# Patient Record
Sex: MALE | Race: WHITE | NOT HISPANIC OR LATINO | Employment: OTHER | URBAN - METROPOLITAN AREA
[De-identification: names, ages, dates, MRNs, and addresses within clinical notes are randomized per-mention and may not be internally consistent; named-entity substitution may affect disease eponyms.]

---

## 2017-01-24 ENCOUNTER — TELEPHONE (OUTPATIENT)
Dept: FAMILY MEDICINE | Facility: CLINIC | Age: 62
End: 2017-01-24

## 2017-01-24 DIAGNOSIS — M62.830 LUMBAR PARASPINAL MUSCLE SPASM: Primary | ICD-10-CM

## 2017-01-24 NOTE — TELEPHONE ENCOUNTER
Patient called in asking can you give him something for pain patient is having bad back spasms. Please advise.

## 2017-01-24 NOTE — TELEPHONE ENCOUNTER
----- Message from Luz Jones sent at 1/23/2017  8:33 AM CST -----  Contact: self  Pt requesting a call back regarding bad back spasms.  Please call pt at .    Thanks

## 2017-01-25 RX ORDER — ORPHENADRINE CITRATE 100 MG/1
100 TABLET, EXTENDED RELEASE ORAL 2 TIMES DAILY
Qty: 30 TABLET | Refills: 0 | Status: SHIPPED | OUTPATIENT
Start: 2017-01-25 | End: 2017-02-04

## 2017-01-25 RX ORDER — HYDROCODONE BITARTRATE AND ACETAMINOPHEN 7.5; 325 MG/1; MG/1
1 TABLET ORAL EVERY 6 HOURS PRN
Qty: 40 TABLET | Refills: 0 | Status: SHIPPED | OUTPATIENT
Start: 2017-01-25 | End: 2017-06-13 | Stop reason: SDUPTHER

## 2017-01-25 NOTE — TELEPHONE ENCOUNTER
Patient is having severe back spasms.  I called him, we will prescribe some orphenadrine 100 mg twice a day tonight, and prescribed Norco 7.5 mg for the patient's wife to  tomorrow.  Dr. Aquino

## 2017-01-26 ENCOUNTER — TELEPHONE (OUTPATIENT)
Dept: FAMILY MEDICINE | Facility: CLINIC | Age: 62
End: 2017-01-26

## 2017-01-31 RX ORDER — ALLOPURINOL 100 MG/1
TABLET ORAL
Qty: 180 TABLET | Refills: 0 | Status: SHIPPED | OUTPATIENT
Start: 2017-01-31 | End: 2017-04-27 | Stop reason: SDUPTHER

## 2017-02-20 RX ORDER — PRAVASTATIN SODIUM 20 MG/1
TABLET ORAL
Qty: 90 TABLET | Refills: 0 | Status: SHIPPED | OUTPATIENT
Start: 2017-02-20 | End: 2017-05-30 | Stop reason: SDUPTHER

## 2017-03-07 ENCOUNTER — OFFICE VISIT (OUTPATIENT)
Dept: OPTOMETRY | Facility: CLINIC | Age: 62
End: 2017-03-07
Payer: COMMERCIAL

## 2017-03-07 DIAGNOSIS — B30.9 VIRAL CONJUNCTIVITIS: Primary | ICD-10-CM

## 2017-03-07 PROCEDURE — 92012 INTRM OPH EXAM EST PATIENT: CPT | Mod: S$GLB,,, | Performed by: OPTOMETRIST

## 2017-03-07 PROCEDURE — 99999 PR PBB SHADOW E&M-EST. PATIENT-LVL II: CPT | Mod: PBBFAC,,, | Performed by: OPTOMETRIST

## 2017-03-07 NOTE — PATIENT INSTRUCTIONS
CONJUNCTIVITIS    Conjunctivitis Caused by Infection  Infections are caused by viruses or germs (bacteria). Treatment includes keeping your eyes and hands clean. Your health care provider may prescribe eye drops, and tell you to stay home from work or school if youre contagious. Untreated infections can be serious. It's important to see your provider for a diagnosis.    Viral infections  A cold, flu, or other virus can spread to your eyes. This causes a watery discharge. Your eyes may burn or itch and get red. Your eyelids may also be puffy and sore.    Treatment  Most viral infections go away on their own. Artificial tears and warm compresses can relieve symptoms. Your provider may also prescribe eye drops. A viral infection can be very contagious and spreads quickly. To prevent this, wash your hands often. Use a separate tissue to wipe each eye. Dont touch your eyes or share bedding or towels.       Bacterial infections  Bacterial infections often occur in one eye. There may be a watery or a thick discharge from the eye. These infections can cause serious damage to your eye if not treated promptly.    Treatment  Your provider may prescribe eye drops or ointment to kill the bacteria. Warm compresses can help keep the eyelids clean. To keep the bacteria from spreading, wash your hands often. Use a separate tissue to wipe each eye. Dont touch your eyes or share bedding or towels.      © 6448-5340 [employer]. [address] [place] [address], [place] [address]. All rights reserved. This information is not intended as a substitute for professional medical care. Always follow your healthcare professional's instructions.

## 2017-03-07 NOTE — PROGRESS NOTES
HPI     Dls: 11/29/16 Dr. Garrett x 1week ago dr. [place].     Pt states woke up this am red os itching os no burning no pain no tearing   no mucous no photophobia no changes in vision. Pt used systane balance.            Last edited by Veda Zurita on 3/7/2017  1:30 PM.       Assessment /Plan     For exam results, see Encounter Report.    Viral conjunctivitis    Cool compresses as needed. Artificial tears PRN for discomfort. Practice good hygiene. Return if symptoms worsen or fail to improve.

## 2017-03-20 ENCOUNTER — OFFICE VISIT (OUTPATIENT)
Dept: FAMILY MEDICINE | Facility: CLINIC | Age: 62
End: 2017-03-20
Payer: COMMERCIAL

## 2017-03-20 VITALS
BODY MASS INDEX: 25.33 KG/M2 | DIASTOLIC BLOOD PRESSURE: 88 MMHG | HEART RATE: 53 BPM | WEIGHT: 167.13 LBS | OXYGEN SATURATION: 99 % | TEMPERATURE: 100 F | SYSTOLIC BLOOD PRESSURE: 130 MMHG | HEIGHT: 68 IN

## 2017-03-20 PROCEDURE — 99999 PR PBB SHADOW E&M-EST. PATIENT-LVL III: CPT | Mod: PBBFAC,,,

## 2017-03-20 PROCEDURE — 3079F DIAST BP 80-89 MM HG: CPT | Mod: S$GLB,,,

## 2017-03-20 PROCEDURE — 99213 OFFICE O/P EST LOW 20 MIN: CPT | Mod: S$GLB,,,

## 2017-03-20 PROCEDURE — 3075F SYST BP GE 130 - 139MM HG: CPT | Mod: S$GLB,,,

## 2017-03-20 PROCEDURE — 1160F RVW MEDS BY RX/DR IN RCRD: CPT | Mod: S$GLB,,,

## 2017-03-20 RX ORDER — OSELTAMIVIR PHOSPHATE 75 MG/1
75 CAPSULE ORAL 2 TIMES DAILY
Qty: 10 CAPSULE | Refills: 0 | Status: SHIPPED | OUTPATIENT
Start: 2017-03-20 | End: 2017-03-25

## 2017-03-20 NOTE — PROGRESS NOTES
Chief Complaint   Patient presents with    Nasal Congestion    Fever    Cough       HPI  Denis Bunn is a 61 y.o. male who presents to the office with febrile illness.  The patient had no should the sudden onset of malaise, chills and fever 2 days ago.  This is associated with mild body ache, mild cough, but the patient is extremely fatigued.  Nonsmoker.  Pt is known to me.    HPI    PAST MEDICAL HISTORY:  Past Medical History:   Diagnosis Date    Back pain     Cataract     Constipation - functional     History of gout     HTN (hypertension), benign 4/24/2013    Prostatitis     Vision changes        PAST SURGICAL HISTORY:  Past Surgical History:   Procedure Laterality Date    CATARACT EXTRACTION      left eye    ELBOW SURGERY      scope/right    EYE SURGERY      Dr. Hope.retina x2 left    LAMINECTOMY      WRIST SURGERY      right       SOCIAL HISTORY:  Social History     Social History    Marital status:      Spouse name: N/A    Number of children: N/A    Years of education: N/A     Occupational History    Not on file.     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    Alcohol use 1.8 oz/week     3 Glasses of wine per week      Comment: 8-10 glasses wine per week    Drug use: No    Sexual activity: Yes     Partners: Female     Other Topics Concern    Not on file     Social History Narrative    No narrative on file       FAMILY HISTORY:  Family History   Problem Relation Age of Onset    Blindness Mother      from [facility]    Cataracts Mother     Cataracts Father     Cancer Father     No Known Problems Sister     No Known Problems Brother     No Known Problems Maternal Aunt     No Known Problems Maternal Uncle     No Known Problems Paternal Aunt     No Known Problems Paternal Uncle     No Known Problems Maternal Grandmother     No Known Problems Maternal Grandfather     No  Known Problems Paternal Grandmother     No Known Problems Paternal Grandfather     Amblyopia Neg Hx     Diabetes Neg Hx     Glaucoma Neg Hx     Hypertension Neg Hx     Macular degeneration Neg Hx     Retinal detachment Neg Hx     Strabismus Neg Hx     Stroke Neg Hx     Thyroid disease Neg Hx        ALLERGIES AND MEDICATIONS: updated and reviewed.  Review of patient's allergies indicates:   Allergen Reactions    Augmentin [amoxicillin-pot clavulanate]      Other reaction(s): Stomach upset    Celebrex [celecoxib] Other (See Comments)     Current Outpatient Prescriptions   Medication Sig Dispense Refill    allopurinol (ZYLOPRIM) 100 MG tablet TAKE 1 TABLET BY MOUTH TWICE A  tablet 0    allopurinol (ZYLOPRIM) 100 MG tablet TAKE 1 TABLET BY MOUTH TWICE A  tablet 0    ascorbic acid (VITAMIN C) 500 MG tablet Take 500 mg by mouth once daily.      aspirin 81 MG chewable tablet Take 1 tablet by mouth.      ciclopirox 1 % shampoo       DILTIAZEM HCL (DILTIAZEM 2% CREAM) Apply topically 3 (three) times daily. Apply topically to anal area. 50 g 5    hydrocodone-acetaminophen 7.5-325mg (NORCO) 7.5-325 mg per tablet Take 1 tablet by mouth every 6 (six) hours as needed for Pain. 40 tablet 0    meloxicam (MOBIC) 15 MG tablet Take 15 mg by mouth as needed.   5    OM-3/E/LINOL/ALA/OLEIC/GLA/LIP (OMEGA 3-6-9 ORAL) Take by mouth.      pravastatin (PRAVACHOL) 20 MG tablet TAKE 1 TABLET(20 MG) BY MOUTH EVERY DAY 90 tablet 0    SAW PALMETTO ORAL Take by mouth.      oseltamivir (TAMIFLU) 75 MG capsule Take 1 capsule (75 mg total) by mouth 2 (two) times daily. 10 capsule 0     No current facility-administered medications for this visit.        ROS  Review of Systems   Constitutional: Positive for chills and fever. Negative for appetite change.   HENT: Negative for postnasal drip and sore throat.    Respiratory: Positive for cough (Mild.).    Musculoskeletal: Positive for myalgias.       Physical  "Exam  Vitals:    03/20/17 1048   BP: 130/88   Pulse: (!) 53   Temp: 99.6 °F (37.6 °C)    Body mass index is 25.41 kg/(m^2).  Weight: 75.8 kg (167 lb 1.7 oz)   Height: 5' 8" (172.7 cm)     Physical Exam   Constitutional: He appears well-developed and well-nourished. No distress.   HENT:   TMs are normal.  Oropharynx is clear, well hydrated in no exudate.   Eyes: Conjunctivae are normal. Pupils are equal, round, and reactive to light.   Cardiovascular: Normal rate and regular rhythm.    Pulmonary/Chest: Effort normal and breath sounds normal.   Clear lungs.   Vitals reviewed.      Health Maintenance       Date Due Completion Date    TETANUS VACCINE 8/22/1972 ---    Zoster Vaccine 8/22/2014 ---    Lipid Panel 6/7/2021 6/7/2016    Colonoscopy 7/29/2024 7/29/2014          Assessment & Plan    1.  flu type A  Clinically, this is flu.  We'll go ahead and treat as such.  Follow-up if no improvement.  - oseltamivir (TAMIFLU) 75 MG capsule; Take 1 capsule (75 mg total) by mouth 2 (two) times daily.  Dispense: 10 capsule; Refill: 0      Return if symptoms worsen or fail to improve.        "

## 2017-03-20 NOTE — MR AVS SNAPSHOT
[facility]  [address]  [place] [address]  Phone: [phone]  Fax: [phone]                  Denis Bunn   3/20/2017 11:00 AM   Office Visit    Description:  Male : 1955   Provider:  Renan Aquino Jr., MD   Department:  [facility]           Reason for Visit     Nasal Congestion     Fever     Cough           Diagnoses this Visit        Comments    Asian flu type A    -  Primary            To Do List           Goals (5 Years of Data)     None      Follow-Up and Disposition     Return if symptoms worsen or fail to improve.       These Medications        Disp Refills Start End    oseltamivir (TAMIFLU) 75 MG capsule 10 capsule 0 3/20/2017 3/25/2017    Take 1 capsule (75 mg total) by mouth 2 (two) times daily. - Oral    Pharmacy: [facility]/pharmacy #[id number] - [place] - [address].  #: [phone]         [facility]     [facility] On Call Nurse Care Line -  Assistance  Registered nurses in the [facility] Call Center provide clinical advisement, health education, appointment booking, and other advisory services.  Call for this free service at [phone].             Medications           Message regarding Medications     Verify the changes and/or additions to your medication regime listed below are the same as discussed with your clinician today.  If any of these changes or additions are incorrect, please notify your healthcare provider.        START taking these NEW medications        Refills    oseltamivir (TAMIFLU) 75 MG capsule 0    Sig: Take 1 capsule (75 mg total) by mouth 2 (two) times daily.    Class: Normal    Route: Oral      STOP taking these medications     azithromycin (ZITHROMAX Z-MARTHA) 250 MG tablet Two tabs today then one daily until completed.           Verify that the below list of medications is an accurate representation of the medications you are currently taking.  If none reported, the list may be blank. If  "incorrect, please contact your healthcare provider. Carry this list with you in case of emergency.           Current Medications     allopurinol (ZYLOPRIM) 100 MG tablet TAKE 1 TABLET BY MOUTH TWICE A DAY    allopurinol (ZYLOPRIM) 100 MG tablet TAKE 1 TABLET BY MOUTH TWICE A DAY    ascorbic acid (VITAMIN C) 500 MG tablet Take 500 mg by mouth once daily.    aspirin 81 MG chewable tablet Take 1 tablet by mouth.    ciclopirox 1 % shampoo     DILTIAZEM HCL (DILTIAZEM 2% CREAM) Apply topically 3 (three) times daily. Apply topically to anal area.    hydrocodone-acetaminophen 7.5-325mg (NORCO) 7.5-325 mg per tablet Take 1 tablet by mouth every 6 (six) hours as needed for Pain.    meloxicam (MOBIC) 15 MG tablet Take 15 mg by mouth as needed.     OM-3/E/LINOL/ALA/OLEIC/GLA/LIP (OMEGA 3-6-9 ORAL) Take by mouth.    pravastatin (PRAVACHOL) 20 MG tablet TAKE 1 TABLET(20 MG) BY MOUTH EVERY DAY    SAW PALMETTO ORAL Take by mouth.    oseltamivir (TAMIFLU) 75 MG capsule Take 1 capsule (75 mg total) by mouth 2 (two) times daily.           Clinical Reference Information           Your Vitals Were     BP Pulse Temp Height Weight SpO2    130/88 (BP Location: Left arm, Patient Position: Sitting, BP Method: Manual) 53 99.6 °F (37.6 °C) 5' 8" (1.727 m) 75.8 kg (167 lb 1.7 oz) 99%    BMI                25.41 kg/m2          Blood Pressure          Most Recent Value    BP  130/88      Allergies as of 3/20/2017     Augmentin [Amoxicillin-pot Clavulanate]    Celebrex [Celecoxib]      Immunizations Administered on Date of Encounter - 3/20/2017     None      Language Assistance Services     ATTENTION: Language assistance services are available, free of charge. Please call [phone].      ATENCIÓN: Si jigneshla leny, tiene a lopez disposición servicios gratuitos de asistencia lingüística. Llame al [phone].     CHÚ Ý: N?u b?n nói Ti?ng Vi?t, có các d?ch v? h? tr? ngôn ng? mi?n phí dành cho b?n. G?i s? [phone].         [facility] complies with applicable Federal civil rights laws and does not discriminate on the basis of race, color, national origin, age, disability, or sex.

## 2017-03-21 ENCOUNTER — TELEPHONE (OUTPATIENT)
Dept: FAMILY MEDICINE | Facility: CLINIC | Age: 62
End: 2017-03-21

## 2017-03-21 ENCOUNTER — PATIENT MESSAGE (OUTPATIENT)
Dept: FAMILY MEDICINE | Facility: CLINIC | Age: 62
End: 2017-03-21

## 2017-03-21 DIAGNOSIS — J01.91 ACUTE RECURRENT SINUSITIS, UNSPECIFIED LOCATION: Primary | ICD-10-CM

## 2017-03-21 RX ORDER — METHYLPREDNISOLONE 4 MG/1
TABLET ORAL
Qty: 1 PACKAGE | Refills: 0 | Status: SHIPPED | OUTPATIENT
Start: 2017-03-21 | End: 2017-04-11

## 2017-04-21 ENCOUNTER — PATIENT MESSAGE (OUTPATIENT)
Dept: CARDIOLOGY | Facility: CLINIC | Age: 62
End: 2017-04-21

## 2017-04-27 RX ORDER — ALLOPURINOL 100 MG/1
TABLET ORAL
Qty: 180 TABLET | Refills: 0 | Status: SHIPPED | OUTPATIENT
Start: 2017-04-27 | End: 2017-06-13 | Stop reason: SDUPTHER

## 2017-05-23 ENCOUNTER — TELEPHONE (OUTPATIENT)
Dept: CARDIOLOGY | Facility: CLINIC | Age: 62
End: 2017-05-23

## 2017-05-24 NOTE — TELEPHONE ENCOUNTER
----- Message from Eli Keller sent at 5/23/2017  8:25 AM CDT -----  Contact: pt  Pls call pt at [phone].  He is returning someone's call from this morning to offer him an appt with Dr. Pereyra at the [facility] in July.  There are no appts available for me to offer him.    Thank you

## 2017-05-24 NOTE — TELEPHONE ENCOUNTER
Spoke with patient. He wants appt for him and wife on the same day with . Will schedule and call him back Thurs or Fri this week.

## 2017-05-31 ENCOUNTER — PATIENT MESSAGE (OUTPATIENT)
Dept: CARDIOLOGY | Facility: CLINIC | Age: 62
End: 2017-05-31

## 2017-05-31 RX ORDER — PRAVASTATIN SODIUM 20 MG/1
TABLET ORAL
Qty: 90 TABLET | Refills: 0 | Status: SHIPPED | OUTPATIENT
Start: 2017-05-31 | End: 2017-08-28 | Stop reason: SDUPTHER

## 2017-06-05 ENCOUNTER — LAB VISIT (OUTPATIENT)
Dept: LAB | Facility: HOSPITAL | Age: 62
End: 2017-06-05
Attending: INTERNAL MEDICINE
Payer: COMMERCIAL

## 2017-06-05 DIAGNOSIS — R73.01 IMPAIRED FASTING GLUCOSE: ICD-10-CM

## 2017-06-05 DIAGNOSIS — I10 HTN (HYPERTENSION), BENIGN: ICD-10-CM

## 2017-06-05 DIAGNOSIS — E78.5 DYSLIPIDEMIA: ICD-10-CM

## 2017-06-05 LAB
ALBUMIN SERPL BCP-MCNC: 3.9 G/DL
ALP SERPL-CCNC: 61 U/L
ALT SERPL W/O P-5'-P-CCNC: 21 U/L
ANION GAP SERPL CALC-SCNC: 7 MMOL/L
AST SERPL-CCNC: 25 U/L
BILIRUB SERPL-MCNC: 1 MG/DL
BUN SERPL-MCNC: 16 MG/DL
CALCIUM SERPL-MCNC: 9.6 MG/DL
CHLORIDE SERPL-SCNC: 106 MMOL/L
CHOLEST/HDLC SERPL: 2 {RATIO}
CO2 SERPL-SCNC: 28 MMOL/L
COMPLEXED PSA SERPL-MCNC: 2.5 NG/ML
CREAT SERPL-MCNC: 1.1 MG/DL
CRP SERPL-MCNC: 0.8 MG/L
EST. GFR  (AFRICAN AMERICAN): >60 ML/MIN/1.73 M^2
EST. GFR  (NON AFRICAN AMERICAN): >60 ML/MIN/1.73 M^2
GLUCOSE SERPL-MCNC: 103 MG/DL
HDL/CHOLESTEROL RATIO: 49.3 %
HDLC SERPL-MCNC: 152 MG/DL
HDLC SERPL-MCNC: 75 MG/DL
LDLC SERPL CALC-MCNC: 65.4 MG/DL
NONHDLC SERPL-MCNC: 77 MG/DL
POTASSIUM SERPL-SCNC: 5.2 MMOL/L
PROT SERPL-MCNC: 7.2 G/DL
SODIUM SERPL-SCNC: 141 MMOL/L
TRIGL SERPL-MCNC: 58 MG/DL
TSH SERPL DL<=0.005 MIU/L-ACNC: 1.28 UIU/ML

## 2017-06-05 PROCEDURE — 80061 LIPID PANEL: CPT

## 2017-06-05 PROCEDURE — 36415 COLL VENOUS BLD VENIPUNCTURE: CPT | Mod: PO

## 2017-06-05 PROCEDURE — 84443 ASSAY THYROID STIM HORMONE: CPT

## 2017-06-05 PROCEDURE — 86141 C-REACTIVE PROTEIN HS: CPT

## 2017-06-05 PROCEDURE — 84153 ASSAY OF PSA TOTAL: CPT

## 2017-06-05 PROCEDURE — 80053 COMPREHEN METABOLIC PANEL: CPT

## 2017-06-05 PROCEDURE — 83036 HEMOGLOBIN GLYCOSYLATED A1C: CPT

## 2017-06-06 LAB
ESTIMATED AVG GLUCOSE: 100 MG/DL
HBA1C MFR BLD HPLC: 5.1 %

## 2017-06-13 ENCOUNTER — OFFICE VISIT (OUTPATIENT)
Dept: FAMILY MEDICINE | Facility: CLINIC | Age: 62
End: 2017-06-13
Payer: COMMERCIAL

## 2017-06-13 VITALS
HEART RATE: 68 BPM | BODY MASS INDEX: 25.09 KG/M2 | DIASTOLIC BLOOD PRESSURE: 80 MMHG | WEIGHT: 165.56 LBS | OXYGEN SATURATION: 99 % | SYSTOLIC BLOOD PRESSURE: 136 MMHG | TEMPERATURE: 99 F | HEIGHT: 68 IN

## 2017-06-13 DIAGNOSIS — M62.830 LUMBAR PARASPINAL MUSCLE SPASM: ICD-10-CM

## 2017-06-13 DIAGNOSIS — M54.9 CHRONIC BACK PAIN, UNSPECIFIED BACK LOCATION, UNSPECIFIED BACK PAIN LATERALITY: ICD-10-CM

## 2017-06-13 DIAGNOSIS — G89.29 CHRONIC BACK PAIN, UNSPECIFIED BACK LOCATION, UNSPECIFIED BACK PAIN LATERALITY: ICD-10-CM

## 2017-06-13 DIAGNOSIS — Z00.00 ROUTINE MEDICAL EXAM: Primary | ICD-10-CM

## 2017-06-13 PROCEDURE — 99396 PREV VISIT EST AGE 40-64: CPT | Mod: S$GLB,,,

## 2017-06-13 PROCEDURE — 99999 PR PBB SHADOW E&M-EST. PATIENT-LVL III: CPT | Mod: PBBFAC,,,

## 2017-06-13 RX ORDER — ORPHENADRINE CITRATE 100 MG/1
100 TABLET, EXTENDED RELEASE ORAL 2 TIMES DAILY
Qty: 60 TABLET | Refills: 5 | Status: SHIPPED | OUTPATIENT
Start: 2017-06-13 | End: 2017-06-23

## 2017-06-13 RX ORDER — MELOXICAM 15 MG/1
15 TABLET ORAL
Qty: 60 TABLET | Refills: 5 | Status: SHIPPED | OUTPATIENT
Start: 2017-06-13 | End: 2018-05-14 | Stop reason: ALTCHOICE

## 2017-06-13 RX ORDER — HYDROCODONE BITARTRATE AND ACETAMINOPHEN 7.5; 325 MG/1; MG/1
1 TABLET ORAL EVERY 6 HOURS PRN
Qty: 60 TABLET | Refills: 0 | Status: SHIPPED | OUTPATIENT
Start: 2017-07-13 | End: 2017-07-07 | Stop reason: SDUPTHER

## 2017-06-13 RX ORDER — HYDROCODONE BITARTRATE AND ACETAMINOPHEN 7.5; 325 MG/1; MG/1
1 TABLET ORAL EVERY 6 HOURS PRN
Qty: 60 TABLET | Refills: 0 | Status: SHIPPED | OUTPATIENT
Start: 2017-08-12 | End: 2017-07-07 | Stop reason: SDUPTHER

## 2017-06-13 RX ORDER — HYDROCODONE BITARTRATE AND ACETAMINOPHEN 7.5; 325 MG/1; MG/1
1 TABLET ORAL EVERY 6 HOURS PRN
Qty: 60 TABLET | Refills: 0 | Status: SHIPPED | OUTPATIENT
Start: 2017-06-13 | End: 2017-07-13

## 2017-06-13 NOTE — PROGRESS NOTES
No chief complaint on file.      HPI  Denis Bunn is a 61 y.o. male who presents to the office today for annual physical examination and health care review.  The patient is in generally good health, with 2 major problems: Chronic back pain, for which she is now seeing an acupuncturist.  The patient has had several back operations, most of them were done by Dr. Ivelisse Crump.  The patient is getting to the point where he is able to play golf at the present time, this was not a given several months ago.  Pt is known to me.    Second big problem is that he has significant ophthalmological disease, he sees eye doctors on a regular basis, essentially, the patient has retinal disease of a chronic nature which is deteriorative in nature.      HPI    PAST MEDICAL HISTORY:  Past Medical History:   Diagnosis Date    Back pain     Cataract     Constipation - functional     History of gout     HTN (hypertension), benign 4/24/2013    Prostatitis     Vision changes        PAST SURGICAL HISTORY:  Past Surgical History:   Procedure Laterality Date    CATARACT EXTRACTION      left eye    ELBOW SURGERY      scope/right    EYE SURGERY      Dr. Hope.retina x2 left    LAMINECTOMY      WRIST SURGERY      right       SOCIAL HISTORY:  Social History     Social History    Marital status:      Spouse name: N/A    Number of children: N/A    Years of education: N/A     Occupational History    Not on file.     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    Alcohol use 1.8 oz/week     3 Glasses of wine per week      Comment: 8-10 glasses wine per week    Drug use: No    Sexual activity: Yes     Partners: Female     Other Topics Concern    Not on file     Social History Narrative    No narrative on file       FAMILY HISTORY:  Family History   Problem Relation Age of Onset    Blindness Mother      from [facility]     Cataracts Mother     Cataracts Father     Cancer Father     No Known Problems Sister     No Known Problems Brother     No Known Problems Maternal Aunt     No Known Problems Maternal Uncle     No Known Problems Paternal Aunt     No Known Problems Paternal Uncle     No Known Problems Maternal Grandmother     No Known Problems Maternal Grandfather     No Known Problems Paternal Grandmother     No Known Problems Paternal Grandfather     Amblyopia Neg Hx     Diabetes Neg Hx     Glaucoma Neg Hx     Hypertension Neg Hx     Macular degeneration Neg Hx     Retinal detachment Neg Hx     Strabismus Neg Hx     Stroke Neg Hx     Thyroid disease Neg Hx        ALLERGIES AND MEDICATIONS: updated and reviewed.  Review of patient's allergies indicates:   Allergen Reactions    Augmentin [amoxicillin-pot clavulanate]      Other reaction(s): Stomach upset    Celebrex [celecoxib] Other (See Comments)     Current Outpatient Prescriptions   Medication Sig Dispense Refill    allopurinol (ZYLOPRIM) 100 MG tablet TAKE 1 TABLET BY MOUTH TWICE A  tablet 0    ascorbic acid (VITAMIN C) 500 MG tablet Take 500 mg by mouth once daily.      aspirin 81 MG chewable tablet Take 1 tablet by mouth.      ciclopirox 1 % shampoo       hydrocodone-acetaminophen 7.5-325mg (NORCO) 7.5-325 mg per tablet Take 1 tablet by mouth every 6 (six) hours as needed for Pain. 60 tablet 0    [START ON 7/13/2017] hydrocodone-acetaminophen 7.5-325mg (NORCO) 7.5-325 mg per tablet Take 1 tablet by mouth every 6 (six) hours as needed for Pain. 60 tablet 0    [START ON 8/12/2017] hydrocodone-acetaminophen 7.5-325mg (NORCO) 7.5-325 mg per tablet Take 1 tablet by mouth every 6 (six) hours as needed for Pain. 60 tablet 0    meloxicam (MOBIC) 15 MG tablet Take 15 mg by mouth as needed.   5    OM-3/E/LINOL/ALA/OLEIC/GLA/LIP (OMEGA 3-6-9 ORAL) Take by mouth.      pravastatin (PRAVACHOL) 20 MG tablet TAKE 1 TABLET(20 MG) BY MOUTH EVERY DAY 90  "tablet 0    SAW PALMETTO ORAL Take by mouth.      DILTIAZEM HCL (DILTIAZEM 2% CREAM) Apply topically 3 (three) times daily. Apply topically to anal area. 50 g 5     No current facility-administered medications for this visit.        ROS  Review of Systems   Constitutional: Negative for unexpected weight change. Activity change: he exercises a lot.  Swims.   HENT: Negative for hearing loss, rhinorrhea and trouble swallowing.    Eyes: Negative for discharge and visual disturbance.        See history of present illness.   Respiratory: Negative for chest tightness and wheezing.    Cardiovascular: Negative for chest pain and palpitations.        No exertional symptoms whatsoever.   Gastrointestinal: Negative for blood in stool, constipation, diarrhea and vomiting.        Up-to-date with colonoscopy, apparently in the remote past he had some benign polyps, on a 5 year follow-up.   Endocrine: Negative for polydipsia and polyuria.   Genitourinary: Negative.  Negative for difficulty urinating, hematuria and urgency.   Musculoskeletal: Positive for arthralgias and back pain (with a lot of spasm.). Negative for joint swelling and neck pain.   Neurological: Negative.  Negative for weakness and headaches.   Psychiatric/Behavioral: Negative.  Negative for confusion and dysphoric mood.       Physical Exam  Vitals:    06/13/17 0750   BP: 136/80   Pulse: 68   Temp: 98.8 °F (37.1 °C)    Body mass index is 25.17 kg/m².  Weight: 75.1 kg (165 lb 9.1 oz)   Height: 5' 8" (172.7 cm)     Physical Exam   Constitutional: He is oriented to person, place, and time. He appears well-developed and well-nourished. No distress.   HENT:   TMs normal.  Oropharynx is clear, with good dental care.   Eyes: Conjunctivae are normal. Pupils are equal, round, and reactive to light.   Neck: Normal range of motion. Neck supple. No thyromegaly present.   Cardiovascular: Normal rate, regular rhythm and intact distal pulses.    No murmur " heard.  Pulmonary/Chest: Effort normal and breath sounds normal.   Abdominal: Soft. Bowel sounds are normal.   Genitourinary:   Genitourinary Comments: Testicular/hernia examination-normal.   Musculoskeletal: He exhibits no edema.   Neurological: He is alert and oriented to person, place, and time.   Skin: Skin is dry. No rash noted.   Psychiatric: He has a normal mood and affect. His behavior is normal.   Vitals reviewed.      Health Maintenance       Date Due Completion Date    TETANUS VACCINE 08/22/1972 ---    Zoster Vaccine 08/22/2014 ---    Influenza Vaccine 08/01/2017 10/3/2016    Lipid Panel 06/05/2022 6/5/2017    Colonoscopy 07/29/2024 7/29/2014          Assessment & Plan    1. Routine medical exam  Nondiagnostic examination.    2. Chronic back pain, unspecified back location, unspecified back pain laterality  This is a chronic problem for the patient, the patient does everything that he can to improve his back situation.    3. Lumbar paraspinal muscle spasm  As above.  Continue medications, patient is able to perform very well with these.  I would recommend continuation as is.  - hydrocodone-acetaminophen 7.5-325mg (NORCO) 7.5-325 mg per tablet; Take 1 tablet by mouth every 6 (six) hours as needed for Pain.  Dispense: 60 tablet; Refill: 0  - hydrocodone-acetaminophen 7.5-325mg (NORCO) 7.5-325 mg per tablet; Take 1 tablet by mouth every 6 (six) hours as needed for Pain.  Dispense: 60 tablet; Refill: 0  - hydrocodone-acetaminophen 7.5-325mg (NORCO) 7.5-325 mg per tablet; Take 1 tablet by mouth every 6 (six) hours as needed for Pain.  Dispense: 60 tablet; Refill: 0  - orphenadrine (NORFLEX) 100 mg tablet; Take 1 tablet (100 mg total) by mouth 2 (two) times daily.  Dispense: 60 tablet; Refill: 5  - meloxicam (MOBIC) 15 MG tablet; Take 1 tablet (15 mg total) by mouth as needed.  Dispense: 60 tablet; Refill: 5      Return in about 1 year (around 6/13/2018).

## 2017-06-27 ENCOUNTER — TELEPHONE (OUTPATIENT)
Dept: FAMILY MEDICINE | Facility: CLINIC | Age: 62
End: 2017-06-27

## 2017-06-27 RX ORDER — ALLOPURINOL 100 MG/1
100 TABLET ORAL 2 TIMES DAILY
Qty: 10 TABLET | Refills: 1 | Status: SHIPPED | OUTPATIENT
Start: 2017-06-27 | End: 2017-07-07 | Stop reason: SDUPTHER

## 2017-06-27 NOTE — TELEPHONE ENCOUNTER
----- Message from Lissette Barrios sent at 6/27/2017  8:36 AM CDT -----  Contact: pt  Pt is out of town and left his Zyloprim 100 mg at home and would like 10 pills called in to [facility] [phone].   LOV 7/14/16    Thanks

## 2017-06-27 NOTE — TELEPHONE ENCOUNTER
----- Message from Sissy Alexis sent at 6/27/2017  8:25 AM CDT -----  Contact: self  Pt calling to get a 5 day script (10 pills) of allopurinol (ZYLOPRIM) 100 MG tablet. Pt is on vacation and left script at home. Please send a script to [facility] in [place] information below. Please call pt at [phone]       [facility]  [address] [place], [place]  [phone]

## 2017-07-06 NOTE — PROGRESS NOTES
Subjective:   Patient ID:  Denis Bunn is a 61 y.o. male who presents for follow-up of CAD risk    HPI: The patient is here for CAD risk factors.   The patient has no chest pain, SOB, TIA, palpitations, syncope or pre-syncope.Patient currently exercises many times per week.        Review of Systems   Constitution: Negative for chills, decreased appetite, diaphoresis, fever, weakness, malaise/fatigue, night sweats, weight gain and weight loss.   HENT: Negative for congestion, headaches, hoarse voice, nosebleeds, sore throat and tinnitus.    Eyes: Negative for blurred vision, double vision, vision loss in left eye, vision loss in right eye, visual disturbance and visual halos.   Cardiovascular: Negative for chest pain, claudication, cyanosis, dyspnea on exertion, irregular heartbeat, leg swelling, near-syncope, orthopnea, palpitations, paroxysmal nocturnal dyspnea and syncope.   Respiratory: Negative for cough, hemoptysis, shortness of breath, sleep disturbances due to breathing, snoring, sputum production and wheezing.    Endocrine: Negative for cold intolerance, heat intolerance, polydipsia, polyphagia and polyuria.   Hematologic/Lymphatic: Negative for adenopathy and bleeding problem. Does not bruise/bleed easily.   Skin: Negative for color change, dry skin, flushing, itching, nail changes, poor wound healing, rash, skin cancer, suspicious lesions and unusual hair distribution.   Musculoskeletal: Negative for arthritis, back pain, falls, gout, joint pain, joint swelling, muscle cramps, muscle weakness, myalgias and stiffness.   Gastrointestinal: Negative for abdominal pain, anorexia, change in bowel habit, constipation, diarrhea, dysphagia, heartburn, hematemesis, hematochezia, melena and vomiting.   Genitourinary: Negative for decreased libido, dysuria, hematuria, hesitancy and urgency.   Neurological: Negative for excessive daytime sleepiness, dizziness, focal weakness, light-headedness, loss of balance,  "numbness, paresthesias, seizures, sensory change, tremors and vertigo.   Psychiatric/Behavioral: Negative for altered mental status, depression, hallucinations, memory loss, substance abuse and suicidal ideas. The patient does not have insomnia and is not nervous/anxious.    Allergic/Immunologic: Negative for environmental allergies and hives.       Objective: /86   Pulse 64   Ht 5' 8" (1.727 m)   Wt 73.5 kg (162 lb 2.4 oz)   BMI 24.65 kg/m²      Physical Exam   Constitutional: He is oriented to person, place, and time. He appears well-developed and well-nourished. No distress.   HENT:   Head: Normocephalic.   Eyes: EOM are normal. Pupils are equal, round, and reactive to light.   Neck: Normal range of motion. No thyromegaly present.   Cardiovascular: Normal rate, regular rhythm, normal heart sounds and intact distal pulses.  Exam reveals no gallop and no friction rub.    No murmur heard.  Pulses:       Carotid pulses are 3+ on the right side, and 3+ on the left side.       Radial pulses are 3+ on the right side, and 3+ on the left side.        Femoral pulses are 3+ on the right side, and 3+ on the left side.       Popliteal pulses are 3+ on the right side, and 3+ on the left side.        Dorsalis pedis pulses are 3+ on the right side, and 3+ on the left side.        Posterior tibial pulses are 3+ on the right side, and 3+ on the left side.   Pulmonary/Chest: Effort normal and breath sounds normal. No respiratory distress. He has no wheezes. He has no rales. He exhibits no tenderness.   Abdominal: Soft. He exhibits no distension and no mass. There is no tenderness.   Musculoskeletal: Normal range of motion.   Lymphadenopathy:     He has no cervical adenopathy.   Neurological: He is alert and oriented to person, place, and time.   Skin: Skin is warm. He is not diaphoretic. No cyanosis. Nails show no clubbing.   Psychiatric: He has a normal mood and affect. His speech is normal and behavior is normal. " Judgment and thought content normal. Cognition and memory are normal.       Assessment:     1. FH: CAD (coronary artery disease)    2. HTN (hypertension), benign    3. Impaired fasting glucose    4. Dyslipidemia        Plan:   Discussed diet , achieving and maintaining ideal body weight, and exercise.   We reviewed meds in detail.  Reassured  Discussed goals  Denis was seen today for hypertension.    Diagnoses and all orders for this visit:    FH: CAD (coronary artery disease)  -     allopurinol (ZYLOPRIM) 100 MG tablet; Take 1 tablet (100 mg total) by mouth 2 (two) times daily.  -     Lipid panel; Future; Expected date: 07/07/2018  -     Comprehensive metabolic panel; Future; Expected date: 07/07/2018  -     TSH; Future; Expected date: 07/07/2018  -     PSA, Screening; Future; Expected date: 07/07/2018  -     EKG 12-lead; Future; Expected date: 07/07/2018    HTN (hypertension), benign  -     allopurinol (ZYLOPRIM) 100 MG tablet; Take 1 tablet (100 mg total) by mouth 2 (two) times daily.  -     Lipid panel; Future; Expected date: 07/07/2018  -     Comprehensive metabolic panel; Future; Expected date: 07/07/2018  -     TSH; Future; Expected date: 07/07/2018  -     PSA, Screening; Future; Expected date: 07/07/2018  -     EKG 12-lead; Future; Expected date: 07/07/2018    Impaired fasting glucose  -     PSA, Screening; Future; Expected date: 07/07/2018  -     Hemoglobin A1c; Future; Expected date: 07/07/2018    Dyslipidemia  -     Lipid panel; Future; Expected date: 07/07/2018  -     Comprehensive metabolic panel; Future; Expected date: 07/07/2018  -     TSH; Future; Expected date: 07/07/2018  -     PSA, Screening; Future; Expected date: 07/07/2018  -     EKG 12-lead; Future; Expected date: 07/07/2018    Other orders  -     doxycycline (VIBRAMYCIN) 100 MG Cap; Take 1 capsule by mouth once daily.            Return in about 1 year (around 7/7/2018) for with ECG and labs.

## 2017-07-07 ENCOUNTER — OFFICE VISIT (OUTPATIENT)
Dept: CARDIOLOGY | Facility: CLINIC | Age: 62
End: 2017-07-07
Payer: COMMERCIAL

## 2017-07-07 VITALS
HEART RATE: 64 BPM | HEIGHT: 68 IN | SYSTOLIC BLOOD PRESSURE: 138 MMHG | BODY MASS INDEX: 24.57 KG/M2 | DIASTOLIC BLOOD PRESSURE: 86 MMHG | WEIGHT: 162.13 LBS

## 2017-07-07 DIAGNOSIS — I10 HTN (HYPERTENSION), BENIGN: ICD-10-CM

## 2017-07-07 DIAGNOSIS — Z82.49 FH: CAD (CORONARY ARTERY DISEASE): Primary | ICD-10-CM

## 2017-07-07 DIAGNOSIS — E78.5 DYSLIPIDEMIA: ICD-10-CM

## 2017-07-07 DIAGNOSIS — R73.01 IMPAIRED FASTING GLUCOSE: ICD-10-CM

## 2017-07-07 PROCEDURE — 99999 PR PBB SHADOW E&M-EST. PATIENT-LVL III: CPT | Mod: PBBFAC,,, | Performed by: INTERNAL MEDICINE

## 2017-07-07 PROCEDURE — 99214 OFFICE O/P EST MOD 30 MIN: CPT | Mod: S$GLB,,, | Performed by: INTERNAL MEDICINE

## 2017-07-07 RX ORDER — DOXYCYCLINE 100 MG/1
1 CAPSULE ORAL DAILY
COMMUNITY
Start: 2017-07-06 | End: 2017-07-16

## 2017-07-07 RX ORDER — ALLOPURINOL 100 MG/1
100 TABLET ORAL 2 TIMES DAILY
Qty: 30 TABLET | Refills: 11 | Status: SHIPPED | OUTPATIENT
Start: 2017-07-07 | End: 2017-11-16 | Stop reason: SDUPTHER

## 2017-08-28 RX ORDER — PRAVASTATIN SODIUM 20 MG/1
TABLET ORAL
Qty: 90 TABLET | Refills: 0 | Status: SHIPPED | OUTPATIENT
Start: 2017-08-28 | End: 2017-11-25 | Stop reason: SDUPTHER

## 2017-09-26 ENCOUNTER — CLINICAL SUPPORT (OUTPATIENT)
Dept: FAMILY MEDICINE | Facility: CLINIC | Age: 62
End: 2017-09-26
Payer: COMMERCIAL

## 2017-09-26 DIAGNOSIS — Z23 NEED FOR INFLUENZA VACCINATION: Primary | ICD-10-CM

## 2017-09-26 PROCEDURE — 99499 UNLISTED E&M SERVICE: CPT | Mod: S$GLB,,, | Performed by: INTERNAL MEDICINE

## 2017-09-26 PROCEDURE — 90686 IIV4 VACC NO PRSV 0.5 ML IM: CPT | Mod: S$GLB,,, | Performed by: INTERNAL MEDICINE

## 2017-09-26 PROCEDURE — 90471 IMMUNIZATION ADMIN: CPT | Mod: S$GLB,,, | Performed by: INTERNAL MEDICINE

## 2017-11-01 ENCOUNTER — OFFICE VISIT (OUTPATIENT)
Dept: FAMILY MEDICINE | Facility: CLINIC | Age: 62
End: 2017-11-01
Payer: COMMERCIAL

## 2017-11-01 VITALS
OXYGEN SATURATION: 98 % | WEIGHT: 169.75 LBS | BODY MASS INDEX: 25.73 KG/M2 | HEART RATE: 54 BPM | SYSTOLIC BLOOD PRESSURE: 134 MMHG | HEIGHT: 68 IN | RESPIRATION RATE: 17 BRPM | DIASTOLIC BLOOD PRESSURE: 88 MMHG | TEMPERATURE: 98 F

## 2017-11-01 DIAGNOSIS — M54.16 LUMBAR RADICULOPATHY: Primary | ICD-10-CM

## 2017-11-01 PROCEDURE — 99999 PR PBB SHADOW E&M-EST. PATIENT-LVL III: CPT | Mod: PBBFAC,,, | Performed by: INTERNAL MEDICINE

## 2017-11-01 PROCEDURE — 99213 OFFICE O/P EST LOW 20 MIN: CPT | Mod: S$GLB,,, | Performed by: INTERNAL MEDICINE

## 2017-11-01 RX ORDER — PREGABALIN 75 MG/1
CAPSULE ORAL
COMMUNITY
End: 2018-06-26

## 2017-11-01 RX ORDER — TRAMADOL HYDROCHLORIDE 50 MG/1
TABLET ORAL
COMMUNITY
Start: 2017-08-01 | End: 2018-06-26

## 2017-11-01 RX ORDER — CYCLOBENZAPRINE HCL 10 MG
TABLET ORAL
COMMUNITY
Start: 2017-08-01 | End: 2018-07-13

## 2017-11-01 RX ORDER — HYDROCODONE BITARTRATE AND ACETAMINOPHEN 7.5; 325 MG/1; MG/1
TABLET ORAL
COMMUNITY
Start: 2017-08-24 | End: 2018-07-13

## 2017-11-01 NOTE — PROGRESS NOTES
"Subjective:       Patient ID: Denis Bunn is a 62 y.o. male.    Chief Complaint: Establish Care    Est new pcp (formerly with Dr. Dooley)    HPI: 61 y/o with long history of lumbar disc disease is status post two fusions (L5-S1 in 1988 and L3-L4 in 2015) presents to establish PCP. He is actually scheduled next week for fusion at L2-L3 for worsening radicular symptoms with Dr. Ivelisse Crump. He has chronic lower back pain with radiation to bilateral posterior thighs and on left to lateral lower leg and foot. He has no loss of bowel or bladder or motor dysfunction. He is currently using lyrica nightly and tramadol once per day for pain control. Until two weeks ago he was swimming three time per week for exercise, but has discontinued this due to worsening pain. No orthopnea PND or chest pain. No LE swelling      Review of Systems   Constitutional: Positive for activity change. Negative for unexpected weight change.   HENT: Negative for hearing loss, rhinorrhea and trouble swallowing.    Eyes: Negative for discharge and visual disturbance.   Respiratory: Negative for chest tightness and wheezing.    Cardiovascular: Negative for chest pain and palpitations.   Gastrointestinal: Negative for blood in stool, constipation, diarrhea and vomiting.   Endocrine: Negative for polydipsia and polyuria.   Genitourinary: Negative for difficulty urinating, hematuria and urgency.   Musculoskeletal: Positive for arthralgias. Negative for joint swelling and neck pain.   Neurological: Negative for weakness and headaches.   Psychiatric/Behavioral: Negative for confusion and dysphoric mood.       Objective:     Vitals:    11/01/17 0747 11/01/17 0817   BP: (!) 146/94 134/88   BP Location: Left arm    Patient Position: Sitting    BP Method: Medium (Manual)    Pulse: 62 (!) 54   Resp: 17    Temp: 98.2 °F (36.8 °C)    TempSrc: Oral    SpO2: 98%    Weight: 77 kg (169 lb 12.1 oz)    Height: 5' 8" (1.727 m)           Physical Exam "   Constitutional: He is oriented to person, place, and time. He appears well-developed and well-nourished.   HENT:   Head: Normocephalic and atraumatic.   Eyes: Conjunctivae are normal. Pupils are equal, round, and reactive to light.   Neck: Normal range of motion.   Cardiovascular: Normal rate and regular rhythm.  Exam reveals no gallop and no friction rub.    No murmur heard.  Rate low 60's   Pulmonary/Chest: Effort normal and breath sounds normal. He has no wheezes. He has no rales.   Abdominal: Soft. Bowel sounds are normal. There is no tenderness. There is no rebound and no guarding.   Musculoskeletal: Normal range of motion. He exhibits no edema or tenderness.   Neurological: He is alert and oriented to person, place, and time. No cranial nerve deficit.   Normal gait observed without foot drop   Skin: Skin is warm and dry.   Psychiatric: He has a normal mood and affect.       Assessment:       1. Lumbar radiculopathy        Plan:    1. To follow up with his surgeon as scheduled defer pain management ot surgeon. He is aware of importance of early mobilization after surgery to prevent DVT.

## 2017-11-16 DIAGNOSIS — I10 HTN (HYPERTENSION), BENIGN: ICD-10-CM

## 2017-11-16 DIAGNOSIS — Z82.49 FH: CAD (CORONARY ARTERY DISEASE): ICD-10-CM

## 2017-11-16 RX ORDER — ALLOPURINOL 100 MG/1
TABLET ORAL
Qty: 30 TABLET | Refills: 1 | Status: SHIPPED | OUTPATIENT
Start: 2017-11-16 | End: 2017-12-06 | Stop reason: SDUPTHER

## 2017-11-27 RX ORDER — PRAVASTATIN SODIUM 20 MG/1
TABLET ORAL
Qty: 90 TABLET | Refills: 0 | Status: SHIPPED | OUTPATIENT
Start: 2017-11-27 | End: 2018-02-23 | Stop reason: SDUPTHER

## 2017-12-01 ENCOUNTER — PATIENT MESSAGE (OUTPATIENT)
Dept: CARDIOLOGY | Facility: CLINIC | Age: 62
End: 2017-12-01

## 2017-12-06 ENCOUNTER — PATIENT MESSAGE (OUTPATIENT)
Dept: CARDIOLOGY | Facility: CLINIC | Age: 62
End: 2017-12-06

## 2017-12-06 DIAGNOSIS — Z82.49 FH: CAD (CORONARY ARTERY DISEASE): ICD-10-CM

## 2017-12-06 DIAGNOSIS — I10 HTN (HYPERTENSION), BENIGN: ICD-10-CM

## 2017-12-06 RX ORDER — ALLOPURINOL 100 MG/1
100 TABLET ORAL 2 TIMES DAILY
Qty: 180 TABLET | Refills: 3 | Status: SHIPPED | OUTPATIENT
Start: 2017-12-06 | End: 2018-12-16 | Stop reason: SDUPTHER

## 2018-02-23 RX ORDER — PRAVASTATIN SODIUM 20 MG/1
TABLET ORAL
Qty: 90 TABLET | Refills: 0 | Status: SHIPPED | OUTPATIENT
Start: 2018-02-23 | End: 2018-05-22 | Stop reason: SDUPTHER

## 2018-05-14 ENCOUNTER — OFFICE VISIT (OUTPATIENT)
Dept: FAMILY MEDICINE | Facility: CLINIC | Age: 63
End: 2018-05-14
Payer: COMMERCIAL

## 2018-05-14 VITALS
WEIGHT: 167.31 LBS | RESPIRATION RATE: 17 BRPM | HEIGHT: 68 IN | DIASTOLIC BLOOD PRESSURE: 84 MMHG | HEART RATE: 46 BPM | SYSTOLIC BLOOD PRESSURE: 130 MMHG | BODY MASS INDEX: 25.36 KG/M2 | OXYGEN SATURATION: 97 % | TEMPERATURE: 99 F

## 2018-05-14 DIAGNOSIS — M54.9 CHRONIC BACK PAIN, UNSPECIFIED BACK LOCATION, UNSPECIFIED BACK PAIN LATERALITY: ICD-10-CM

## 2018-05-14 DIAGNOSIS — K29.50 CHRONIC GASTRITIS WITHOUT BLEEDING, UNSPECIFIED GASTRITIS TYPE: Primary | ICD-10-CM

## 2018-05-14 DIAGNOSIS — G89.29 CHRONIC BACK PAIN, UNSPECIFIED BACK LOCATION, UNSPECIFIED BACK PAIN LATERALITY: ICD-10-CM

## 2018-05-14 PROCEDURE — 99999 PR PBB SHADOW E&M-EST. PATIENT-LVL III: CPT | Mod: PBBFAC,,, | Performed by: INTERNAL MEDICINE

## 2018-05-14 PROCEDURE — 3008F BODY MASS INDEX DOCD: CPT | Mod: CPTII,S$GLB,, | Performed by: INTERNAL MEDICINE

## 2018-05-14 PROCEDURE — 3075F SYST BP GE 130 - 139MM HG: CPT | Mod: CPTII,S$GLB,, | Performed by: INTERNAL MEDICINE

## 2018-05-14 PROCEDURE — 99214 OFFICE O/P EST MOD 30 MIN: CPT | Mod: S$GLB,,, | Performed by: INTERNAL MEDICINE

## 2018-05-14 PROCEDURE — 3079F DIAST BP 80-89 MM HG: CPT | Mod: CPTII,S$GLB,, | Performed by: INTERNAL MEDICINE

## 2018-05-14 RX ORDER — NAPROXEN 500 MG/1
500 TABLET ORAL 2 TIMES DAILY WITH MEALS
COMMUNITY
Start: 2018-05-03 | End: 2019-03-11

## 2018-05-14 RX ORDER — OMEPRAZOLE 40 MG/1
40 CAPSULE, DELAYED RELEASE ORAL DAILY
Qty: 14 CAPSULE | Refills: 0 | Status: SHIPPED | OUTPATIENT
Start: 2018-05-14 | End: 2018-07-13

## 2018-05-14 NOTE — PROGRESS NOTES
"Subjective:       Patient ID: Denis Bunn is a 62 y.o. male.    Chief Complaint: Abdominal Cramping; Nausea; and Gas    Abdominal Cramping   This is a new problem. The current episode started 1 to 4 weeks ago (x10 days). The onset quality is gradual. The problem occurs intermittently. The most recent episode lasted 1 hour. The problem has been waxing and waning. The pain is located in the periumbilical region. The patient is experiencing no pain. Quality: cramping, and "queasy" filling occasionaly worse after eating. The abdominal pain does not radiate. Associated symptoms include nausea. Pertinent negatives include no constipation, diarrhea, dysuria, fever, headaches, hematuria or vomiting. The pain is aggravated by eating. The pain is relieved by belching. He has tried nothing for the symptoms.   one month ago switched from meloxicam to naproxen by his orthopedic surgeon. Noted approximately ten days ago feeling of queasiness. Stopped naproxen with initially some improvement but still with occasional periumbical "cramping" associated with PO intake. Moving bowels daily no change in color or character no melena or BRBPR no dysphagia/odonyphagia  Review of Systems   Constitutional: Negative for activity change, appetite change, fatigue, fever and unexpected weight change.   HENT: Negative for ear pain, rhinorrhea and sore throat.    Eyes: Negative for discharge and visual disturbance.   Respiratory: Negative for chest tightness, shortness of breath and wheezing.    Cardiovascular: Negative for chest pain, palpitations and leg swelling.   Gastrointestinal: Positive for abdominal distention and nausea. Negative for abdominal pain, constipation, diarrhea and vomiting.   Endocrine: Negative for cold intolerance and heat intolerance.   Genitourinary: Negative for dysuria and hematuria.   Musculoskeletal: Negative for joint swelling and neck stiffness.   Skin: Negative for rash.   Neurological: Negative for " "dizziness, syncope, weakness and headaches.   Psychiatric/Behavioral: Negative for suicidal ideas.       Objective:     Vitals:    05/14/18 1058   BP: 130/84   BP Location: Right arm   Patient Position: Sitting   BP Method: Medium (Manual)   Pulse: (!) 46   Resp: 17   Temp: 98.6 °F (37 °C)   TempSrc: Oral   SpO2: 97%   Weight: 75.9 kg (167 lb 5.3 oz)   Height: 5' 8" (1.727 m)          Physical Exam   Constitutional: He is oriented to person, place, and time. He appears well-developed and well-nourished.   HENT:   Head: Normocephalic and atraumatic.   Eyes: Conjunctivae are normal. Pupils are equal, round, and reactive to light. No scleral icterus.   Neck: Normal range of motion.   Cardiovascular: Normal rate and regular rhythm.  Exam reveals no gallop and no friction rub.    No murmur heard.  Pulmonary/Chest: Effort normal and breath sounds normal. He has no wheezes. He has no rales.   Abdominal: Soft. Bowel sounds are normal. There is no tenderness. There is no rebound and no guarding.   Bowel sounds in all four quadrents no tenderness to deep palpation   Musculoskeletal: Normal range of motion. He exhibits no edema or tenderness.   Neurological: He is alert and oriented to person, place, and time. No cranial nerve deficit.   Skin: Skin is warm and dry.   Psychiatric: He has a normal mood and affect.       Assessment and Plan   1. Chronic gastritis without bleeding, unspecified gastritis type  In light of recent NSAID change suspect gastritis. Trial daily ppi if no improvement after foru days (and holding NSAIDs) would consider GI evaluation with EGD  - omeprazole (PRILOSEC) 40 MG capsule; Take 1 capsule (40 mg total) by mouth once daily.  Dispense: 14 capsule; Refill: 0    2. Chronic back pain, unspecified back location, unspecified back pain laterality  Trial muscle relaxer to replace NSAID  "

## 2018-05-15 DIAGNOSIS — R00.2 PALPITATIONS: Primary | ICD-10-CM

## 2018-05-16 ENCOUNTER — PATIENT MESSAGE (OUTPATIENT)
Dept: FAMILY MEDICINE | Facility: CLINIC | Age: 63
End: 2018-05-16

## 2018-05-22 RX ORDER — PRAVASTATIN SODIUM 20 MG/1
TABLET ORAL
Qty: 90 TABLET | Refills: 0 | Status: SHIPPED | OUTPATIENT
Start: 2018-05-22 | End: 2018-08-23 | Stop reason: SDUPTHER

## 2018-06-06 ENCOUNTER — PATIENT MESSAGE (OUTPATIENT)
Dept: FAMILY MEDICINE | Facility: CLINIC | Age: 63
End: 2018-06-06

## 2018-06-11 ENCOUNTER — PATIENT MESSAGE (OUTPATIENT)
Dept: FAMILY MEDICINE | Facility: CLINIC | Age: 63
End: 2018-06-11

## 2018-06-11 ENCOUNTER — PATIENT MESSAGE (OUTPATIENT)
Dept: CARDIOLOGY | Facility: CLINIC | Age: 63
End: 2018-06-11

## 2018-06-11 ENCOUNTER — LAB VISIT (OUTPATIENT)
Dept: LAB | Facility: HOSPITAL | Age: 63
End: 2018-06-11
Payer: COMMERCIAL

## 2018-06-11 DIAGNOSIS — Z82.49 FH: CAD (CORONARY ARTERY DISEASE): ICD-10-CM

## 2018-06-11 DIAGNOSIS — R73.01 IMPAIRED FASTING GLUCOSE: ICD-10-CM

## 2018-06-11 DIAGNOSIS — E78.5 DYSLIPIDEMIA: ICD-10-CM

## 2018-06-11 DIAGNOSIS — I10 HTN (HYPERTENSION), BENIGN: ICD-10-CM

## 2018-06-11 LAB
ALBUMIN SERPL BCP-MCNC: 4 G/DL
ALP SERPL-CCNC: 53 U/L
ALT SERPL W/O P-5'-P-CCNC: 19 U/L
ANION GAP SERPL CALC-SCNC: 8 MMOL/L
AST SERPL-CCNC: 24 U/L
BILIRUB SERPL-MCNC: 0.9 MG/DL
BUN SERPL-MCNC: 19 MG/DL
CALCIUM SERPL-MCNC: 10 MG/DL
CHLORIDE SERPL-SCNC: 106 MMOL/L
CHOLEST SERPL-MCNC: 158 MG/DL
CHOLEST/HDLC SERPL: 2.3 {RATIO}
CO2 SERPL-SCNC: 29 MMOL/L
COMPLEXED PSA SERPL-MCNC: 3.6 NG/ML
CREAT SERPL-MCNC: 1.1 MG/DL
EST. GFR  (AFRICAN AMERICAN): >60 ML/MIN/1.73 M^2
EST. GFR  (NON AFRICAN AMERICAN): >60 ML/MIN/1.73 M^2
ESTIMATED AVG GLUCOSE: 100 MG/DL
GLUCOSE SERPL-MCNC: 99 MG/DL
HBA1C MFR BLD HPLC: 5.1 %
HDLC SERPL-MCNC: 69 MG/DL
HDLC SERPL: 43.7 %
LDLC SERPL CALC-MCNC: 74.8 MG/DL
NONHDLC SERPL-MCNC: 89 MG/DL
POTASSIUM SERPL-SCNC: 5.1 MMOL/L
PROT SERPL-MCNC: 7.2 G/DL
SODIUM SERPL-SCNC: 143 MMOL/L
TRIGL SERPL-MCNC: 71 MG/DL
TSH SERPL DL<=0.005 MIU/L-ACNC: 1.59 UIU/ML

## 2018-06-11 PROCEDURE — 80061 LIPID PANEL: CPT

## 2018-06-11 PROCEDURE — 84153 ASSAY OF PSA TOTAL: CPT

## 2018-06-11 PROCEDURE — 80053 COMPREHEN METABOLIC PANEL: CPT

## 2018-06-11 PROCEDURE — 84443 ASSAY THYROID STIM HORMONE: CPT

## 2018-06-11 PROCEDURE — 36415 COLL VENOUS BLD VENIPUNCTURE: CPT | Mod: PN

## 2018-06-11 PROCEDURE — 83036 HEMOGLOBIN GLYCOSYLATED A1C: CPT

## 2018-06-12 ENCOUNTER — PATIENT MESSAGE (OUTPATIENT)
Dept: CARDIOLOGY | Facility: CLINIC | Age: 63
End: 2018-06-12

## 2018-06-15 ENCOUNTER — TELEPHONE (OUTPATIENT)
Dept: FAMILY MEDICINE | Facility: CLINIC | Age: 63
End: 2018-06-15

## 2018-06-15 NOTE — TELEPHONE ENCOUNTER
----- Message from Xochitl elvisdana sent at 6/15/2018 10:32 AM CDT -----  Contact: self  Pt called to r/s appt on 06/18. He states it is ridiculous that he will have to wait until 07/13 for first available appt. Please call back to discuss possible earlier appt.  [phone].

## 2018-06-25 ENCOUNTER — PATIENT MESSAGE (OUTPATIENT)
Dept: CARDIOLOGY | Facility: CLINIC | Age: 63
End: 2018-06-25

## 2018-06-26 ENCOUNTER — OFFICE VISIT (OUTPATIENT)
Dept: FAMILY MEDICINE | Facility: CLINIC | Age: 63
End: 2018-06-26
Payer: COMMERCIAL

## 2018-06-26 VITALS
DIASTOLIC BLOOD PRESSURE: 92 MMHG | OXYGEN SATURATION: 98 % | HEIGHT: 68 IN | WEIGHT: 167.56 LBS | TEMPERATURE: 98 F | BODY MASS INDEX: 25.39 KG/M2 | HEART RATE: 60 BPM | RESPIRATION RATE: 12 BRPM | SYSTOLIC BLOOD PRESSURE: 180 MMHG

## 2018-06-26 DIAGNOSIS — R00.2 PALPITATIONS: ICD-10-CM

## 2018-06-26 DIAGNOSIS — Z00.00 VISIT FOR WELL MAN HEALTH CHECK: Primary | ICD-10-CM

## 2018-06-26 DIAGNOSIS — I10 HTN (HYPERTENSION), BENIGN: ICD-10-CM

## 2018-06-26 DIAGNOSIS — R00.2 HEART PALPITATIONS: Primary | ICD-10-CM

## 2018-06-26 DIAGNOSIS — E78.5 DYSLIPIDEMIA: ICD-10-CM

## 2018-06-26 DIAGNOSIS — F41.9 ANXIETY: ICD-10-CM

## 2018-06-26 PROCEDURE — 3080F DIAST BP >= 90 MM HG: CPT | Mod: CPTII,S$GLB,, | Performed by: FAMILY MEDICINE

## 2018-06-26 PROCEDURE — 93005 ELECTROCARDIOGRAM TRACING: CPT | Mod: S$GLB,,, | Performed by: FAMILY MEDICINE

## 2018-06-26 PROCEDURE — 3077F SYST BP >= 140 MM HG: CPT | Mod: CPTII,S$GLB,, | Performed by: FAMILY MEDICINE

## 2018-06-26 PROCEDURE — 99999 PR PBB SHADOW E&M-EST. PATIENT-LVL III: CPT | Mod: PBBFAC,,, | Performed by: FAMILY MEDICINE

## 2018-06-26 PROCEDURE — 93010 ELECTROCARDIOGRAM REPORT: CPT | Mod: S$GLB,,, | Performed by: INTERNAL MEDICINE

## 2018-06-26 PROCEDURE — 99396 PREV VISIT EST AGE 40-64: CPT | Mod: S$GLB,,, | Performed by: FAMILY MEDICINE

## 2018-06-26 RX ORDER — TRIAZOLAM 0.12 MG/1
0.12 TABLET ORAL NIGHTLY PRN
Qty: 30 TABLET | Refills: 0 | Status: SHIPPED | OUTPATIENT
Start: 2018-06-26 | End: 2018-07-26

## 2018-06-26 NOTE — PROGRESS NOTES
"Well Man VISIT      CHIEF COMPLAINT  Chief Complaint   Patient presents with    Annual Exam       HPI  Denis Bunn is a 62 y.o. male who presents for physical.     Social Factors  Tobacco use: No  Ready to Quit: No  Alcohol: Yes on rare occasion  Intimate partner violence screening  "Do you feel safe in your current relationship?" Yes   "Have you ever been in a relationship in which your partner frightened you or hurt you?" No  Living Will/POA: No  Regular Exercise: Yes swims 3 days a week    Depression  Over the past two weeks, have you felt down, depressed, or hopeless? Yes feels down due to chronic back apin  Over the past two weeks, have you felt little interest or pleasure in doing things? No    Reproductive Health  STD screening in last year: deferred  HIV screening: deferred    Screen for Chronic Disease  CHD Risk Factors: hypertension and male gender  Estimated body mass index is 25.48 kg/m² as calculated from the following:    Height as of this encounter: 5' 8" (1.727 m).    Weight as of this encounter: 76 kg (167 lb 8.8 oz).  Dyslipidemia screening needed: utd  T2DM screening needed: utd  Colonoscopy needed: utd  PSA needed: utd  AAA screening needed:n/a  Screen men 35 years and older, and men 20 to 34 years of age who have cardiovascular risk factors for dyslipidemia  Begin screening colonoscopies at 50 years of age in men of average risk, and continue until 75 years of age; offer fecal occult blood testing every year, flexible sigmoidoscopy every five years combined with fecal occult blood testing every three years, or colonoscopy every 10 years   The American U[employer] Association recommends offering PSA testing and digital rectal examination to well-informed men beginning at 40 years of age and continuing until life expectancy is less than 10 years  Screen once with ultrasonography in men 65 to 75 years of age if they have a family history or have smoked at [id number] cigarettes in their " "lifetime  Screen men with a sustained blood pressure greater than 135/80 mm Hg for T2DM      Immunizations  delayed    ALLERGIES and MEDS were verified.   PMHx, PSHx, FHx, SOCIALHx were updated as pertinent.    REVIEW OF SYSTEMS  Review of Systems   HENT: Negative for hearing loss.    Eyes: Negative for discharge.   Respiratory: Negative for wheezing.    Cardiovascular: Negative for chest pain and palpitations.   Gastrointestinal: Negative for blood in stool, constipation, diarrhea and vomiting.   Genitourinary: Negative for hematuria and urgency.   Musculoskeletal: Negative for neck pain.   Neurological: Negative for weakness and headaches.   Endo/Heme/Allergies: Negative for polydipsia.         PHYSICAL EXAM  VITAL SIGNS: BP (!) 180/92 (BP Location: Left arm, Patient Position: Sitting, BP Method: Medium (Manual))   Pulse 60   Temp 98 °F (36.7 °C) (Oral)   Resp 12   Ht 5' 8" (1.727 m)   Wt 76 kg (167 lb 8.8 oz)   SpO2 98%   BMI 25.48 kg/m²   GEN: Well developed, Well nourished, No acute distress.  HENT: Normocephalic, Atraumatic, Bilateral external ears normal, Nose normal, Oropharynx moist, No oral exudates.   Eyes: PERRL, EOMI, Conjunctiva normal, No discharge.   Neck: Supple, No tenderness.  Lymphatic: No cervical or supraclavicular lymphadenopathy noted.   Cardiovascular: Normal heart rate, Normal rhythm, No murmurs, No rubs, No gallops.   Thorax & Lungs: Normal breath sounds, No respiratory distress, No wheezing.  Abdomen: Soft, No tenderness, Bowel sounds normal.  Genital: deferred  Skin: Warm, Dry, No erythema, No rash.   Extremities: No edema, No tenderness.       ASSESSMENT/PLAN    Denis was seen today for annual exam.    Diagnoses and all orders for this visit:    Visit for [employer] [place] health check    Palpitations  -     IN OFFICE EKG 12-LEAD (to Muse)  -     EKG RHYTHM STRIP (to Muse); Future    HTN (hypertension), benign    Dyslipidemia    Other orders  -     Cancel: (In Office Administered) Tdap " Vaccine       1.  Labs reviewed and look good  2.  PSA is elevated compared to last year, so recommend repeat in 6 months or sooner should symptoms develop  3.  traizolam for sleep and anxiety    FOLLOW UP: cardiology follow up this week    Eleazar Spain MD

## 2018-06-27 ENCOUNTER — CLINICAL SUPPORT (OUTPATIENT)
Dept: ELECTROPHYSIOLOGY | Facility: CLINIC | Age: 63
End: 2018-06-27
Payer: COMMERCIAL

## 2018-06-27 DIAGNOSIS — R00.2 HEART PALPITATIONS: ICD-10-CM

## 2018-06-27 PROCEDURE — 93268 ECG RECORD/REVIEW: CPT | Mod: S$GLB,,, | Performed by: INTERNAL MEDICINE

## 2018-06-29 ENCOUNTER — PATIENT MESSAGE (OUTPATIENT)
Dept: FAMILY MEDICINE | Facility: CLINIC | Age: 63
End: 2018-06-29

## 2018-06-29 ENCOUNTER — PATIENT MESSAGE (OUTPATIENT)
Dept: CARDIOLOGY | Facility: CLINIC | Age: 63
End: 2018-06-29

## 2018-07-01 ENCOUNTER — HOSPITAL ENCOUNTER (OUTPATIENT)
Facility: HOSPITAL | Age: 63
Discharge: HOME | End: 2018-07-02
Attending: EMERGENCY MEDICINE | Admitting: EMERGENCY MEDICINE
Payer: COMMERCIAL

## 2018-07-01 DIAGNOSIS — R00.2 PALPITATIONS: Primary | ICD-10-CM

## 2018-07-01 LAB
ANION GAP SERPL CALC-SCNC: 8 MMOL/L
BASOPHILS # BLD AUTO: 0.04 K/UL
BASOPHILS NFR BLD: 0.6 %
BNP SERPL-MCNC: 254 PG/ML
BUN SERPL-MCNC: 19 MG/DL
CALCIUM SERPL-MCNC: 9.7 MG/DL
CHLORIDE SERPL-SCNC: 106 MMOL/L
CO2 SERPL-SCNC: 27 MMOL/L
CREAT SERPL-MCNC: 1.1 MG/DL
DIFFERENTIAL METHOD: ABNORMAL
EOSINOPHIL # BLD AUTO: 0.1 K/UL
EOSINOPHIL NFR BLD: 1.1 %
ERYTHROCYTE [DISTWIDTH] IN BLOOD BY AUTOMATED COUNT: 12.5 %
EST. GFR  (AFRICAN AMERICAN): >60 ML/MIN/1.73 M^2
EST. GFR  (NON AFRICAN AMERICAN): >60 ML/MIN/1.73 M^2
GLUCOSE SERPL-MCNC: 112 MG/DL
HCT VFR BLD AUTO: 42.7 %
HGB BLD-MCNC: 14.4 G/DL
IMM GRANULOCYTES # BLD AUTO: 0.02 K/UL
IMM GRANULOCYTES NFR BLD AUTO: 0.3 %
LYMPHOCYTES # BLD AUTO: 1.6 K/UL
LYMPHOCYTES NFR BLD: 22.1 %
MAGNESIUM SERPL-MCNC: 2.3 MG/DL
MCH RBC QN AUTO: 31.4 PG
MCHC RBC AUTO-ENTMCNC: 33.7 G/DL
MCV RBC AUTO: 93 FL
MONOCYTES # BLD AUTO: 0.7 K/UL
MONOCYTES NFR BLD: 9.6 %
NEUTROPHILS # BLD AUTO: 4.7 K/UL
NEUTROPHILS NFR BLD: 66.3 %
NRBC BLD-RTO: 0 /100 WBC
PHOSPHATE SERPL-MCNC: 2.5 MG/DL
PLATELET # BLD AUTO: 264 K/UL
PMV BLD AUTO: 9.9 FL
POTASSIUM SERPL-SCNC: 4.2 MMOL/L
RBC # BLD AUTO: 4.58 M/UL
SODIUM SERPL-SCNC: 141 MMOL/L
T4 FREE SERPL-MCNC: 0.94 NG/DL
TROPONIN I SERPL DL<=0.01 NG/ML-MCNC: <0.006 NG/ML
TSH SERPL DL<=0.005 MIU/L-ACNC: 1.23 UIU/ML
WBC # BLD AUTO: 7.07 K/UL

## 2018-07-01 PROCEDURE — 83880 ASSAY OF NATRIURETIC PEPTIDE: CPT

## 2018-07-01 PROCEDURE — 93010 ELECTROCARDIOGRAM REPORT: CPT | Mod: ,,, | Performed by: INTERNAL MEDICINE

## 2018-07-01 PROCEDURE — 25000003 PHARM REV CODE 250: Performed by: PHYSICIAN ASSISTANT

## 2018-07-01 PROCEDURE — 99219 PR INITIAL OBSERVATION CARE,LEVL II: CPT | Mod: ,,, | Performed by: PHYSICIAN ASSISTANT

## 2018-07-01 PROCEDURE — 99285 EMERGENCY DEPT VISIT HI MDM: CPT | Mod: 25

## 2018-07-01 PROCEDURE — 80048 BASIC METABOLIC PNL TOTAL CA: CPT

## 2018-07-01 PROCEDURE — 85025 COMPLETE CBC W/AUTO DIFF WBC: CPT

## 2018-07-01 PROCEDURE — 93306 TTE W/DOPPLER COMPLETE: CPT | Mod: 26,,, | Performed by: INTERNAL MEDICINE

## 2018-07-01 PROCEDURE — 84443 ASSAY THYROID STIM HORMONE: CPT

## 2018-07-01 PROCEDURE — A4216 STERILE WATER/SALINE, 10 ML: HCPCS | Performed by: PHYSICIAN ASSISTANT

## 2018-07-01 PROCEDURE — 84100 ASSAY OF PHOSPHORUS: CPT

## 2018-07-01 PROCEDURE — G0378 HOSPITAL OBSERVATION PER HR: HCPCS

## 2018-07-01 PROCEDURE — 93306 TTE W/DOPPLER COMPLETE: CPT

## 2018-07-01 PROCEDURE — 84484 ASSAY OF TROPONIN QUANT: CPT

## 2018-07-01 PROCEDURE — 83735 ASSAY OF MAGNESIUM: CPT

## 2018-07-01 PROCEDURE — 25000003 PHARM REV CODE 250: Performed by: EMERGENCY MEDICINE

## 2018-07-01 PROCEDURE — 99285 EMERGENCY DEPT VISIT HI MDM: CPT | Mod: ,,, | Performed by: EMERGENCY MEDICINE

## 2018-07-01 PROCEDURE — 84439 ASSAY OF FREE THYROXINE: CPT

## 2018-07-01 PROCEDURE — 93005 ELECTROCARDIOGRAM TRACING: CPT

## 2018-07-01 RX ORDER — NAPROXEN SODIUM 220 MG/1
81 TABLET, FILM COATED ORAL DAILY
Status: DISCONTINUED | OUTPATIENT
Start: 2018-07-02 | End: 2018-07-02 | Stop reason: HOSPADM

## 2018-07-01 RX ORDER — ONDANSETRON 8 MG/1
8 TABLET, ORALLY DISINTEGRATING ORAL EVERY 8 HOURS PRN
Status: DISCONTINUED | OUTPATIENT
Start: 2018-07-01 | End: 2018-07-02 | Stop reason: HOSPADM

## 2018-07-01 RX ORDER — ACETAMINOPHEN 325 MG/1
650 TABLET ORAL EVERY 6 HOURS PRN
Status: DISCONTINUED | OUTPATIENT
Start: 2018-07-01 | End: 2018-07-02 | Stop reason: HOSPADM

## 2018-07-01 RX ORDER — METOPROLOL TARTRATE 25 MG/1
12.5 TABLET ORAL 2 TIMES DAILY
Status: DISCONTINUED | OUTPATIENT
Start: 2018-07-01 | End: 2018-07-01

## 2018-07-01 RX ORDER — METOPROLOL TARTRATE 25 MG/1
12.5 TABLET ORAL EVERY 6 HOURS
Status: DISCONTINUED | OUTPATIENT
Start: 2018-07-01 | End: 2018-07-02

## 2018-07-01 RX ORDER — PRAVASTATIN SODIUM 20 MG/1
20 TABLET ORAL NIGHTLY
Status: DISCONTINUED | OUTPATIENT
Start: 2018-07-01 | End: 2018-07-02 | Stop reason: HOSPADM

## 2018-07-01 RX ORDER — LISINOPRIL 5 MG/1
5 TABLET ORAL DAILY
Status: DISCONTINUED | OUTPATIENT
Start: 2018-07-01 | End: 2018-07-02 | Stop reason: HOSPADM

## 2018-07-01 RX ORDER — RAMELTEON 8 MG/1
8 TABLET ORAL NIGHTLY PRN
Status: DISCONTINUED | OUTPATIENT
Start: 2018-07-01 | End: 2018-07-02 | Stop reason: HOSPADM

## 2018-07-01 RX ORDER — ALLOPURINOL 100 MG/1
100 TABLET ORAL 2 TIMES DAILY
Status: DISCONTINUED | OUTPATIENT
Start: 2018-07-01 | End: 2018-07-02 | Stop reason: HOSPADM

## 2018-07-01 RX ORDER — SODIUM CHLORIDE 0.9 % (FLUSH) 0.9 %
3 SYRINGE (ML) INJECTION EVERY 8 HOURS
Status: DISCONTINUED | OUTPATIENT
Start: 2018-07-01 | End: 2018-07-02 | Stop reason: HOSPADM

## 2018-07-01 RX ORDER — NAPROXEN 500 MG/1
500 TABLET ORAL 2 TIMES DAILY PRN
Status: DISCONTINUED | OUTPATIENT
Start: 2018-07-01 | End: 2018-07-02 | Stop reason: HOSPADM

## 2018-07-01 RX ADMIN — ALLOPURINOL 100 MG: 100 TABLET ORAL at 08:07

## 2018-07-01 RX ADMIN — PRAVASTATIN SODIUM 20 MG: 20 TABLET ORAL at 08:07

## 2018-07-01 RX ADMIN — METOPROLOL TARTRATE 12.5 MG: 25 TABLET, FILM COATED ORAL at 01:07

## 2018-07-01 RX ADMIN — SODIUM CHLORIDE, PRESERVATIVE FREE 3 ML: 5 INJECTION INTRAVENOUS at 08:07

## 2018-07-01 RX ADMIN — LISINOPRIL 5 MG: 5 TABLET ORAL at 03:07

## 2018-07-01 NOTE — HPI
"62 year old male with a PMHx of HTN (never on medications) and HLD presenting to the ER with wife at bedside complaining of palpitations x3 weeks. Pt reports episodes have been intermittent over the past 3 weeks and describes them as "my heart jumping around". He has gone periods of 3-4 days without any symptoms. He also noticed improvement of symptoms with Xanax. Pt reports increased frequency last night and reports constant "fluttering" since ~3AM. Pt denies associated CP, SOB, diaphoresis, N/V, lightheadedness, and association with exertion. Pt denies all other complaints- abdominal pain, N/V/D, fever/chills, urinary sx, recent illness/antibitoic use, headache, vision/speech changes. Pt endorses drinking 1-2 glasses of wine nightly. Pt denies smoking and illicit drug use.       In the ED, patient is hypertensive. Afebrile without leukocytosis. Labs entirely unremarkable, including troponin. EKG shows NSR with frequent PVCs. CXR without acute process.   "

## 2018-07-01 NOTE — ASSESSMENT & PLAN NOTE
-Admit to OBS  -Obtain 2D echo with [facility]  -Start metoprolol 12.5 mg po BID and increase dose as tolerated  -Cardiology will follow along and possibly consult EP if symptoms persist despite medical therapy.

## 2018-07-01 NOTE — ASSESSMENT & PLAN NOTE
- Hypertensive on arrival  - Afebrile without leukocytosis  - Labs entirely unremarkable, including troponin  - EKG shows NSR with frequent PVCs  - CXR without acute process  - Outpatient cardiologist, Dr. Pereyra ordered 30 day event monitor which is showing intermittent PVCs with sinus/bradycardia  - Cardiology consulted in the ER. Discussed with cardiology staff, Dr. Grijalva; plan to start lopressor 12.5 mg q6, lisinopril 5 mg daily, and obtain 2d echo  - Telemetry

## 2018-07-01 NOTE — HPI
62 M with PMH HTN placed in obs for palpitations and frequent PVCs. The patient has had these symptoms for the past 3 weeks which are intermittent, described as a rapid heart rate in which at times he can feel his heart beating in his throat. The symptoms are more noticeable when he is at rest, they are usually mild and he ignores them when he is active. He denies any syncope, near-syncope, chest pain/tightness/pressure, history of stroke/TIA or focal weakness. There are no alleviating factors, however he is more symptomatic when he is under stress. He drinks 1 cup of coffee daily, uses no other stimulants or illicit drugs, reports moderate alcohol consumption. He is currently wearing a 30 day event monitor. He exercises (swimming) multiple times per week). He is a never smoker, there is a family history of CAD in his father, and the patient has had negative cardiac stress testing in the past.

## 2018-07-01 NOTE — ED NOTES
WAR room called for pt portable tele monitor. Awaiting monitor prior to transportation. Pt report given to FLORENTINO Sutton on OBS 1-A.

## 2018-07-01 NOTE — H&P
"[facility] Medicine  History & Physical    Patient Name: Denis Bunn  MRN: [id number]  Admission Date: 7/1/2018  Attending Physician: Lupe Son MD   Primary Care Provider: Taz Lilly MD    [facility] Medicine Team: [facility] MED  Rosalie Cross PA-C     Patient information was obtained from patient, past medical records and ER records.     Subjective:     Principal Problem:Palpitations    Chief Complaint:   Chief Complaint   Patient presents with    Palpitations     for a few weeks, worse since 3am today, denies CP or SOB, currently wearing external heart monitor        HPI: 62 year old male with a PMHx of HTN (never on medications) and HLD presenting to the ER with wife at bedside complaining of palpitations x3 weeks. Pt reports episodes have been intermittent over the past 3 weeks and describes them as "my heart jumping around". He has gone periods of 3-4 days without any symptoms. He also noticed improvement of symptoms with Xanax. Pt reports increased frequency last night and reports constant "fluttering" since ~3AM. Pt denies associated CP, SOB, diaphoresis, N/V, lightheadedness, and association with exertion. Pt denies all other complaints- abdominal pain, N/V/D, fever/chills, urinary sx, recent illness/antibitoic use, headache, vision/speech changes. Pt endorses drinking 1-2 glasses of wine nightly. Pt denies smoking and illicit drug use.       In the ED, patient is hypertensive. Afebrile without leukocytosis. Labs entirely unremarkable, including troponin. EKG shows NSR with frequent PVCs. CXR without acute process.     Past Medical History:   Diagnosis Date    Back pain     Cataract     Constipation - functional     History of gout     HTN (hypertension), benign 4/24/2013    Prostatitis     Vision changes        Past Surgical History:   Procedure Laterality Date    BACK SURGERY      CATARACT EXTRACTION      left eye    ELBOW SURGERY      scope/right "    EYE SURGERY      Dr. Hope.retina x2 left    LAMINECTOMY      WRIST SURGERY      right       Review of patient's allergies indicates:   Allergen Reactions    Augmentin [amoxicillin-pot clavulanate]      Other reaction(s): Stomach upset    Celebrex [celecoxib] Other (See Comments)       No current facility-administered medications on file prior to encounter.      Current Outpatient Prescriptions on File Prior to Encounter   Medication Sig    allopurinol (ZYLOPRIM) 100 MG tablet Take 1 tablet (100 mg total) by mouth 2 (two) times daily.    naproxen (NAPROSYN) 500 MG tablet Take 500 mg by mouth 2 (two) times daily with meals.     pravastatin (PRAVACHOL) 20 MG tablet TAKE 1 TABLET(20 MG) BY MOUTH EVERY DAY    ascorbic acid (VITAMIN C) 500 MG tablet Take 500 mg by mouth once daily.    aspirin 81 MG chewable tablet Take 1 tablet by mouth.    ciclopirox 1 % shampoo every other day.     cyclobenzaprine (FLEXERIL) 10 MG tablet     hydrocodone-acetaminophen 7.5-325mg (NORCO) 7.5-325 mg per tablet     OM-3/E/LINOL/ALA/OLEIC/GLA/LIP (OMEGA 3-6-9 ORAL) Take by mouth once daily.     omeprazole (PRILOSEC) 40 MG capsule Take 1 capsule (40 mg total) by mouth once daily.    SAW PALMETTO ORAL Take by mouth once daily.     triazolam (HALCION) 0.125 MG tablet Take 1 tablet (0.125 mg total) by mouth nightly as needed.     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    Alcohol use 1.8 oz/week     3 Glasses of wine per week      Comment: 8-10 glasses wine per week    Drug use: No    Sexual activity: Yes     Partners:  Female     Review of Systems   Constitutional: Negative for chills, diaphoresis, fatigue and fever.   HENT: Negative for congestion, hearing loss and trouble swallowing.    Eyes: Negative for photophobia and visual disturbance.   Respiratory: Negative for cough, chest tightness, shortness of breath and wheezing.    Cardiovascular: Positive for palpitations. Negative for chest pain and leg swelling.   Gastrointestinal: Negative for abdominal distention, abdominal pain, diarrhea, nausea and vomiting.   Genitourinary: Negative for difficulty urinating, dysuria, flank pain, frequency and hematuria.   Musculoskeletal: Positive for back pain (chronic). Negative for gait problem, myalgias and neck pain.   Skin: Negative for rash and wound.   Neurological: Negative for dizziness, seizures, syncope, speech difficulty, weakness, light-headedness and headaches.   Psychiatric/Behavioral: Negative for agitation, confusion and dysphoric mood. The patient is nervous/anxious.      Objective:     Vital Signs (Most Recent):  Temp: 97.8 °F (36.6 °C) (07/01/18 1047)  Pulse: 66 (07/01/18 1449)  Resp: (!) 22 (07/01/18 1449)  BP: (!) 187/116 (07/01/18 1449)  SpO2: 99 % (07/01/18 1449) Vital Signs (24h Range):  Temp:  [97.8 °F (36.6 °C)] 97.8 °F (36.6 °C)  Pulse:  [60-68] 66  Resp:  [12-24] 22  SpO2:  [98 %-99 %] 99 %  BP: (159-227)/() 187/116     Weight: 72.6 kg (160 lb)  Body mass index is 24.33 kg/m².    Physical Exam   Constitutional: He is oriented to person, place, and time. He appears well-developed and well-nourished. No distress.   HENT:   Head: Normocephalic and atraumatic.   Eyes: Conjunctivae and EOM are normal. Right eye exhibits no discharge. Left eye exhibits no discharge. No scleral icterus.   Neck: Normal range of motion. Neck supple. No tracheal deviation present.   Cardiovascular: Normal rate, regular rhythm, normal heart sounds and intact distal pulses.    No murmur heard.  Pulmonary/Chest: Effort normal and  breath sounds normal. No respiratory distress. He has no wheezes.   Abdominal: Soft. Bowel sounds are normal. He exhibits no distension. There is no tenderness.   Musculoskeletal: Normal range of motion. He exhibits no edema or tenderness.   Neurological: He is alert and oriented to person, place, and time. No cranial nerve deficit.   Skin: Skin is warm and dry. He is not diaphoretic. No erythema.   Psychiatric: He has a normal mood and affect. His behavior is normal.         CRANIAL NERVES     CN III, IV, VI   Extraocular motions are normal.        Significant Labs: All pertinent labs within the past 24 hours have been reviewed.    Significant Imaging: I have reviewed all pertinent imaging results/findings within the past 24 hours.    Assessment/Plan:     * Palpitations    - Hypertensive on arrival  - Afebrile without leukocytosis  - Labs entirely unremarkable, including troponin  - EKG shows NSR with frequent PVCs  - CXR without acute process  - Outpatient cardiologist, Dr. Pereyra ordered 30 day event monitor which is showing intermittent PVCs with sinus/bradycardia  - Cardiology consulted in the ER. Discussed with cardiology staff, Dr. Grijalva; plan to start lopressor 12.5 mg q6, lisinopril 5 mg daily, and obtain 2d echo  - Telemetry        HTN (hypertension), benign    - Per patient, never been on antihypertensive regimen  - Uncontrolled and elevated on arrival  - Will start lopressor 12.5 mg q6 and lisinopril 5 mg daily per cardiology recs  - Continue to monitor        Dyslipidemia    - Continue home Pravastatin 20 mg qhs          VTE Risk Mitigation         Ordered     IP VTE LOW RISK PATIENT  Once      07/01/18 1422     Place YOANA hose  Until discontinued      07/01/18 1422             Rosalie Cross PA-C  Department of Hospital Medicine   [facility]

## 2018-07-01 NOTE — PLAN OF CARE
Problem: Fall Risk (Adult)  Goal: Identify Related Risk Factors and Signs and Symptoms  Related risk factors and signs and symptoms are identified upon initiation of Human Response Clinical Practice Guideline (CPG)   Outcome: Ongoing (interventions implemented as appropriate)  Pt arrived to OBS 1 from ED. Pt aao x 4, ambulated from stretcher to bed. Denies pain or discomfort. Telemetry monitor in place. Pt oriented to call light and diet menu. Allergy band placed on wrist, nonskid socks on. Bed in low position wheels locked. Will continue to monitor.

## 2018-07-01 NOTE — ED PROVIDER NOTES
SCRIBE #1 NOTE: I, Hunter Lemus, am scribing for, and in the presence of,  Dr. Son. I have scribed the following portions of the note -       CC: Palpitations (for a few weeks, worse since 3am today, denies CP or SOB, currently wearing external heart monitor)      HPI: Denis Bunn is a 62 y.o. year old male who presents to the ED complaining of worsening heart palpitations today.   Pt states that he has been experiencing heart palpitations x 3 weeks, which worsened last night. He describes the palpitations as intermittent and states that he experienced 2-3 hours of palpitations last night which prompted him to call his cardiologist. Pt states that he has taken Trazodone and Xanax once each in the past few weeks which helped to resolve his sx. He denies CP SOB, diaphoresis, N/V, lightheadedness. Pt notes drinking 1 cup of coffee per day and mild to moderate ETOH consumption but denies any other stimulants that may worsen his palpitations. He denies any sleep disturbances. Familial hx of heart disease. No recent illnesses. Pt currently taking Allopurinol q.d. in the morning and Pravastatin q.d. at night.     Past Medical History:   Diagnosis Date    Back pain     Cataract     Constipation - functional     History of gout     HTN (hypertension), benign 4/24/2013    Prostatitis     Vision changes      Past Surgical History:   Procedure Laterality Date    BACK SURGERY      CATARACT EXTRACTION      left eye    ELBOW SURGERY      scope/right    EYE SURGERY      Dr. Hope.retina x2 left    LAMINECTOMY      WRIST SURGERY      right     Family History   Problem Relation Age of Onset    Blindness Mother         from cva    Cataracts Mother     Cataracts Father     Cancer Father     No Known Problems Sister     No Known Problems Brother     No Known Problems Maternal Aunt     No Known Problems Maternal Uncle     No Known Problems Paternal Aunt     No Known Problems Paternal Uncle     No Known  Problems Maternal Grandmother     No Known Problems Maternal Grandfather     No Known Problems Paternal Grandmother     No Known Problems Paternal Grandfather     Amblyopia Neg Hx     Diabetes Neg Hx     Glaucoma Neg Hx     Hypertension Neg Hx     Macular degeneration Neg Hx     Retinal detachment Neg Hx     Strabismus Neg Hx     Stroke Neg Hx     Thyroid disease Neg Hx      No current facility-administered medications on file prior to encounter.      Current Outpatient Prescriptions on File Prior to Encounter   Medication Sig Dispense Refill    allopurinol (ZYLOPRIM) 100 MG tablet Take 1 tablet (100 mg total) by mouth 2 (two) times daily. 180 tablet 3    naproxen (NAPROSYN) 500 MG tablet Take 500 mg by mouth 2 (two) times daily with meals.       pravastatin (PRAVACHOL) 20 MG tablet TAKE 1 TABLET(20 MG) BY MOUTH EVERY DAY 90 tablet 0    ascorbic acid (VITAMIN C) 500 MG tablet Take 500 mg by mouth once daily.      aspirin 81 MG chewable tablet Take 1 tablet by mouth.      ciclopirox 1 % shampoo every other day.       cyclobenzaprine (FLEXERIL) 10 MG tablet       hydrocodone-acetaminophen 7.5-325mg (NORCO) 7.5-325 mg per tablet       OM-3/E/LINOL/ALA/OLEIC/GLA/LIP (OMEGA 3-6-9 ORAL) Take by mouth once daily.       omeprazole (PRILOSEC) 40 MG capsule Take 1 capsule (40 mg total) by mouth once daily. 14 capsule 0    SAW PALMETTO ORAL Take by mouth once daily.       triazolam (HALCION) 0.125 MG tablet Take 1 tablet (0.125 mg total) by mouth nightly as needed. 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    Alcohol use 1.8 oz/week     3 Glasses of wine per week      Comment: 8-10  glasses wine per week    Drug use: No    Sexual activity: Yes     Partners: Female     Other Topics Concern    None     Social History Narrative    None       ROS:     Constitutional : neg  HEENT neg  Resp neg  Cardiac palpitations  GI neg   neg  Neuro neg  Heme/Immune: neg  Endo neg  Skin neg    PHYSICAL EXAM:  Vitals:    07/01/18 1922   BP: (!) 141/81   Pulse: (!) 55   Resp: 18   Temp: 98.4 °F (36.9 °C)         PHYSICAL EXAM:   general: comfortable, in no acute distress  VS: triage VS reviewed  HEENT: NC/AT, PERRL, EOMI  Neck: trachea midline  CV: irregular rate, some regular beats followed by irregular beats, no m/r/g, no LE pitting edema  Resp: CTAB  ABD:  ND, + normal BS, NT  Renal: No CVAT  Neuro: AAO x 3, 5/5 muscle strength in upper and lower extremities, sensation grossly intact, face symmetric, speech normal  Ext: no deformity, +2 symmetrical peripheral pulses          DATA & INTERVENTIONS:    LABS reviewed:  Labs Reviewed   CBC W/ AUTO DIFFERENTIAL - Abnormal; Notable for the following:        Result Value    RBC 4.58 (*)     MCH 31.4 (*)     All other components within normal limits   BASIC METABOLIC PANEL - Abnormal; Notable for the following:     Glucose 112 (*)     All other components within normal limits   B-TYPE NATRIURETIC PEPTIDE - Abnormal; Notable for the following:      (*)     All other components within normal limits   PHOSPHORUS - Abnormal; Notable for the following:     Phosphorus 2.5 (*)     All other components within normal limits   MAGNESIUM   TROPONIN I   TSH   T4, FREE   TSH    Narrative:     ADD ON TSH & FT4 PER DR. PIEDAD ALBRECHT AT  07/01/2018  14:26     T4, FREE    Narrative:     ADD ON TSH & FT4 PER DR. PIEDAD ALBRECHT AT  07/01/2018  14:26         RADIOLOGY reviewed:  Imaging Results          X-Ray Chest PA And Lateral (Final result)  Result time 07/01/18 12:15:39    Final result by Joseph Cortes MD (07/01/18 12:15:39)                 Impression:      No  acute findings.      Electronically signed by: Joseph Cortes MD  Date:    07/01/2018  Time:    12:15             Narrative:    EXAMINATION:  XR CHEST PA AND LATERAL    CLINICAL HISTORY:  Palpitations    TECHNIQUE:  PA and lateral views of the chest were performed.    COMPARISON:  None    FINDINGS:  The cardiomediastinal contour is within normal limits.  The lungs are clear.  No pneumothorax.  No pleural effusions.                                MEDICATIONS/FLUIDS:  Medications   ramelteon tablet 8 mg (not administered)   pravastatin tablet 20 mg (not administered)   naproxen tablet 500 mg (not administered)   allopurinol tablet 100 mg (not administered)   aspirin chewable tablet 81 mg (not administered)   sodium chloride 0.9% flush 3 mL (not administered)   ondansetron disintegrating tablet 8 mg (not administered)   acetaminophen tablet 650 mg (not administered)   metoprolol tartrate (LOPRESSOR) split tablet 12.5 mg (not administered)   lisinopril tablet 5 mg (5 mg Oral Given 7/1/18 1536)         MDM:   61 yo M w/ hx of palpitation, + event monitor, worse last night  No associated symptoms.  ekg w/ VR 82 w/ frequent PVC, st depression/twi in the inf leads and twi in V3-V5  Event monitor: HR 52-58, sinus kenzie w/ PJC, PVCs,PAC, IVCD      Cbc/bmp/mg/trop/tsh no gross abn  Phos 2.5  cxr no acute    Pt placed in observation with hospital medicine team. Consult with cardiology who recommended starting metoprolol BID, place in observation and obtain ECHO. Otherwise pt's blood work including magnesium and troponin reveal no gross abnormalities. CXR with no infiltrate or pulmonary edema.          Consults: Cardiology (recs appreciated)  EKG: HR 77,  frequent PVCs, slight st depression and t wave inversion in 2,3 and aVF, t wave inversion in V3, V4, V5.     IMPRESSION:  1.) Palpitations, frequent PVC      Dispo: Admission    Critical Care Time: N/A       Lupe Son MD  07/01/18 1569

## 2018-07-01 NOTE — CONSULTS
[facility]  Cardiology  Consult Note    Patient Name: Denis Bunn  MRN: [id number]  Admission Date: 7/1/2018  Hospital Length of Stay: 0 days  Code Status: No Order   Attending Provider: Lupe Albrecht MD   Consulting Provider: mOi Blanco MD  Primary Care Physician: Taz Lilly MD  Principal Problem:<principal problem not specified>    Patient information was obtained from patient and ER records.     Inpatient consult to Cardiology  Consult performed by: OMI BLANCO  Consult ordered by: LUPE ALBRECHT  Reason for consult: Palpitations  Assessment/Recommendations: Start metoprolol 12.5 mg po BID and titrate up  Outpatient cards follow up and 2D echo with CFD        Subjective:     Chief Complaint: Palpitations     HPI:   Mr Moseley is a 61 yo man with medical history significant for HTN and palpitations who comes to the ED with worsening of palpitations started yesterday night. Patient reports feeling his heart rate going too fast. Episodes lasted for a couple of minutes and subsided on its own.   He reports symptoms of palpitations over the past 3 weeks.   He is seen by Dr Pereyra who ordered a 30 day event monitor. He is currently wearing a 30 day event monitor. Review of his monitor showed intermittent PVC's with sinus/bradycardia. EKG today showed sinus rhythm with frequent PVC's. BP was stable and HR is 77 bpm. Other admission labs including CBC and BMP were within normal limits.     Past Medical History:   Diagnosis Date    Back pain     Cataract     Constipation - functional     History of gout     HTN (hypertension), benign 4/24/2013    Prostatitis     Vision changes        Past Surgical History:   Procedure Laterality Date    BACK SURGERY      CATARACT EXTRACTION      left eye    ELBOW SURGERY      scope/right    EYE SURGERY      Dr. Hope.retina x2 left    LAMINECTOMY      WRIST SURGERY      right       Review of patient's allergies indicates:    Allergen Reactions    Augmentin [amoxicillin-pot clavulanate]      Other reaction(s): Stomach upset    Celebrex [celecoxib] Other (See Comments)       No current facility-administered medications on file prior to encounter.      Current Outpatient Prescriptions on File Prior to Encounter   Medication Sig    allopurinol (ZYLOPRIM) 100 MG tablet Take 1 tablet (100 mg total) by mouth 2 (two) times daily.    naproxen (NAPROSYN) 500 MG tablet Take 500 mg by mouth 2 (two) times daily with meals.     pravastatin (PRAVACHOL) 20 MG tablet TAKE 1 TABLET(20 MG) BY MOUTH EVERY DAY    ascorbic acid (VITAMIN C) 500 MG tablet Take 500 mg by mouth once daily.    aspirin 81 MG chewable tablet Take 1 tablet by mouth.    ciclopirox 1 % shampoo every other day.     cyclobenzaprine (FLEXERIL) 10 MG tablet     hydrocodone-acetaminophen 7.5-325mg (NORCO) 7.5-325 mg per tablet     OM-3/E/LINOL/ALA/OLEIC/GLA/LIP (OMEGA 3-6-9 ORAL) Take by mouth once daily.     omeprazole (PRILOSEC) 40 MG capsule Take 1 capsule (40 mg total) by mouth once daily.    SAW PALMETTO ORAL Take by mouth once daily.     triazolam (HALCION) 0.125 MG tablet Take 1 tablet (0.125 mg total) by mouth nightly as needed.     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    Alcohol use 1.8 oz/week     3 Glasses of wine per week      Comment: 8-10 glasses wine per week    Drug use: No    Sexual activity: Yes     Partners: Female     Review of Systems   Constitution: Negative.   HENT: Negative.    Eyes: Negative.    Cardiovascular: Positive for palpitations.  Negative for chest pain, claudication, cyanosis, dyspnea on exertion, irregular heartbeat, leg swelling, near-syncope, orthopnea, paroxysmal nocturnal dyspnea and syncope.   Respiratory: Positive for shortness of breath. Negative for cough, hemoptysis, sleep disturbances due to breathing, snoring, sputum production and wheezing.    Endocrine: Negative.    Hematologic/Lymphatic: Negative.    Skin: Negative.    Gastrointestinal: Negative.    Genitourinary: Negative.    Neurological: Negative.      Objective:     Vital Signs (Most Recent):  Temp: 97.8 °F (36.6 °C) (07/01/18 1047)  Pulse: 68 (07/01/18 1204)  Resp: 13 (07/01/18 1204)  BP: (!) 169/93 (07/01/18 1204)  SpO2: 99 % (07/01/18 1204) Vital Signs (24h Range):  Temp:  [97.8 °F (36.6 °C)] 97.8 °F (36.6 °C)  Pulse:  [62-68] 68  Resp:  [12-20] 13  SpO2:  [98 %-99 %] 99 %  BP: (169-227)/() 169/93     Weight: 72.6 kg (160 lb)  Body mass index is 24.33 kg/m².    SpO2: 99 %  O2 Device (Oxygen Therapy): room air    No intake or output data in the 24 hours ending 07/01/18 1326    Lines/Drains/Airways     Peripheral Intravenous Line                 Peripheral IV - Single Lumen 07/01/18 1126 Left Antecubital less than 1 day                Physical Exam   Constitutional: He is oriented to person, place, and time. He appears well-developed and well-nourished.   HENT:   Head: Normocephalic and atraumatic.   Eyes: Conjunctivae are normal.   Neck: Neck supple. No JVD present. No thyromegaly present.   Cardiovascular: Normal rate, regular rhythm, normal heart sounds and intact distal pulses.  Exam reveals no gallop and no friction rub.    No murmur heard.  Pulmonary/Chest: Effort normal and breath sounds normal. No respiratory distress. He has no wheezes. He has no rales.   Abdominal: Soft. Bowel sounds are normal. He exhibits no distension. There is no tenderness. There is no rebound.   Musculoskeletal: Normal range of motion.   Neurological: He is oriented to person,  place, and time.   Skin: Skin is warm.   Nursing note and vitals reviewed.      Significant Labs:   CMP   Recent Labs  Lab 07/01/18  1125      K 4.2      CO2 27   *   BUN 19   CREATININE 1.1   CALCIUM 9.7   ANIONGAP 8   ESTGFRAFRICA >60.0   EGFRNONAA >60.0   , CBC   Recent Labs  Lab 07/01/18  1125   WBC 7.07   HGB 14.4   HCT 42.7      , INR No results for input(s): INR, PROTIME in the last 48 hours., Lipid Panel No results for input(s): CHOL, HDL, LDLCALC, TRIG, CHOLHDL in the last 48 hours., Troponin   Recent Labs  Lab 07/01/18  1125   TROPONINI <0.006    and All pertinent lab results from the last 24 hours have been reviewed.    Significant Imaging: Echocardiogram: 2D echo with color flow doppler: No results found for this or any previous visit.    Assessment and Plan:     Palpitations    -ok to be discharged  -Start metoprolol 12.5 mg po BID   -Follow up as outpatient with Dr Pereyra and 2D echo with CFD            VTE Risk Mitigation     None          Thank you for your consult. I will sign off. Please contact us if you have any additional questions.    Omi Blanco MD  Cardiology   [facility]

## 2018-07-01 NOTE — SUBJECTIVE & OBJECTIVE
Past Medical History:   Diagnosis Date    Back pain     Cataract     Constipation - functional     History of gout     HTN (hypertension), benign 4/24/2013    Prostatitis     Vision changes        Past Surgical History:   Procedure Laterality Date    BACK SURGERY      CATARACT EXTRACTION      left eye    ELBOW SURGERY      scope/right    EYE SURGERY      Dr. Hope.retina x2 left    LAMINECTOMY      WRIST SURGERY      right       Review of patient's allergies indicates:   Allergen Reactions    Augmentin [amoxicillin-pot clavulanate]      Other reaction(s): Stomach upset    Celebrex [celecoxib] Other (See Comments)       No current facility-administered medications on file prior to encounter.      Current Outpatient Prescriptions on File Prior to Encounter   Medication Sig    allopurinol (ZYLOPRIM) 100 MG tablet Take 1 tablet (100 mg total) by mouth 2 (two) times daily.    naproxen (NAPROSYN) 500 MG tablet Take 500 mg by mouth 2 (two) times daily with meals.     pravastatin (PRAVACHOL) 20 MG tablet TAKE 1 TABLET(20 MG) BY MOUTH EVERY DAY    ascorbic acid (VITAMIN C) 500 MG tablet Take 500 mg by mouth once daily.    aspirin 81 MG chewable tablet Take 1 tablet by mouth.    ciclopirox 1 % shampoo every other day.     cyclobenzaprine (FLEXERIL) 10 MG tablet     hydrocodone-acetaminophen 7.5-325mg (NORCO) 7.5-325 mg per tablet     OM-3/E/LINOL/ALA/OLEIC/GLA/LIP (OMEGA 3-6-9 ORAL) Take by mouth once daily.     omeprazole (PRILOSEC) 40 MG capsule Take 1 capsule (40 mg total) by mouth once daily.    SAW PALMETTO ORAL Take by mouth once daily.     triazolam (HALCION) 0.125 MG tablet Take 1 tablet (0.125 mg total) by mouth nightly as needed.     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    Alcohol use 1.8 oz/week     3 Glasses of wine per week      Comment: 8-10 glasses wine per week    Drug use: No    Sexual activity: Yes     Partners: Female     Review of Systems   Constitution: Negative.   HENT: Negative.    Eyes: Negative.    Cardiovascular: Positive for palpitations. Negative for chest pain, claudication, cyanosis, dyspnea on exertion, irregular heartbeat, leg swelling, near-syncope, orthopnea, paroxysmal nocturnal dyspnea and syncope.   Respiratory: Positive for shortness of breath. Negative for cough, hemoptysis, sleep disturbances due to breathing, snoring, sputum production and wheezing.    Endocrine: Negative.    Hematologic/Lymphatic: Negative.    Skin: Negative.    Gastrointestinal: Negative.    Genitourinary: Negative.    Neurological: Negative.      Objective:     Vital Signs (Most Recent):  Temp: 97.8 °F (36.6 °C) (07/01/18 1047)  Pulse: 68 (07/01/18 1204)  Resp: 13 (07/01/18 1204)  BP: (!) 169/93 (07/01/18 1204)  SpO2: 99 % (07/01/18 1204) Vital Signs (24h Range):  Temp:  [97.8 °F (36.6 °C)] 97.8 °F (36.6 °C)  Pulse:  [62-68] 68  Resp:  [12-20] 13  SpO2:  [98 %-99 %] 99 %  BP: (169-227)/() 169/93     Weight: 72.6 kg (160 lb)  Body mass index is 24.33 kg/m².    SpO2: 99 %  O2 Device (Oxygen Therapy): room air    No intake or output data in the 24 hours ending 07/01/18 1326    Lines/Drains/Airways     Peripheral Intravenous Line                 Peripheral IV - Single Lumen 07/01/18 1126 Left Antecubital less than 1 day                Physical Exam   Constitutional: He is oriented to person, place, and time. He appears well-developed and well-nourished.   HENT:   Head: Normocephalic and atraumatic.   Eyes: Conjunctivae are normal.   Neck: Neck supple. No JVD present. No thyromegaly  present.   Cardiovascular: Normal rate, regular rhythm, normal heart sounds and intact distal pulses.  Exam reveals no gallop and no friction rub.    No murmur heard.  Pulmonary/Chest: Effort normal and breath sounds normal. No respiratory distress. He has no wheezes. He has no rales.   Abdominal: Soft. Bowel sounds are normal. He exhibits no distension. There is no tenderness. There is no rebound.   Musculoskeletal: Normal range of motion.   Neurological: He is oriented to person, place, and time.   Skin: Skin is warm.   Nursing note and vitals reviewed.      Significant Labs:   CMP   Recent Labs  Lab 07/01/18  1125      K 4.2      CO2 27   *   BUN 19   CREATININE 1.1   CALCIUM 9.7   ANIONGAP 8   ESTGFRAFRICA >60.0   EGFRNONAA >60.0   , CBC   Recent Labs  Lab 07/01/18  1125   WBC 7.07   HGB 14.4   HCT 42.7      , INR No results for input(s): INR, PROTIME in the last 48 hours., Lipid Panel No results for input(s): CHOL, HDL, LDLCALC, TRIG, CHOLHDL in the last 48 hours., Troponin   Recent Labs  Lab 07/01/18  1125   TROPONINI <0.006    and All pertinent lab results from the last 24 hours have been reviewed.    Significant Imaging: Echocardiogram: 2D echo with color flow doppler: No results found for this or any previous visit.

## 2018-07-01 NOTE — ED TRIAGE NOTES
"Pt c/o heart palpitations. Pt states started about 3 weeks ago but was intermittent at that time. Pt had heart monitor placed this past Wednesday. States had multiple episodes since monitor was placed. Starting last night, palpitations have been almost constant; Lasted about 2-3 hours throughout the night. Pt states when palpitations occur, he feels "My heart is coming out of my throat"; But denies any SOB or chest pain with palpitations. Pt A&O x4 during triage. Pt ambulatory per self through facility.   "

## 2018-07-01 NOTE — ASSESSMENT & PLAN NOTE
- Per patient, never been on antihypertensive regimen  - Uncontrolled and elevated on arrival  - Will start lopressor 12.5 mg q6 and lisinopril 5 mg daily per cardiology recs  - Continue to monitor

## 2018-07-01 NOTE — SUBJECTIVE & OBJECTIVE
Past Medical History:   Diagnosis Date    Back pain     Cataract     Constipation - functional     History of gout     HTN (hypertension), benign 4/24/2013    Prostatitis     Vision changes        Past Surgical History:   Procedure Laterality Date    BACK SURGERY      CATARACT EXTRACTION      left eye    ELBOW SURGERY      scope/right    EYE SURGERY      Dr. Hope.retina x2 left    LAMINECTOMY      WRIST SURGERY      right       Review of patient's allergies indicates:   Allergen Reactions    Augmentin [amoxicillin-pot clavulanate]      Other reaction(s): Stomach upset    Celebrex [celecoxib] Other (See Comments)       No current facility-administered medications on file prior to encounter.      Current Outpatient Prescriptions on File Prior to Encounter   Medication Sig    allopurinol (ZYLOPRIM) 100 MG tablet Take 1 tablet (100 mg total) by mouth 2 (two) times daily.    naproxen (NAPROSYN) 500 MG tablet Take 500 mg by mouth 2 (two) times daily with meals.     pravastatin (PRAVACHOL) 20 MG tablet TAKE 1 TABLET(20 MG) BY MOUTH EVERY DAY    ascorbic acid (VITAMIN C) 500 MG tablet Take 500 mg by mouth once daily.    aspirin 81 MG chewable tablet Take 1 tablet by mouth.    ciclopirox 1 % shampoo every other day.     cyclobenzaprine (FLEXERIL) 10 MG tablet     hydrocodone-acetaminophen 7.5-325mg (NORCO) 7.5-325 mg per tablet     OM-3/E/LINOL/ALA/OLEIC/GLA/LIP (OMEGA 3-6-9 ORAL) Take by mouth once daily.     omeprazole (PRILOSEC) 40 MG capsule Take 1 capsule (40 mg total) by mouth once daily.    SAW PALMETTO ORAL Take by mouth once daily.     triazolam (HALCION) 0.125 MG tablet Take 1 tablet (0.125 mg total) by mouth nightly as needed.     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    Alcohol use 1.8 oz/week     3 Glasses of wine per week      Comment: 8-10 glasses wine per week    Drug use: No    Sexual activity: Yes     Partners: Female     Review of Systems   Constitutional: Negative for chills, diaphoresis, fatigue and fever.   HENT: Negative for congestion, hearing loss and trouble swallowing.    Eyes: Negative for photophobia and visual disturbance.   Respiratory: Negative for cough, chest tightness, shortness of breath and wheezing.    Cardiovascular: Positive for palpitations. Negative for chest pain and leg swelling.   Gastrointestinal: Negative for abdominal distention, abdominal pain, diarrhea, nausea and vomiting.   Genitourinary: Negative for difficulty urinating, dysuria, flank pain, frequency and hematuria.   Musculoskeletal: Positive for back pain (chronic). Negative for gait problem, myalgias and neck pain.   Skin: Negative for rash and wound.   Neurological: Negative for dizziness, seizures, syncope, speech difficulty, weakness, light-headedness and headaches.   Psychiatric/Behavioral: Negative for agitation, confusion and dysphoric mood. The patient is nervous/anxious.      Objective:     Vital Signs (Most Recent):  Temp: 97.8 °F (36.6 °C) (07/01/18 1047)  Pulse: 66 (07/01/18 1449)  Resp: (!) 22 (07/01/18 1449)  BP: (!) 187/116 (07/01/18 1449)  SpO2: 99 % (07/01/18 1449) Vital Signs (24h Range):  Temp:  [97.8 °F (36.6 °C)] 97.8 °F (36.6 °C)  Pulse:  [60-68] 66  Resp:  [12-24] 22  SpO2:  [98 %-99 %] 99 %  BP: (159-227)/() 187/116     Weight: 72.6 kg (160 lb)  Body mass index is 24.33 kg/m².    Physical Exam   Constitutional: He is oriented to person, place, and time. He appears well-developed and well-nourished. No distress.   HENT:   Head: Normocephalic and atraumatic.   Eyes:  Conjunctivae and EOM are normal. Right eye exhibits no discharge. Left eye exhibits no discharge. No scleral icterus.   Neck: Normal range of motion. Neck supple. No tracheal deviation present.   Cardiovascular: Normal rate, regular rhythm, normal heart sounds and intact distal pulses.    No murmur heard.  Pulmonary/Chest: Effort normal and breath sounds normal. No respiratory distress. He has no wheezes.   Abdominal: Soft. Bowel sounds are normal. He exhibits no distension. There is no tenderness.   Musculoskeletal: Normal range of motion. He exhibits no edema or tenderness.   Neurological: He is alert and oriented to person, place, and time. No cranial nerve deficit.   Skin: Skin is warm and dry. He is not diaphoretic. No erythema.   Psychiatric: He has a normal mood and affect. His behavior is normal.         CRANIAL NERVES     CN III, IV, VI   Extraocular motions are normal.        Significant Labs: All pertinent labs within the past 24 hours have been reviewed.    Significant Imaging: I have reviewed all pertinent imaging results/findings within the past 24 hours.

## 2018-07-02 VITALS
OXYGEN SATURATION: 99 % | SYSTOLIC BLOOD PRESSURE: 160 MMHG | WEIGHT: 162.56 LBS | RESPIRATION RATE: 18 BRPM | HEART RATE: 53 BPM | DIASTOLIC BLOOD PRESSURE: 88 MMHG | HEIGHT: 68 IN | TEMPERATURE: 98 F | BODY MASS INDEX: 24.64 KG/M2

## 2018-07-02 LAB
ALBUMIN SERPL BCP-MCNC: 3.5 G/DL
ALP SERPL-CCNC: 57 U/L
ALT SERPL W/O P-5'-P-CCNC: 15 U/L
ANION GAP SERPL CALC-SCNC: 8 MMOL/L
AST SERPL-CCNC: 19 U/L
BASOPHILS # BLD AUTO: 0.04 K/UL
BASOPHILS NFR BLD: 0.7 %
BILIRUB SERPL-MCNC: 0.9 MG/DL
BUN SERPL-MCNC: 17 MG/DL
CALCIUM SERPL-MCNC: 9.2 MG/DL
CHLORIDE SERPL-SCNC: 106 MMOL/L
CO2 SERPL-SCNC: 27 MMOL/L
CREAT SERPL-MCNC: 0.9 MG/DL
DIASTOLIC DYSFUNCTION: NO
DIFFERENTIAL METHOD: ABNORMAL
EOSINOPHIL # BLD AUTO: 0.1 K/UL
EOSINOPHIL NFR BLD: 2.2 %
ERYTHROCYTE [DISTWIDTH] IN BLOOD BY AUTOMATED COUNT: 12.6 %
EST. GFR  (AFRICAN AMERICAN): >60 ML/MIN/1.73 M^2
EST. GFR  (NON AFRICAN AMERICAN): >60 ML/MIN/1.73 M^2
ESTIMATED PA SYSTOLIC PRESSURE: 20.31
GLUCOSE SERPL-MCNC: 93 MG/DL
HCT VFR BLD AUTO: 42.6 %
HGB BLD-MCNC: 14.3 G/DL
IMM GRANULOCYTES # BLD AUTO: 0.01 K/UL
IMM GRANULOCYTES NFR BLD AUTO: 0.2 %
LYMPHOCYTES # BLD AUTO: 1.7 K/UL
LYMPHOCYTES NFR BLD: 27.8 %
MAGNESIUM SERPL-MCNC: 2.3 MG/DL
MCH RBC QN AUTO: 31.6 PG
MCHC RBC AUTO-ENTMCNC: 33.6 G/DL
MCV RBC AUTO: 94 FL
MITRAL VALVE REGURGITATION: NORMAL
MONOCYTES # BLD AUTO: 0.6 K/UL
MONOCYTES NFR BLD: 10.2 %
NEUTROPHILS # BLD AUTO: 3.5 K/UL
NEUTROPHILS NFR BLD: 58.9 %
NRBC BLD-RTO: 0 /100 WBC
PHOSPHATE SERPL-MCNC: 3.2 MG/DL
PLATELET # BLD AUTO: 252 K/UL
PMV BLD AUTO: 10.4 FL
POTASSIUM SERPL-SCNC: 4.7 MMOL/L
PROT SERPL-MCNC: 6.5 G/DL
RBC # BLD AUTO: 4.52 M/UL
RETIRED EF AND QEF - SEE NOTES: 55 (ref 55–65)
SODIUM SERPL-SCNC: 141 MMOL/L
WBC # BLD AUTO: 5.98 K/UL

## 2018-07-02 PROCEDURE — 25000003 PHARM REV CODE 250: Performed by: PHYSICIAN ASSISTANT

## 2018-07-02 PROCEDURE — 85025 COMPLETE CBC W/AUTO DIFF WBC: CPT

## 2018-07-02 PROCEDURE — 84100 ASSAY OF PHOSPHORUS: CPT

## 2018-07-02 PROCEDURE — 99217 PR OBSERVATION CARE DISCHARGE: CPT | Mod: ,,, | Performed by: PHYSICIAN ASSISTANT

## 2018-07-02 PROCEDURE — 36415 COLL VENOUS BLD VENIPUNCTURE: CPT

## 2018-07-02 PROCEDURE — A4216 STERILE WATER/SALINE, 10 ML: HCPCS | Performed by: PHYSICIAN ASSISTANT

## 2018-07-02 PROCEDURE — 83735 ASSAY OF MAGNESIUM: CPT

## 2018-07-02 PROCEDURE — G0378 HOSPITAL OBSERVATION PER HR: HCPCS

## 2018-07-02 PROCEDURE — 80053 COMPREHEN METABOLIC PANEL: CPT

## 2018-07-02 RX ORDER — VERAPAMIL HYDROCHLORIDE 180 MG/1
180 CAPSULE, EXTENDED RELEASE ORAL DAILY
Qty: 30 CAPSULE | Refills: 2 | Status: SHIPPED | OUTPATIENT
Start: 2018-07-02 | End: 2018-07-13 | Stop reason: SDUPTHER

## 2018-07-02 RX ORDER — METOPROLOL TARTRATE 25 MG/1
12.5 TABLET ORAL DAILY
Status: DISCONTINUED | OUTPATIENT
Start: 2018-07-03 | End: 2018-07-02

## 2018-07-02 RX ADMIN — SODIUM CHLORIDE, PRESERVATIVE FREE 3 ML: 5 INJECTION INTRAVENOUS at 06:07

## 2018-07-02 RX ADMIN — ALLOPURINOL 100 MG: 100 TABLET ORAL at 08:07

## 2018-07-02 RX ADMIN — ASPIRIN 81 MG CHEWABLE TABLET 81 MG: 81 TABLET CHEWABLE at 08:07

## 2018-07-02 RX ADMIN — LISINOPRIL 5 MG: 5 TABLET ORAL at 08:07

## 2018-07-02 NOTE — PLAN OF CARE
BLAIR was informed by JOSIE Cross ([id number]) that the patient will need a 48 hr holter monitor prior to discharge. CM informed Faye ([id number]) in the cardiology clinic of monitor needed. Rosalie stated that the patient is to remove the 30D monitor & return it to the cards clinic when the 48 hr monitor is placed. Patient awake & alert with spouse, Christi Arias ([phone]), at the bedside when CM rounded. CM informed the patient of cardiac monitor status. Patient verbalized understanding. Will continue to follow.

## 2018-07-02 NOTE — PLAN OF CARE
Problem: Patient Care Overview  Goal: Plan of Care Review  Outcome: Ongoing (interventions implemented as appropriate)  Patient has been free of falls this shift.  He is AAOx4 and VS's have been stable.  He is on telemetry and has remained sinus bradycardic. Spoke with JOSIE العراقي about giving the patient the metoprolol and he said that it could be held because his heart rate was so low.

## 2018-07-02 NOTE — ASSESSMENT & PLAN NOTE
- TTE with normal EF, PENELOPE  - PVC's still noted on telemetry, and patient is symptomatic\  - Continue Lopressor on discharge  - Will plan for 48H Holter prior to discharge  - Patient will turn in 30 day event monitor prior to discharge  - Follow up with Dr. Melendez as an outpatient  - Follow up with Dr. Pereyra as previously scheduled

## 2018-07-02 NOTE — ASSESSMENT & PLAN NOTE
- Hypertensive on arrival  - Afebrile without leukocytosis  - Labs entirely unremarkable, including troponin  - EKG shows NSR with frequent PVCs  - CXR without acute process  - Outpatient cardiologist, Dr. Pereyra ordered 30 day event monitor which is showing intermittent PVCs with sinus/bradycardia  - Cardiology consulted in the ER. Discussed with cardiology staff, Dr. Grijalva; plan to start lopressor 12.5 mg q6, lisinopril 5 mg daily, and obtain 2d echo  - 2d CFD shows pEF, normal diastolic function, no WMA  - Bradycardic with 1 dose of lopressor  - EP consulted and rec starting verapamil 180mg and discharge with 48 hour monitor. Pt to follow up with Dr. Melendez in clinic in 1-2 weeks.

## 2018-07-02 NOTE — PROGRESS NOTES
Pt discharged and ambulated off unit independently with even gait with spouse. Pt's VSS, NAD noted. Rosalie GALINDO aware that pt's BP was 160/88 via manual method and stated it was okay to d/c pt. Pt received discharge instructions/medication prescription and verbalized understanding. Pt's IV removed with catheter intact.

## 2018-07-02 NOTE — PLAN OF CARE
07/02/18 0920   Discharge Assessment   Assessment Type Discharge Planning Assessment   Confirmed/corrected address and phone number on facesheet? Yes   Assessment information obtained from? Patient   Expected Length of Stay (days) 1   Communicated expected length of stay with patient/caregiver yes   Prior to hospitilization cognitive status: Alert/Oriented   Prior to hospitalization functional status: Independent   Current cognitive status: Alert/Oriented   Current Functional Status: Independent   Lives With spouse  (spouse, Christi Arias ([phone]))   Able to Return to Prior Arrangements yes   Is patient able to care for self after discharge? Yes   Patient's perception of discharge disposition home or selfcare   Readmission Within The Last 30 Days no previous admission in last 30 days   Patient currently being followed by outpatient case management? No   Patient currently receives any other outside agency services? No   Equipment Currently Used at Home none   Do you have any problems affording any of your prescribed medications? No   Is the patient taking medications as prescribed? yes   Does the patient have transportation home? Yes   Transportation Available family or friend will provide  (spouse)   Does the patient receive services at the [facility]? No   Discharge Plan A Home with family   Discharge Plan B Home Health   Patient/Family In Agreement With Plan yes     Dx: palpitations  Consult: florentin  Pharm: [facility]  Hosp f/u appt: Dr. Pereyra (florentin) on 7/13/18 at 1100. Message sent to the EP clinic requesting a hospital follow up appointment with Dr. Melendez in 1-2 weeks.     30D event monitor in use at this time.

## 2018-07-02 NOTE — PLAN OF CARE
07/02/18 1358   Final Note   Assessment Type Final Discharge Note     Patient discharged home with no needs on 7/2/18.

## 2018-07-02 NOTE — SUBJECTIVE & OBJECTIVE
Past Medical History:   Diagnosis Date    Back pain     Cataract     Constipation - functional     History of gout     HTN (hypertension), benign 4/24/2013    Prostatitis     Vision changes        Past Surgical History:   Procedure Laterality Date    BACK SURGERY      CATARACT EXTRACTION      left eye    ELBOW SURGERY      scope/right    EYE SURGERY      Dr. Hope.retina x2 left    LAMINECTOMY      WRIST SURGERY      right       Review of patient's allergies indicates:   Allergen Reactions    Augmentin [amoxicillin-pot clavulanate]      Other reaction(s): Stomach upset    Celebrex [celecoxib] Other (See Comments)       No current facility-administered medications on file prior to encounter.      Current Outpatient Prescriptions on File Prior to Encounter   Medication Sig    allopurinol (ZYLOPRIM) 100 MG tablet Take 1 tablet (100 mg total) by mouth 2 (two) times daily.    naproxen (NAPROSYN) 500 MG tablet Take 500 mg by mouth 2 (two) times daily with meals.     pravastatin (PRAVACHOL) 20 MG tablet TAKE 1 TABLET(20 MG) BY MOUTH EVERY DAY    ascorbic acid (VITAMIN C) 500 MG tablet Take 500 mg by mouth once daily.    aspirin 81 MG chewable tablet Take 1 tablet by mouth.    ciclopirox 1 % shampoo every other day.     cyclobenzaprine (FLEXERIL) 10 MG tablet     hydrocodone-acetaminophen 7.5-325mg (NORCO) 7.5-325 mg per tablet     OM-3/E/LINOL/ALA/OLEIC/GLA/LIP (OMEGA 3-6-9 ORAL) Take by mouth once daily.     omeprazole (PRILOSEC) 40 MG capsule Take 1 capsule (40 mg total) by mouth once daily.    SAW PALMETTO ORAL Take by mouth once daily.     triazolam (HALCION) 0.125 MG tablet Take 1 tablet (0.125 mg total) by mouth nightly as needed.     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    Alcohol use 1.8 oz/week     3 Glasses of wine per week      Comment: 8-10 glasses wine per week    Drug use: No    Sexual activity: Yes     Partners: Female     Review of Systems   Constitution: Negative for chills, decreased appetite, diaphoresis, fever, night sweats, weight gain and weight loss.   HENT: Negative for congestion, nosebleeds and tinnitus.    Eyes: Negative for blurred vision and photophobia.   Cardiovascular: Positive for irregular heartbeat and palpitations. Negative for chest pain, claudication, cyanosis, dyspnea on exertion, leg swelling, near-syncope, orthopnea, paroxysmal nocturnal dyspnea and syncope.   Respiratory: Negative for cough, hemoptysis, shortness of breath and wheezing.    Skin: Negative for color change and rash.   Musculoskeletal: Negative for back pain and falls.   Gastrointestinal: Negative for abdominal pain, constipation, diarrhea, nausea and vomiting.   Genitourinary: Negative for dysuria and hematuria.   Neurological: Negative for focal weakness, numbness and seizures.   Psychiatric/Behavioral: Negative for altered mental status. The patient is not nervous/anxious.    All other systems reviewed and are negative.    Objective:     Vital Signs (Most Recent):  Temp: 98.3 °F (36.8 °C) (07/02/18 0732)  Pulse: (!) 52 (07/02/18 1000)  Resp: 16 (07/02/18 0732)  BP: 125/69 (07/02/18 0732)  SpO2: 99 % (07/02/18 0732) Vital Signs (24h Range):  Temp:  [98.2 °F (36.8 °C)-98.5 °F (36.9 °C)] 98.3 °F (36.8 °C)  Pulse:  [36-79] 52  Resp:  [12-24] 16  SpO2:  [97 %-99 %] 99 %  BP: ()/() 125/69     Weight: 73.8 kg (162 lb 9.4 oz)  Body mass index is 24.72 kg/m².    SpO2: 99 %  O2 Device (Oxygen Therapy): room air    No intake or output data in the 24 hours ending 07/02/18  1050    Lines/Drains/Airways     Peripheral Intravenous Line                 Peripheral IV - Single Lumen 07/01/18 1126 Left Antecubital less than 1 day              Physical Exam   Constitutional: He is oriented to person, place, and time. He appears well-developed and well-nourished. No distress.   HENT:   Head: Normocephalic and atraumatic.   Eyes: EOM are normal. Pupils are equal, round, and reactive to light.   Neck: Normal range of motion. No JVD present.   There is no JVP   Cardiovascular: Normal rate, regular rhythm, normal heart sounds and intact distal pulses.  Exam reveals no gallop and no friction rub.    No murmur heard.  Pulmonary/Chest: Effort normal and breath sounds normal. No respiratory distress. He has no wheezes. He has no rales.   Abdominal: Soft. Bowel sounds are normal. He exhibits no distension. There is no tenderness.   Musculoskeletal: Normal range of motion. He exhibits no edema (wearing compression stockings).   Neurological: He is alert and oriented to person, place, and time.   Skin: Skin is warm and dry. No rash noted. He is not diaphoretic.   Psychiatric: He has a normal mood and affect. His behavior is normal.

## 2018-07-02 NOTE — PLAN OF CARE
Problem: Patient Care Overview  Goal: Plan of Care Review  Outcome: Ongoing (interventions implemented as appropriate)  Pt's VSS, NAD noted. Pt is sinus bradycardic to upper 30's to 60's with multiple PVCs. Spouse at bedside. Pt ambulates independently with even gait. Pt denies palpitations this morning. Nonskid socks and teds on pt. Pt aware we need to measure intake and output and agreed to urinate in urinal.

## 2018-07-02 NOTE — DISCHARGE SUMMARY
"[facility] Hospital Medicine  Discharge Summary      Patient Name: Denis Bunn  MRN: [id number]  Admission Date: 7/1/2018  Hospital Length of Stay: 0 days  Discharge Date and Time: 7/2/2018  1:36 PM  Attending Physician: No att. providers found   Discharging Provider: Rosalie Cross PA-C  Primary Care Provider: Taz Lilly MD  [facility] Medicine Team: [facility] Rosalie Cross PA-C    HPI:   62 year old male with a PMHx of HTN (never on medications) and HLD presenting to the ER with wife at bedside complaining of palpitations x3 weeks. Pt reports episodes have been intermittent over the past 3 weeks and describes them as "my heart jumping around". He has gone periods of 3-4 days without any symptoms. He also noticed improvement of symptoms with Xanax. Pt reports increased frequency last night and reports constant "fluttering" since ~3AM. Pt denies associated CP, SOB, diaphoresis, N/V, lightheadedness, and association with exertion. Pt denies all other complaints- abdominal pain, N/V/D, fever/chills, urinary sx, recent illness/antibitoic use, headache, vision/speech changes. Pt endorses drinking 1-2 glasses of wine nightly. Pt denies smoking and illicit drug use.       In the ED, patient is hypertensive. Afebrile without leukocytosis. Labs entirely unremarkable, including troponin. EKG shows NSR with frequent PVCs. CXR without acute process.     * No surgery found *      Hospital Course:   Pt admitted to observation for palpitations. Labs entirely unremarkable, including troponin. EKG shows NSR with frequent PVCs. Outpatient 30 day event monitor which is showing intermittent PVCs with sinus/bradycardia. EP consulted; rec starting verapamil 180mg and discharge with 48 hour monitor. Pt to follow up with Dr. Melendez in clinic in 1-2 weeks.     Consults:   Consults         Status Ordering Provider     Inpatient consult to Cardiology  Once     Provider:  (Not yet assigned)    Completed " PIEDAD ALBRECHT ARYA          * Palpitations    - Hypertensive on arrival  - Afebrile without leukocytosis  - Labs entirely unremarkable, including troponin  - EKG shows NSR with frequent PVCs  - CXR without acute process  - Outpatient cardiologist, Dr. Pereyra ordered 30 day event monitor which is showing intermittent PVCs with sinus/bradycardia  - Cardiology consulted in the ER. Discussed with cardiology staff, Dr. Grijalva; plan to start lopressor 12.5 mg q6, lisinopril 5 mg daily, and obtain 2d echo  - 2d CFD shows pEF, normal diastolic function, no WMA  - Bradycardic with 1 dose of lopressor  - EP consulted and rec starting verapamil 180mg and discharge with 48 hour monitor. Pt to follow up with Dr. Melendez in clinic in 1-2 weeks.        HTN (hypertension), benign    - Per patient, never been on antihypertensive regimen  - Uncontrolled and elevated on arrival; suspect anxiety contributing  - Discharged on verapamil 180 mg   - PCP and cardiology f/u as outpatient        Dyslipidemia    - Continue home Pravastatin 20 mg qhs          Final Active Diagnoses:    Diagnosis Date Noted POA    PRINCIPAL PROBLEM:  Palpitations [R00.2] 05/14/2015 Yes    HTN (hypertension), benign [I10] 04/24/2013 Yes    Dyslipidemia [E78.5] 04/24/2013 Yes      Problems Resolved During this Admission:    Diagnosis Date Noted Date Resolved POA       Discharged Condition: stable    Disposition: Home or Self Care    Follow Up:  Follow-up Information     Domenico Pereyra MD On 7/13/2018.    Specialty:  Cardiology  Why:  at 11:00 AM  Contact information:  [address]  [facility] [address]  [phone]             Venkata Melendez MD On 7/6/2018.    Specialties:  Electrophysiology, Cardiovascular Disease  Why:  Dr. Melendez's nurse to call the patient with appointment date & time.   Contact information:  [address]  [facility] [address]  [phone]                 Patient Instructions:     Notify your health care provider if you  experience any of the following:  temperature >100.4     Notify your health care provider if you experience any of the following:  redness, tenderness, or signs of infection (pain, swelling, redness, odor or green/yellow discharge around incision site)     Notify your health care provider if you experience any of the following:  difficulty breathing or increased cough     Notify your health care provider if you experience any of the following:  persistent dizziness, light-headedness, or visual disturbances     Holter monitor - 48 hour   Standing Status: Future  Standing Exp. Date: 07/02/19     Activity as tolerated         Significant Diagnostic Studies: Labs:   Troponin   Recent Labs  Lab 07/01/18  1125   TROPONINI <0.006     Radiology: X-Ray: CXR: X-Ray Chest PA and Lateral (CXR):   Results for orders placed or performed during the hospital encounter of 07/01/18   X-Ray Chest PA And Lateral    Narrative    EXAMINATION:  XR CHEST PA AND LATERAL    CLINICAL HISTORY:  Palpitations    TECHNIQUE:  PA and lateral views of the chest were performed.    COMPARISON:  None    FINDINGS:  The cardiomediastinal contour is within normal limits.  The lungs are clear.  No pneumothorax.  No pleural effusions.      Impression    No acute findings.      Electronically signed by: Joseph Cortes MD  Date:    07/01/2018  Time:    12:15     Cardiac Graphics: ECG and Echocardiogram:   2D echo with color flow doppler:   Results for orders placed or performed during the hospital encounter of 07/01/18   2D echo with color flow doppler   Result Value Ref Range    EF 55 55 - 65    Mitral Valve Regurgitation MILD     Diastolic Dysfunction No     Est. PA Systolic Pressure 20.31        Pending Diagnostic Studies:     None         Medications:  Reconciled Home Medications:      Medication List      START taking these medications    verapamil 180 MG C24p  Commonly known as:  VERELAN  Take 1 capsule (180 mg total) by mouth once daily.        CONTINUE  taking these medications    allopurinol 100 MG tablet  Commonly known as:  ZYLOPRIM  Take 1 tablet (100 mg total) by mouth 2 (two) times daily.     aspirin 81 MG Chew  Take 1 tablet by mouth.     ciclopirox 1 % shampoo  every other day.     cyclobenzaprine 10 MG tablet  Commonly known as:  FLEXERIL     HYDROcodone-acetaminophen 7.5-325 mg per tablet  Commonly known as:  NORCO     naproxen 500 MG tablet  Commonly known as:  NAPROSYN  Take 500 mg by mouth 2 (two) times daily with meals.     OMEGA 3-6-9 ORAL  Take by mouth once daily.     omeprazole 40 MG capsule  Commonly known as:  PRILOSEC  Take 1 capsule (40 mg total) by mouth once daily.     pravastatin 20 MG tablet  Commonly known as:  PRAVACHOL  TAKE 1 TABLET(20 MG) BY MOUTH EVERY DAY     SAW PALMETTO ORAL  Take by mouth once daily.     triazolam 0.125 MG tablet  Commonly known as:  HALCION  Take 1 tablet (0.125 mg total) by mouth nightly as needed.     VITAMIN C 500 MG tablet  Generic drug:  ascorbic acid (vitamin C)  Take 500 mg by mouth once daily.     ZANTAC ORAL  Take by mouth.            Indwelling Lines/Drains at time of discharge:   Lines/Drains/Airways          No matching active lines, drains, or airways          Time spent on the discharge of patient: 30 minutes  Patient was seen and examined on the date of discharge and determined to be suitable for discharge.         Rosalie Cross PA-C  Department of Hospital Medicine  [facility]  Discussed with staff: Dr. Milton

## 2018-07-02 NOTE — HOSPITAL COURSE
Pt admitted to observation for palpitations. Labs entirely unremarkable, including troponin. EKG shows NSR with frequent PVCs. Outpatient 30 day event monitor which is showing intermittent PVCs with sinus/bradycardia. EP consulted; rec starting verapamil 180mg and discharge with 48 hour monitor. Pt to follow up with Dr. Melendez in clinic in 1-2 weeks.

## 2018-07-02 NOTE — CONSULTS
[facility]  Cardiology  Consult Note    Patient Name: Denis Bunn  MRN: [id number]  Admission Date: 7/1/2018  Hospital Length of Stay: 0 days  Code Status: Full Code   Attending Provider: Kassandra Milton MD   Consulting Provider: Jason Mesa MD  Primary Care Physician: Taz Lilly MD  Principal Problem:Palpitations    Patient information was obtained from patient and ER records.     Consults  Subjective:     Chief Complaint:  Irregular heart beat x 3 weeks     HPI:   62 M with PMH HTN placed in obs for palpitations and frequent PVCs. The patient has had these symptoms for the past 3 weeks which are intermittent, described as a rapid heart rate in which at times he can feel his heart beating in his throat. The symptoms are more noticeable when he is at rest, they are usually mild and he ignores them when he is active. He denies any syncope, near-syncope, chest pain/tightness/pressure, history of stroke/TIA or focal weakness. There are no alleviating factors, however he is more symptomatic when he is under stress. He drinks 1 cup of coffee daily, uses no other stimulants or illicit drugs, reports moderate alcohol consumption. He is currently wearing a 30 day event monitor. He exercises (swimming) multiple times per week). He is a never smoker, there is a family history of CAD in his father, and the patient has had negative cardiac stress testing in the past.    Past Medical History:   Diagnosis Date    Back pain     Cataract     Constipation - functional     History of gout     HTN (hypertension), benign 4/24/2013    Prostatitis     Vision changes        Past Surgical History:   Procedure Laterality Date    BACK SURGERY      CATARACT EXTRACTION      left eye    ELBOW SURGERY      scope/right    EYE SURGERY      Dr. Hope.retina x2 left    LAMINECTOMY      WRIST SURGERY      right       Review of patient's allergies indicates:   Allergen Reactions    Augmentin  [amoxicillin-pot clavulanate]      Other reaction(s): Stomach upset    Celebrex [celecoxib] Other (See Comments)       No current facility-administered medications on file prior to encounter.      Current Outpatient Prescriptions on File Prior to Encounter   Medication Sig    allopurinol (ZYLOPRIM) 100 MG tablet Take 1 tablet (100 mg total) by mouth 2 (two) times daily.    naproxen (NAPROSYN) 500 MG tablet Take 500 mg by mouth 2 (two) times daily with meals.     pravastatin (PRAVACHOL) 20 MG tablet TAKE 1 TABLET(20 MG) BY MOUTH EVERY DAY    ascorbic acid (VITAMIN C) 500 MG tablet Take 500 mg by mouth once daily.    aspirin 81 MG chewable tablet Take 1 tablet by mouth.    ciclopirox 1 % shampoo every other day.     cyclobenzaprine (FLEXERIL) 10 MG tablet     hydrocodone-acetaminophen 7.5-325mg (NORCO) 7.5-325 mg per tablet     OM-3/E/LINOL/ALA/OLEIC/GLA/LIP (OMEGA 3-6-9 ORAL) Take by mouth once daily.     omeprazole (PRILOSEC) 40 MG capsule Take 1 capsule (40 mg total) by mouth once daily.    SAW PALMETTO ORAL Take by mouth once daily.     triazolam (HALCION) 0.125 MG tablet Take 1 tablet (0.125 mg total) by mouth nightly as needed.     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    Alcohol use 1.8 oz/week     3 Glasses of wine per week      Comment: 8-10 glasses wine per week    Drug use: No    Sexual activity: Yes     Partners: Female     Review of Systems   Constitution: Negative for chills, decreased appetite, diaphoresis, fever, night sweats, weight gain and weight loss.   HENT: Negative for  congestion, nosebleeds and tinnitus.    Eyes: Negative for blurred vision and photophobia.   Cardiovascular: Positive for irregular heartbeat and palpitations. Negative for chest pain, claudication, cyanosis, dyspnea on exertion, leg swelling, near-syncope, orthopnea, paroxysmal nocturnal dyspnea and syncope.   Respiratory: Negative for cough, hemoptysis, shortness of breath and wheezing.    Skin: Negative for color change and rash.   Musculoskeletal: Negative for back pain and falls.   Gastrointestinal: Negative for abdominal pain, constipation, diarrhea, nausea and vomiting.   Genitourinary: Negative for dysuria and hematuria.   Neurological: Negative for focal weakness, numbness and seizures.   Psychiatric/Behavioral: Negative for altered mental status. The patient is not nervous/anxious.      Objective:     Vital Signs (Most Recent):  Temp: 98.3 °F (36.8 °C) (07/02/18 0732)  Pulse: (!) 52 (07/02/18 1000)  Resp: 16 (07/02/18 0732)  BP: 125/69 (07/02/18 0732)  SpO2: 99 % (07/02/18 0732) Vital Signs (24h Range):  Temp:  [98.2 °F (36.8 °C)-98.5 °F (36.9 °C)] 98.3 °F (36.8 °C)  Pulse:  [36-79] 52  Resp:  [12-24] 16  SpO2:  [97 %-99 %] 99 %  BP: ()/() 125/69     Weight: 73.8 kg (162 lb 9.4 oz)  Body mass index is 24.72 kg/m².    SpO2: 99 %  O2 Device (Oxygen Therapy): room air    No intake or output data in the 24 hours ending 07/02/18 1050    Lines/Drains/Airways     Peripheral Intravenous Line                 Peripheral IV - Single Lumen 07/01/18 1126 Left Antecubital less than 1 day                Physical Exam   Constitutional: He is oriented to person, place, and time. He appears well-developed and well-nourished. No distress.   HENT:   Head: Normocephalic and atraumatic.   Eyes: EOM are normal. Pupils are equal, round, and reactive to light.   Neck: Normal range of motion. No JVD present.   There is no JVP   Cardiovascular: Normal rate, regular rhythm, normal heart sounds and intact distal pulses.   Exam reveals no gallop and no friction rub.    No murmur heard.  Pulmonary/Chest: Effort normal and breath sounds normal. No respiratory distress. He has no wheezes. He has no rales.   Abdominal: Soft. Bowel sounds are normal. He exhibits no distension. There is no tenderness.   Musculoskeletal: Normal range of motion. He exhibits no edema (wearing compression stockings).   Neurological: He is alert and oriented to person, place, and time.   Skin: Skin is warm. No rash noted. He is not diaphoretic.   Psychiatric: He has a normal mood and affect. His behavior is normal.       Significant Labs:     Recent Results (from the past 24 hour(s))   CBC auto differential    Collection Time: 07/01/18 11:25 AM   Result Value Ref Range    WBC 7.07 3.90 - 12.70 K/uL    RBC 4.58 (L) 4.60 - 6.20 M/uL    Hemoglobin 14.4 14.0 - 18.0 g/dL    Hematocrit 42.7 40.0 - 54.0 %    MCV 93 82 - 98 fL    MCH 31.4 (H) 27.0 - 31.0 pg    MCHC 33.7 32.0 - 36.0 g/dL    RDW 12.5 11.5 - 14.5 %    Platelets 264 150 - 350 K/uL    MPV 9.9 9.2 - 12.9 fL    Immature Granulocytes 0.3 0.0 - 0.5 %    Gran # (ANC) 4.7 1.8 - 7.7 K/uL    Immature Grans (Abs) 0.02 0.00 - 0.04 K/uL    Lymph # 1.6 1.0 - 4.8 K/uL    Mono # 0.7 0.3 - 1.0 K/uL    Eos # 0.1 0.0 - 0.5 K/uL    Baso # 0.04 0.00 - 0.20 K/uL    nRBC 0 0 /100 WBC    Gran% 66.3 38.0 - 73.0 %    Lymph% 22.1 18.0 - 48.0 %    Mono% 9.6 4.0 - 15.0 %    Eosinophil% 1.1 0.0 - 8.0 %    Basophil% 0.6 0.0 - 1.9 %    Differential Method Automated    Basic metabolic panel    Collection Time: 07/01/18 11:25 AM   Result Value Ref Range    Sodium 141 136 - 145 mmol/L    Potassium 4.2 3.5 - 5.1 mmol/L    Chloride 106 95 - 110 mmol/L    CO2 27 23 - 29 mmol/L    Glucose 112 (H) 70 - 110 mg/dL    BUN, Bld 19 8 - 23 mg/dL    Creatinine 1.1 0.5 - 1.4 mg/dL    Calcium 9.7 8.7 - 10.5 mg/dL    Anion Gap 8 8 - 16 mmol/L    eGFR if African American >60.0 >60 mL/min/1.73 m^2    eGFR if non African American >60.0 >60 mL/min/1.73 m^2    Magnesium    Collection Time: 07/01/18 11:25 AM   Result Value Ref Range    Magnesium 2.3 1.6 - 2.6 mg/dL   Troponin I    Collection Time: 07/01/18 11:25 AM   Result Value Ref Range    Troponin I <0.006 0.000 - 0.026 ng/mL   Brain natriuretic peptide    Collection Time: 07/01/18 11:25 AM   Result Value Ref Range     (H) 0 - 99 pg/mL   Phosphorus    Collection Time: 07/01/18 11:25 AM   Result Value Ref Range    Phosphorus 2.5 (L) 2.7 - 4.5 mg/dL   TSH    Collection Time: 07/01/18 11:25 AM   Result Value Ref Range    TSH 1.230 0.400 - 4.000 uIU/mL   T4, free    Collection Time: 07/01/18 11:25 AM   Result Value Ref Range    Free T4 0.94 0.71 - 1.51 ng/dL   2D echo with color flow doppler    Collection Time: 07/01/18  7:07 PM   Result Value Ref Range    EF 55 55 - 65    Mitral Valve Regurgitation MILD     Diastolic Dysfunction No     Est. PA Systolic Pressure 20.31    CBC auto differential    Collection Time: 07/02/18  6:29 AM   Result Value Ref Range    WBC 5.98 3.90 - 12.70 K/uL    RBC 4.52 (L) 4.60 - 6.20 M/uL    Hemoglobin 14.3 14.0 - 18.0 g/dL    Hematocrit 42.6 40.0 - 54.0 %    MCV 94 82 - 98 fL    MCH 31.6 (H) 27.0 - 31.0 pg    MCHC 33.6 32.0 - 36.0 g/dL    RDW 12.6 11.5 - 14.5 %    Platelets 252 150 - 350 K/uL    MPV 10.4 9.2 - 12.9 fL    Immature Granulocytes 0.2 0.0 - 0.5 %    Gran # (ANC) 3.5 1.8 - 7.7 K/uL    Immature Grans (Abs) 0.01 0.00 - 0.04 K/uL    Lymph # 1.7 1.0 - 4.8 K/uL    Mono # 0.6 0.3 - 1.0 K/uL    Eos # 0.1 0.0 - 0.5 K/uL    Baso # 0.04 0.00 - 0.20 K/uL    nRBC 0 0 /100 WBC    Gran% 58.9 38.0 - 73.0 %    Lymph% 27.8 18.0 - 48.0 %    Mono% 10.2 4.0 - 15.0 %    Eosinophil% 2.2 0.0 - 8.0 %    Basophil% 0.7 0.0 - 1.9 %    Differential Method Automated    Comprehensive metabolic panel    Collection Time: 07/02/18  6:29 AM   Result Value Ref Range    Sodium 141 136 - 145 mmol/L    Potassium 4.7 3.5 - 5.1 mmol/L    Chloride 106 95 - 110 mmol/L    CO2 27 23 - 29 mmol/L    Glucose 93 70 - 110  "mg/dL    BUN, Bld 17 8 - 23 mg/dL    Creatinine 0.9 0.5 - 1.4 mg/dL    Calcium 9.2 8.7 - 10.5 mg/dL    Total Protein 6.5 6.0 - 8.4 g/dL    Albumin 3.5 3.5 - 5.2 g/dL    Total Bilirubin 0.9 0.1 - 1.0 mg/dL    Alkaline Phosphatase 57 55 - 135 U/L    AST 19 10 - 40 U/L    ALT 15 10 - 44 U/L    Anion Gap 8 8 - 16 mmol/L    eGFR if African American >60.0 >60 mL/min/1.73 m^2    eGFR if non African American >60.0 >60 mL/min/1.73 m^2   Magnesium    Collection Time: 07/02/18  6:29 AM   Result Value Ref Range    Magnesium 2.3 1.6 - 2.6 mg/dL   Phosphorus    Collection Time: 07/02/18  6:29 AM   Result Value Ref Range    Phosphorus 3.2 2.7 - 4.5 mg/dL         Significant Imaging:     Imaging Results          X-Ray Chest PA And Lateral (Final result)  Result time 07/01/18 12:15:39    Final result by Joseph Cortes MD (07/01/18 12:15:39)                 Impression:      No acute findings.      Electronically signed by: Joseph Cortes MD  Date:    07/01/2018  Time:    12:15             Narrative:    EXAMINATION:  XR CHEST PA AND LATERAL    CLINICAL HISTORY:  Palpitations    TECHNIQUE:  PA and lateral views of the chest were performed.    COMPARISON:  None    FINDINGS:  The cardiomediastinal contour is within normal limits.  The lungs are clear.  No pneumothorax.  No pleural effusions.                                  Assessment and Plan:     62 M with PMH HTN placed in obs for palpitations and frequent PVCs    Palpitations  Patient with frequent ectopy (PACs vs PVCs) on telemetry and initial EKG  No sustained arrhythmias on telemetry  Currently wearing 30 day monitor showing "intermittent PVCs with sinus bradycardia", however this will not quantify the ectopy  2D echo with preserved EF (55-60%), biatrial enlargement  There is no evidence of PVC induced cardiomyopathy  D/c 30 day event recorder, place 48 hour event monitor  48 hour Holter from 2015 reviewed; 69 PACs, 1 PVC  Patient received one dose of metoprolol tartrate 12.5mg " yesterday, subsequent doses held due to bradycardia  Start verapamil 180mg long acting  Follow-up with Dr. Melendez in clinic in 1-2 weeks (clinic notified to schedule appointment)  If verapamil does not control ectopy/symptoms, can try flecainide, then possibly EPS/RFA        Thank you for your consult.    Jason Mesa MD  Cardiology   [facility]

## 2018-07-02 NOTE — ASSESSMENT & PLAN NOTE
- Per patient, never been on antihypertensive regimen  - Uncontrolled and elevated on arrival; suspect anxiety contributing  - Discharged on verapamil 180 mg   - PCP and cardiology f/u as outpatient

## 2018-07-02 NOTE — PROGRESS NOTES
[facility]  Cardiology  Progress Note    Patient Name: Denis Bunn  MRN: [id number]  Admission Date: 7/1/2018  Hospital Length of Stay: 0 days  Code Status: Full Code   Attending Physician: Kassandra Milton MD   Primary Care Physician: Taz Lilly MD  Expected Discharge Date: 7/2/2018  Principal Problem:Palpitations    Subjective:     Hospital Course:   No notes on file    Past Medical History:   Diagnosis Date    Back pain     Cataract     Constipation - functional     History of gout     HTN (hypertension), benign 4/24/2013    Prostatitis     Vision changes        Past Surgical History:   Procedure Laterality Date    BACK SURGERY      CATARACT EXTRACTION      left eye    ELBOW SURGERY      scope/right    EYE SURGERY      Dr. Hope.retina x2 left    LAMINECTOMY      WRIST SURGERY      right       Review of patient's allergies indicates:   Allergen Reactions    Augmentin [amoxicillin-pot clavulanate]      Other reaction(s): Stomach upset    Celebrex [celecoxib] Other (See Comments)       No current facility-administered medications on file prior to encounter.      Current Outpatient Prescriptions on File Prior to Encounter   Medication Sig    allopurinol (ZYLOPRIM) 100 MG tablet Take 1 tablet (100 mg total) by mouth 2 (two) times daily.    naproxen (NAPROSYN) 500 MG tablet Take 500 mg by mouth 2 (two) times daily with meals.     pravastatin (PRAVACHOL) 20 MG tablet TAKE 1 TABLET(20 MG) BY MOUTH EVERY DAY    ascorbic acid (VITAMIN C) 500 MG tablet Take 500 mg by mouth once daily.    aspirin 81 MG chewable tablet Take 1 tablet by mouth.    ciclopirox 1 % shampoo every other day.     cyclobenzaprine (FLEXERIL) 10 MG tablet     hydrocodone-acetaminophen 7.5-325mg (NORCO) 7.5-325 mg per tablet     OM-3/E/LINOL/ALA/OLEIC/GLA/LIP (OMEGA 3-6-9 ORAL) Take by mouth once daily.     omeprazole (PRILOSEC) 40 MG capsule Take 1 capsule (40 mg total) by mouth once daily.    SAW  PALMETTO ORAL Take by mouth once daily.     triazolam (HALCION) 0.125 MG tablet Take 1 tablet (0.125 mg total) by mouth nightly as needed.     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    Alcohol use 1.8 oz/week     3 Glasses of wine per week      Comment: 8-10 glasses wine per week    Drug use: No    Sexual activity: Yes     Partners: Female     Review of Systems   Constitution: Negative for chills, decreased appetite, diaphoresis, fever, night sweats, weight gain and weight loss.   HENT: Negative for congestion, nosebleeds and tinnitus.    Eyes: Negative for blurred vision and photophobia.   Cardiovascular: Positive for irregular heartbeat and palpitations. Negative for chest pain, claudication, cyanosis, dyspnea on exertion, leg swelling, near-syncope, orthopnea, paroxysmal nocturnal dyspnea and syncope.   Respiratory: Negative for cough, hemoptysis, shortness of breath and wheezing.    Skin: Negative for color change and rash.   Musculoskeletal: Negative for back pain and falls.   Gastrointestinal: Negative for abdominal pain, constipation, diarrhea, nausea and vomiting.   Genitourinary: Negative for dysuria and hematuria.   Neurological: Negative for focal weakness, numbness and seizures.   Psychiatric/Behavioral: Negative for altered mental status. The patient is not nervous/anxious.    All other systems reviewed and are negative.    Objective:     Vital Signs (Most Recent):  Temp: 98.3 °F (36.8 °C) (07/02/18 0732)  Pulse: (!) 52 (07/02/18 1000)  Resp: 16 (07/02/18 0732)  BP: 125/69 (07/02/18  0732)  SpO2: 99 % (07/02/18 0732) Vital Signs (24h Range):  Temp:  [98.2 °F (36.8 °C)-98.5 °F (36.9 °C)] 98.3 °F (36.8 °C)  Pulse:  [36-79] 52  Resp:  [12-24] 16  SpO2:  [97 %-99 %] 99 %  BP: ()/() 125/69     Weight: 73.8 kg (162 lb 9.4 oz)  Body mass index is 24.72 kg/m².    SpO2: 99 %  O2 Device (Oxygen Therapy): room air    No intake or output data in the 24 hours ending 07/02/18 1050    Lines/Drains/Airways     Peripheral Intravenous Line                 Peripheral IV - Single Lumen 07/01/18 1126 Left Antecubital less than 1 day              Physical Exam   Constitutional: He is oriented to person, place, and time. He appears well-developed and well-nourished. No distress.   HENT:   Head: Normocephalic and atraumatic.   Eyes: EOM are normal. Pupils are equal, round, and reactive to light.   Neck: Normal range of motion. No JVD present.   There is no JVP   Cardiovascular: Normal rate, regular rhythm, normal heart sounds and intact distal pulses.  Exam reveals no gallop and no friction rub.    No murmur heard.  Pulmonary/Chest: Effort normal and breath sounds normal. No respiratory distress. He has no wheezes. He has no rales.   Abdominal: Soft. Bowel sounds are normal. He exhibits no distension. There is no tenderness.   Musculoskeletal: Normal range of motion. He exhibits no edema (wearing compression stockings).   Neurological: He is alert and oriented to person, place, and time.   Skin: Skin is warm and dry. No rash noted. He is not diaphoretic.   Psychiatric: He has a normal mood and affect. His behavior is normal.       Assessment and Plan:     * Palpitations    - TTE with normal EF, PENELOPE  - PVC's still noted on telemetry, and patient is symptomatic\  - Continue Lopressor on discharge  - Will plan for 48H Holter prior to discharge  - Patient will turn in 30 day event monitor prior to discharge  - Follow up with Dr. Melendez as an outpatient  - Follow up with Dr. Pereyra as previously scheduled         Patient seen and examined this morning. Thank you for the opportunity to participate in the care of this interesting patient. Discussed with Dr. Grijalva  - staff attestation to follow.    Marciano Mitchell MD  Cardiology  [facility]

## 2018-07-02 NOTE — PROGRESS NOTES
Rosalie Cross PA aware that pt's HR ranges from upper 30s to low 60s with a current HR of 56. PA stated it was okay to hold Metoprolol 6am dose d/t bradycardia.

## 2018-07-09 ENCOUNTER — TELEPHONE (OUTPATIENT)
Dept: ELECTROPHYSIOLOGY | Facility: CLINIC | Age: 63
End: 2018-07-09

## 2018-07-09 NOTE — TELEPHONE ENCOUNTER
----- Message from Yolanda Norris MA sent at 7/9/2018  7:41 AM CDT -----  Contact: Pt      ----- Message -----  From: Tereza Rubalcava  Sent: 7/9/2018   7:02 AM  To: [facility] Staff    Pt says he need to speak with office regarding issues with his heart says he really need to speak with someone     Pt can be reached at [phone]    Thanks

## 2018-07-09 NOTE — TELEPHONE ENCOUNTER
Spoke with patient. He was seen in the ER two weeks ago and has an appt to see Dr Melendez tomorrow. States he only slept two hours last night due to increased palpitations. He wants to be seen today by Dr Melendez. Advised that Dr Melendez is not in clinic today and if he is uncomfortable, he needs to go to ER for evaluation. He verbalizes understanding. Will not cancel tomorrow's appt just yet. He is going to keep me posted.

## 2018-07-10 ENCOUNTER — OFFICE VISIT (OUTPATIENT)
Dept: ELECTROPHYSIOLOGY | Facility: CLINIC | Age: 63
End: 2018-07-10
Payer: COMMERCIAL

## 2018-07-10 ENCOUNTER — HOSPITAL ENCOUNTER (OUTPATIENT)
Dept: CARDIOLOGY | Facility: CLINIC | Age: 63
Discharge: HOME | End: 2018-07-10
Payer: COMMERCIAL

## 2018-07-10 VITALS
HEIGHT: 68 IN | WEIGHT: 165.13 LBS | HEART RATE: 64 BPM | SYSTOLIC BLOOD PRESSURE: 145 MMHG | DIASTOLIC BLOOD PRESSURE: 87 MMHG | BODY MASS INDEX: 25.03 KG/M2

## 2018-07-10 DIAGNOSIS — I49.3 PVC (PREMATURE VENTRICULAR CONTRACTION): Primary | ICD-10-CM

## 2018-07-10 DIAGNOSIS — I10 HTN (HYPERTENSION), BENIGN: Primary | ICD-10-CM

## 2018-07-10 DIAGNOSIS — I49.3 PVC (PREMATURE VENTRICULAR CONTRACTION): ICD-10-CM

## 2018-07-10 PROCEDURE — 93000 ELECTROCARDIOGRAM COMPLETE: CPT | Mod: S$GLB,,, | Performed by: INTERNAL MEDICINE

## 2018-07-10 PROCEDURE — 99999 PR PBB SHADOW E&M-EST. PATIENT-LVL III: CPT | Mod: PBBFAC,,, | Performed by: INTERNAL MEDICINE

## 2018-07-10 PROCEDURE — 3077F SYST BP >= 140 MM HG: CPT | Mod: CPTII,S$GLB,, | Performed by: INTERNAL MEDICINE

## 2018-07-10 PROCEDURE — 3079F DIAST BP 80-89 MM HG: CPT | Mod: CPTII,S$GLB,, | Performed by: INTERNAL MEDICINE

## 2018-07-10 PROCEDURE — 3008F BODY MASS INDEX DOCD: CPT | Mod: CPTII,S$GLB,, | Performed by: INTERNAL MEDICINE

## 2018-07-10 PROCEDURE — 99214 OFFICE O/P EST MOD 30 MIN: CPT | Mod: S$GLB,,, | Performed by: INTERNAL MEDICINE

## 2018-07-10 NOTE — PROGRESS NOTES
Subjective:    Patient ID:  Denis Bunn is a 62 y.o. male who presents for follow-up of Irregular Heart Beat      62 yoM here for arrhythmia management. He presented early July 2018 with frequent palpitations. He was found to have frequent PVCs with RBBB morphology that were relatively narrow. He was wearing an event monitor at the time. He was admitted for observation. There his EF was noted to be normal. I advised that he stop his metoprolol and start verapamil. The event monitor was returned and I asked for a 48h holter. His PVC burden was 23%. He has had some relief on verapamil with a particularly symptomatic day 7/8/18. He denies syncope, near syncope, chest pain, shortness of breath. He feels his PVCs more at rest than with activity. PVC morphology is RBBB, V3 transition with rightward/inferior axis. The QRS duration is ~120 ms.     Echo 7/2/18:  CONCLUSIONS     1 - Normal left ventricular systolic function (EF 55-60%).     2 - Biatrial enlargement.     3 - Normal left ventricular diastolic function.     4 - Normal right ventricular systolic function .     5 - Mild mitral regurgitation.     Past Medical History:  No date: Back pain  No date: Cataract  No date: Constipation - functional  No date: History of gout  4/24/2013: HTN (hypertension), benign  No date: Prostatitis  No date: Vision changes    Past Surgical History:  No date: BACK SURGERY  No date: CATARACT EXTRACTION      Comment: left eye  No date: ELBOW SURGERY      Comment: scope/right  No date: EYE SURGERY      Comment: Dr. 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    Alcohol use: Yes           1.8 oz/week       Glasses of wine: 3 per week       Comment: 8-10 glasses wine per week    Drug use: No              Sexual activity: Yes               Partners with: Female    Other Topics            Concern    None on file    Social History Narrative    None on file        Review of patient's family history indicates:  Problem: Blindness      Relation: Mother       Age of Onset: (Not Specified)       Comment: from cva  Problem: Cataracts      Relation: Mother       Age of Onset: (Not Specified)   Problem: Cataracts      Relation: Father       Age of Onset: (Not Specified)   Problem: Cancer      Relation: Father       Age of Onset: (Not Specified)   Problem: No Known Problems      Relation: Sister       Age of Onset: (Not Specified)   Problem: No Known Problems      Relation: Brother       Age of Onset: (Not Specified)   Problem: No Known Problems      Relation: Maternal Aunt       Age of Onset: (Not Specified)   Problem: No Known Problems      Relation: Maternal Uncle       Age of Onset: (Not Specified)   Problem: No Known Problems      Relation: Paternal Aunt       Age of Onset: (Not Specified)   Problem: No Known Problems      Relation: Paternal Uncle       Age of Onset: (Not Specified)   Problem: No Known Problems      Relation: Maternal Grandmother       Age of Onset: (Not Specified)   Problem: No Known Problems      Relation: Maternal Grandfather       Age of Onset: (Not Specified)   Problem: No Known Problems      Relation: Paternal Grandmother       Age of Onset: (Not Specified)   Problem: No Known Problems      Relation: Paternal Grandfather       Age of Onset: (Not Specified)   Problem: Amblyopia      Relation: Neg Hx       Age of Onset: (Not Specified)   Problem: Diabetes      Relation: Neg Hx       Age of Onset: (Not Specified)   Problem: Glaucoma      Relation: Neg Hx       Age of Onset:  (Not Specified)   Problem: Hypertension      Relation: Neg Hx       Age of Onset: (Not Specified)   Problem: Macular degeneration      Relation: Neg Hx       Age of Onset: (Not Specified)   Problem: Retinal detachment      Relation: Neg Hx       Age of Onset: (Not Specified)   Problem: Strabismus      Relation: Neg Hx       Age of Onset: (Not Specified)   Problem: Stroke      Relation: Neg Hx       Age of Onset: (Not Specified)   Problem: Thyroid disease      Relation: Neg Hx       Age of Onset: (Not Specified)           Review of Systems   Constitution: Negative.   HENT: Negative.    Eyes: Negative.    Cardiovascular: Positive for irregular heartbeat and palpitations. Negative for chest pain, dyspnea on exertion, leg swelling, near-syncope and syncope.   Respiratory: Negative.  Negative for shortness of breath.    Endocrine: Negative.    Hematologic/Lymphatic: Negative.    Skin: Negative.    Musculoskeletal: Negative.    Gastrointestinal: Negative.    Genitourinary: Negative.    Neurological: Negative.  Negative for dizziness and light-headedness.   Psychiatric/Behavioral: Negative.    Allergic/Immunologic: Negative.         Objective:    Physical Exam   Constitutional: He is oriented to person, place, and time. He appears well-developed and well-nourished. No distress.   HENT:   Head: Normocephalic and atraumatic.   Eyes: EOM are normal. Pupils are equal, round, and reactive to light.   Neck: Normal range of motion. No JVD present. No thyromegaly present.   Cardiovascular: Normal rate, S1 normal, S2 normal and normal heart sounds.  A regularly irregular rhythm present. PMI is not displaced.  Exam reveals no gallop and no friction rub.    No murmur heard.  Pulmonary/Chest: Effort normal and breath sounds normal. No respiratory distress. He has no wheezes. He has no rales.   Abdominal: Soft. Bowel sounds are normal. He exhibits no distension. There is no tenderness. There is no rebound and no guarding.    Musculoskeletal: Normal range of motion. He exhibits no edema or tenderness.   Neurological: He is alert and oriented to person, place, and time. No cranial nerve deficit.   Skin: Skin is warm and dry. No rash noted. No erythema.   Psychiatric: He has a normal mood and affect. His behavior is normal. Judgment and thought content normal.   Vitals reviewed.    ECG: NSR with PVCs (RBBB, V3 transition, rightward/inferior axis, 120 ms)        Assessment:       1. HTN (hypertension), benign    2. PVC (premature ventricular contraction)         Plan:       62 yoM with symptomatic PVCs. I suspect fascicular/his/purkinje origin. The morphology is not consistent with a left posterior or left anterior fascicle PVC. I suspect there source is closer to the base of the HPS. Will try verapamil as an initial strategy. If this is not effective, I would offer flecainide. Then, I would offer RFA. RTC 4 weeks to reassess symptoms.

## 2018-07-11 PROBLEM — E66.3 OVERWEIGHT (BMI 25.0-29.9): Status: ACTIVE | Noted: 2018-07-11

## 2018-07-12 NOTE — PROGRESS NOTES
Subjective:   Patient ID:  Denis Bunn is a 62 y.o. male who presents for follow-up of palpitations/PVCs    HPI:The patient is here for CAD risk factors but 0 CAC.      The patient has no chest pain, SOB, TIA,  syncope or pre-syncope.Patient does not exercise.He has been having a lot of palpiations, feeling the PVCs. BP up and down but a lot 130s to 140s.        Review of Systems   Constitution: Negative for chills, decreased appetite, diaphoresis, fever, weakness, malaise/fatigue, night sweats, weight gain and weight loss.   HENT: Negative for congestion, hoarse voice, nosebleeds, sore throat and tinnitus.    Eyes: Negative for blurred vision, double vision, vision loss in left eye, vision loss in right eye, visual disturbance and visual halos.   Cardiovascular: Positive for irregular heartbeat and palpitations. Negative for chest pain, claudication, cyanosis, dyspnea on exertion, leg swelling, near-syncope, orthopnea, paroxysmal nocturnal dyspnea and syncope.   Respiratory: Negative for cough, hemoptysis, shortness of breath, sleep disturbances due to breathing, snoring, sputum production and wheezing.    Endocrine: Negative for cold intolerance, heat intolerance, polydipsia, polyphagia and polyuria.   Hematologic/Lymphatic: Negative for adenopathy and bleeding problem. Does not bruise/bleed easily.   Skin: Negative for color change, dry skin, flushing, itching, nail changes, poor wound healing, rash, skin cancer, suspicious lesions and unusual hair distribution.   Musculoskeletal: Negative for arthritis, back pain, falls, gout, joint pain, joint swelling, muscle cramps, muscle weakness, myalgias and stiffness.   Gastrointestinal: Negative for abdominal pain, anorexia, change in bowel habit, constipation, diarrhea, dysphagia, heartburn, hematemesis, hematochezia, melena and vomiting.   Genitourinary: Negative for decreased libido, dysuria, hematuria, hesitancy and urgency.   Neurological: Negative for  "excessive daytime sleepiness, dizziness, focal weakness, headaches, light-headedness, loss of balance, numbness, paresthesias, seizures, sensory change, tremors and vertigo.   Psychiatric/Behavioral: Negative for altered mental status, depression, hallucinations, memory loss, substance abuse and suicidal ideas. The patient does not have insomnia and is not nervous/anxious.    Allergic/Immunologic: Negative for environmental allergies and hives.       Objective: BP (!) 164/94   Pulse (!) 58   Ht 5' 8" (1.727 m)   Wt 74.6 kg (164 lb 7.4 oz)   BMI 25.01 kg/m²      Physical Exam   Constitutional: He is oriented to person, place, and time. He appears well-developed and well-nourished. No distress.   HENT:   Head: Normocephalic.   Eyes: EOM are normal. Pupils are equal, round, and reactive to light.   Neck: Normal range of motion. No thyromegaly present.   Cardiovascular: Normal rate, regular rhythm, normal heart sounds and intact distal pulses.  Exam reveals no gallop and no friction rub.    No murmur heard.  Pulses:       Carotid pulses are 3+ on the right side, and 3+ on the left side.       Radial pulses are 3+ on the right side, and 3+ on the left side.        Femoral pulses are 3+ on the right side, and 3+ on the left side.       Popliteal pulses are 3+ on the right side, and 3+ on the left side.        Dorsalis pedis pulses are 3+ on the right side, and 3+ on the left side.        Posterior tibial pulses are 3+ on the right side, and 3+ on the left side.   Pulmonary/Chest: Effort normal and breath sounds normal. No respiratory distress. He has no wheezes. He has no rales. He exhibits no tenderness.   Abdominal: Soft. He exhibits no distension and no mass. There is no tenderness.   Musculoskeletal: Normal range of motion.   Lymphadenopathy:     He has no cervical adenopathy.   Neurological: He is alert and oriented to person, place, and time.   Skin: Skin is warm. He is not diaphoretic. No cyanosis. Nails show " no clubbing.   Psychiatric: He has a normal mood and affect. His speech is normal and behavior is normal. Judgment and thought content normal. Cognition and memory are normal.       Assessment:     1. Palpitations    2. PVC (premature ventricular contraction)    3. HTN (hypertension), benign    4. FH: CAD (coronary artery disease)    5. Dyslipidemia    6. Impaired fasting glucose    7. Anxiety    8. Overweight (BMI 25.0-29.9)        Plan:   Discussed diet , achieving and maintaining ideal body weight, and exercise.   We reviewed meds in detail.  Reassured-discussed goals, options, plan  We could stop Verapamil and use BBs but HR may be too slow; we could also try increase in Verapamil vs nothing since benign  Increase Verapamil to 240 ( later 360) but if Flecanide is used for PVCs, can switch to amlodipine  Metoprolol just half of 25 for bad palpitations  Generic Xanax but VERY SPARRINGLY!      Denis was seen today for hypertension and dyslipidemia.    Diagnoses and all orders for this visit:    Palpitations  -     verapamil (VERELAN) 240 MG C24P; Take 1 capsule (240 mg total) by mouth once daily.  -     metoprolol tartrate (LOPRESSOR) 25 MG tablet; Take 1 tablet (25 mg total) by mouth 2 (two) times daily. Use .5 to one prn  -     ALPRAZolam (XANAX) 0.5 MG tablet; Take 1 tablet (0.5 mg total) by mouth 3 (three) times daily. Use .5 to one sparringly  -     Comprehensive metabolic panel; Future; Expected date: 09/13/2019  -     TSH; Future; Expected date: 09/13/2019  -     PSA, Screening; Future; Expected date: 09/13/2019  -     High sensitivity CRP (Cardiac CRP); Future; Expected date: 09/13/2019    PVC (premature ventricular contraction)  -     verapamil (VERELAN) 240 MG C24P; Take 1 capsule (240 mg total) by mouth once daily.  -     metoprolol tartrate (LOPRESSOR) 25 MG tablet; Take 1 tablet (25 mg total) by mouth 2 (two) times daily. Use .5 to one prn  -     ALPRAZolam (XANAX) 0.5 MG tablet; Take 1 tablet (0.5 mg  total) by mouth 3 (three) times daily. Use .5 to one sparringly  -     Comprehensive metabolic panel; Future; Expected date: 09/13/2019  -     TSH; Future; Expected date: 09/13/2019  -     PSA, Screening; Future; Expected date: 09/13/2019  -     High sensitivity CRP (Cardiac CRP); Future; Expected date: 09/13/2019    HTN (hypertension), benign  -     verapamil (VERELAN) 240 MG C24P; Take 1 capsule (240 mg total) by mouth once daily.  -     TSH; Future; Expected date: 09/13/2019  -     PSA, Screening; Future; Expected date: 09/13/2019  -     High sensitivity CRP (Cardiac CRP); Future; Expected date: 09/13/2019    FH: CAD (coronary artery disease)  -     verapamil (VERELAN) 240 MG C24P; Take 1 capsule (240 mg total) by mouth once daily.  -     High sensitivity CRP (Cardiac CRP); Future; Expected date: 09/13/2019    Dyslipidemia  -     Lipid panel; Future; Expected date: 09/13/2019  -     Comprehensive metabolic panel; Future; Expected date: 09/13/2019  -     High sensitivity CRP (Cardiac CRP); Future; Expected date: 09/13/2019    Impaired fasting glucose  -     Hemoglobin A1c; Future; Expected date: 09/13/2019  -     High sensitivity CRP (Cardiac CRP); Future; Expected date: 09/13/2019    Anxiety  -     ALPRAZolam (XANAX) 0.5 MG tablet; Take 1 tablet (0.5 mg total) by mouth 3 (three) times daily. Use .5 to one sparringly    Overweight (BMI 25.0-29.9)  -     High sensitivity CRP (Cardiac CRP); Future; Expected date: 09/13/2019    Other orders  -     magnesium 250 mg Tab; Take 250 mg by mouth once. Takes two 250 mg tablets daily            Follow-up in about 15 months (around 10/13/2019) for with labs.

## 2018-07-13 ENCOUNTER — OFFICE VISIT (OUTPATIENT)
Dept: CARDIOLOGY | Facility: CLINIC | Age: 63
End: 2018-07-13
Payer: COMMERCIAL

## 2018-07-13 VITALS
BODY MASS INDEX: 24.92 KG/M2 | HEIGHT: 68 IN | HEART RATE: 58 BPM | DIASTOLIC BLOOD PRESSURE: 94 MMHG | SYSTOLIC BLOOD PRESSURE: 164 MMHG | WEIGHT: 164.44 LBS

## 2018-07-13 DIAGNOSIS — I49.3 PVC (PREMATURE VENTRICULAR CONTRACTION): ICD-10-CM

## 2018-07-13 DIAGNOSIS — Z82.49 FH: CAD (CORONARY ARTERY DISEASE): ICD-10-CM

## 2018-07-13 DIAGNOSIS — E78.5 DYSLIPIDEMIA: ICD-10-CM

## 2018-07-13 DIAGNOSIS — E66.3 OVERWEIGHT (BMI 25.0-29.9): ICD-10-CM

## 2018-07-13 DIAGNOSIS — I10 HTN (HYPERTENSION), BENIGN: ICD-10-CM

## 2018-07-13 DIAGNOSIS — R73.01 IMPAIRED FASTING GLUCOSE: ICD-10-CM

## 2018-07-13 DIAGNOSIS — F41.9 ANXIETY: ICD-10-CM

## 2018-07-13 DIAGNOSIS — R00.2 PALPITATIONS: Primary | ICD-10-CM

## 2018-07-13 PROCEDURE — 3077F SYST BP >= 140 MM HG: CPT | Mod: CPTII,S$GLB,, | Performed by: INTERNAL MEDICINE

## 2018-07-13 PROCEDURE — 3080F DIAST BP >= 90 MM HG: CPT | Mod: CPTII,S$GLB,, | Performed by: INTERNAL MEDICINE

## 2018-07-13 PROCEDURE — 3008F BODY MASS INDEX DOCD: CPT | Mod: CPTII,S$GLB,, | Performed by: INTERNAL MEDICINE

## 2018-07-13 PROCEDURE — 99215 OFFICE O/P EST HI 40 MIN: CPT | Mod: S$GLB,,, | Performed by: INTERNAL MEDICINE

## 2018-07-13 PROCEDURE — 99999 PR PBB SHADOW E&M-EST. PATIENT-LVL III: CPT | Mod: PBBFAC,,, | Performed by: INTERNAL MEDICINE

## 2018-07-13 RX ORDER — MAGNESIUM 250 MG
250 TABLET ORAL ONCE
COMMUNITY

## 2018-07-13 RX ORDER — METOPROLOL TARTRATE 25 MG/1
25 TABLET, FILM COATED ORAL 2 TIMES DAILY
Qty: 100 TABLET | Refills: 3 | Status: SHIPPED | OUTPATIENT
Start: 2018-07-13 | End: 2018-07-26

## 2018-07-13 RX ORDER — VERAPAMIL HYDROCHLORIDE 240 MG/1
240 CAPSULE, EXTENDED RELEASE ORAL DAILY
Qty: 90 CAPSULE | Refills: 3 | Status: SHIPPED | OUTPATIENT
Start: 2018-07-13 | End: 2019-01-07 | Stop reason: SDUPTHER

## 2018-07-13 RX ORDER — ALPRAZOLAM 0.5 MG/1
0.5 TABLET ORAL 3 TIMES DAILY
Qty: 30 TABLET | Refills: 1 | Status: SHIPPED | OUTPATIENT
Start: 2018-07-13 | End: 2018-11-07

## 2018-07-13 NOTE — PATIENT INSTRUCTIONS
Discussed diet , achieving and maintaining ideal body weight, and exercise.   We reviewed meds in detail.  Reassured-discussed goals, options, plan  We could stop Verapamil and use BBs but HR may be too slow; we could also try increase in Verapamil vs nothing since benign  Increase Verapamil to 240 ( later 360) but if Flecanide is used for PVCs, can switch to amlodipine  Metoprolol just half of 25 for bad palpitations  Generic Xanax but VERY SPARRINGLY!

## 2018-07-24 ENCOUNTER — PATIENT MESSAGE (OUTPATIENT)
Dept: FAMILY MEDICINE | Facility: CLINIC | Age: 63
End: 2018-07-24

## 2018-07-26 ENCOUNTER — PATIENT MESSAGE (OUTPATIENT)
Dept: UROLOGY | Facility: CLINIC | Age: 63
End: 2018-07-26

## 2018-07-26 ENCOUNTER — OFFICE VISIT (OUTPATIENT)
Dept: UROLOGY | Facility: CLINIC | Age: 63
End: 2018-07-26
Payer: COMMERCIAL

## 2018-07-26 VITALS
DIASTOLIC BLOOD PRESSURE: 99 MMHG | BODY MASS INDEX: 24.92 KG/M2 | SYSTOLIC BLOOD PRESSURE: 154 MMHG | HEIGHT: 68 IN | WEIGHT: 164.44 LBS | HEART RATE: 56 BPM

## 2018-07-26 DIAGNOSIS — R97.20 ELEVATED PSA: Primary | ICD-10-CM

## 2018-07-26 PROCEDURE — 3080F DIAST BP >= 90 MM HG: CPT | Mod: CPTII,S$GLB,, | Performed by: UROLOGY

## 2018-07-26 PROCEDURE — 3008F BODY MASS INDEX DOCD: CPT | Mod: CPTII,S$GLB,, | Performed by: UROLOGY

## 2018-07-26 PROCEDURE — 99999 PR PBB SHADOW E&M-EST. PATIENT-LVL III: CPT | Mod: PBBFAC,,, | Performed by: UROLOGY

## 2018-07-26 PROCEDURE — 99214 OFFICE O/P EST MOD 30 MIN: CPT | Mod: S$GLB,,, | Performed by: UROLOGY

## 2018-07-26 PROCEDURE — 3077F SYST BP >= 140 MM HG: CPT | Mod: CPTII,S$GLB,, | Performed by: UROLOGY

## 2018-07-26 RX ORDER — SULFAMETHOXAZOLE AND TRIMETHOPRIM 800; 160 MG/1; MG/1
1 TABLET ORAL 2 TIMES DAILY
Qty: 60 TABLET | Refills: 2 | Status: SHIPPED | OUTPATIENT
Start: 2018-07-26 | End: 2018-08-25

## 2018-07-26 NOTE — PROGRESS NOTES
Subjective:       Patient ID: Denis Bunn is a 62 y.o. male.    Chief Complaint: Nocturia (x2-3  with pelvic pain. pt states he had this about 20 years ago and was told it was inflammation. ) and Urinary Urgency    HPI     Denis Bunn is a 62 y.o. male here for urinary frequency. Notes nocturia x2-3, strong stream, no burning when voiding, well hydrated during the day but reduced at night, and recently reduced alcohol intake. Symptoms resolve with Advil. PVR is 0.    Past Medical History:   Diagnosis Date    Back pain     Cataract     Constipation - functional     History of gout     HTN (hypertension), benign 4/24/2013    Prostatitis     Vision changes        Past Surgical History:   Procedure Laterality Date    BACK SURGERY      CATARACT EXTRACTION      left eye    ELBOW SURGERY      scope/right    EYE SURGERY      Dr. Hope.retina x2 left    LAMINECTOMY      WRIST SURGERY      right       Family History   Problem Relation Age of Onset    Blindness Mother         from cva    Cataracts Mother     Cataracts Father     Cancer Father     No Known Problems Sister     No Known Problems Brother     No Known Problems Maternal Aunt     No Known Problems Maternal Uncle     No Known Problems Paternal Aunt     No Known Problems Paternal Uncle     No Known Problems Maternal Grandmother     No Known Problems Maternal Grandfather     No Known Problems Paternal Grandmother     No Known Problems Paternal Grandfather     Amblyopia Neg Hx     Diabetes Neg Hx     Glaucoma Neg Hx     Hypertension Neg Hx     Macular degeneration Neg Hx     Retinal detachment Neg Hx     Strabismus Neg Hx     Stroke Neg Hx     Thyroid disease Neg Hx        Social History     Social History    Marital status:      Spouse name: N/A    Number of children: N/A    Years of education: N/A     Social History Main Topics    Smoking status: Never Smoker    Smokeless tobacco: Never Used      Comment:  , 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    Alcohol use 1.8 oz/week     3 Glasses of wine per week      Comment: 8-10 glasses wine per week    Drug use: No    Sexual activity: Yes     Partners: Female     Other Topics Concern    None     Social History Narrative    None       Current Outpatient Prescriptions   Medication Sig Dispense Refill    allopurinol (ZYLOPRIM) 100 MG tablet Take 1 tablet (100 mg total) by mouth 2 (two) times daily. 180 tablet 3    ALPRAZolam (XANAX) 0.5 MG tablet Take 1 tablet (0.5 mg total) by mouth 3 (three) times daily. Use .5 to one sparringly 30 tablet 1    ascorbic acid (VITAMIN C) 500 MG tablet Take 500 mg by mouth once daily.      aspirin 81 MG chewable tablet Take 1 tablet by mouth.      ciclopirox 1 % shampoo every other day.       magnesium 250 mg Tab Take 250 mg by mouth once. Takes two 250 mg tablets daily      naproxen (NAPROSYN) 500 MG tablet Take 500 mg by mouth 2 (two) times daily with meals.       OM-3/E/LINOL/ALA/OLEIC/GLA/LIP (OMEGA 3-6-9 ORAL) Take by mouth once daily.       pravastatin (PRAVACHOL) 20 MG tablet TAKE 1 TABLET(20 MG) BY MOUTH EVERY DAY 90 tablet 0    SAW PALMETTO ORAL Take by mouth once daily.       verapamil (VERELAN) 240 MG C24P Take 1 capsule (240 mg total) by mouth once daily. 90 capsule 3     No current facility-administered medications for this visit.        Review of patient's allergies indicates:   Allergen Reactions    Augmentin [amoxicillin-pot clavulanate]      Other reaction(s): Stomach upset    Celebrex [celecoxib] Other (See Comments)       Review of Systems   Constitutional: Negative for activity change, appetite change, chills, diaphoresis, fatigue, fever and unexpected weight change.   HENT: Negative for congestion, dental problem, hearing loss, mouth sores, postnasal drip, rhinorrhea, sinus pressure and trouble swallowing.    Eyes: Negative for pain, discharge and itching.   Respiratory: Negative for apnea, cough, choking, chest  tightness, shortness of breath and wheezing.    Cardiovascular: Negative for chest pain, palpitations and leg swelling.   Gastrointestinal: Negative for abdominal distention, abdominal pain, anal bleeding, blood in stool, constipation, diarrhea, nausea, rectal pain and vomiting.   Endocrine: Negative for polydipsia and polyuria.   Genitourinary: Positive for frequency. Negative for decreased urine volume, difficulty urinating, discharge, dysuria, enuresis, flank pain, genital sores, hematuria, penile pain, penile swelling and scrotal swelling.        Nocturia x2-3.   Musculoskeletal: Negative for arthralgias, back pain and myalgias.   Skin: Negative for color change, rash and wound.   Neurological: Negative for dizziness, syncope, speech difficulty, light-headedness and headaches.   Hematological: Negative for adenopathy. Does not bruise/bleed easily.   Psychiatric/Behavioral: Negative for behavioral problems, confusion and sleep disturbance.       Objective:      Physical Exam   Constitutional: He appears well-developed.   HENT:   Head: Normocephalic.   Neck: Neck supple.   Cardiovascular: Normal rate.    Pulmonary/Chest: Effort normal.   Abdominal: Soft.   Genitourinary: Rectum normal.   Genitourinary Comments: Prostate was smooth without nodularity. No rectal masses. 30 grams. Normal perineum.  EPS shows a few WBCs.  PVR is 0.   Neurological: He is alert.   Skin: Skin is warm.   Psychiatric: He has a normal mood and affect.       Assessment:       1. Elevated PSA        Plan:       Denis was seen today for nocturia and urinary urgency.    Diagnoses and all orders for this visit:    Elevated PSA      Bactrim x 30 days and rtc 2 mos w psa at [place]  Follow up in 2 months with a PSA.    Donny WATERMAN, am acting as a scribe on this patient encounter in the presence and under the supervision of Dr. Marie.    07/26/2018 7:13 AM  Dr. Cynthia WATERMAN, personally performed the services described in this documentation.   All  medical record entries made by the scribe were at my direction and in my presence.   I have reviewed the chart and agree that the record is accurate and complete.   Emmanuel Marie MD.  7:51 AM 07/26/2018

## 2018-07-26 NOTE — LETTER
July 26, 2018      Taz Lilly MD  [address]  [place] [address]           [facility] - Urology 4th Floor  [address]  [place] [address]  Phone: [phone]          Patient: Denis Bunn   MR Number: [id number]   YOB: 1955   Date of Visit: 7/26/2018       Dear Dr. Taz Lilly:    Thank you for referring Denis Bunn to me for evaluation. Attached you will find relevant portions of my assessment and plan of care.    If you have questions, please do not hesitate to call me. I look forward to following Denis uBnn along with you.    Sincerely,    Emmanuel Marie Jr., MD    Enclosure  CC:  No Recipients    If you would like to receive this communication electronically, please contact [email or web address] or [phone] to request more information on [facility] access.    For providers and/or their staff who would like to refer a patient to [facility], please contact us through our one-stop-shop provider referral line, [facility], at [phone].    If you feel you have received this communication in error or would no longer like to receive these types of communications, please e-mail [email or web address]

## 2018-08-03 ENCOUNTER — PATIENT MESSAGE (OUTPATIENT)
Dept: UROLOGY | Facility: CLINIC | Age: 63
End: 2018-08-03

## 2018-08-07 ENCOUNTER — OFFICE VISIT (OUTPATIENT)
Dept: ELECTROPHYSIOLOGY | Facility: CLINIC | Age: 63
End: 2018-08-07
Payer: COMMERCIAL

## 2018-08-07 ENCOUNTER — HOSPITAL ENCOUNTER (OUTPATIENT)
Dept: CARDIOLOGY | Facility: CLINIC | Age: 63
Discharge: HOME | End: 2018-08-07
Payer: COMMERCIAL

## 2018-08-07 ENCOUNTER — PATIENT MESSAGE (OUTPATIENT)
Dept: CARDIOLOGY | Facility: CLINIC | Age: 63
End: 2018-08-07

## 2018-08-07 VITALS
BODY MASS INDEX: 25.07 KG/M2 | WEIGHT: 165.38 LBS | HEART RATE: 47 BPM | SYSTOLIC BLOOD PRESSURE: 142 MMHG | DIASTOLIC BLOOD PRESSURE: 72 MMHG | HEIGHT: 68 IN

## 2018-08-07 DIAGNOSIS — I49.3 PVC (PREMATURE VENTRICULAR CONTRACTION): Primary | ICD-10-CM

## 2018-08-07 DIAGNOSIS — I10 HTN (HYPERTENSION), BENIGN: ICD-10-CM

## 2018-08-07 DIAGNOSIS — Z82.49 FH: CAD (CORONARY ARTERY DISEASE): ICD-10-CM

## 2018-08-07 DIAGNOSIS — R00.2 PALPITATIONS: Primary | ICD-10-CM

## 2018-08-07 DIAGNOSIS — E78.5 DYSLIPIDEMIA: ICD-10-CM

## 2018-08-07 PROCEDURE — 3078F DIAST BP <80 MM HG: CPT | Mod: CPTII,S$GLB,, | Performed by: NURSE PRACTITIONER

## 2018-08-07 PROCEDURE — 3008F BODY MASS INDEX DOCD: CPT | Mod: CPTII,S$GLB,, | Performed by: NURSE PRACTITIONER

## 2018-08-07 PROCEDURE — 99214 OFFICE O/P EST MOD 30 MIN: CPT | Mod: S$GLB,,, | Performed by: NURSE PRACTITIONER

## 2018-08-07 PROCEDURE — 3077F SYST BP >= 140 MM HG: CPT | Mod: CPTII,S$GLB,, | Performed by: NURSE PRACTITIONER

## 2018-08-07 PROCEDURE — 99999 PR PBB SHADOW E&M-EST. PATIENT-LVL III: CPT | Mod: PBBFAC,,, | Performed by: NURSE PRACTITIONER

## 2018-08-07 PROCEDURE — 93000 ELECTROCARDIOGRAM COMPLETE: CPT | Mod: S$GLB,,, | Performed by: INTERNAL MEDICINE

## 2018-08-07 NOTE — PROGRESS NOTES
Mr. Bunn is a patient of Dr. Melendez and was last seen in clinic 7/10/2018.      Subjective:   Patient ID:  Denis Bunn is a 62 y.o. male who presents for follow-up of Follow-up (PVC)  .     HPI:    Mr. Bunn is a 62 y.o. male with PVCs and HTN here for follow up.     Background:    He presented early July 2018 with frequent palpitations. He was found to have frequent PVCs with RBBB morphology that were relatively narrow. He was wearing an event monitor at the time and was admitted for observation. There his EF was noted to be normal.   He was advised to stop his metoprolol and start verapamil. 48 hour holter indicated that his PVC burden was 23%. He has had some relief on verapamil with a particularly symptomatic day 7/8/18. He has denied syncope, near syncope, chest pain, shortness of breath. He feels his PVCs more at rest than with activity. PVC morphology is RBBB, V3 transition with rightward/inferior axis. The QRS duration is ~120 ms. Suspected fascicular/his/purkinje origin. The morphology is not consistent with a left posterior or left anterior fascicle PVC. Dr. Melendez suspects the source is closer to the base of the HPS.   Strategy to try verapamil as an initial strategy. If this is not effective, then try flecainide. RFA if flecainide not effective.     Update (08/07/2018):    He says that with the 180mg dose of verapamil he was still experiencing some palpitations but symptoms resolved after his dose was increased to 240mg by Dr. Pereyra. Today he has no cardiac complaints. Mr. Bunn denies chest pain with exertion or at rest, palpitations, SOB, PLATA, dizziness, or syncope. He remains on verapamil 240mg daily.    I have personally reviewed the patient's EKG today, which shows sinus bradycardia at 47bpm. NJ interval is 132. QT is 450.    Recent Cardiac Tests:    Echo 7/2/18:  CONCLUSIONS     1 - Normal left ventricular systolic function (EF 55-60%).     2 - Biatrial enlargement.     3 - Normal  "left ventricular diastolic function.     4 - Normal right ventricular systolic function .     5 - Mild mitral regurgitation.     Current Outpatient Prescriptions   Medication Sig    allopurinol (ZYLOPRIM) 100 MG tablet Take 1 tablet (100 mg total) by mouth 2 (two) times daily.    ALPRAZolam (XANAX) 0.5 MG tablet Take 1 tablet (0.5 mg total) by mouth 3 (three) times daily. Use .5 to one sparringly    ascorbic acid (VITAMIN C) 500 MG tablet Take 500 mg by mouth once daily.    aspirin 81 MG chewable tablet Take 1 tablet by mouth.    ciclopirox 1 % shampoo every other day.     magnesium 250 mg Tab Take 250 mg by mouth once. Takes two 250 mg tablets daily    OM-3/E/LINOL/ALA/OLEIC/GLA/LIP (OMEGA 3-6-9 ORAL) Take by mouth once daily.     pravastatin (PRAVACHOL) 20 MG tablet TAKE 1 TABLET(20 MG) BY MOUTH EVERY DAY    SAW PALMETTO ORAL Take by mouth once daily.     sulfamethoxazole-trimethoprim 800-160mg (BACTRIM DS) 800-160 mg Tab Take 1 tablet by mouth 2 (two) times daily.    verapamil (VERELAN) 240 MG C24P Take 1 capsule (240 mg total) by mouth once daily.    naproxen (NAPROSYN) 500 MG tablet Take 500 mg by mouth 2 (two) times daily with meals.      No current facility-administered medications for this visit.      Review of Systems   Constitution: Negative for malaise/fatigue.   Cardiovascular: Negative for chest pain, dyspnea on exertion, irregular heartbeat, leg swelling and palpitations.   Respiratory: Negative for shortness of breath.    Hematologic/Lymphatic: Negative for bleeding problem.   Skin: Negative for rash.   Musculoskeletal: Negative for myalgias.   Gastrointestinal: Negative for hematemesis, hematochezia and nausea.   Genitourinary: Negative for hematuria.   Neurological: Negative for light-headedness.   Psychiatric/Behavioral: Negative for altered mental status.   Allergic/Immunologic: Negative for persistent infections.     Objective:        BP (!) 142/72   Pulse (!) 47   Ht 5' 8" (1.727 " m)   Wt 75 kg (165 lb 5.5 oz)   BMI 25.14 kg/m²     Physical Exam   Constitutional: He is oriented to person, place, and time. He appears well-developed and well-nourished.   HENT:   Head: Normocephalic.   Nose: Nose normal.   Eyes: Pupils are equal, round, and reactive to light.   Cardiovascular: Normal rate, regular rhythm, S1 normal and S2 normal.    No murmur heard.  Pulses:       Radial pulses are 2+ on the right side, and 2+ on the left side.   Pulmonary/Chest: Breath sounds normal. No respiratory distress.   Abdominal: Normal appearance.   Musculoskeletal: Normal range of motion. He exhibits no edema.   Neurological: He is alert and oriented to person, place, and time.   Skin: Skin is warm and dry. No erythema.   Psychiatric: He has a normal mood and affect. His speech is normal and behavior is normal.   Nursing note and vitals reviewed.    Lab Results   Component Value Date     07/02/2018    K 4.7 07/02/2018    MG 2.3 07/02/2018    BUN 17 07/02/2018    CREATININE 0.9 07/02/2018    ALT 15 07/02/2018    AST 19 07/02/2018    HGB 14.3 07/02/2018    HCT 42.6 07/02/2018    TSH 1.230 07/01/2018    LDLCALC 74.8 06/11/2018           Assessment:     1. PVC (premature ventricular contraction)    2. HTN (hypertension), benign      Plan:     In summary, Mr. Bunn is a 62 y.o. male with PVCs and HTN here for follow up. He is feeling better with no documented PVCs on his EKG and no symptoms noted on his increased dose of verapamil.   Because his PVCs were so symptomatic and he is not feeling any, I don't feel that a repeat Holter is necessary at this time. He should continue his verapamil and he would like to RTC in 6 months to reassess his regimen. If his symptoms return, will order a Holter at that time.    Continue current medications.  RTC in 6 months, sooner if needed.    *A copy of this note has been sent to Dr. Melendez*    Follow-up in about 6 months (around  2/7/2019).    ------------------------------------------------------------------    ALLIE Cortes, NP-C  [facility]

## 2018-08-23 RX ORDER — PRAVASTATIN SODIUM 20 MG/1
TABLET ORAL
Qty: 90 TABLET | Refills: 0 | Status: SHIPPED | OUTPATIENT
Start: 2018-08-23 | End: 2018-12-01 | Stop reason: SDUPTHER

## 2018-09-06 ENCOUNTER — PATIENT MESSAGE (OUTPATIENT)
Dept: FAMILY MEDICINE | Facility: CLINIC | Age: 63
End: 2018-09-06

## 2018-09-06 ENCOUNTER — PATIENT MESSAGE (OUTPATIENT)
Dept: UROLOGY | Facility: CLINIC | Age: 63
End: 2018-09-06

## 2018-09-06 DIAGNOSIS — Z11.59 NEED FOR HEPATITIS C SCREENING TEST: Primary | ICD-10-CM

## 2018-09-18 ENCOUNTER — PATIENT MESSAGE (OUTPATIENT)
Dept: CARDIOLOGY | Facility: CLINIC | Age: 63
End: 2018-09-18

## 2018-09-19 ENCOUNTER — LAB VISIT (OUTPATIENT)
Dept: LAB | Facility: HOSPITAL | Age: 63
End: 2018-09-19
Attending: INTERNAL MEDICINE
Payer: COMMERCIAL

## 2018-09-19 DIAGNOSIS — Z11.59 NEED FOR HEPATITIS C SCREENING TEST: ICD-10-CM

## 2018-09-19 PROCEDURE — 86803 HEPATITIS C AB TEST: CPT

## 2018-09-19 PROCEDURE — 36415 COLL VENOUS BLD VENIPUNCTURE: CPT | Mod: PO

## 2018-09-20 LAB — HCV AB SERPL QL IA: NEGATIVE

## 2018-09-25 ENCOUNTER — CLINICAL SUPPORT (OUTPATIENT)
Dept: FAMILY MEDICINE | Facility: CLINIC | Age: 63
End: 2018-09-25
Payer: COMMERCIAL

## 2018-09-25 ENCOUNTER — OFFICE VISIT (OUTPATIENT)
Dept: UROLOGY | Facility: CLINIC | Age: 63
End: 2018-09-25
Payer: COMMERCIAL

## 2018-09-25 VITALS
DIASTOLIC BLOOD PRESSURE: 92 MMHG | HEART RATE: 84 BPM | BODY MASS INDEX: 25.11 KG/M2 | WEIGHT: 165.13 LBS | SYSTOLIC BLOOD PRESSURE: 178 MMHG

## 2018-09-25 DIAGNOSIS — Z23 NEED FOR PROPHYLACTIC VACCINATION AND INOCULATION AGAINST INFLUENZA: Primary | ICD-10-CM

## 2018-09-25 DIAGNOSIS — N13.8 BPH WITH URINARY OBSTRUCTION: Primary | ICD-10-CM

## 2018-09-25 DIAGNOSIS — N40.1 BPH WITH URINARY OBSTRUCTION: Primary | ICD-10-CM

## 2018-09-25 PROCEDURE — 90471 IMMUNIZATION ADMIN: CPT | Mod: S$GLB,,, | Performed by: INTERNAL MEDICINE

## 2018-09-25 PROCEDURE — 99499 UNLISTED E&M SERVICE: CPT | Mod: S$GLB,,, | Performed by: INTERNAL MEDICINE

## 2018-09-25 PROCEDURE — 3008F BODY MASS INDEX DOCD: CPT | Mod: CPTII,S$GLB,, | Performed by: UROLOGY

## 2018-09-25 PROCEDURE — 3080F DIAST BP >= 90 MM HG: CPT | Mod: CPTII,S$GLB,, | Performed by: UROLOGY

## 2018-09-25 PROCEDURE — 99999 PR PBB SHADOW E&M-EST. PATIENT-LVL III: CPT | Mod: PBBFAC,,, | Performed by: UROLOGY

## 2018-09-25 PROCEDURE — 90686 IIV4 VACC NO PRSV 0.5 ML IM: CPT | Mod: S$GLB,,, | Performed by: INTERNAL MEDICINE

## 2018-09-25 PROCEDURE — 3077F SYST BP >= 140 MM HG: CPT | Mod: CPTII,S$GLB,, | Performed by: UROLOGY

## 2018-09-25 PROCEDURE — 99213 OFFICE O/P EST LOW 20 MIN: CPT | Mod: S$GLB,,, | Performed by: UROLOGY

## 2018-09-25 NOTE — PROGRESS NOTES
Subjective:       Patient ID: Denis Bunn is a 63 y.o. male.    Chief Complaint: Follow-up (2 months f/u with psa, pt still reports urination at night, pt gets up 2-3 times night)    HPI  patient is here for prostate check.  He had a mild bump in his PSA to 3.8.  He has some vague irritative symptoms namely nocturia times 2-3 but he has a good stream and empties his bladder. Urine was clear.  He does not take the Flomax and states that he tried in the past and it did not help    Past Medical History:   Diagnosis Date    Back pain     Cataract     Constipation - functional     History of gout     HTN (hypertension), benign 4/24/2013    Prostatitis     Vision changes        Past Surgical History:   Procedure Laterality Date    BACK SURGERY      CATARACT EXTRACTION      left eye    COLONOSCOPY N/A 7/29/2014    Performed by Daryl [facility] at [facility] ([facility])    ELBOW SURGERY      scope/right    EYE SURGERY      Dr. Hope.retina x2 left    LAMINECTOMY      WRIST SURGERY      right       Family History   Problem Relation Age of Onset    Blindness Mother         from cva    Cataracts Mother     Cataracts Father     Cancer Father     No Known Problems Sister     No Known Problems Brother     No Known Problems Maternal Aunt     No Known Problems Maternal Uncle     No Known Problems Paternal Aunt     No Known Problems Paternal Uncle     No Known Problems Maternal Grandmother     No Known Problems Maternal Grandfather     No Known Problems Paternal Grandmother     No Known Problems Paternal Grandfather     Amblyopia Neg Hx     Diabetes Neg Hx     Glaucoma Neg Hx     Hypertension Neg Hx     Macular degeneration Neg Hx     Retinal detachment Neg Hx     Strabismus Neg Hx     Stroke Neg Hx     Thyroid disease Neg Hx        Social History     Socioeconomic History    Marital status:      Spouse name: Not on file    Number of children: Not on file    Years of education: Not  on file    Highest education level: Not on file   Social Needs    Financial resource strain: Not on file    Food insecurity - worry: Not on file    Food insecurity - inability: Not on file    Transportation needs - medical: Not on file    Transportation needs - non-medical: Not on file   Occupational History    Not on file   Tobacco Use    Smoking status: Never Smoker    Smokeless tobacco: Never Used    Tobacco comment: , 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   Substance and Sexual Activity    Alcohol use: Yes     Alcohol/week: 1.8 oz     Types: 3 Glasses of wine per week     Comment: 8-10 glasses wine per week    Drug use: No    Sexual activity: Yes     Partners: Female   Other Topics Concern    Not on file   Social History Narrative    Not on file       Allergies:  Augmentin [amoxicillin-pot clavulanate] and Celebrex [celecoxib]    Medications:    Current Outpatient Medications:     allopurinol (ZYLOPRIM) 100 MG tablet, Take 1 tablet (100 mg total) by mouth 2 (two) times daily., Disp: 180 tablet, Rfl: 3    ascorbic acid (VITAMIN C) 500 MG tablet, Take 500 mg by mouth once daily., Disp: , Rfl:     aspirin 81 MG chewable tablet, Take 1 tablet by mouth., Disp: , Rfl:     ciclopirox 1 % shampoo, every other day. , Disp: , Rfl:     magnesium 250 mg Tab, Take 250 mg by mouth once. Takes two 250 mg tablets daily, Disp: , Rfl:     OM-3/E/LINOL/ALA/OLEIC/GLA/LIP (OMEGA 3-6-9 ORAL), Take by mouth once daily. , Disp: , Rfl:     pravastatin (PRAVACHOL) 20 MG tablet, TAKE 1 TABLET(20 MG) BY MOUTH EVERY DAY, Disp: 90 tablet, Rfl: 0    SAW PALMETTO ORAL, Take by mouth once daily. , Disp: , Rfl:     verapamil (VERELAN) 240 MG C24P, Take 1 capsule (240 mg total) by mouth once daily., Disp: 90 capsule, Rfl: 3    ALPRAZolam (XANAX) 0.5 MG tablet, Take 1 tablet (0.5 mg total) by mouth 3 (three) times daily. Use .5 to one sparringly, Disp: 30 tablet, Rfl: 1    naproxen (NAPROSYN) 500 MG tablet, Take 500 mg by mouth  2 (two) times daily with meals. , Disp: , Rfl:     Review of Systems   Constitutional: Negative for activity change, appetite change, chills, diaphoresis, fatigue, fever and unexpected weight change.   HENT: Negative for congestion, dental problem, hearing loss, mouth sores, postnasal drip, rhinorrhea, sinus pressure and trouble swallowing.    Eyes: Negative for pain, discharge and itching.   Respiratory: Negative for apnea, cough, choking, chest tightness, shortness of breath and wheezing.    Cardiovascular: Negative for chest pain, palpitations and leg swelling.   Gastrointestinal: Negative for abdominal distention, abdominal pain, anal bleeding, blood in stool, constipation, diarrhea, nausea, rectal pain and vomiting.   Endocrine: Negative for polydipsia and polyuria.   Genitourinary: Negative for decreased urine volume, difficulty urinating, discharge, dysuria, enuresis, flank pain, frequency, genital sores, hematuria, penile pain, penile swelling, scrotal swelling, testicular pain and urgency.   Musculoskeletal: Negative for arthralgias, back pain and myalgias.   Skin: Negative for color change, rash and wound.   Neurological: Negative for dizziness, syncope, speech difficulty, light-headedness and headaches.   Hematological: Negative for adenopathy. Does not bruise/bleed easily.   Psychiatric/Behavioral: Negative for behavioral problems, confusion, hallucinations and sleep disturbance.       Objective:      Physical Exam   Constitutional: He appears well-developed.   HENT:   Head: Normocephalic.   Cardiovascular: Normal rate.    Pulmonary/Chest: Effort normal.   Abdominal: Soft.   Genitourinary: Prostate normal.   Neurological: He is alert.   Skin: Skin is warm.     Psychiatric: He has a normal mood and affect.       Assessment:       1. BPH with urinary obstruction        Plan:       Denis was seen today for follow-up.    Diagnoses and all orders for this visit:    BPH with urinary obstruction  -      Prostate Specific Antigen, Diagnostic; Future        return to clinic 6 months with PSA

## 2018-10-23 ENCOUNTER — PATIENT MESSAGE (OUTPATIENT)
Dept: FAMILY MEDICINE | Facility: CLINIC | Age: 63
End: 2018-10-23

## 2018-11-01 ENCOUNTER — OFFICE VISIT (OUTPATIENT)
Dept: FAMILY MEDICINE | Facility: CLINIC | Age: 63
End: 2018-11-01
Payer: COMMERCIAL

## 2018-11-01 VITALS
WEIGHT: 170 LBS | HEIGHT: 68 IN | DIASTOLIC BLOOD PRESSURE: 90 MMHG | SYSTOLIC BLOOD PRESSURE: 164 MMHG | RESPIRATION RATE: 14 BRPM | BODY MASS INDEX: 25.76 KG/M2 | HEART RATE: 71 BPM | OXYGEN SATURATION: 97 % | TEMPERATURE: 98 F

## 2018-11-01 DIAGNOSIS — M54.50 CHRONIC MIDLINE LOW BACK PAIN WITHOUT SCIATICA: ICD-10-CM

## 2018-11-01 DIAGNOSIS — G89.29 CHRONIC MIDLINE LOW BACK PAIN WITHOUT SCIATICA: ICD-10-CM

## 2018-11-01 DIAGNOSIS — J06.9 VIRAL URI: Primary | ICD-10-CM

## 2018-11-01 DIAGNOSIS — I10 HTN (HYPERTENSION), BENIGN: ICD-10-CM

## 2018-11-01 DIAGNOSIS — Z87.39 PERSONAL HISTORY OF SPINAL STENOSIS: ICD-10-CM

## 2018-11-01 DIAGNOSIS — Z98.1 HISTORY OF SPINAL FUSION: ICD-10-CM

## 2018-11-01 PROCEDURE — 99214 OFFICE O/P EST MOD 30 MIN: CPT | Mod: S$GLB,,, | Performed by: FAMILY MEDICINE

## 2018-11-01 PROCEDURE — 3008F BODY MASS INDEX DOCD: CPT | Mod: CPTII,S$GLB,, | Performed by: FAMILY MEDICINE

## 2018-11-01 PROCEDURE — 99999 PR PBB SHADOW E&M-EST. PATIENT-LVL IV: CPT | Mod: PBBFAC,,, | Performed by: FAMILY MEDICINE

## 2018-11-01 PROCEDURE — 3077F SYST BP >= 140 MM HG: CPT | Mod: CPTII,S$GLB,, | Performed by: FAMILY MEDICINE

## 2018-11-01 PROCEDURE — 3078F DIAST BP <80 MM HG: CPT | Mod: CPTII,S$GLB,, | Performed by: FAMILY MEDICINE

## 2018-11-01 RX ORDER — LOSARTAN POTASSIUM 25 MG/1
25 TABLET ORAL DAILY
Qty: 30 TABLET | Refills: 1 | Status: SHIPPED | OUTPATIENT
Start: 2018-11-01 | End: 2018-11-05

## 2018-11-01 RX ORDER — FLUTICASONE PROPIONATE 50 MCG
1 SPRAY, SUSPENSION (ML) NASAL DAILY
Qty: 16 G | Refills: 3 | Status: SHIPPED | OUTPATIENT
Start: 2018-11-01 | End: 2019-04-24 | Stop reason: SDUPTHER

## 2018-11-02 ENCOUNTER — PATIENT MESSAGE (OUTPATIENT)
Dept: CARDIOLOGY | Facility: CLINIC | Age: 63
End: 2018-11-02

## 2018-11-02 ENCOUNTER — PATIENT MESSAGE (OUTPATIENT)
Dept: ADMINISTRATIVE | Facility: OTHER | Age: 63
End: 2018-11-02

## 2018-11-05 ENCOUNTER — PATIENT MESSAGE (OUTPATIENT)
Dept: CARDIOLOGY | Facility: CLINIC | Age: 63
End: 2018-11-05

## 2018-11-05 ENCOUNTER — PATIENT MESSAGE (OUTPATIENT)
Dept: FAMILY MEDICINE | Facility: CLINIC | Age: 63
End: 2018-11-05

## 2018-11-05 RX ORDER — VERAPAMIL HYDROCHLORIDE 120 MG/1
120 TABLET, FILM COATED, EXTENDED RELEASE ORAL NIGHTLY
COMMUNITY
End: 2018-11-05 | Stop reason: SDUPTHER

## 2018-11-05 RX ORDER — VERAPAMIL HYDROCHLORIDE 120 MG/1
120 TABLET, FILM COATED, EXTENDED RELEASE ORAL NIGHTLY
Qty: 30 TABLET | Refills: 11 | Status: SHIPPED | OUTPATIENT
Start: 2018-11-05 | End: 2019-08-25 | Stop reason: SDUPTHER

## 2018-11-06 ENCOUNTER — PATIENT MESSAGE (OUTPATIENT)
Dept: CARDIOLOGY | Facility: CLINIC | Age: 63
End: 2018-11-06

## 2018-11-06 NOTE — TELEPHONE ENCOUNTER
Patient called and stated cardiology had stopped blood pressure started by this MD and increased his Verapamil.  He states that blood pressure is still mildly elevated.  Will call me tomorrow if sore throat and fatigue persists and will get labs ordered      Eleazar Spain MD

## 2018-11-07 ENCOUNTER — PATIENT MESSAGE (OUTPATIENT)
Dept: FAMILY MEDICINE | Facility: CLINIC | Age: 63
End: 2018-11-07

## 2018-11-07 ENCOUNTER — CLINICAL SUPPORT (OUTPATIENT)
Dept: REHABILITATION | Facility: OTHER | Age: 63
End: 2018-11-07
Attending: PHYSICAL MEDICINE & REHABILITATION
Payer: COMMERCIAL

## 2018-11-07 ENCOUNTER — TELEPHONE (OUTPATIENT)
Dept: FAMILY MEDICINE | Facility: CLINIC | Age: 63
End: 2018-11-07

## 2018-11-07 VITALS
HEART RATE: 51 BPM | HEIGHT: 68 IN | WEIGHT: 169 LBS | SYSTOLIC BLOOD PRESSURE: 164 MMHG | DIASTOLIC BLOOD PRESSURE: 101 MMHG | BODY MASS INDEX: 25.61 KG/M2

## 2018-11-07 DIAGNOSIS — M54.50 CHRONIC BILATERAL LOW BACK PAIN WITHOUT SCIATICA: Primary | ICD-10-CM

## 2018-11-07 DIAGNOSIS — G89.29 CHRONIC BILATERAL LOW BACK PAIN WITHOUT SCIATICA: Primary | ICD-10-CM

## 2018-11-07 DIAGNOSIS — Z98.1 S/P LUMBAR SPINAL FUSION: ICD-10-CM

## 2018-11-07 PROCEDURE — 99204 OFFICE O/P NEW MOD 45 MIN: CPT | Mod: ,,, | Performed by: PHYSICAL MEDICINE & REHABILITATION
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
## 2018-11-07 NOTE — PROGRESS NOTES
Health  met with patient to complete initial outcomes for the Healthy Back lumbar program.  Questions were reviewed with patient and discussed with Dr. Castle. The patient will meet with Dr. Castle to determine program enrollment.     HealthyBack Questionnaire  11/7/2018   Please select the location of your worst pain:  Low back   Please select the location of any additional pain: (hold down the control key, and click to select multiple responses) Mid back- Below shoulder blades   Did any event trigger your pain?  No   How long has this pain been going on?  30 years but worse in the last 3 years after 2nd fusion   Please indicate how the pain is changing.  No Change   What is the WORST level of pain that you have experienced in the last week?  6   What is the LEAST level of pain that you have experienced in the past week?  0   What can you NOT do not that you used to be able to do?  N/A   Are your limitations mainly due to your pain?  No   What are your additional complaints, if any? None   Is there ever a time during the day when your pain stops, even for a brief moment, before returning? Yes   If yes, when your pain stops, does it disappear completely? Is it totally gone? No   Does bending forward make your typical pain worse? Yes   Morning: Worse during   Afternoon: Better during   Evening:  Better during   Nighttime: Better during   Standing:  Better   Lying on stomach: Same   Lying on back: Better   Sitting:  Worse   Walking: Better   Climbing stairs: Better   In the last seven days, have you had any changes in your bowel and/or bladder habits? No   Have all of your attempts to treat your back/leg pain resulted in failure?  Yes   Do you believe your doctor(s) do NOT understand how much pain you have?  No   Do you believe that you have one or more conditions that have not been diagnosed by your doctor(s)?  No   Do you believe that you deserve more understanding from others including your family than you  are getting?  No   Do you feel relatively calm about how your back/leg pain has impacted your life?  Yes   Are you OK with treatment taking a very long time (even years) before you feel relief from your back/leg pain?  Yes   Do you believe that your pain has caused you to be more forgetful?  No   Do you feel that you have not received enough treatment for your pain?  No   Do you believe that your doctor(s) do not have the right training to treat your back/leg pain effectively?  No   Do you believe you should not be allowed to work or attend school because of your back/leg pain?  No   When did this pain begin?  30 years ago but worse in the last 3 years after 2nd fusion   Did the pain begin after an injury or an accident? No   Is the pain work related?  No   Please karlene which of the following over-the-counter medicines you take. (hold down the control key, and click to select multiple responses) Acetaminophen   Please karlene which of the following prescription medicines you take. (hold down the control key, and click to select multiple responses) Other   Emergency department  No   Health care providers (hold down the control key, and click to select multiple responses) Family doctor   Investigations done (hold down the control key, and click to select multiple responses) MRI   Procedures (hold down the control key, and click to select multiple responses) None   Emergency department  No   Health care providers (hold down the control key, and click to select multiple responses) Family doctor, Physical therapist   Investigations done (hold down the control key, and click to select multiple responses) X-ray   Procedures (hold down the control key, and click to select multiple responses) Epidural steroid injections (LOGAN)   Have you had any surgery on your back?  Yes   Please karlene what you are experiencing. (hold down the control key, and click to select multiple responses) None   First activity you would like to perform  better:  Playing golf   Current ability score to perform first activity: 7   Second activity you would like to perform better: Bending   Current ability score to perform second activity: 5   Third activity you would like to perform better: Rolling over in bed   Current ability score to perform third activity: 5   Pain intensity:  The pain comes and goes and is moderate.   Personal care (washing, dressing, etc.):  I would not have to change my way of washing or dressing in order to avoid pain.   Lifting: Pain prevents me from lifting heavy weights, but I can manage light to medium weights if conveniently positioned.   Walking: I have some pain walking, but it does not increase with distance.   Sitting: Pain prevents me from sitting more than one hour.   Standing: I can stand as long as I like without pain.   Sleeping: Because of my pain, my normal night sleep is reduced by less than quarter.   Social life: My social life is normal and gives me no pain.   Traveling: I get extra pain while traveling, but it does not compel me to seek alternative forms of travel.   Changing degree of pain: My pain is neither getting better nor worse.   Do you need any help looking after yourself? I need no help at all.   When doing household tasks, e.g., preparing food, gardening, using the video recorder, radio, telephone, or washing the car: I need no help at all.   Thinking about how easily you can get around your home and community: I get around my home and community by myself without any difficulty.   Because of your health, your relationships, e.g., your friends, partner or parents, generally: Are very close and warm   Thinking about your relationships with other people: I have plenty of friends and am never lonely.   Thinking about your health and your relationship with your  family:  My role in the family is unaffected by my health.   Thinking about your vision, including when using your glasses or contact lenses if needed: I  see normally.   Thinking about your hearing, including using your hearing aid if needed: I have some difficulty hearing, or I do not hear clearly, e.g., I ask people to speak up or turn up the TV radio volume.   When you communicate with others, e.g., talking, listening, writing, or signing: I have no trouble speaking to them or understanding what they are saying.   Thinking about how you sleep: I am able to sleep without difficulty most of the time.   Thinking about how you generally feel: I am slightly anxious, worried or depressed.   How much pain or discomfort do you experience? I have moderate pain.   Little interest or pleasure in doing things Not at all   Feeling down, depressed or hopeless Not at all   What is your occupation?     How do you spend your leisure time? What are your hobbies? Playing golf and gardening   How do you spend your leisure time? What are your hobbies? Playing golf and gardening   What is your highest level of education? 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  Physician   When did this pain begin?  30 years ago but worse in the last 3 years after 2nd fusion   Do you need any help looking after yourself? I need no help at all.   When doing household tasks, e.g., preparing food, gardening, using the video recorder, radio, telephone, or washing the car: I need no help at all.   Thinking about how easily you can get around your home and community: I get around my home and community by myself without any difficulty.   Because of your health, your relationships, e.g., your friends, partner or parents, generally: Are very close and warm   Thinking about your relationships with other people: I have plenty of friends and am never lonely.   Thinking about your health and your relationship with your  family:  My role in the family is unaffected by my health.   Thinking about your vision, including when using your  glasses or contact lenses if needed: I see normally.   Thinking about your hearing, including using your hearing aid if needed: I have some difficulty hearing, or I do not hear clearly, e.g., I ask people to speak up or turn up the TV radio volume.   When you communicate with others, e.g., talking, listening, writing, or signing: I have no trouble speaking to them or understanding what they are saying.   Thinking about how you sleep: I am able to sleep without difficulty most of the time.   Thinking about how you generally feel: I am slightly anxious, worried or depressed.   How much pain or discomfort do you experience? I have moderate pain.   Little interest or pleasure in doing things Not at all   Feeling down, depressed or hopeless Not at all

## 2018-11-09 ENCOUNTER — PATIENT MESSAGE (OUTPATIENT)
Dept: CARDIOLOGY | Facility: CLINIC | Age: 63
End: 2018-11-09

## 2018-11-09 ENCOUNTER — LAB VISIT (OUTPATIENT)
Dept: LAB | Facility: HOSPITAL | Age: 63
End: 2018-11-09
Payer: COMMERCIAL

## 2018-11-09 DIAGNOSIS — E78.5 DYSLIPIDEMIA: ICD-10-CM

## 2018-11-09 DIAGNOSIS — I10 HTN (HYPERTENSION), BENIGN: ICD-10-CM

## 2018-11-09 DIAGNOSIS — R00.2 PALPITATIONS: ICD-10-CM

## 2018-11-09 DIAGNOSIS — I49.3 PVC (PREMATURE VENTRICULAR CONTRACTION): ICD-10-CM

## 2018-11-09 DIAGNOSIS — E66.3 OVERWEIGHT (BMI 25.0-29.9): ICD-10-CM

## 2018-11-09 DIAGNOSIS — R73.01 IMPAIRED FASTING GLUCOSE: ICD-10-CM

## 2018-11-09 DIAGNOSIS — Z82.49 FH: CAD (CORONARY ARTERY DISEASE): ICD-10-CM

## 2018-11-09 LAB
ALBUMIN SERPL BCP-MCNC: 4.2 G/DL
ALP SERPL-CCNC: 51 U/L
ALT SERPL W/O P-5'-P-CCNC: 16 U/L
ANION GAP SERPL CALC-SCNC: 6 MMOL/L
AST SERPL-CCNC: 21 U/L
BILIRUB SERPL-MCNC: 0.7 MG/DL
BUN SERPL-MCNC: 17 MG/DL
CALCIUM SERPL-MCNC: 9.9 MG/DL
CHLORIDE SERPL-SCNC: 104 MMOL/L
CHOLEST SERPL-MCNC: 166 MG/DL
CHOLEST/HDLC SERPL: 2.5 {RATIO}
CO2 SERPL-SCNC: 30 MMOL/L
COMPLEXED PSA SERPL-MCNC: 3.1 NG/ML
CREAT SERPL-MCNC: 1.2 MG/DL
CRP SERPL-MCNC: 0.77 MG/L
EST. GFR  (AFRICAN AMERICAN): >60 ML/MIN/1.73 M^2
EST. GFR  (NON AFRICAN AMERICAN): >60 ML/MIN/1.73 M^2
ESTIMATED AVG GLUCOSE: 94 MG/DL
GLUCOSE SERPL-MCNC: 98 MG/DL
HBA1C MFR BLD HPLC: 4.9 %
HDLC SERPL-MCNC: 67 MG/DL
HDLC SERPL: 40.4 %
LDLC SERPL CALC-MCNC: 86.2 MG/DL
NONHDLC SERPL-MCNC: 99 MG/DL
POTASSIUM SERPL-SCNC: 4.6 MMOL/L
PROT SERPL-MCNC: 7.4 G/DL
SODIUM SERPL-SCNC: 140 MMOL/L
TRIGL SERPL-MCNC: 64 MG/DL
TSH SERPL DL<=0.005 MIU/L-ACNC: 1.37 UIU/ML

## 2018-11-09 PROCEDURE — 84153 ASSAY OF PSA TOTAL: CPT

## 2018-11-09 PROCEDURE — 36415 COLL VENOUS BLD VENIPUNCTURE: CPT | Mod: PN

## 2018-11-09 PROCEDURE — 86141 C-REACTIVE PROTEIN HS: CPT

## 2018-11-09 PROCEDURE — 80053 COMPREHEN METABOLIC PANEL: CPT

## 2018-11-09 PROCEDURE — 83036 HEMOGLOBIN GLYCOSYLATED A1C: CPT

## 2018-11-09 PROCEDURE — 80061 LIPID PANEL: CPT

## 2018-11-09 PROCEDURE — 84443 ASSAY THYROID STIM HORMONE: CPT

## 2018-11-12 ENCOUNTER — PATIENT MESSAGE (OUTPATIENT)
Dept: CARDIOLOGY | Facility: CLINIC | Age: 63
End: 2018-11-12

## 2018-11-12 ENCOUNTER — OFFICE VISIT (OUTPATIENT)
Dept: FAMILY MEDICINE | Facility: CLINIC | Age: 63
End: 2018-11-12
Payer: COMMERCIAL

## 2018-11-12 DIAGNOSIS — I10 HTN (HYPERTENSION), BENIGN: Primary | ICD-10-CM

## 2018-11-12 PROCEDURE — 3008F BODY MASS INDEX DOCD: CPT | Mod: CPTII,S$GLB,, | Performed by: INTERNAL MEDICINE

## 2018-11-12 PROCEDURE — 99999 PR PBB SHADOW E&M-EST. PATIENT-LVL III: CPT | Mod: PBBFAC,,, | Performed by: INTERNAL MEDICINE

## 2018-11-12 PROCEDURE — 3078F DIAST BP <80 MM HG: CPT | Mod: CPTII,S$GLB,, | Performed by: INTERNAL MEDICINE

## 2018-11-12 PROCEDURE — 3077F SYST BP >= 140 MM HG: CPT | Mod: CPTII,S$GLB,, | Performed by: INTERNAL MEDICINE

## 2018-11-12 PROCEDURE — 99214 OFFICE O/P EST MOD 30 MIN: CPT | Mod: S$GLB,,, | Performed by: INTERNAL MEDICINE

## 2018-11-13 VITALS
OXYGEN SATURATION: 94 % | DIASTOLIC BLOOD PRESSURE: 74 MMHG | WEIGHT: 169.06 LBS | HEIGHT: 68 IN | HEART RATE: 61 BPM | SYSTOLIC BLOOD PRESSURE: 122 MMHG | BODY MASS INDEX: 25.62 KG/M2

## 2018-11-13 NOTE — PROGRESS NOTES
"Subjective:       Patient ID: Denis Bunn is a 63 y.o. male.    Chief Complaint: Hypertension and Sore Throat    F/u results    HPI: 64 y/o w/ HTN recent increase in verapamil. Over last week home BP average 122/74 no orthostatic or adverse side effects. Did have sore throat and some mid day fatigue last week this is improving. No dysphagia no night sweats no fevers/chills. Using flonase intermittently. Swimming for exercise without exertional limitations      Review of Systems   Constitutional: Negative for activity change, appetite change, fatigue, fever and unexpected weight change.   HENT: Positive for sore throat. Negative for ear pain and rhinorrhea.    Eyes: Negative for discharge and visual disturbance.   Respiratory: Negative for chest tightness, shortness of breath and wheezing.    Cardiovascular: Negative for chest pain, palpitations and leg swelling.   Gastrointestinal: Negative for abdominal pain, constipation and diarrhea.   Endocrine: Negative for cold intolerance and heat intolerance.   Genitourinary: Negative for dysuria and hematuria.   Musculoskeletal: Negative for joint swelling and neck stiffness.   Skin: Negative for rash.   Neurological: Negative for dizziness, syncope, weakness and headaches.   Psychiatric/Behavioral: Negative for suicidal ideas.       Objective:     Vitals:    11/12/18 1555 11/13/18 0743   BP: (!) 140/80 122/74   Pulse: 61    SpO2: (!) 94%    Weight: 76.7 kg (169 lb 1.5 oz)    Height: 5' 8" (1.727 m)           Physical Exam   Constitutional: He is oriented to person, place, and time. He appears well-developed and well-nourished.   HENT:   Head: Normocephalic and atraumatic.   Eyes: Conjunctivae are normal. No scleral icterus.   Neck: Normal range of motion.   Cardiovascular: Normal rate and regular rhythm. Exam reveals no gallop and no friction rub.   No murmur heard.  No LE edema   Pulmonary/Chest: Effort normal and breath sounds normal. He has no wheezes. He has no " rales.   Abdominal: Soft. Bowel sounds are normal. There is no tenderness. There is no rebound and no guarding.   Musculoskeletal: Normal range of motion. He exhibits no edema or tenderness.   Neurological: He is alert and oriented to person, place, and time. No cranial nerve deficit.   Skin: Skin is warm and dry.   Psychiatric: He has a normal mood and affect.       Assessment and Plan   1. Essential hypertension:    Labs reviewed no evidence of end organ damage or electrolyte abnormality, home BP readings at goal continue current medications he is signing up for digitial HTN program.

## 2018-12-02 RX ORDER — PRAVASTATIN SODIUM 20 MG/1
TABLET ORAL
Qty: 90 TABLET | Refills: 0 | Status: SHIPPED | OUTPATIENT
Start: 2018-12-02 | End: 2019-03-03 | Stop reason: SDUPTHER

## 2018-12-07 ENCOUNTER — CLINICAL SUPPORT (OUTPATIENT)
Dept: REHABILITATION | Facility: HOSPITAL | Age: 63
End: 2018-12-07
Attending: PHYSICAL MEDICINE & REHABILITATION
Payer: COMMERCIAL

## 2018-12-07 DIAGNOSIS — M53.86 DECREASED RANGE OF MOTION OF LUMBAR SPINE: ICD-10-CM

## 2018-12-07 DIAGNOSIS — M62.830 MUSCLE SPASM OF BACK: ICD-10-CM

## 2018-12-07 DIAGNOSIS — M54.9 CHRONIC BACK PAIN, UNSPECIFIED BACK LOCATION, UNSPECIFIED BACK PAIN LATERALITY: ICD-10-CM

## 2018-12-07 DIAGNOSIS — G89.29 CHRONIC BACK PAIN, UNSPECIFIED BACK LOCATION, UNSPECIFIED BACK PAIN LATERALITY: ICD-10-CM

## 2018-12-07 PROCEDURE — 97110 THERAPEUTIC EXERCISES: CPT | Mod: PN

## 2018-12-07 PROCEDURE — 97161 PT EVAL LOW COMPLEX 20 MIN: CPT | Mod: PN

## 2018-12-07 NOTE — PLAN OF CARE
[facility] - [facility]AL[facility]     [id number]      Diagnosis:   Encounter Diagnoses   Name Primary?    Muscle spasm of back     Decreased range of motion of lumbar spine     Chronic back pain, unspecified back location, unspecified back pain laterality      Physician: Whitney Castle, *  Treatment Orders: PT Eval and Treat    Past Medical History:   Diagnosis Date    Back pain     Cataract     Constipation - functional     History of gout     HTN (hypertension), benign 4/24/2013    Prostatitis     Vision changes      Current Outpatient Medications   Medication Sig    allopurinol (ZYLOPRIM) 100 MG tablet Take 1 tablet (100 mg total) by mouth 2 (two) times daily.    ascorbic acid (VITAMIN C) 500 MG tablet Take 500 mg by mouth once daily.    aspirin 81 MG chewable tablet Take 1 tablet by mouth.    fluticasone (FLONASE) 50 mcg/actuation nasal spray 1 spray (50 mcg total) by Each Nare route once daily.    magnesium 250 mg Tab Take 250 mg by mouth once. Takes two 250 mg tablets daily    naproxen (NAPROSYN) 500 MG tablet Take 500 mg by mouth 2 (two) times daily with meals.     OM-3/E/LINOL/ALA/OLEIC/GLA/LIP (OMEGA 3-6-9 ORAL) Take by mouth once daily.     pravastatin (PRAVACHOL) 20 MG tablet TAKE 1 TABLET(20 MG) BY MOUTH EVERY DAY    SAW PALMETTO ORAL Take by mouth once daily.     verapamil (CALAN-SR) 120 MG CR tablet Take 1 tablet (120 mg total) by mouth every evening.    verapamil (VERELAN) 240 MG C24P Take 1 capsule (240 mg total) by mouth once daily. (Patient taking differently: Take 240 mg by mouth once daily. Take one 240 mg capsule daily in the morning.)     No current facility-administered medications for this visit.      Review of patient's allergies indicates:   Allergen Reactions    Augmentin [amoxicillin-pot clavulanate]      Other reaction(s): Stomach upset    Celebrex [celecoxib] Other (See Comments)       Precautions:  Lumbar fusion      Pattern of pain determined: 1 PEN    Evaluation Date: 12/7/2018  Authorization Period Expiration: 12/31/18  Plan of Care Expiration: 3/8/19  Reassessment Due: 1/7/19  Visit # / Visits authorized: 1/ 20    Time In: 1430  Time Out: 1600  Total Billable Time: 90 minutes     HISTORY   History of Present Illness: He has had low back pain for 30 years, but worse in the last 3 years after his 2015 back surgery.  He has had 3 back surgeries, 1988, 2015, 2017.  two fusions (L5-S1 in 1988 and L3-L4 in 2015) and fusion at L2-L3 2017 by Dr. Crump.  Pt is currently fused from L2-S1.  Pain in lower R low back that is painful constantly, fluctuates in severity.  Pt continues to play golf regularly, swims 2x/week for exercise, and goes to see acupuncture every Monday which seems to help.  Denies N/T into LE's; mild dull ache in L lateral calf on occasion which does not last long. He has had 8-10 injections with temporary relief, all before surgery in 2015.  Pt's goal to decrease R sided pain and prevent decline so he can still be active.     Diagnostic Tests: None available in Epic    Pain Scale: Denis rates pain on a scale of 0-10 to be 8 at worst; 0 currently; 0 at best using VAS.   Pain location: R low back    Aggravating factors: mornings, golf at times  Easing Factors: sitting, swimming, pain medication(takes minimally)  Disturbed Sleep: no    Pattern of pain questions:  1.  Where is your pain the worst? R side low back  2.  Is your pain constant or intermittent? Intermittent   3.  Does bending forward make your typical pain worse? yes  4.  Since the start of your back pain, has there been a change in your bowel or bladder? none  5.  What can't you do now that you use to be able to do? none    Prior Treatment: acupuncture  Prior functional status: independent  DME owned/used: none  Occupation:  retired   Leisure: golf                     Pts goals:  Sx alleviation, improve strength     Red Flag Screening:    Cough  Sneeze  Strain: (--)  Bladder/ bowel: (--)  Falls: (--)  Night pain: (--)  Unexplained weight loss: (--)  General health: good    OBJECTIVE     Postural examination/scapula alignment: Rounded shoulder, Head forward and Decreased lordosis  Joint integrity: fusion L2-S1, Decreased mobility T3-T10  Skin integrity: no deficit  Edema: no deficit  Sitting: slouched posture in chair, R SB position in PPT with buttock at edge of chair  Standing: Able to stand erect, dec lumbar lordosis, rounded shoulders with slumped posture.   Correction of posture: no effect with lumbar roll    MOVEMENT LOSS    ROM Loss   Flexion major loss   Extension major loss   Side bending Right moderate loss R low back pain   Side bending Left major loss   Rotation Right moderate loss mild R low back pain   Rotation Left moderate loss     Lower Extremity Strength  Right LE  Left LE    Hip flexion: 4+/5 Hip flexion: 4+/5, mild pain R low back   Hip extension:  4/5 Hip extension: 4/5   Hip abduction: 4-/5, SL position Hip abduction: 4-/5   Hip adduction:  5/5 Hip adduction:  4+/5   Hip Internal rotation   4-/5 Hip Internal rotation 4-/5   Knee Flexion 4/5 Knee Flexion 4/5   Knee Extension 5/5 Knee Extension 4+/5   Ankle dorsiflexion: 4+/5 Ankle dorsiflexion: 4+/5   Ankle plantarflexion: 5/5 Ankle plantarflexion: 5/5     Ely's test: lacking 30 B HS  R hip IR 8 deg with pain in R side low back    GAIT:  Assistive Device used: none  Level of Assistance: independent  Patient displays the following gait deviations:  decreased step length.     Special Tests:   Test Name  Test Result   Prone Instability Test (+)   SI Joint Provocation Test (+)   Straight Leg Raise (+)   Neural Tension Test (+) R and L   Crossed Straight Leg Raise (--)   Walking on toes (--)   Walking on heels  (--)       NEUROLOGICAL SCREENING     Sensory deficit: SILT    Reflexes:    Left Right   Patella Tendon 2+ 2+   Babinski  (--) (--)   Clonus (--) (--)     REPEATED TEST  MOVEMENTS:  Repeated Flexion in Standing pain during motion   Repeated Extension in Standing no effect   Repeated Flexion in lying no effect   Repeated Extension in lying  no effect       Baseline Isometric Testing on Med X equipment: Testing administered by PT  Date of testin18  ROM 0-42 deg   Max Peak Torque 99    Min Peak Torque 66    Flex/Ext Ratio 1.5/1   % below normative data 49   Counter weight 227   femur 5   Seat pad 1     Treatment   Time In: 1430  Time Out: 1600    PT Evaluation Completed? Yes  Discussed Plan of Care with patient: Yes    Home Exercise Program as follows: - to be given next session     - Patient received education regarding proper posture and body mechanics.  Patient was given top 10 tips handout which discusses posture seated, standing, lifting correctly, components of exercise, importance of nutrition and hydration, and importance of sleep.    - Patient received a handout regarding anticipated muscular soreness following the isometric test and strategies for management were reviewed with patient including stretching, using ice and scheduled rest.     Pt was instructed in and performed the following:     Denis received therapeutic exercises to develop/improve posture, lumbar/cervical ROM, strength and muscular endurance for 5 minutes including the following exercises:   -Prone quad stretch  -HS stretch    Denis received the following manual therapy techniques: Joint mobilizations, Soft tissue Mobilization and vacuum cupping were applied to the: thoracolumbar spine for 15 minutes.   Pt received the following manual therapy techniques:   -15 min x Vacuum/cupping STM with manual therapy techniques was performed to R thoracolumbar paraspinals to decrease muscle tightness, increase circulation and promote healing process. The pt's skin was monitored for redness adjusting pressure as needed. The pt was instructed in possible side effects of bruising and/or soreness.   Assessment    This is a 63 y.o. male referred to [facility] and presents with a medical diagnosis of   Encounter Diagnoses   Name Primary?    Muscle spasm of back     Decreased range of motion of lumbar spine     Chronic back pain, unspecified back location, unspecified back pain laterality     and demonstrates limitations as described below in the problem list. Pt rehab potential is Good. Pt presents with decreased muscle length of B quadriceps and hip flexor group as well as HS groups, spinal weakness, and increased tonicity in R and L QL as well as thoracolumbar paraspinals that are contributing to overall chronic sx.  Pt appears to demonstrate spinal weakness 2/2 long surgical history of spinal fusion which should be considered in all treatment plans.      Pain Pattern: 1 PEN       Patient received education on the Healthy Back program, purpose of the isometric test, progression of back strengthening as well as wellness approach and systemic strengthening.  Details of the program were discussed.  Reviewed that patient should feel support/pressure from med ex restraints but no pain or discomfort and patient expressed understanding.    Based on the above history and physical examination an active physical therapy program is recommended.  Pt will continue to benefit from skilled outpatient physical therapy to address the deficits listed below in the chart, provide pt/family education and to maximize pt's level of independence in the home and community environment. .     No environmental, cultural, spiritual, developmental or education needs expressed or noted    Medical necessity is demonstrated by the following problem list.    Pt presents with the following impairments:     History  Co-morbidities and personal factors that may impact the plan of care Co-morbidities:   anxiety, CAD and HTN    Personal Factors:   no deficits     low   Examination  Body Structures and Functions, activity limitations and  participation restrictions that may impact the plan of care Body Regions:   back  lower extremities  trunk    Body Systems:    ROM  strength  motor control  blood pressure    Participation Restrictions:   Golfing, daily tasks     Activity limitations:   Learning and applying knowledge  no deficits    General Tasks and Commands  no deficits    Communication  no deficits    Mobility  lifting and carrying objects    Self care  no deficits    Domestic Life  no deficits    Interactions/Relationships  no deficits    Life Areas  no deficits    Community and Social Life  no deficits         low   Clinical Presentation stable and uncomplicated low   Decision Making/ Complexity Score: low       GOALS: Pt is in agreement with the following goals.    Short term goals:  6 weeks or 10 visits   1.  Pt will demonstrate increased lumbar ROM by at least 3 degrees from the initial ROM value with improvements noted in functional ROM and ability to perform ADLs  2.  Pt will demonstrate increased maximum isometric torque value by 15% when compared to the initial value resulting in improved ability to perform bending, lifting, and carrying activities safely, confidently.    3.  Patient report a reduction in worst pain score by 1-2 points for improved tolerance during work and recreational activities  4.  Pt able to perform HEP correctly with minimal cueing or supervision for therapist      Long term goals: 13 weeks or 20 visits   1. Pt will demonstrate increased lumbar ROM by at least 6 degrees from initial ROM value, resulting in improved ability to perform functional fwd bending while standing and sitting.   2. Pt will demonstrate increased maximum isometric torque value by 30% when compared to the initial value resulting in improved ability to perform bending, lifting, and carrying activities safely, confidently.  3. Pt to demonstrate ability to independently control and reduce their pain through posture positioning and mechanical  "movements throughout a typical day.  4. Pt will report decreased pain with playing golf without residual sx to maintain QOL.      Plan   Outpatient physical therapy 2x week for 13 weeks or 20 visits to include the following:   - Patient education  - Therapeutic exercise  - Manual therapy  - Performance testing   - Neuromuscular Re-education  - Therapeutic activity   - Modalities    Pt may be seen by PTA as part of the rehabilitation team.     Therapist: Cary Villanueva, PT  12/7/2018    "I certify the need for these services furnished under this plan of treatment and while under my care."    ____________________________________  Physician/Referring Practitioner    _______________  Date of Signature            "

## 2018-12-07 NOTE — PROGRESS NOTES
[facility]     [id number]      Diagnosis:   Encounter Diagnoses   Name Primary?    Muscle spasm of back     Decreased range of motion of lumbar spine     Chronic back pain, unspecified back location, unspecified back pain laterality      Physician: Whitney Castle, *  Treatment Orders: PT Eval and Treat    Past Medical History:   Diagnosis Date    Back pain     Cataract     Constipation - functional     History of gout     HTN (hypertension), benign 4/24/2013    Prostatitis     Vision changes      Current Outpatient Medications   Medication Sig    allopurinol (ZYLOPRIM) 100 MG tablet Take 1 tablet (100 mg total) by mouth 2 (two) times daily.    ascorbic acid (VITAMIN C) 500 MG tablet Take 500 mg by mouth once daily.    aspirin 81 MG chewable tablet Take 1 tablet by mouth.    fluticasone (FLONASE) 50 mcg/actuation nasal spray 1 spray (50 mcg total) by Each Nare route once daily.    magnesium 250 mg Tab Take 250 mg by mouth once. Takes two 250 mg tablets daily    naproxen (NAPROSYN) 500 MG tablet Take 500 mg by mouth 2 (two) times daily with meals.     OM-3/E/LINOL/ALA/OLEIC/GLA/LIP (OMEGA 3-6-9 ORAL) Take by mouth once daily.     pravastatin (PRAVACHOL) 20 MG tablet TAKE 1 TABLET(20 MG) BY MOUTH EVERY DAY    SAW PALMETTO ORAL Take by mouth once daily.     verapamil (CALAN-SR) 120 MG CR tablet Take 1 tablet (120 mg total) by mouth every evening.    verapamil (VERELAN) 240 MG C24P Take 1 capsule (240 mg total) by mouth once daily. (Patient taking differently: Take 240 mg by mouth once daily. Take one 240 mg capsule daily in the morning.)     No current facility-administered medications for this visit.      Review of patient's allergies indicates:   Allergen Reactions    Augmentin [amoxicillin-pot clavulanate]      Other reaction(s): Stomach upset    Celebrex [celecoxib] Other (See Comments)       Precautions:  Lumbar fusion      Pattern of pain determined: 1 PEN    Evaluation Date: 12/7/2018  Authorization Period Expiration: 12/31/18  Plan of Care Expiration: 3/8/19  Reassessment Due: 1/7/19  Visit # / Visits authorized: 1/ 20    Time In: 1430  Time Out: 1600  Total Billable Time: 90 minutes     HISTORY   History of Present Illness: He has had low back pain for 30 years, but worse in the last 3 years after his 2015 back surgery.  He has had 3 back surgeries, 1988, 2015, 2017.  two fusions (L5-S1 in 1988 and L3-L4 in 2015) and fusion at L2-L3 2017 by Dr. Crump.  Pt is currently fused from L2-S1.  Pain in lower R low back that is painful constantly, fluctuates in severity.  Pt continues to play golf regularly, swims 2x/week for exercise, and goes to see acupuncture every Monday which seems to help.  Denies N/T into LE's; mild dull ache in L lateral calf on occasion which does not last long. He has had 8-10 injections with temporary relief, all before surgery in 2015.  Pt's goal to decrease R sided pain and prevent decline so he can still be active.     Diagnostic Tests: None available in Epic    Pain Scale: Denis rates pain on a scale of 0-10 to be 8 at worst; 0 currently; 0 at best using VAS.   Pain location: R low back    Aggravating factors: mornings, golf at times  Easing Factors: sitting, swimming, pain medication(takes minimally)  Disturbed Sleep: no    Pattern of pain questions:  1.  Where is your pain the worst? R side low back  2.  Is your pain constant or intermittent? Intermittent   3.  Does bending forward make your typical pain worse? yes  4.  Since the start of your back pain, has there been a change in your bowel or bladder? none  5.  What can't you do now that you use to be able to do? none    Prior Treatment: acupuncture  Prior functional status: independent  DME owned/used: none  Occupation:  retired   Leisure: golf                     Pts goals:  Sx alleviation, improve strength     Red Flag Screening:    Cough  Sneeze  Strain: (--)  Bladder/ bowel: (--)  Falls: (--)  Night pain: (--)  Unexplained weight loss: (--)  General health: good    OBJECTIVE     Postural examination/scapula alignment: Rounded shoulder, Head forward and Decreased lordosis  Joint integrity: fusion L2-S1, Decreased mobility T3-T10  Skin integrity: no deficit  Edema: no deficit  Sitting: slouched posture in chair, R SB position in PPT with buttock at edge of chair  Standing: Able to stand erect, dec lumbar lordosis, rounded shoulders with slumped posture.   Correction of posture: no effect with lumbar roll    MOVEMENT LOSS    ROM Loss   Flexion major loss   Extension major loss   Side bending Right moderate loss R low back pain   Side bending Left major loss   Rotation Right moderate loss mild R low back pain   Rotation Left moderate loss     Lower Extremity Strength  Right LE  Left LE    Hip flexion: 4+/5 Hip flexion: 4+/5, mild pain R low back   Hip extension:  4/5 Hip extension: 4/5   Hip abduction: 4-/5, SL position Hip abduction: 4-/5   Hip adduction:  5/5 Hip adduction:  4+/5   Hip Internal rotation   4-/5 Hip Internal rotation 4-/5   Knee Flexion 4/5 Knee Flexion 4/5   Knee Extension 5/5 Knee Extension 4+/5   Ankle dorsiflexion: 4+/5 Ankle dorsiflexion: 4+/5   Ankle plantarflexion: 5/5 Ankle plantarflexion: 5/5     Ely's test: lacking 30 B HS  R hip IR 8 deg with pain in R side low back    GAIT:  Assistive Device used: none  Level of Assistance: independent  Patient displays the following gait deviations:  decreased step length.     Special Tests:   Test Name  Test Result   Prone Instability Test (+)   SI Joint Provocation Test (+)   Straight Leg Raise (+)   Neural Tension Test (+) R and L   Crossed Straight Leg Raise (--)   Walking on toes (--)   Walking on heels  (--)       NEUROLOGICAL SCREENING     Sensory deficit: SILT    Reflexes:    Left Right   Patella Tendon 2+ 2+   Babinski  (--) (--)   Clonus (--) (--)     REPEATED TEST  MOVEMENTS:  Repeated Flexion in Standing pain during motion   Repeated Extension in Standing no effect   Repeated Flexion in lying no effect   Repeated Extension in lying  no effect       Baseline Isometric Testing on Med X equipment: Testing administered by PT  Date of testin18  ROM 0-42 deg   Max Peak Torque 99    Min Peak Torque 66    Flex/Ext Ratio 1.5/1   % below normative data 49   Counter weight 227   femur 5   Seat pad 1     Treatment   Time In: 1430  Time Out: 1600    PT Evaluation Completed? Yes  Discussed Plan of Care with patient: Yes    Home Exercise Program as follows: - to be given next session     - Patient received education regarding proper posture and body mechanics.  Patient was given top 10 tips handout which discusses posture seated, standing, lifting correctly, components of exercise, importance of nutrition and hydration, and importance of sleep.    - Patient received a handout regarding anticipated muscular soreness following the isometric test and strategies for management were reviewed with patient including stretching, using ice and scheduled rest.     Pt was instructed in and performed the following:     Denis received therapeutic exercises to develop/improve posture, lumbar/cervical ROM, strength and muscular endurance for 5 minutes including the following exercises:   -Prone quad stretch  -HS stretch    Denis received the following manual therapy techniques: Joint mobilizations, Soft tissue Mobilization and vacuum cupping were applied to the: thoracolumbar spine for 15 minutes.   Pt received the following manual therapy techniques:   -15 min x Vacuum/cupping STM with manual therapy techniques was performed to R thoracolumbar paraspinals to decrease muscle tightness, increase circulation and promote healing process. The pt's skin was monitored for redness adjusting pressure as needed. The pt was instructed in possible side effects of bruising and/or soreness.   Assessment    This is a 63 y.o. male referred to [facility] [facility] and presents with a medical diagnosis of   Encounter Diagnoses   Name Primary?    Muscle spasm of back     Decreased range of motion of lumbar spine     Chronic back pain, unspecified back location, unspecified back pain laterality     and demonstrates limitations as described below in the problem list. Pt rehab potential is Good. Pt presents with decreased muscle length of B quadriceps and hip flexor group as well as HS groups, spinal weakness, and increased tonicity in R and L QL as well as thoracolumbar paraspinals that are contributing to overall chronic sx.  Pt appears to demonstrate spinal weakness 2/2 long surgical history of spinal fusion which should be considered in all treatment plans.      Pain Pattern: 1 PEN       Patient received education on the Healthy [facility] program, purpose of the isometric test, progression of back strengthening as well as wellness approach and systemic strengthening.  Details of the program were discussed.  Reviewed that patient should feel support/pressure from med ex restraints but no pain or discomfort and patient expressed understanding.    Based on the above history and physical examination an active physical therapy program is recommended.  Pt will continue to benefit from skilled outpatient physical therapy to address the deficits listed below in the chart, provide pt/family education and to maximize pt's level of independence in the home and community environment. .     No environmental, cultural, spiritual, developmental or education needs expressed or noted    Medical necessity is demonstrated by the following problem list.    Pt presents with the following impairments:     History  Co-morbidities and personal factors that may impact the plan of care Co-morbidities:   anxiety, CAD and HTN    Personal Factors:   no deficits     low   Examination  Body Structures and Functions, activity limitations and  participation restrictions that may impact the plan of care Body Regions:   back  lower extremities  trunk    Body Systems:    ROM  strength  motor control  blood pressure    Participation Restrictions:   Golfing, daily tasks     Activity limitations:   Learning and applying knowledge  no deficits    General Tasks and Commands  no deficits    Communication  no deficits    Mobility  lifting and carrying objects    Self care  no deficits    Domestic Life  no deficits    Interactions/Relationships  no deficits    Life Areas  no deficits    Community and Social Life  no deficits         low   Clinical Presentation stable and uncomplicated low   Decision Making/ Complexity Score: low       GOALS: Pt is in agreement with the following goals.    Short term goals:  6 weeks or 10 visits   1.  Pt will demonstrate increased lumbar ROM by at least 3 degrees from the initial ROM value with improvements noted in functional ROM and ability to perform ADLs  2.  Pt will demonstrate increased maximum isometric torque value by 15% when compared to the initial value resulting in improved ability to perform bending, lifting, and carrying activities safely, confidently.    3.  Patient report a reduction in worst pain score by 1-2 points for improved tolerance during work and recreational activities  4.  Pt able to perform HEP correctly with minimal cueing or supervision for therapist      Long term goals: 13 weeks or 20 visits   1. Pt will demonstrate increased lumbar ROM by at least 6 degrees from initial ROM value, resulting in improved ability to perform functional fwd bending while standing and sitting.   2. Pt will demonstrate increased maximum isometric torque value by 30% when compared to the initial value resulting in improved ability to perform bending, lifting, and carrying activities safely, confidently.  3. Pt to demonstrate ability to independently control and reduce their pain through posture positioning and mechanical  "movements throughout a typical day.  4. Pt will report decreased pain with playing golf without residual sx to maintain QOL.      Plan   Outpatient physical therapy 2x week for 13 weeks or 20 visits to include the following:   - Patient education  - Therapeutic exercise  - Manual therapy  - Performance testing   - Neuromuscular Re-education  - Therapeutic activity   - Modalities    Pt may be seen by PTA as part of the rehabilitation team.     Therapist: Cary Villanueva, PT  12/7/2018    "I certify the need for these services furnished under this plan of treatment and while under my care."    ____________________________________  Physician/Referring Practitioner    _______________  Date of Signature          "

## 2018-12-16 DIAGNOSIS — I10 HTN (HYPERTENSION), BENIGN: ICD-10-CM

## 2018-12-16 DIAGNOSIS — Z82.49 FH: CAD (CORONARY ARTERY DISEASE): ICD-10-CM

## 2018-12-16 RX ORDER — ALLOPURINOL 100 MG/1
100 TABLET ORAL 2 TIMES DAILY
Qty: 180 TABLET | Refills: 3 | Status: SHIPPED | OUTPATIENT
Start: 2018-12-16 | End: 2019-12-08 | Stop reason: SDUPTHER

## 2018-12-28 NOTE — PROGRESS NOTES
Routine Office Visit    Patient Name: Denis Bunn    : 1955  MRN: [id number]    Subjective:  Denis is a 63 y.o. male who presents today for:    1. Sore throat  Patient presenting today with sore throat x 3 days.  No fevers, chills, n/v/d.  He states he does feel a drip in the back of the throat.  He does have an occasional cough, but nothing sever.  No rashes.  He wanted to make sure he didn't need abx    2.  Chronic lower back pain  Patient has a history of chronic low back pain s/p fusions.  He states that he take pain medication as needed for the pain.  He states that some days are better than others.  He would like to try different options to help control the pain.  No new weakness, numbness, and no bowel/bladder incontinence.    Past Medical History  Past Medical History:   Diagnosis Date    Back pain     Cataract     Constipation - functional     History of gout     HTN (hypertension), benign 2013    Prostatitis     Vision changes        Past Surgical History  Past Surgical History:   Procedure Laterality Date    BACK SURGERY      CATARACT EXTRACTION      left eye    COLONOSCOPY N/A 2014    Performed by Daryl Ramsey MD at [facility] (4[facility])    ELBOW SURGERY      scope/right    EYE SURGERY      Dr. Hope.retina x2 left    LAMINECTOMY      WRIST SURGERY      right       Family History  Family History   Problem Relation Age of Onset    Blindness Mother         from cva    Cataracts Mother     Cataracts Father     Cancer Father     No Known Problems Sister     No Known Problems Brother     No Known Problems Maternal Aunt     No Known Problems Maternal Uncle     No Known Problems Paternal Aunt     No Known Problems Paternal Uncle     No Known Problems Maternal Grandmother     No Known Problems Maternal Grandfather     No Known Problems Paternal Grandmother     No Known Problems Paternal Grandfather     Amblyopia Neg Hx     Diabetes Neg Hx     Glaucoma Neg Hx      Hypertension Neg Hx     Macular degeneration Neg Hx     Retinal detachment Neg Hx     Strabismus Neg Hx     Stroke Neg Hx     Thyroid disease Neg Hx        Social History  Social History     Socioeconomic History    Marital status:      Spouse name: Not on file    Number of children: Not on file    Years of education: Not on file    Highest education level: Not on file   Social Needs    Financial resource strain: Not on file    Food insecurity - worry: Not on file    Food insecurity - inability: Not on file    Transportation needs - medical: Not on file    Transportation needs - non-medical: Not on file   Occupational History    Not on file   Tobacco Use    Smoking status: Never Smoker    Smokeless tobacco: Never Used    Tobacco comment: , 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   Substance and Sexual Activity    Alcohol use: Yes     Alcohol/week: 1.8 oz     Types: 3 Glasses of wine per week     Comment: 8-10 glasses wine per week    Drug use: No    Sexual activity: Yes     Partners: Female   Other Topics Concern    Not on file   Social History Narrative    Not on file       Current Medications  Current Outpatient Medications on File Prior to Visit   Medication Sig Dispense Refill    ascorbic acid (VITAMIN C) 500 MG tablet Take 500 mg by mouth once daily.      aspirin 81 MG chewable tablet Take 1 tablet by mouth.      magnesium 250 mg Tab Take 250 mg by mouth once. Takes two 250 mg tablets daily      naproxen (NAPROSYN) 500 MG tablet Take 500 mg by mouth 2 (two) times daily with meals.       OM-3/E/LINOL/ALA/OLEIC/GLA/LIP (OMEGA 3-6-9 ORAL) Take by mouth once daily.       SAW PALMETTO ORAL Take by mouth once daily.       verapamil (VERELAN) 240 MG C24P Take 1 capsule (240 mg total) by mouth once daily. (Patient taking differently: Take 240 mg by mouth once daily. Take one 240 mg capsule daily in the morning.) 90 capsule 3     No current facility-administered medications on file prior  "to visit.        Allergies   Review of patient's allergies indicates:   Allergen Reactions    Augmentin [amoxicillin-pot clavulanate]      Other reaction(s): Stomach upset    Celebrex [celecoxib] Other (See Comments)       Review of Systems (Pertinent positives)  Review of Systems   Constitutional: Negative.    HENT: Positive for sore throat. Negative for congestion, ear discharge and ear pain.    Eyes: Negative.    Respiratory: Negative for cough, shortness of breath and stridor.    Cardiovascular: Negative.    Gastrointestinal: Negative for abdominal pain, diarrhea and vomiting.   Genitourinary: Negative.    Musculoskeletal: Positive for back pain. Negative for neck pain.   Skin: Negative.    Neurological: Negative for headaches.         BP (!) 164/90 (BP Location: Right arm, Patient Position: Sitting, BP Method: Medium (Manual))   Pulse 71   Temp 98.1 °F (36.7 °C) (Oral)   Resp 14   Ht 5' 8" (1.727 m)   Wt 77.1 kg (169 lb 15.6 oz)   SpO2 97%   BMI 25.84 kg/m²     GENERAL APPEARANCE: in no apparent distress and well developed and well nourished  HEENT: PERRL, EOMI, Sclera clear, anicteric, Oropharynx shows cobblestoning, Neck supple with midline trachea  NECK: normal, supple, no adenopathy, thyroid normal in size  RESPIRATORY: appears well, vitals normal, no respiratory distress, acyanotic, normal RR, chest clear, no wheezing, crepitations, rhonchi, normal symmetric air entry  HEART: regular rate and rhythm, S1, S2 normal, no murmur, click, rub or gallop.    ABDOMEN: abdomen is soft without tenderness, no masses, no hernias, no organomegaly, no rebound, no guarding. Suprapubic tenderness absent. No CVA tenderness.  NEUROLOGIC: normal without focal findings, CN II-XII are intact.    SKIN: no rashes, no wounds, no other lesions  PSYCH: Alert, oriented x 3, thought content appropriate, speech normal, pleasant and cooperative, good eye contact, well groomed    Assessment/Plan:  Denis Bunn is a 63 y.o. " male who presents today for :    Denis was seen today for sore throat.    Diagnoses and all orders for this visit:    Viral URI  -     fluticasone (FLONASE) 50 mcg/actuation nasal spray; 1 spray (50 mcg total) by Each Nare route once daily.    HTN (hypertension), benign  -     Discontinue: losartan (COZAAR) 25 MG tablet; Take 1 tablet (25 mg total) by mouth once daily.    Chronic midline low back pain without sciatica  -     Ambulatory consult to [facility]    History of spinal fusion  -     Ambulatory consult to [facility]    Personal history of spinal stenosis  -     Ambulatory consult to [facility]    Other orders  -     Hypertension Digital Medicine (HDMP) Enrollment Order  -     Hypertension Digital Medicine (HDMP): Assign Onboarding Questionnaires      1.  flonase for URI  2.  Refer to healthy back for eval  3.  HDMP for HTN monitoring, but discuss with his cardiologist  4.  Follow up with PCP as needed    Eleazar Spain MD

## 2018-12-30 ENCOUNTER — PATIENT MESSAGE (OUTPATIENT)
Dept: FAMILY MEDICINE | Facility: CLINIC | Age: 63
End: 2018-12-30

## 2018-12-30 ENCOUNTER — NURSE TRIAGE (OUTPATIENT)
Dept: ADMINISTRATIVE | Facility: CLINIC | Age: 63
End: 2018-12-30

## 2018-12-30 NOTE — TELEPHONE ENCOUNTER
"Pt has a very bad cold, also has verapamil for blood pressure, and has PVC.  Advised pt to speak with pharmacist for more specifics on what allergy meds are appropriate with his htn and pvc's.    Reason for Disposition   Caller has medication question, adult has minor symptoms, caller declines triage, and triager answers question    Answer Assessment - Initial Assessment Questions  1. SYMPTOMS: "Do you have any symptoms?"      Can I take allegra 24 hr if I have PVC's and htn?  What's better tylenol or ibuprofen?  2. SEVERITY: If symptoms are present, ask "Are they mild, moderate or severe?"      na    Protocols used: ST MEDICATION QUESTION CALL-A-AH      "

## 2018-12-31 ENCOUNTER — OFFICE VISIT (OUTPATIENT)
Dept: FAMILY MEDICINE | Facility: CLINIC | Age: 63
End: 2018-12-31
Payer: COMMERCIAL

## 2018-12-31 VITALS
OXYGEN SATURATION: 96 % | WEIGHT: 168.19 LBS | HEIGHT: 68 IN | DIASTOLIC BLOOD PRESSURE: 78 MMHG | HEART RATE: 65 BPM | SYSTOLIC BLOOD PRESSURE: 124 MMHG | TEMPERATURE: 99 F | BODY MASS INDEX: 25.49 KG/M2

## 2018-12-31 DIAGNOSIS — J11.1 INFLUENZA: Primary | ICD-10-CM

## 2018-12-31 LAB
CTP QC/QA: YES
FLUAV AG NPH QL: NEGATIVE
FLUBV AG NPH QL: NEGATIVE

## 2018-12-31 PROCEDURE — 99214 OFFICE O/P EST MOD 30 MIN: CPT | Mod: S$GLB,,, | Performed by: FAMILY MEDICINE

## 2018-12-31 PROCEDURE — 3008F BODY MASS INDEX DOCD: CPT | Mod: CPTII,S$GLB,, | Performed by: FAMILY MEDICINE

## 2018-12-31 PROCEDURE — 87804 INFLUENZA ASSAY W/OPTIC: CPT | Mod: QW,S$GLB,, | Performed by: FAMILY MEDICINE

## 2018-12-31 PROCEDURE — 3078F DIAST BP <80 MM HG: CPT | Mod: CPTII,S$GLB,, | Performed by: FAMILY MEDICINE

## 2018-12-31 PROCEDURE — 99999 PR PBB SHADOW E&M-EST. PATIENT-LVL III: CPT | Mod: PBBFAC,,, | Performed by: FAMILY MEDICINE

## 2018-12-31 PROCEDURE — 3074F SYST BP LT 130 MM HG: CPT | Mod: CPTII,S$GLB,, | Performed by: FAMILY MEDICINE

## 2018-12-31 RX ORDER — MOMETASONE FUROATE 1 MG/ML
SOLUTION TOPICAL
COMMUNITY
Start: 2018-11-27 | End: 2019-03-11

## 2018-12-31 RX ORDER — OSELTAMIVIR PHOSPHATE 75 MG/1
75 CAPSULE ORAL 2 TIMES DAILY
Qty: 10 CAPSULE | Refills: 0 | Status: SHIPPED | OUTPATIENT
Start: 2018-12-31 | End: 2019-01-05

## 2018-12-31 NOTE — PROGRESS NOTES
Subjective:       Patient ID: Denis Bunn is a 63 y.o. male.    Chief Complaint: Nasal Congestion; Cough; and Fever    HPI   62 yo male presents for a fever. He had a fever of 101.2F this morning. States he has not felt well for the last 2.5 days. Endorses myalgias, fatigue, congestion, diaphoresis, and chills. Denies cough. States his wife was sick 5-6 days ago but was not as bad as what he has. Had flu vaccine. Took 2 tylenols this am along with flonase.    Review of Systems   Constitutional: Positive for fever.   HENT: Positive for congestion and sore throat. Negative for ear pain.    Respiratory: Negative for cough and wheezing.    Cardiovascular: Negative for chest pain.   Gastrointestinal: Negative for abdominal pain, diarrhea, nausea and vomiting.   Genitourinary: Negative for dysuria.   Skin: Negative for rash.   Neurological: Positive for headaches.        Past Medical History:   Diagnosis Date    Back pain     Cataract     Constipation - functional     History of gout     HTN (hypertension), benign 4/24/2013    Prostatitis     Vision changes      Past Surgical History:   Procedure Laterality Date    BACK SURGERY      CATARACT EXTRACTION      left eye    COLONOSCOPY N/A 7/29/2014    Performed by Daryl Ramsey [facility] at [facility] ([facility])    ELBOW SURGERY      scope/right    EYE SURGERY      Dr. Hope.retina x2 left    LAMINECTOMY      WRIST SURGERY      right     Family History   Problem Relation Age of Onset    Blindness Mother         from cva    Cataracts Mother     Cataracts Father     Cancer Father     No Known Problems Sister     No Known Problems Brother     No Known Problems Maternal Aunt     No Known Problems Maternal Uncle     No Known Problems Paternal Aunt     No Known Problems Paternal Uncle     No Known Problems Maternal Grandmother     No Known Problems Maternal Grandfather     No Known Problems Paternal Grandmother     No Known Problems Paternal Grandfather      Amblyopia Neg Hx     Diabetes Neg Hx     Glaucoma Neg Hx     Hypertension Neg Hx     Macular degeneration Neg Hx     Retinal detachment Neg Hx     Strabismus Neg Hx     Stroke Neg Hx     Thyroid disease Neg Hx      Social History     Socioeconomic History    Marital status:      Spouse name: None    Number of children: None    Years of education: None    Highest education level: None   Social Needs    Financial resource strain: None    Food insecurity - worry: None    Food insecurity - inability: None    Transportation needs - medical: None    Transportation needs - non-medical: None   Occupational History    None   Tobacco Use    Smoking status: Never Smoker    Smokeless tobacco: Never Used    Tobacco comment: , 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   Substance and Sexual Activity    Alcohol use: Yes     Alcohol/week: 1.8 oz     Types: 3 Glasses of wine per week     Comment: 8-10 glasses wine per week    Drug use: No    Sexual activity: Yes     Partners: Female   Other Topics Concern    None   Social History Narrative    None        Objective:      Vitals:    12/31/18 0914   BP: 124/78   Pulse: 65   Temp: 99.2 °F (37.3 °C)     Physical Exam   Constitutional: He is oriented to person, place, and time. He appears ill.   HENT:   Head: Normocephalic and atraumatic.   Right Ear: Tympanic membrane and ear canal normal.   Left Ear: Tympanic membrane and ear canal normal.   Nose: Right sinus exhibits no maxillary sinus tenderness and no frontal sinus tenderness. Left sinus exhibits no maxillary sinus tenderness and no frontal sinus tenderness.   Mouth/Throat: Posterior oropharyngeal erythema present. No oropharyngeal exudate.   B/l nasal turbinate swelling   Eyes: Conjunctivae are normal.   Neck: Neck supple.   Cardiovascular: Normal rate, regular rhythm, normal heart sounds and intact distal pulses. Exam reveals no gallop and no friction rub.   No murmur heard.  Pulmonary/Chest: Effort  normal and breath sounds normal. He has no wheezes. He has no rales.   Abdominal: Soft. Bowel sounds are normal. There is no tenderness.   Musculoskeletal: He exhibits no edema.   Lymphadenopathy:     He has cervical adenopathy (b/l).   Neurological: He is alert and oriented to person, place, and time.   Skin: Skin is warm and dry.   Psychiatric: He has a normal mood and affect. His behavior is normal. Judgment and thought content normal.          Assessment:       1. Influenza        Plan:       Influenza  -     POCT Influenza A/B- neg  - Advised pt to use OTC meds like allegra, flonase, etc for symptomatic relief  - Though flu is neg will treat w/ tamiflu due to flu like symptoms  -     oseltamivir (TAMIFLU) 75 MG capsule; Take 1 capsule (75 mg total) by mouth 2 (two) times daily. for 5 days  Dispense: 10 capsule; Refill: 0      Follow-up if symptoms worsen or fail to improve.            Angel Avina MD  12/31/2018 9:32 AM

## 2019-01-03 ENCOUNTER — PATIENT MESSAGE (OUTPATIENT)
Dept: FAMILY MEDICINE | Facility: CLINIC | Age: 64
End: 2019-01-03

## 2019-01-03 ENCOUNTER — OFFICE VISIT (OUTPATIENT)
Dept: FAMILY MEDICINE | Facility: CLINIC | Age: 64
End: 2019-01-03
Payer: COMMERCIAL

## 2019-01-03 VITALS
OXYGEN SATURATION: 98 % | DIASTOLIC BLOOD PRESSURE: 80 MMHG | HEART RATE: 51 BPM | TEMPERATURE: 99 F | SYSTOLIC BLOOD PRESSURE: 116 MMHG | WEIGHT: 171.06 LBS | BODY MASS INDEX: 25.92 KG/M2 | HEIGHT: 68 IN

## 2019-01-03 DIAGNOSIS — J11.1 INFLUENZA: Primary | ICD-10-CM

## 2019-01-03 PROCEDURE — 3008F PR BODY MASS INDEX (BMI) DOCUMENTED: ICD-10-PCS | Mod: CPTII,S$GLB,, | Performed by: FAMILY MEDICINE

## 2019-01-03 PROCEDURE — 3074F SYST BP LT 130 MM HG: CPT | Mod: CPTII,S$GLB,, | Performed by: FAMILY MEDICINE

## 2019-01-03 PROCEDURE — 99999 PR PBB SHADOW E&M-EST. PATIENT-LVL IV: ICD-10-PCS | Mod: PBBFAC,,, | Performed by: FAMILY MEDICINE

## 2019-01-03 PROCEDURE — 96372 PR INJECTION,THERAP/PROPH/DIAG2ST, IM OR SUBCUT: ICD-10-PCS | Mod: S$GLB,,, | Performed by: FAMILY MEDICINE

## 2019-01-03 PROCEDURE — 99214 OFFICE O/P EST MOD 30 MIN: CPT | Mod: 25,S$GLB,, | Performed by: FAMILY MEDICINE

## 2019-01-03 PROCEDURE — 3008F BODY MASS INDEX DOCD: CPT | Mod: CPTII,S$GLB,, | Performed by: FAMILY MEDICINE

## 2019-01-03 PROCEDURE — 3079F DIAST BP 80-89 MM HG: CPT | Mod: CPTII,S$GLB,, | Performed by: FAMILY MEDICINE

## 2019-01-03 PROCEDURE — 3079F PR MOST RECENT DIASTOLIC BLOOD PRESSURE 80-89 MM HG: ICD-10-PCS | Mod: CPTII,S$GLB,, | Performed by: FAMILY MEDICINE

## 2019-01-03 PROCEDURE — 96372 THER/PROPH/DIAG INJ SC/IM: CPT | Mod: S$GLB,,, | Performed by: FAMILY MEDICINE

## 2019-01-03 PROCEDURE — 99999 PR PBB SHADOW E&M-EST. PATIENT-LVL IV: CPT | Mod: PBBFAC,,, | Performed by: FAMILY MEDICINE

## 2019-01-03 PROCEDURE — 3074F PR MOST RECENT SYSTOLIC BLOOD PRESSURE < 130 MM HG: ICD-10-PCS | Mod: CPTII,S$GLB,, | Performed by: FAMILY MEDICINE

## 2019-01-03 PROCEDURE — 99214 PR OFFICE/OUTPT VISIT, EST, LEVL IV, 30-39 MIN: ICD-10-PCS | Mod: 25,S$GLB,, | Performed by: FAMILY MEDICINE

## 2019-01-03 RX ORDER — METHYLPREDNISOLONE 4 MG/1
TABLET ORAL
Qty: 1 PACKAGE | Refills: 0 | Status: SHIPPED | OUTPATIENT
Start: 2019-01-03 | End: 2019-01-03

## 2019-01-03 RX ORDER — METHYLPREDNISOLONE ACETATE 40 MG/ML
40 INJECTION, SUSPENSION INTRA-ARTICULAR; INTRALESIONAL; INTRAMUSCULAR; SOFT TISSUE
Status: COMPLETED | OUTPATIENT
Start: 2019-01-03 | End: 2019-01-03

## 2019-01-03 RX ADMIN — METHYLPREDNISOLONE ACETATE 40 MG: 40 INJECTION, SUSPENSION INTRA-ARTICULAR; INTRALESIONAL; INTRAMUSCULAR; SOFT TISSUE at 04:01

## 2019-01-04 ENCOUNTER — CLINICAL SUPPORT (OUTPATIENT)
Dept: REHABILITATION | Facility: HOSPITAL | Age: 64
End: 2019-01-04
Attending: PHYSICAL MEDICINE & REHABILITATION
Payer: COMMERCIAL

## 2019-01-04 DIAGNOSIS — M62.830 MUSCLE SPASM OF BACK: ICD-10-CM

## 2019-01-04 DIAGNOSIS — M54.9 CHRONIC BACK PAIN, UNSPECIFIED BACK LOCATION, UNSPECIFIED BACK PAIN LATERALITY: ICD-10-CM

## 2019-01-04 DIAGNOSIS — G89.29 CHRONIC BACK PAIN, UNSPECIFIED BACK LOCATION, UNSPECIFIED BACK PAIN LATERALITY: ICD-10-CM

## 2019-01-04 DIAGNOSIS — M53.86 DECREASED RANGE OF MOTION OF LUMBAR SPINE: ICD-10-CM

## 2019-01-04 PROCEDURE — 97110 THERAPEUTIC EXERCISES: CPT | Mod: PN

## 2019-01-04 NOTE — PROGRESS NOTES
Subjective:       Patient ID: Denis Bunn is a 63 y.o. male.    Chief Complaint: Nasal Congestion    HPI   64 yo male presents for congestion. He was seen about 4 days ago and was dx w/ flu. Patient sent an email today stating he was not feeling well and would like a 'shot'. After responding to the email patient decided to come to the clinic anyways. Endorses similar complaint as before. He states he was improving yesterday but today he felt worst. He endorses taking otc medication and is on the last day of his tamiflu.     Review of Systems   Constitutional: Positive for activity change. Negative for unexpected weight change.   HENT: Positive for rhinorrhea. Negative for hearing loss and trouble swallowing.    Eyes: Negative for discharge and visual disturbance.   Respiratory: Negative for chest tightness and wheezing.    Cardiovascular: Negative for chest pain and palpitations.   Gastrointestinal: Negative for blood in stool, constipation, diarrhea and vomiting.   Endocrine: Negative for polydipsia and polyuria.   Genitourinary: Negative for difficulty urinating, hematuria and urgency.   Musculoskeletal: Negative for arthralgias, joint swelling and neck pain.   Neurological: Negative for weakness and headaches.   Psychiatric/Behavioral: Negative for confusion and dysphoric mood.        Past Medical History:   Diagnosis Date    Back pain     Cataract     Constipation - functional     History of gout     HTN (hypertension), benign 4/24/2013    Prostatitis     Vision changes      Past Surgical History:   Procedure Laterality Date    BACK SURGERY      CATARACT EXTRACTION      left eye    COLONOSCOPY N/A 7/29/2014    Performed by [facility] at [facility] (4TH FLR)    ELBOW SURGERY      scope/right    EYE SURGERY      Dr. Hope.retina x2 left    LAMINECTOMY      WRIST SURGERY      right     Family History   Problem Relation Age of Onset    Blindness Mother         from cva    Cataracts Mother      Cataracts Father     Cancer Father     No Known Problems Sister     No Known Problems Brother     No Known Problems Maternal Aunt     No Known Problems Maternal Uncle     No Known Problems Paternal Aunt     No Known Problems Paternal Uncle     No Known Problems Maternal Grandmother     No Known Problems Maternal Grandfather     No Known Problems Paternal Grandmother     No Known Problems Paternal Grandfather     Amblyopia Neg Hx     Diabetes Neg Hx     Glaucoma Neg Hx     Hypertension Neg Hx     Macular degeneration Neg Hx     Retinal detachment Neg Hx     Strabismus Neg Hx     Stroke Neg Hx     Thyroid disease Neg Hx      Social History     Socioeconomic History    Marital status:      Spouse name: None    Number of children: None    Years of education: None    Highest education level: None   Social Needs    Financial resource strain: None    Food insecurity - worry: None    Food insecurity - inability: None    Transportation needs - medical: None    Transportation needs - non-medical: None   Occupational History    None   Tobacco Use    Smoking status: Never Smoker    Smokeless tobacco: Never Used    Tobacco comment: , 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   Substance and Sexual Activity    Alcohol use: Yes     Alcohol/week: 1.8 oz     Types: 3 Glasses of wine per week     Comment: 8-10 glasses wine per week    Drug use: No    Sexual activity: Yes     Partners: Female   Other Topics Concern    None   Social History Narrative    None        Objective:      Vitals:    01/03/19 1608   BP: 116/80   Pulse: (!) 51   Temp: 98.6 °F (37 °C)     Physical Exam   Constitutional: He is oriented to person, place, and time. No distress.   HENT:   Head: Normocephalic and atraumatic.   Mouth/Throat: No oropharyngeal exudate or posterior oropharyngeal erythema.   Eyes: Conjunctivae are normal. Pupils are equal, round, and reactive to light.   Neck: Neck supple. No JVD present.    Cardiovascular: Normal rate, regular rhythm and normal heart sounds. Exam reveals no gallop and no friction rub.   No murmur heard.  Pulmonary/Chest: Effort normal and breath sounds normal. He has no wheezes. He has no rales.   Abdominal: Soft. Bowel sounds are normal. There is no tenderness.   Neurological: He is alert and oriented to person, place, and time.   Skin: Skin is warm and dry.   Psychiatric: He has a normal mood and affect. His behavior is normal. Judgment and thought content normal.          Assessment:       1. Influenza        Plan:       Influenza  - Advised pt to finish tamiflu  - Continue otc meds  - Pt insistent on steroid shot.  - Pt was reminded again about flu course of infection. Advised pt again it may take some time to fully recover.  -     methylPREDNISolone acetate injection 40 mg      Follow-up if symptoms worsen or fail to improve.            Angel Avina MD  1/3/2019 6:58 PM

## 2019-01-04 NOTE — PROGRESS NOTES
"  Physical Therapy Daily Treatment Note     Name: Denis Bunn  Clinic Number: [id number]    Therapy Diagnosis:   Encounter Diagnoses   Name Primary?    Muscle spasm of back     Decreased range of motion of lumbar spine     Chronic back pain, unspecified back location, unspecified back pain laterality      Physician: Whitney Castle, *    Visit Date: 2019    Physician Orders: PT Eval and Treat  Medical Diagnosis: Chronic bilateral low back pain without sciatica  Evaluation Date: 2018  Authorization Period Expiration: 2019  Plan of Care Certification Period: 2018 to 3/8/2019   Visit #/Visits authorized:  (new referral; total visits 2)     Time In: 1000  Time Out: 1100  Total Billable Time: 30 minutes    Precautions: Lumbar fusion    Subjective     Pt reports: that he has not been in since evaluation secondary to scheduling conflicts with family coming in town and recently having the flu. Patient notes no change in his low back pain since , but states that he does not have much discomfort today "because I've been taking a lot of Tylenol."   He was not compliant with home exercise program. Patient states that he was not issued an HEP on evaluation date.  Response to previous treatment: good, no adverse reaction  Functional change: none    Pain: 0/10  Location: Right back and QL    Objective        Baseline Isometric Testing on Med X equipment: Testing administered by PT  Date of testin18  ROM 0-42 deg   Max Peak Torque 99    Min Peak Torque 66    Flex/Ext Ratio 1.5/1   % below normative data 49   Counter weight 227   Femur 5   Seat pad 1     Al received therapeutic exercises to develop strength, endurance, ROM, flexibility, posture and core stabilization for 50 minutes including:    Warm-up: Nustep x 10 minutes - Level 2   Prone quad stretch: 10 x 10" hold ea.  Supine active HS stretch: 10 x 10" hold ea.  Supine pelvic tilts: 10 x 3" hold    [facility] 2019 "   Visit Number 2   VAS Pain Rating 0   Manual Therapy 10   Lumbar Weight 50   Repetitions 20   Rating of Perceived Exertion 1   Ice - Z Lie (in min.) 0     Peripheral muscle strengthening which included 1 set of 15-20 repetitions at a slow, controlled 7 second per rep pace focused on strengthening supporting musculature for improved body mechanics and functional mobility. Pt and therapist focused on proper form during treatment to ensure optimal strengthening of each targeted muscle group. Machines were utilized including torso rotation, leg extension, leg curl, chest press, upright row, tricep extension, bicep curl, leg press, and hip abduction.  Not performed today: LE peripheral machines.    Al received the following manual therapy techniques:   10 minutes x Vacuum/cupping STM (silicone cups) with manual therapy techniques was performed to Right lumbar paraspinals/QL to decrease muscle tightness, increase circulation and promote healing process. The pt's skin was monitored for redness adjusting pressure as needed. The pt was instructed in possible side effects of bruising and/or soreness.     Al received cold pack for 00 minutes to lumbar spine. - not today, pt declined    Home Exercises Provided and Patient Education Provided     Education provided:   - importance of compliance with HEP and consistency with therapy    Written Home Exercises Provided: Patient instructed to cont prior HEP.  Exercises were reviewed and Al was able to demonstrate them prior to the end of the session.  Al demonstrated good  understanding of the education provided.     See EMR under Patient Instructions for exercises provided prior visit.    Assessment     Patient tolerated treatment session good today. Patient with increased tone present in Right QL/lumbar spine with good response to manual care. Patient challenged with HS stretch with visible decrease in tissue extensibility. Patient able to complete 20 repetitions at 50 ft/lbs on  MedX Lumbar Extension machine reporting an RPE of 1 post exercise. Consider 10% increase in weight next visit. Patient with good tolerance to UE peripheral machines reporting appropriate muscle response. Plan next visit to add LE peripheral machines per [facility] protocol.   Al is progressing well towards his goals.   Pt prognosis is Good.     Pt will continue to benefit from skilled outpatient physical therapy to address the deficits listed in the problem list box on initial evaluation, provide pt/family education and to maximize pt's level of independence in the home and community environment.     Pt's spiritual, cultural and educational needs considered and pt agreeable to plan of care and goals.    Goals:   Short term goals:  6 weeks or 10 visits   1.  Pt will demonstrate increased lumbar ROM by at least 3 degrees from the initial ROM value with improvements noted in functional ROM and ability to perform ADLs  2.  Pt will demonstrate increased maximum isometric torque value by 15% when compared to the initial value resulting in improved ability to perform bending, lifting, and carrying activities safely, confidently.  3.  Patient report a reduction in worst pain score by 1-2 points for improved tolerance during work and recreational activities  4.  Pt able to perform HEP correctly with minimal cueing or supervision for therapist     Plan     Outpatient physical therapy 2x week for 13 weeks or 20 visits to include the following:   - Patient education  - Therapeutic exercise  - Manual therapy  - Performance testing   - Neuromuscular Re-education  - Therapeutic activity   - Modalities     Pt may be seen by PTA as part of the rehabilitation team.     Maribell Duong PTA   01/04/2019

## 2019-01-07 ENCOUNTER — CLINICAL SUPPORT (OUTPATIENT)
Dept: REHABILITATION | Facility: HOSPITAL | Age: 64
End: 2019-01-07
Attending: PHYSICAL MEDICINE & REHABILITATION
Payer: COMMERCIAL

## 2019-01-07 ENCOUNTER — PATIENT MESSAGE (OUTPATIENT)
Dept: FAMILY MEDICINE | Facility: CLINIC | Age: 64
End: 2019-01-07

## 2019-01-07 DIAGNOSIS — B96.89 ACUTE BACTERIAL SINUSITIS: Primary | ICD-10-CM

## 2019-01-07 DIAGNOSIS — Z82.49 FH: CAD (CORONARY ARTERY DISEASE): ICD-10-CM

## 2019-01-07 DIAGNOSIS — I49.3 PVC (PREMATURE VENTRICULAR CONTRACTION): ICD-10-CM

## 2019-01-07 DIAGNOSIS — I10 HTN (HYPERTENSION), BENIGN: ICD-10-CM

## 2019-01-07 DIAGNOSIS — M54.9 BACK PAIN, UNSPECIFIED BACK LOCATION, UNSPECIFIED BACK PAIN LATERALITY, UNSPECIFIED CHRONICITY: ICD-10-CM

## 2019-01-07 DIAGNOSIS — R00.2 PALPITATIONS: ICD-10-CM

## 2019-01-07 DIAGNOSIS — M53.86 DECREASED RANGE OF MOTION OF LUMBAR SPINE: ICD-10-CM

## 2019-01-07 DIAGNOSIS — M62.830 MUSCLE SPASM OF BACK: ICD-10-CM

## 2019-01-07 DIAGNOSIS — J01.90 ACUTE BACTERIAL SINUSITIS: Primary | ICD-10-CM

## 2019-01-07 PROCEDURE — 97110 THERAPEUTIC EXERCISES: CPT | Mod: PN

## 2019-01-07 RX ORDER — VERAPAMIL HYDROCHLORIDE 240 MG/1
CAPSULE, EXTENDED RELEASE ORAL
Qty: 90 CAPSULE | Refills: 1 | Status: SHIPPED | OUTPATIENT
Start: 2019-01-07 | End: 2019-07-04 | Stop reason: SDUPTHER

## 2019-01-07 RX ORDER — DOXYCYCLINE 100 MG/1
100 CAPSULE ORAL 2 TIMES DAILY
Qty: 14 CAPSULE | Refills: 0 | Status: SHIPPED | OUTPATIENT
Start: 2019-01-07 | End: 2019-01-14

## 2019-01-07 NOTE — PROGRESS NOTES
"  Physical Therapy Daily Treatment Note     Name: Denis Bunn  Clinic Number: [id number]    Therapy Diagnosis:   Encounter Diagnoses   Name Primary?    Muscle spasm of back     Decreased range of motion of lumbar spine      Physician: Whitney Castle, *    Visit Date: 2019    Physician Orders: PT Eval and Treat  Medical Diagnosis: Chronic bilateral low back pain without sciatica  Evaluation Date: 2018  Authorization Period Expiration: 2019  Plan of Care Certification Period: 2018 to 3/8/2019   Visit #/Visits authorized:  (new referral; total visits 2)     Time In: 730  Time Out: 830  Total Billable Time: 30 minutes    Precautions: Lumbar fusion    Subjective     Pt reports: he continues to take tylenol to manage soreness and stiffness in back but feeling "ok" today.  Pt with constant stiffness with occasional spasm in back when getting to floor to do stretches in the morning.  Pt denies soreness after last session.     He was not compliant with home exercise program. Patient states that he was not issued an HEP on evaluation date.  Response to previous treatment: good, no adverse reaction  Functional change: none    Pain: 0/10  Location: Right back and QL    Objective        Baseline Isometric Testing on Med X equipment: Testing administered by PT  Date of testin18  ROM 0-42 deg   Max Peak Torque 99    Min Peak Torque 66    Flex/Ext Ratio 1.5/1   % below normative data 49   Counter weight 227   Femur 5   Seat pad 1     Al received therapeutic exercises to develop strength, endurance, ROM, flexibility, posture and core stabilization for 50 minutes including:    Warm-up: Nustep x 10 minutes - Level 2   Prone quad stretch: 10 x 10" hold ea.  Supine active HS stretch: 10 x 10" hold ea.  Supine pelvic tilts: 10 x 3" hold  +LTR with SB x10, 5" hold    [facility] 2019   Visit Number 3   VAS Pain Rating 2   Time 10   Manual Therapy 10   Lumbar Weight 53 "   Repetitions 20   Rating of Perceived Exertion 3   Ice - Z Lie (in min.) 0       Peripheral muscle strengthening which included 1 set of 15-20 repetitions at a slow, controlled 7 second per rep pace focused on strengthening supporting musculature for improved body mechanics and functional mobility. Pt and therapist focused on proper form during treatment to ensure optimal strengthening of each targeted muscle group. Machines were utilized including torso rotation, leg extension, leg curl, chest press, upright row, tricep extension, bicep curl, leg press, and hip abduction.      Al received the following manual therapy techniques:   5 minutes x Vacuum/cupping STM (silicone cups) with manual therapy techniques was performed to Right lumbar paraspinals/QL to decrease muscle tightness, increase circulation and promote healing process. The pt's skin was monitored for redness adjusting pressure as needed. The pt was instructed in possible side effects of bruising and/or soreness.     Al received cold pack for 00 minutes to lumbar spine. - not today, pt declined    Home Exercises Provided and Patient Education Provided     Education provided:   - importance of compliance with HEP and consistency with therapy    Written Home Exercises Provided: Patient instructed to cont prior HEP.  Exercises were reviewed and Al was able to demonstrate them prior to the end of the session.  Al demonstrated good  understanding of the education provided.     See EMR under Patient Instructions for exercises provided prior visit.    Assessment     Pt tolerated well 5% increase in medX extension machine this visit 2/2 low RPE ratings last session; able to complete 20 repetitions at 53ft.lb with RPE of 3 this visit.  Pt remains stiff and sore each morning but overall feeling well with good performance in clinic, cont with manual care to decrease tonicity and improve tissue mobility along with stretching as tolerated.  Cont per POC.     Al is  progressing well towards his goals.   Pt prognosis is Good.     Pt will continue to benefit from skilled outpatient physical therapy to address the deficits listed in the problem list box on initial evaluation, provide pt/family education and to maximize pt's level of independence in the home and community environment.     Pt's spiritual, cultural and educational needs considered and pt agreeable to plan of care and goals.    Goals:   Short term goals:  6 weeks or 10 visits   1.  Pt will demonstrate increased lumbar ROM by at least 3 degrees from the initial ROM value with improvements noted in functional ROM and ability to perform ADLs  2.  Pt will demonstrate increased maximum isometric torque value by 15% when compared to the initial value resulting in improved ability to perform bending, lifting, and carrying activities safely, confidently.  3.  Patient report a reduction in worst pain score by 1-2 points for improved tolerance during work and recreational activities  4.  Pt able to perform HEP correctly with minimal cueing or supervision for therapist     Plan     Outpatient physical therapy 2x week for 13 weeks or 20 visits to include the following:   - Patient education  - Therapeutic exercise  - Manual therapy  - Performance testing   - Neuromuscular Re-education  - Therapeutic activity   - Modalities     Pt may be seen by PTA as part of the rehabilitation team.     Cary Villanueva, PT   01/07/2019

## 2019-01-09 DIAGNOSIS — B96.89 ACUTE BACTERIAL SINUSITIS: ICD-10-CM

## 2019-01-09 DIAGNOSIS — J01.90 ACUTE BACTERIAL SINUSITIS: ICD-10-CM

## 2019-01-09 RX ORDER — DOXYCYCLINE 100 MG/1
100 CAPSULE ORAL 2 TIMES DAILY
Qty: 14 CAPSULE | Refills: 0 | OUTPATIENT
Start: 2019-01-09 | End: 2019-01-16

## 2019-01-11 ENCOUNTER — CLINICAL SUPPORT (OUTPATIENT)
Dept: REHABILITATION | Facility: HOSPITAL | Age: 64
End: 2019-01-11
Attending: PHYSICAL MEDICINE & REHABILITATION
Payer: COMMERCIAL

## 2019-01-11 DIAGNOSIS — M54.9 BACK PAIN, UNSPECIFIED BACK LOCATION, UNSPECIFIED BACK PAIN LATERALITY, UNSPECIFIED CHRONICITY: ICD-10-CM

## 2019-01-11 DIAGNOSIS — M53.86 DECREASED RANGE OF MOTION OF LUMBAR SPINE: ICD-10-CM

## 2019-01-11 DIAGNOSIS — M62.830 MUSCLE SPASM OF BACK: ICD-10-CM

## 2019-01-11 PROCEDURE — 97110 THERAPEUTIC EXERCISES: CPT | Mod: PN

## 2019-01-11 NOTE — PROGRESS NOTES
"  Physical Therapy Daily Treatment Note     Name: Denis Bunn  Clinic Number: [id number]    Therapy Diagnosis:   No diagnosis found.  Physician: Whitney Castle, *    Visit Date: 2019    Physician Orders: PT Eval and Treat  Medical Diagnosis: Chronic bilateral low back pain without sciatica  Evaluation Date: 2018  Authorization Period Expiration: 2019  Plan of Care Certification Period: 2018 to 3/8/2019   Visit #/Visits authorized:  (new referral; total visits 2)     Time In: 0800  Time Out: 08  Total Billable Time: 30 minutes    Precautions: Lumbar fusion    Subjective     Pt reports: he had quite a bit of spasm in R side low back on  but this has since gone down some.  He reports that severity fluctuates and wonders if maybe he sleeps funny and this is why some days are worse.  Pt denies soreness from last session.     He was not compliant with home exercise program. Patient states that he was not issued an HEP on evaluation date.  Response to previous treatment: good, no adverse reaction  Functional change: none    Pain: 4/10  Location: Right back and QL    Objective        Baseline Isometric Testing on Med X equipment: Testing administered by PT  Date of testin18  ROM 0-42 deg   Max Peak Torque 99    Min Peak Torque 66    Flex/Ext Ratio 1.5/1   % below normative data 49   Counter weight 227   Femur 5   Seat pad 1     Al received therapeutic exercises to develop strength, endurance, ROM, flexibility, posture and core stabilization for 50 minutes including:    Warm-up: Nustep x 10 minutes - Level 2   Prone quad stretch: 10 x 10" hold ea.  Supine active HS stretch: 10 x 10" hold ea.  Supine pelvic tilts: 10 x 3" hold  LTR with SB x10, 5" hold  +prone leg lifts 2x5 with pressure to R QL during L raise     [facility] 2019   Visit Number 4   VAS Pain Rating 4   Time 10   Manual Therapy -   Lumbar Weight 56   Repetitions 20   Rating of Perceived " Exertion 3   Ice - Z Lie (in min.) 0       Peripheral muscle strengthening which included 1 set of 15-20 repetitions at a slow, controlled 7 second per rep pace focused on strengthening supporting musculature for improved body mechanics and functional mobility. Pt and therapist focused on proper form during treatment to ensure optimal strengthening of each targeted muscle group. Machines were utilized including torso rotation, leg extension, leg curl, chest press, upright row, tricep extension, bicep curl, leg press, and hip abduction.    Al received the following manual therapy techniques:   5 minutes x Vacuum/cupping STM (silicone cups) with manual therapy techniques was performed to Right lumbar paraspinals/QL to decrease muscle tightness, increase circulation and promote healing process. The pt's skin was monitored for redness adjusting pressure as needed. The pt was instructed in possible side effects of bruising and/or soreness.     Al received cold pack for 00 minutes to lumbar spine. - not today, pt declined    Home Exercises Provided and Patient Education Provided     Education provided:   - importance of compliance with HEP and consistency with therapy    Written Home Exercises Provided: Patient instructed to cont prior HEP.  Exercises were reviewed and Al was able to demonstrate them prior to the end of the session.  Al demonstrated good  understanding of the education provided.     See EMR under Patient Instructions for exercises provided prior visit.    Assessment     Pt tolerated well 5% increase in weight on medX extension again this visit to 56ft.lbs for 20 repetitions; pt without complaint of soreness and demonstrates appropriate level of challenge with good form.  Addition of prone leg lifts this visit to assist with core stabilization; addition of deep pressure to QL TP on R with L raise which caused much soreness.  Cont per tolerance as pt progressing well with program.     Al is progressing well  towards his goals.   Pt prognosis is Good.     Pt will continue to benefit from skilled outpatient physical therapy to address the deficits listed in the problem list box on initial evaluation, provide pt/family education and to maximize pt's level of independence in the home and community environment.     Pt's spiritual, cultural and educational needs considered and pt agreeable to plan of care and goals.    Goals:   Short term goals:  6 weeks or 10 visits   1.  Pt will demonstrate increased lumbar ROM by at least 3 degrees from the initial ROM value with improvements noted in functional ROM and ability to perform ADLs  2.  Pt will demonstrate increased maximum isometric torque value by 15% when compared to the initial value resulting in improved ability to perform bending, lifting, and carrying activities safely, confidently.  3.  Patient report a reduction in worst pain score by 1-2 points for improved tolerance during work and recreational activities  4.  Pt able to perform HEP correctly with minimal cueing or supervision for therapist     Plan     Outpatient physical therapy 2x week for 13 weeks or 20 visits to include the following:   - Patient education  - Therapeutic exercise  - Manual therapy  - Performance testing   - Neuromuscular Re-education  - Therapeutic activity   - Modalities     Pt may be seen by PTA as part of the rehabilitation team.     Cary Villanueva, PT   01/11/2019

## 2019-01-14 ENCOUNTER — CLINICAL SUPPORT (OUTPATIENT)
Dept: REHABILITATION | Facility: HOSPITAL | Age: 64
End: 2019-01-14
Attending: PHYSICAL MEDICINE & REHABILITATION
Payer: COMMERCIAL

## 2019-01-14 DIAGNOSIS — M54.9 BACK PAIN, UNSPECIFIED BACK LOCATION, UNSPECIFIED BACK PAIN LATERALITY, UNSPECIFIED CHRONICITY: ICD-10-CM

## 2019-01-14 DIAGNOSIS — M62.830 MUSCLE SPASM OF BACK: ICD-10-CM

## 2019-01-14 DIAGNOSIS — M53.86 DECREASED RANGE OF MOTION OF LUMBAR SPINE: ICD-10-CM

## 2019-01-14 PROCEDURE — 97110 THERAPEUTIC EXERCISES: CPT | Mod: PN

## 2019-01-14 NOTE — PROGRESS NOTES
"  Physical Therapy Daily Treatment Note     Name: Denis Bunn  Clinic Number: [id number]    Therapy Diagnosis:   Encounter Diagnoses   Name Primary?    Muscle spasm of back     Decreased range of motion of lumbar spine     Back pain, unspecified back location, unspecified back pain laterality, unspecified chronicity      Physician: Whitney Castle, *    Visit Date: 2019    Physician Orders: PT Eval and Treat  Medical Diagnosis: Chronic bilateral low back pain without sciatica  Evaluation Date: 2018  Authorization Period Expiration: 2019  Plan of Care Certification Period: 2018 to 3/8/2019   Visit #/Visits authorized:     Time In: 730  Time Out: 825  Total Billable Time: 40 minutes    Precautions: Lumbar fusion    Subjective     Pt reports: he continues to have pain in R low back that grabs him when getting up from bed but is present all day described as mostly aching.  Pt reports no adverse response to last session although he did have mild soreness after leaving.    He was not compliant with home exercise program.  Response to previous treatment: good, no adverse reaction  Functional change: none    Pain: 610  Location: Right back and QL    Objective        Baseline Isometric Testing on Med X equipment: Testing administered by PT  Date of testin18  ROM 0-42 deg   Max Peak Torque 99    Min Peak Torque 66    Flex/Ext Ratio 1.5/1   % below normative data 49   Counter weight 227   Femur 5   Seat pad 1     Al received therapeutic exercises to develop strength, endurance, ROM, flexibility, posture and core stabilization for 50 minutes including:    Warm-up: Nustep x 10 minutes - Level 2   Prone quad stretch: 10 x 10" hold ea.  Supine active HS stretch: 10 x 10" hold ea.  Supine pelvic tilts: 10 x 3" hold  LTR with SB x10, 5" hold  prone leg lifts 2x5 with pressure to R QL during L raise   +standing OH press (facing to side, keep band at midline- pallof for QL/oblique) x15 " to R and L    [facility] 1/14/2019   Visit Number 5   VAS Pain Rating 6   Time -   Manual Therapy -   Lumbar Weight 56   Repetitions 20   Rating of Perceived Exertion 3   Ice - Z Lie (in min.) 0       Peripheral muscle strengthening which included 1 set of 15-20 repetitions at a slow, controlled 7 second per rep pace focused on strengthening supporting musculature for improved body mechanics and functional mobility. Pt and therapist focused on proper form during treatment to ensure optimal strengthening of each targeted muscle group. Machines were utilized including torso rotation, leg extension, leg curl, chest press, upright row, tricep extension, bicep curl, leg press, and hip abduction.    Al received the following manual therapy techniques:   5 minutes x Vacuum/cupping STM (silicone cups) with manual therapy techniques was performed to Right lumbar paraspinals/QL to decrease muscle tightness, increase circulation and promote healing process. The pt's skin was monitored for redness adjusting pressure as needed. The pt was instructed in possible side effects of bruising and/or soreness.     Al received cold pack for 00 minutes to lumbar spine. - not today, pt declined    Home Exercises Provided and Patient Education Provided     Education provided:   - importance of compliance with HEP and consistency with therapy    Written Home Exercises Provided: Patient instructed to cont prior HEP.  Exercises were reviewed and Al was able to demonstrate them prior to the end of the session.  Al demonstrated good  understanding of the education provided.     See EMR under Patient Instructions for exercises provided prior visit.    Assessment     Pt with continued pain in R low back and area of QL that is unchanged since beginning tx.  Pt is, however, improving well with overall exercise tolerance as well as resistance.  Pt able to complete 20 repetitions on Cureatr extension machine this visit at 56ft.lb and to benefit  from 5% increase again next session.  Addition of vertical pallof press this visit to encourage QL/oblique activation in attempt to bring blood flow to the area to assist with soreness; good tolerance to activity without complaint.  Pt to continue with progression as able.    Al is progressing well towards his goals.   Pt prognosis is Good.     Pt will continue to benefit from skilled outpatient physical therapy to address the deficits listed in the problem list box on initial evaluation, provide pt/family education and to maximize pt's level of independence in the home and community environment.     Pt's spiritual, cultural and educational needs considered and pt agreeable to plan of care and goals.    Goals:   Short term goals:  6 weeks or 10 visits   1.  Pt will demonstrate increased lumbar ROM by at least 3 degrees from the initial ROM value with improvements noted in functional ROM and ability to perform ADLs  2.  Pt will demonstrate increased maximum isometric torque value by 15% when compared to the initial value resulting in improved ability to perform bending, lifting, and carrying activities safely, confidently.  3.  Patient report a reduction in worst pain score by 1-2 points for improved tolerance during work and recreational activities  4.  Pt able to perform HEP correctly with minimal cueing or supervision for therapist     Plan     Outpatient physical therapy 2x week for 13 weeks or 20 visits to include the following:   - Patient education  - Therapeutic exercise  - Manual therapy  - Performance testing   - Neuromuscular Re-education  - Therapeutic activity   - Modalities     Pt may be seen by PTA as part of the rehabilitation team.     Cary Villanueva, PT   01/14/2019

## 2019-01-18 ENCOUNTER — CLINICAL SUPPORT (OUTPATIENT)
Dept: REHABILITATION | Facility: HOSPITAL | Age: 64
End: 2019-01-18
Attending: PHYSICAL MEDICINE & REHABILITATION
Payer: COMMERCIAL

## 2019-01-18 DIAGNOSIS — M54.9 BACK PAIN, UNSPECIFIED BACK LOCATION, UNSPECIFIED BACK PAIN LATERALITY, UNSPECIFIED CHRONICITY: ICD-10-CM

## 2019-01-18 DIAGNOSIS — M53.86 DECREASED RANGE OF MOTION OF LUMBAR SPINE: ICD-10-CM

## 2019-01-18 DIAGNOSIS — M62.830 MUSCLE SPASM OF BACK: ICD-10-CM

## 2019-01-18 PROCEDURE — 97110 THERAPEUTIC EXERCISES: CPT | Mod: PN

## 2019-01-18 NOTE — PROGRESS NOTES
"  Physical Therapy Daily Treatment Note     Name: Denis Bunn  Clinic Number: [id number]    Therapy Diagnosis:   Encounter Diagnoses   Name Primary?    Muscle spasm of back     Decreased range of motion of lumbar spine     Back pain, unspecified back location, unspecified back pain laterality, unspecified chronicity      Physician: Whitney Castle, *    Visit Date: 2019    Physician Orders: PT Eval and Treat  Medical Diagnosis: Chronic bilateral low back pain without sciatica  Evaluation Date: 2018  Authorization Period Expiration: 2019  Plan of Care Certification Period: 2018 to 3/8/2019   Visit #/Visits authorized:     Time In: 730  Time Out: 825  Total Billable Time: 55 minutes    Precautions: Lumbar fusion    Subjective     Pt reports: that he always have lower back pain. Pt states he has right lower back pain which causes muscle spasm and pain.     He was not compliant with home exercise program.  Response to previous treatment: good, no adverse reaction  Functional change: none    Pain: 10  Location: Right back and QL    Objective        Baseline Isometric Testing on Med X equipment: Testing administered by PT  Date of testin18  ROM 0-42 deg   Max Peak Torque 99    Min Peak Torque 66    Flex/Ext Ratio 1.5/1   % below normative data 49   Counter weight 227   Femur 5   Seat pad 1     Al received therapeutic exercises to develop strength, endurance, ROM, flexibility, posture and core stabilization for 55 minutes including:    Warm-up: upright bike x 10 minutes    Prone quad stretch: 10 x 10" hold ea.  Supine active HS stretch: 10 x 10" hold ea.  Supine pelvic tilts: 10 x 3" hold  LTR with SB x10, 5" hold  prone leg lifts 2x5 with pressure to R QL during L raise   +standing OH press (facing to side, keep band at midline- pallof for QL/oblique) x15 to R and L  Seated trunk flexion 1x2 min  Cat/camel 1x10         Peripheral muscle strengthening which included 1 set " of 15-20 repetitions at a slow, controlled 7 second per rep pace focused on strengthening supporting musculature for improved body mechanics and functional mobility. Pt and therapist focused on proper form during treatment to ensure optimal strengthening of each targeted muscle group. Machines were utilized including torso rotation, leg extension, leg curl, chest press, upright row, tricep extension, bicep curl, leg press, and hip abduction.    Al received the following manual therapy techniques:   00 minutes x Vacuum/cupping STM (silicone cups) with manual therapy techniques was performed to Right lumbar paraspinals/QL to decrease muscle tightness, increase circulation and promote healing process. The pt's skin was monitored for redness adjusting pressure as needed. The pt was instructed in possible side effects of bruising and/or soreness.     Pt received 10 min of soft tissue mobs in the right QL with Biofreeze to decrease pain and muscle tightness.     Al received cold pack for 00 minutes to lumbar spine. - not today, pt declined    Home Exercises Provided and Patient Education Provided     Education provided:   - importance of compliance with HEP and consistency with therapy    Written Home Exercises Provided: Patient instructed to cont prior HEP.  Exercises were reviewed and Al was able to demonstrate them prior to the end of the session.  Al demonstrated good  understanding of the education provided.     See EMR under Patient Instructions for exercises provided prior visit.    Assessment     Pt with continued pain in R low back and area of QL that is unchanged since beginning tx.  Pt presented with right QL pain and stiffness. Pt demonstrated pain during stretches technique for lower back and QL. Pt tolerated Medx lumbar extension with 60 ft.lbs, 20 reps, and RPE of 4 today. Pt did not demonstrated difficult during exercises. Pt tolerated well peripheral muscle strengthening. Manual therapy was performed with  severe right QL muscle restriction and tightness. Pain minor subsided in the end of PT session.  Pt will start perform peripheral muscle strengthening in the gym. Pt to continue with progression as able.    Al is progressing well towards his goals.   Pt prognosis is Good.     Pt will continue to benefit from skilled outpatient physical therapy to address the deficits listed in the problem list box on initial evaluation, provide pt/family education and to maximize pt's level of independence in the home and community environment.     Pt's spiritual, cultural and educational needs considered and pt agreeable to plan of care and goals.    Goals:   Short term goals:  6 weeks or 10 visits   1.  Pt will demonstrate increased lumbar ROM by at least 3 degrees from the initial ROM value with improvements noted in functional ROM and ability to perform ADLs  2.  Pt will demonstrate increased maximum isometric torque value by 15% when compared to the initial value resulting in improved ability to perform bending, lifting, and carrying activities safely, confidently.  3.  Patient report a reduction in worst pain score by 1-2 points for improved tolerance during work and recreational activities  4.  Pt able to perform HEP correctly with minimal cueing or supervision for therapist     Plan     Outpatient physical therapy 2x week for 13 weeks or 20 visits to include the following:   - Patient education  - Therapeutic exercise  - Manual therapy  - Performance testing   - Neuromuscular Re-education  - Therapeutic activity   - Modalities     Pt may be seen by PTA as part of the rehabilitation team.     Jarad Powell, PT   01/18/2019

## 2019-02-01 DIAGNOSIS — I49.3 PVC (PREMATURE VENTRICULAR CONTRACTION): Primary | ICD-10-CM

## 2019-02-04 ENCOUNTER — HOSPITAL ENCOUNTER (OUTPATIENT)
Dept: CARDIOLOGY | Facility: CLINIC | Age: 64
Discharge: HOME | End: 2019-02-04
Payer: COMMERCIAL

## 2019-02-04 ENCOUNTER — OFFICE VISIT (OUTPATIENT)
Dept: ELECTROPHYSIOLOGY | Facility: CLINIC | Age: 64
End: 2019-02-04
Payer: COMMERCIAL

## 2019-02-04 VITALS
HEIGHT: 68 IN | WEIGHT: 168.44 LBS | SYSTOLIC BLOOD PRESSURE: 144 MMHG | BODY MASS INDEX: 25.53 KG/M2 | DIASTOLIC BLOOD PRESSURE: 82 MMHG | HEART RATE: 48 BPM

## 2019-02-04 DIAGNOSIS — I49.3 PVC (PREMATURE VENTRICULAR CONTRACTION): ICD-10-CM

## 2019-02-04 DIAGNOSIS — I49.3 PVC (PREMATURE VENTRICULAR CONTRACTION): Primary | ICD-10-CM

## 2019-02-04 DIAGNOSIS — I10 HTN (HYPERTENSION), BENIGN: ICD-10-CM

## 2019-02-04 DIAGNOSIS — R00.2 PALPITATIONS: ICD-10-CM

## 2019-02-04 PROCEDURE — 99999 PR PBB SHADOW E&M-EST. PATIENT-LVL III: ICD-10-PCS | Mod: PBBFAC,,, | Performed by: NURSE PRACTITIONER

## 2019-02-04 PROCEDURE — 3008F PR BODY MASS INDEX (BMI) DOCUMENTED: ICD-10-PCS | Mod: CPTII,S$GLB,, | Performed by: NURSE PRACTITIONER

## 2019-02-04 PROCEDURE — 3077F PR MOST RECENT SYSTOLIC BLOOD PRESSURE >= 140 MM HG: ICD-10-PCS | Mod: CPTII,S$GLB,, | Performed by: NURSE PRACTITIONER

## 2019-02-04 PROCEDURE — 3077F SYST BP >= 140 MM HG: CPT | Mod: CPTII,S$GLB,, | Performed by: NURSE PRACTITIONER

## 2019-02-04 PROCEDURE — 93010 ELECTROCARDIOGRAM REPORT: CPT | Mod: S$GLB,,, | Performed by: INTERNAL MEDICINE

## 2019-02-04 PROCEDURE — 93005 RHYTHM STRIP: ICD-10-PCS | Mod: S$GLB,,, | Performed by: INTERNAL MEDICINE

## 2019-02-04 PROCEDURE — 3079F PR MOST RECENT DIASTOLIC BLOOD PRESSURE 80-89 MM HG: ICD-10-PCS | Mod: CPTII,S$GLB,, | Performed by: NURSE PRACTITIONER

## 2019-02-04 PROCEDURE — 93005 ELECTROCARDIOGRAM TRACING: CPT | Mod: S$GLB,,, | Performed by: INTERNAL MEDICINE

## 2019-02-04 PROCEDURE — 99214 OFFICE O/P EST MOD 30 MIN: CPT | Mod: S$GLB,,, | Performed by: NURSE PRACTITIONER

## 2019-02-04 PROCEDURE — 93010 RHYTHM STRIP: ICD-10-PCS | Mod: S$GLB,,, | Performed by: INTERNAL MEDICINE

## 2019-02-04 PROCEDURE — 3079F DIAST BP 80-89 MM HG: CPT | Mod: CPTII,S$GLB,, | Performed by: NURSE PRACTITIONER

## 2019-02-04 PROCEDURE — 99214 PR OFFICE/OUTPT VISIT, EST, LEVL IV, 30-39 MIN: ICD-10-PCS | Mod: S$GLB,,, | Performed by: NURSE PRACTITIONER

## 2019-02-04 PROCEDURE — 99999 PR PBB SHADOW E&M-EST. PATIENT-LVL III: CPT | Mod: PBBFAC,,, | Performed by: NURSE PRACTITIONER

## 2019-02-04 PROCEDURE — 3008F BODY MASS INDEX DOCD: CPT | Mod: CPTII,S$GLB,, | Performed by: NURSE PRACTITIONER

## 2019-02-04 NOTE — PROGRESS NOTES
Mr. Bunn is a patient of Dr. Melendez and was last seen in clinic 8/7/2018.      Subjective:   Patient ID:  Denis Bunn is a 63 y.o. male who presents for follow-up of Palpitations  .     HPI:    Mr. Bunn is a 63 y.o. male with PVCs and HTN here for follow up.      Background:  He presented early July 2018 with frequent palpitations. He was found to have frequent PVCs with RBBB morphology that were relatively narrow. He was wearing an event monitor at the time and was admitted for observation. There his EF was noted to be normal.   He was advised to stop his metoprolol and start verapamil. 48 hour holter indicated that his PVC burden was 23%. He has had some relief on verapamil with a particularly symptomatic day 7/8/18. He has denied syncope, near syncope, chest pain, shortness of breath. He feels his PVCs more at rest than with activity. PVC morphology is RBBB, V3 transition with rightward/inferior axis. The QRS duration is ~120 ms. Suspected fascicular/his/purkinje origin. The morphology is not consistent with a left posterior or left anterior fascicle PVC. Dr. Melendez suspects the source is closer to the base of the HPS.    Strategy to try verapamil as an initial strategy. If this is not effective, then try flecainide. RFA if flecainide not effective.     Update (08/07/2018):  Palpitations resolved with verapamil 240mg daily.     Update (02/04/2019):    Dr. Pereyra increased his verapamil to 240mg AM, 120mg PM in November. BPs have been much better controlled. He says his BPs at home are in the 110s systolic.  Today he feels well. Some palpitations when he is lying in bed at night but minimal. Otherwise no cardiac complaints. Mr. Bunn denies chest pain with exertion or at rest, OB, PLATA, dizziness, or syncope.    I have personally reviewed the patient's EKG today, which shows sinus bradycardia at 48bpm. CA interval is 150. QT is 434. He is not light-headed.    Recent Cardiac Tests:    Echo  7/2/18:  CONCLUSIONS     1 - Normal left ventricular systolic function (EF 55-60%).     2 - Biatrial enlargement.     3 - Normal left ventricular diastolic function.     4 - Normal right ventricular systolic function .     5 - Mild mitral regurgitation.      Current Outpatient Medications   Medication Sig    allopurinol (ZYLOPRIM) 100 MG tablet TAKE 1 TABLET (100 MG TOTAL) BY MOUTH 2 (TWO) TIMES DAILY.    ascorbic acid (VITAMIN C) 500 MG tablet Take 500 mg by mouth once daily.    aspirin 81 MG chewable tablet Take 1 tablet by mouth.    magnesium 250 mg Tab Take 250 mg by mouth once. Takes two 250 mg tablets daily    OM-3/E/LINOL/ALA/OLEIC/GLA/LIP (OMEGA 3-6-9 ORAL) Take by mouth once daily.     pravastatin (PRAVACHOL) 20 MG tablet TAKE 1 TABLET(20 MG) BY MOUTH EVERY DAY    SAW PALMETTO ORAL Take by mouth once daily.     verapamil (CALAN-SR) 120 MG CR tablet Take 1 tablet (120 mg total) by mouth every evening.    verapamil (VERELAN) 240 MG C24P TAKE 1 CAPSULE BY MOUTH EVERY DAY (Patient taking differently: TAKE 1 CAPSULE BY MOUTH EVERY MORNING)    fluticasone (FLONASE) 50 mcg/actuation nasal spray 1 spray (50 mcg total) by Each Nare route once daily.    mometasone (ELOCON) 0.1 % solution     naproxen (NAPROSYN) 500 MG tablet Take 500 mg by mouth 2 (two) times daily with meals.      No current facility-administered medications for this visit.      Review of Systems   Constitution: Negative for malaise/fatigue.   Cardiovascular: Negative for chest pain, dyspnea on exertion, irregular heartbeat, leg swelling and palpitations.   Respiratory: Negative for shortness of breath.    Hematologic/Lymphatic: Negative for bleeding problem.   Skin: Negative for rash.   Musculoskeletal: Negative for myalgias.   Gastrointestinal: Negative for hematemesis, hematochezia and nausea.   Genitourinary: Negative for hematuria.   Neurological: Negative for light-headedness.   Psychiatric/Behavioral: Negative for altered mental  "status.   Allergic/Immunologic: Negative for persistent infections.     Objective:        BP (!) 144/82   Pulse (!) 48   Ht 5' 8" (1.727 m)   Wt 76.4 kg (168 lb 6.9 oz)   BMI 25.61 kg/m²     Physical Exam   Constitutional: He is oriented to person, place, and time. He appears well-developed and well-nourished.   HENT:   Head: Normocephalic.   Nose: Nose normal.   Eyes: Pupils are equal, round, and reactive to light.   Cardiovascular: Regular rhythm, S1 normal and S2 normal. Bradycardia present.   No murmur heard.  Pulses:       Radial pulses are 2+ on the right side, and 2+ on the left side.   Pulmonary/Chest: Breath sounds normal. No respiratory distress.   Abdominal: Normal appearance.   Musculoskeletal: Normal range of motion. He exhibits no edema.   Neurological: He is alert and oriented to person, place, and time.   Skin: Skin is warm and dry. No erythema.   Psychiatric: He has a normal mood and affect. His speech is normal and behavior is normal.   Nursing note and vitals reviewed.    Lab Results   Component Value Date     11/09/2018    K 4.6 11/09/2018    MG 2.3 07/02/2018    BUN 17 11/09/2018    CREATININE 1.2 11/09/2018    ALT 16 11/09/2018    AST 21 11/09/2018    HGB 14.3 07/02/2018    HCT 42.6 07/02/2018    TSH 1.369 11/09/2018    LDLCALC 86.2 11/09/2018           Assessment:     1. PVC (premature ventricular contraction)    2. HTN (hypertension), benign    3. Palpitations      Plan:     In summary, Mr. Bunn is a 63 y.o. male with PVCs and HTN here for follow up.   He is doing well from a rhythm standpoint, with PVCs well controlled on verapamil 240/120. PVCs were very symptomatic before.  He is bradycardic but not symptomatic at all, says this is his norm.   Will continue current regimen. Holter if he starts to develop symptoms. He also follows with Dr. Pereyra in general cardiology.    Continue current medications.  RTC in one year, sooner if needed.    *A copy of this note has been sent to " Dr. Melendez*    Follow-up in about 1 year (around 2/4/2020).    ------------------------------------------------------------------    ALLIE Cortes, NP-C  [facility]

## 2019-02-07 NOTE — PROGRESS NOTES
"  Physical Therapy Daily Treatment Note     Name: Denis Bunn  Clinic Number: [id number]    Therapy Diagnosis:   Encounter Diagnoses   Name Primary?    Muscle spasm of back     Decreased range of motion of lumbar spine     Back pain, unspecified back location, unspecified back pain laterality, unspecified chronicity      Physician: Whitney Castle, *    Visit Date: 2019    Physician Orders: PT Eval and Treat  Medical Diagnosis: Chronic bilateral low back pain without sciatica  Evaluation Date: 2018  Authorization Period Expiration: 2019  Plan of Care Certification Period: 2018 to 3/8/2019   Visit #/Visits authorized:     Time In: 6:53a  Time Out: 7:43a  Total Billable Time: 50 minutes    Precautions: Lumbar fusion    Subjective     Pt reports: he felt stiff this morning and he is not sure why. He feels like he always has back pain and this morning it is really bad    He was not compliant with home exercise program.  Response to previous treatment: good, no adverse reaction  Functional change: none    Pain: 6/10  Location: Right back and QL    Objective        Baseline Isometric Testing on Med X equipment: Testing administered by PT  Date of testin18  ROM 0-42 deg   Max Peak Torque 99    Min Peak Torque 66    Flex/Ext Ratio 1.5/1   % below normative data 49   Counter weight 227   Femur 5   Seat pad 1     Al received therapeutic exercises to develop strength, endurance, ROM, flexibility, posture and core stabilization for 44 minutes including    Upright bike x 10 minutes    Prone quad stretch: 10 x 10" hold ea.  Supine active HS stretch: 10 x 10" hold ea.  Supine pelvic tilts: 10 x 3" hold  LTR with SB x2 min  prone leg lifts 2x5 with pressure to R QL during L raise   +standing OH press (facing to side, keep band at midline- pallof for QL/oblique) x15 to R and L  Seated trunk flexion 1x2 min  Cat/camel 1x15  Child's pose 5x, 10-15s hold     [facility] 2019 "   Visit Number 7   VAS Pain Rating 6   Time -   Manual Therapy -   Lumbar Weight 60   Repetitions 20   Rating of Perceived Exertion 4   Ice - Z Lie (in min.) 0         Peripheral muscle strengthening which included 1 set of 15-20 repetitions at a slow, controlled 7 second per rep pace focused on strengthening supporting musculature for improved body mechanics and functional mobility. Pt and therapist focused on proper form during treatment to ensure optimal strengthening of each targeted muscle group. Machines were utilized including torso rotation, leg extension, leg curl, chest press, upright row, tricep extension, bicep curl, leg press, and hip abduction.    Al received the following manual therapy techniques:   00 minutes x Vacuum/cupping STM (silicone cups) with manual therapy techniques was performed to Right lumbar paraspinals/QL to decrease muscle tightness, increase circulation and promote healing process. The pt's skin was monitored for redness adjusting pressure as needed. The pt was instructed in possible side effects of bruising and/or soreness.     Pt received 6 min of soft tissue mobs in the right QL to decrease pain and muscle tightness.     Al received cold pack for 00 minutes to lumbar spine. - not today, pt declined    Home Exercises Provided and Patient Education Provided     Education provided:   - importance of compliance with HEP and consistency with therapy    Written Home Exercises Provided: Patient instructed to cont prior HEP.  Exercises were reviewed and Al was able to demonstrate them prior to the end of the session.  Al demonstrated good  understanding of the education provided.     See EMR under Patient Instructions for exercises provided prior visit.    Assessment     Pt with good tolerance to back strengthening but had difficulty with stretching due to muscle spasms. Pt presented with stiffness and pain in right QL. Pt tolerated Medx lumbar extension with 60 ft.lbs, 20 reps, and RPE  of 4 today. Pt did not have any difficulty or pain with peripheral exercises including LE, UE, trunk musculature strengthening. Pt had slight relief with STM provided to R QL, noted to have stiffness throughout lower back.. Pt to continue with progression as able.    Al is progressing well towards his goals.   Pt prognosis is Good.     Pt will continue to benefit from skilled outpatient physical therapy to address the deficits listed in the problem list box on initial evaluation, provide pt/family education and to maximize pt's level of independence in the home and community environment.     Pt's spiritual, cultural and educational needs considered and pt agreeable to plan of care and goals.    Goals:   Short term goals:  6 weeks or 10 visits   1.  Pt will demonstrate increased lumbar ROM by at least 3 degrees from the initial ROM value with improvements noted in functional ROM and ability to perform ADLs  2.  Pt will demonstrate increased maximum isometric torque value by 15% when compared to the initial value resulting in improved ability to perform bending, lifting, and carrying activities safely, confidently.  3.  Patient report a reduction in worst pain score by 1-2 points for improved tolerance during work and recreational activities  4.  Pt able to perform HEP correctly with minimal cueing or supervision for therapist     Plan     Outpatient physical therapy 2x week for 13 weeks or 20 visits to include the following:   - Patient education  - Therapeutic exercise  - Manual therapy  - Performance testing   - Neuromuscular Re-education  - Therapeutic activity   - Modalities     Pt may be seen by PTA as part of the rehabilitation team.     Jarad Powell, PT & Ju Goddard  02/08/2019

## 2019-02-08 ENCOUNTER — CLINICAL SUPPORT (OUTPATIENT)
Dept: REHABILITATION | Facility: HOSPITAL | Age: 64
End: 2019-02-08
Attending: PHYSICAL MEDICINE & REHABILITATION
Payer: COMMERCIAL

## 2019-02-08 DIAGNOSIS — M53.86 DECREASED RANGE OF MOTION OF LUMBAR SPINE: ICD-10-CM

## 2019-02-08 DIAGNOSIS — M54.9 BACK PAIN, UNSPECIFIED BACK LOCATION, UNSPECIFIED BACK PAIN LATERALITY, UNSPECIFIED CHRONICITY: ICD-10-CM

## 2019-02-08 DIAGNOSIS — M62.830 MUSCLE SPASM OF BACK: ICD-10-CM

## 2019-02-08 PROCEDURE — 97110 THERAPEUTIC EXERCISES: CPT | Mod: PN

## 2019-02-08 NOTE — PROGRESS NOTES
"  Physical Therapy Daily Treatment Note     Name: Denis Bunn  Clinic Number: [id number]    Therapy Diagnosis:   Encounter Diagnoses   Name Primary?    Muscle spasm of back     Decreased range of motion of lumbar spine     Back pain, unspecified back location, unspecified back pain laterality, unspecified chronicity      Physician: Whitney Castle, *    Visit Date: 2019    Physician Orders: PT Eval and Treat  Medical Diagnosis: Chronic bilateral low back pain without sciatica  Evaluation Date: 2018  Authorization Period Expiration: 2019  Plan of Care Certification Period: 2018 to 3/8/2019   Visit #/Visits authorized:     Time In: 7:50 a  Time Out: 8:30a  Total Billable Time: 40 minutes    Precautions: Lumbar fusion    Subjective     Pt reports: that he went to swimming this morning. Pt states he is feeling good today. Pt states after PT session he felt a lot of muscle grabbing in the right lower back. Pt states he took muscle relaxer to help decrease muscle grabbing. Pt states stretches is counter productive for him due to increase muscle grabbing. Pt reports machines help him loose it up.    He was not compliant with home exercise program.  Response to previous treatment: good, no adverse reaction  Functional change: none    Pain: 0/10  Location: Right back and QL    Objective        Baseline Isometric Testing on Med X equipment: Testing administered by PT  Date of testin18  ROM 0-42 deg   Max Peak Torque 99    Min Peak Torque 66    Flex/Ext Ratio 1.5/1   % below normative data 49   Counter weight 227   Femur 5   Seat pad 1     Al received therapeutic exercises to develop strength, endurance, ROM, flexibility, posture and core stabilization for 40 minutes including    Upright bike x 10 minutes    Prone quad stretch: 10 x 10" hold ea. NP  Supine active HS stretch: 10 x 10" hold ea. NP  Supine pelvic tilts: 10 x 3" hold NP  LTR with SB x2 min NP  prone leg lifts 2x5 with " pressure to R QL during L raise  NP  +standing OH press (facing to side, keep band at midline- pallof for QL/oblique) x15 to R and L  NP  Seated trunk flexion 1x2 min NP  Cat/camel 1x15 NP  Child's pose 5x, 10-15s hold  NP    [facility] 2/11/2019   Visit Number 8   VAS Pain Rating 0   Treadmill Time (in min.) 10   Speed 2.8   Time -   Manual Therapy -   Lumbar Weight 65   Repetitions 20   Rating of Perceived Exertion 3   Ice - Z Lie (in min.) 0         Peripheral muscle strengthening which included 1 set of 15-20 repetitions at a slow, controlled 7 second per rep pace focused on strengthening supporting musculature for improved body mechanics and functional mobility. Pt and therapist focused on proper form during treatment to ensure optimal strengthening of each targeted muscle group. Machines were utilized including torso rotation, leg extension, leg curl, chest press, upright row, tricep extension, bicep curl, leg press, and hip abduction.    Al received the following manual therapy techniques:   00 minutes x Vacuum/cupping STM (silicone cups) with manual therapy techniques was performed to Right lumbar paraspinals/QL to decrease muscle tightness, increase circulation and promote healing process. The pt's skin was monitored for redness adjusting pressure as needed. The pt was instructed in possible side effects of bruising and/or soreness.     Pt received 6 min of soft tissue mobs in the right QL to decrease pain and muscle tightness.     Al received cold pack for 00 minutes to lumbar spine. - not today, pt declined    Home Exercises Provided and Patient Education Provided     Education provided:   - importance of compliance with HEP and consistency with therapy    Written Home Exercises Provided: Patient instructed to cont prior HEP.  Exercises were reviewed and Al was able to demonstrate them prior to the end of the session.  Al demonstrated good  understanding of the education provided.     See EMR  under Patient Instructions for exercises provided prior visit.    Assessment     Pt with good tolerance to back strengthening. Therapy has to be short due to patient's living early to work. Pt tolerated well Medx lumbar extension with increase of 5 ft.lbs. Pt was able to complete 20 reps with RPE of 3 and 65 ft.lbs. Pt did not demonstrated difficult during exercises. Pt has increase of lower back muscle spasm during stretching. PT educated pt the importance of stretching, but pt is hesitant of stretching. Pt tolerated increase of 5 lbs in all peripheral muscle strengthening well.. Pt to continue with progression as able.    Al is progressing well towards his goals.   Pt prognosis is Good.     Pt will continue to benefit from skilled outpatient physical therapy to address the deficits listed in the problem list box on initial evaluation, provide pt/family education and to maximize pt's level of independence in the home and community environment.     Pt's spiritual, cultural and educational needs considered and pt agreeable to plan of care and goals.    Goals:   Short term goals:  6 weeks or 10 visits   1.  Pt will demonstrate increased lumbar ROM by at least 3 degrees from the initial ROM value with improvements noted in functional ROM and ability to perform ADLs  2.  Pt will demonstrate increased maximum isometric torque value by 15% when compared to the initial value resulting in improved ability to perform bending, lifting, and carrying activities safely, confidently.  3.  Patient report a reduction in worst pain score by 1-2 points for improved tolerance during work and recreational activities  4.  Pt able to perform HEP correctly with minimal cueing or supervision for therapist     Plan     Outpatient physical therapy 2x week for 13 weeks or 20 visits to include the following:   - Patient education  - Therapeutic exercise  - Manual therapy  - Performance testing   - Neuromuscular Re-education  - Therapeutic  activity   - Modalities     Pt may be seen by PTA as part of the rehabilitation team.     Jarad Powell, PT & Ju Goddard  02/11/2019

## 2019-02-11 ENCOUNTER — CLINICAL SUPPORT (OUTPATIENT)
Dept: REHABILITATION | Facility: HOSPITAL | Age: 64
End: 2019-02-11
Attending: PHYSICAL MEDICINE & REHABILITATION
Payer: COMMERCIAL

## 2019-02-11 DIAGNOSIS — M62.830 MUSCLE SPASM OF BACK: ICD-10-CM

## 2019-02-11 DIAGNOSIS — M54.9 BACK PAIN, UNSPECIFIED BACK LOCATION, UNSPECIFIED BACK PAIN LATERALITY, UNSPECIFIED CHRONICITY: ICD-10-CM

## 2019-02-11 DIAGNOSIS — M53.86 DECREASED RANGE OF MOTION OF LUMBAR SPINE: ICD-10-CM

## 2019-02-11 PROCEDURE — 97110 THERAPEUTIC EXERCISES: CPT | Mod: PN

## 2019-02-14 NOTE — PROGRESS NOTES
"  Physical Therapy Daily Treatment Note     Name: Denis Bunn  Clinic Number: [id number]    Therapy Diagnosis:   Encounter Diagnoses   Name Primary?    Muscle spasm of back     Decreased range of motion of lumbar spine     Back pain, unspecified back location, unspecified back pain laterality, unspecified chronicity      Physician: Whitney Castle, *    Visit Date: 2/15/2019    Physician Orders: PT Eval and Treat  Medical Diagnosis: Chronic bilateral low back pain without sciatica  Evaluation Date: 2018  Authorization Period Expiration: 2019  Plan of Care Certification Period: 2018 to 3/8/2019   Visit #/Visits authorized:     Time In: 6:52  Time Out: 7:42  Total Billable Time: 48 minutes    Precautions: Lumbar fusion    Subjective     Pt reports: that he has the same normal pain in his back, continues with the "muscle grabbing and pain" that he has been experiencing for a few years now.  Pt states he might have to discontinue healthy back program due to financial.     He was not compliant with home exercise program.  Response to previous treatment: good, no adverse reaction  Functional change: none    Pain: 610  Location: Right back and QL    Objective        Baseline Isometric Testing on Med X equipment: Testing administered by PT  Date of testin18  ROM 0-42 deg   Max Peak Torque 99    Min Peak Torque 66    Flex/Ext Ratio 1.5/1   % below normative data 49   Counter weight 227   Femur 5   Seat pad 1     Al received therapeutic exercises to develop strength, endurance, ROM, flexibility, posture and core stabilization for 40 minutes including    Upright bike x 10 minutes  - NP  Treadmill x10 minutes   DKTC no ball, 30 sec x4  Prone quad stretch: 10 x 10" hold ea. NP  Supine active HS stretch: 10 x 10" hold ea. NP  Supine pelvic tilts: 10 x 3" hold NP  LTR with SB x2 min NP  prone leg lifts 2x5 with pressure to R QL during L raise  NP  +standing OH press (facing to side, keep " band at midline- pallof for QL/oblique) x15 to R and L  NP  Seated trunk flexion 1x2 min NP  Cat/camel 1x15 NP  Child's pose 5x, 10-15s hold NP       [facility] 2/15/2019   Visit Number 9   VAS Pain Rating 6   Treadmill Time (in min.) 10   Speed 2.8   Time -   Manual Therapy -   Lumbar Flexion 45   Lumbar Extension 0   Lumbar Weight 68   Repetitions 20   Rating of Perceived Exertion 3   Ice - Z Lie (in min.) 0         Peripheral muscle strengthening which included 1 set of 15-20 repetitions at a slow, controlled 7 second per rep pace focused on strengthening supporting musculature for improved body mechanics and functional mobility. Pt and therapist focused on proper form during treatment to ensure optimal strengthening of each targeted muscle group. Machines were utilized including torso rotation, leg extension, leg curl, chest press, upright row, tricep extension, bicep curl, leg press, and hip abduction.    Al received the following manual therapy techniques:   00 minutes x Vacuum/cupping STM (silicone cups) with manual therapy techniques was performed to Right lumbar paraspinals/QL to decrease muscle tightness, increase circulation and promote healing process. The pt's skin was monitored for redness adjusting pressure as needed. The pt was instructed in possible side effects of bruising and/or soreness.     Pt received 8 min of soft tissue mobs to the right QL, manual stretching to QL in sidelying over pillow to decrease pain and muscle tightness.     Al received hot pack for 10 minutes to lumbar spine.    Home Exercises Provided and Patient Education Provided     Education provided:   - importance of compliance with HEP and consistency with therapy    Written Home Exercises Provided: Patient instructed to cont prior HEP.  Exercises were reviewed and Al was able to demonstrate them prior to the end of the session.  Al demonstrated good  understanding of the education provided.     See EMR under Patient  Instructions for exercises provided prior visit.    Assessment     Self D/C, Pt with good tolerance to back strengthening. Pt tolerated well Medx lumbar extension with increase of 3 ft.lbs. Pt was able to complete 20 reps with RPE of 3 and 68 ft.lbs. Pt did not presented with aggravation of lower back pain in the medx lumbar extension, but pt demonstrated increase of lower back muscle spasm and pain after manual therapy. Pt has very restricted QL and lower back muscles, which causes increase of muscle spasm. Pt might d/c healthy back program due to financial charges. Pt might be transitioning to [facility] program or gym. Pt spoke with patient on phone Pt wants to be self d/c due to financial reasons. Pt will be going to gym to perform HEP.      Al is progressing well towards his goals.   Pt prognosis is Good.     Pt will continue to benefit from skilled outpatient physical therapy to address the deficits listed in the problem list box on initial evaluation, provide pt/family education and to maximize pt's level of independence in the home and community environment.     Pt's spiritual, cultural and educational needs considered and pt agreeable to plan of care and goals.    Goals:   Short term goals:  6 weeks or 10 visits   1.  Pt will demonstrate increased lumbar ROM by at least 3 degrees from the initial ROM value with improvements noted in functional ROM and ability to perform ADLs  2.  Pt will demonstrate increased maximum isometric torque value by 15% when compared to the initial value resulting in improved ability to perform bending, lifting, and carrying activities safely, confidently.  3.  Patient report a reduction in worst pain score by 1-2 points for improved tolerance during work and recreational activities  4.  Pt able to perform HEP correctly with minimal cueing or supervision for therapist     Plan   Per patients request, he will be self d/c today due to financial.     Jarad Powell, PT & Ju  Nitza  02/15/2019

## 2019-02-15 ENCOUNTER — CLINICAL SUPPORT (OUTPATIENT)
Dept: REHABILITATION | Facility: HOSPITAL | Age: 64
End: 2019-02-15
Attending: PHYSICAL MEDICINE & REHABILITATION
Payer: COMMERCIAL

## 2019-02-15 DIAGNOSIS — M54.9 BACK PAIN, UNSPECIFIED BACK LOCATION, UNSPECIFIED BACK PAIN LATERALITY, UNSPECIFIED CHRONICITY: ICD-10-CM

## 2019-02-15 DIAGNOSIS — M53.86 DECREASED RANGE OF MOTION OF LUMBAR SPINE: ICD-10-CM

## 2019-02-15 DIAGNOSIS — M62.830 MUSCLE SPASM OF BACK: ICD-10-CM

## 2019-02-15 PROCEDURE — 97140 MANUAL THERAPY 1/> REGIONS: CPT | Mod: PN

## 2019-02-15 PROCEDURE — 97110 THERAPEUTIC EXERCISES: CPT | Mod: PN

## 2019-02-20 ENCOUNTER — PATIENT MESSAGE (OUTPATIENT)
Dept: CARDIOLOGY | Facility: CLINIC | Age: 64
End: 2019-02-20

## 2019-03-01 ENCOUNTER — LAB VISIT (OUTPATIENT)
Dept: LAB | Facility: HOSPITAL | Age: 64
End: 2019-03-01
Payer: COMMERCIAL

## 2019-03-01 DIAGNOSIS — N40.1 BPH WITH URINARY OBSTRUCTION: ICD-10-CM

## 2019-03-01 DIAGNOSIS — N13.8 BPH WITH URINARY OBSTRUCTION: ICD-10-CM

## 2019-03-01 LAB — COMPLEXED PSA SERPL-MCNC: 2.9 NG/ML

## 2019-03-01 PROCEDURE — 84153 ASSAY OF PSA TOTAL: CPT

## 2019-03-01 PROCEDURE — 36415 COLL VENOUS BLD VENIPUNCTURE: CPT | Mod: PN

## 2019-03-04 RX ORDER — PRAVASTATIN SODIUM 20 MG/1
TABLET ORAL
Qty: 90 TABLET | Refills: 0 | Status: SHIPPED | OUTPATIENT
Start: 2019-03-04 | End: 2019-06-05 | Stop reason: SDUPTHER

## 2019-03-11 ENCOUNTER — OFFICE VISIT (OUTPATIENT)
Dept: UROLOGY | Facility: CLINIC | Age: 64
End: 2019-03-11
Payer: COMMERCIAL

## 2019-03-11 VITALS
WEIGHT: 170.63 LBS | SYSTOLIC BLOOD PRESSURE: 137 MMHG | HEART RATE: 46 BPM | BODY MASS INDEX: 25.86 KG/M2 | DIASTOLIC BLOOD PRESSURE: 78 MMHG | HEIGHT: 68 IN

## 2019-03-11 DIAGNOSIS — N40.1 BPH WITH URINARY OBSTRUCTION: Primary | ICD-10-CM

## 2019-03-11 DIAGNOSIS — N13.8 BPH WITH URINARY OBSTRUCTION: Primary | ICD-10-CM

## 2019-03-11 PROCEDURE — 3008F PR BODY MASS INDEX (BMI) DOCUMENTED: ICD-10-PCS | Mod: CPTII,S$GLB,, | Performed by: UROLOGY

## 2019-03-11 PROCEDURE — 3075F SYST BP GE 130 - 139MM HG: CPT | Mod: CPTII,S$GLB,, | Performed by: UROLOGY

## 2019-03-11 PROCEDURE — 3078F PR MOST RECENT DIASTOLIC BLOOD PRESSURE < 80 MM HG: ICD-10-PCS | Mod: CPTII,S$GLB,, | Performed by: UROLOGY

## 2019-03-11 PROCEDURE — 3008F BODY MASS INDEX DOCD: CPT | Mod: CPTII,S$GLB,, | Performed by: UROLOGY

## 2019-03-11 PROCEDURE — 99214 OFFICE O/P EST MOD 30 MIN: CPT | Mod: S$GLB,,, | Performed by: UROLOGY

## 2019-03-11 PROCEDURE — 99999 PR PBB SHADOW E&M-EST. PATIENT-LVL III: CPT | Mod: PBBFAC,,, | Performed by: UROLOGY

## 2019-03-11 PROCEDURE — 99999 PR PBB SHADOW E&M-EST. PATIENT-LVL III: ICD-10-PCS | Mod: PBBFAC,,, | Performed by: UROLOGY

## 2019-03-11 PROCEDURE — 3075F PR MOST RECENT SYSTOLIC BLOOD PRESS GE 130-139MM HG: ICD-10-PCS | Mod: CPTII,S$GLB,, | Performed by: UROLOGY

## 2019-03-11 PROCEDURE — 99214 PR OFFICE/OUTPT VISIT, EST, LEVL IV, 30-39 MIN: ICD-10-PCS | Mod: S$GLB,,, | Performed by: UROLOGY

## 2019-03-11 PROCEDURE — 3078F DIAST BP <80 MM HG: CPT | Mod: CPTII,S$GLB,, | Performed by: UROLOGY

## 2019-03-11 NOTE — PROGRESS NOTES
Subjective:       Patient ID: Denis Bunn is a 63 y.o. male.    Chief Complaint: bph with urinary obstruction (rtc 6month check up with psa last psa 03/01/2019.)    HPI    Denis Bunn is a 63 y.o. male with PMHx of prostatitis and HTN here for a prostate evaluation. His PSA 10 days ago was 2.9 and 4 months ago it was 3.1. Notes he has a good stream, feels like he empties his bladder and experiences nocturia 1-3x. He previously tried Flomax but it did not help.    Past Medical History:   Diagnosis Date    Back pain     Cataract     Constipation - functional     History of gout     HTN (hypertension), benign 4/24/2013    Prostatitis     Vision changes        Past Surgical History:   Procedure Laterality Date    BACK SURGERY      CATARACT EXTRACTION      left eye    COLONOSCOPY N/A 7/29/2014    Performed by Daryl Ramsey MD at [facility] ([facility])    ELBOW SURGERY      scope/right    EYE SURGERY      Dr. Hope.retina x2 left    LAMINECTOMY      WRIST SURGERY      right       Family History   Problem Relation Age of Onset    Blindness Mother         from cva    Cataracts Mother     Cataracts Father     Cancer Father     No Known Problems Sister     No Known Problems Brother     No Known Problems Maternal Aunt     No Known Problems Maternal Uncle     No Known Problems Paternal Aunt     No Known Problems Paternal Uncle     No Known Problems Maternal Grandmother     No Known Problems Maternal Grandfather     No Known Problems Paternal Grandmother     No Known Problems Paternal Grandfather     Amblyopia Neg Hx     Diabetes Neg Hx     Glaucoma Neg Hx     Hypertension Neg Hx     Macular degeneration Neg Hx     Retinal detachment Neg Hx     Strabismus Neg Hx     Stroke Neg Hx     Thyroid disease Neg Hx        Social History     Socioeconomic History    Marital status:      Spouse name: Not on file    Number of children: Not on file    Years of education: Not on file     Highest education level: Not on file   Social Needs    Financial resource strain: Not on file    Food insecurity - worry: Not on file    Food insecurity - inability: Not on file    Transportation needs - medical: Not on file    Transportation needs - non-medical: Not on file   Occupational History    Not on file   Tobacco Use    Smoking status: Never Smoker    Smokeless tobacco: Never Used    Tobacco comment: , 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   Substance and Sexual Activity    Alcohol use: Yes     Alcohol/week: 1.8 oz     Types: 3 Glasses of wine per week     Comment: 8-10 glasses wine per week    Drug use: No    Sexual activity: Yes     Partners: Female   Other Topics Concern    Not on file   Social History Narrative    Not on file       Allergies:  Augmentin [amoxicillin-pot clavulanate] and Celebrex [celecoxib]    Medications:    Current Outpatient Medications:     allopurinol (ZYLOPRIM) 100 MG tablet, TAKE 1 TABLET (100 MG TOTAL) BY MOUTH 2 (TWO) TIMES DAILY., Disp: 180 tablet, Rfl: 3    ascorbic acid (VITAMIN C) 500 MG tablet, Take 500 mg by mouth once daily., Disp: , Rfl:     aspirin 81 MG chewable tablet, Take 1 tablet by mouth., Disp: , Rfl:     fluticasone (FLONASE) 50 mcg/actuation nasal spray, 1 spray (50 mcg total) by Each Nare route once daily., Disp: 16 g, Rfl: 3    magnesium 250 mg Tab, Take 250 mg by mouth once. Takes two 250 mg tablets daily, Disp: , Rfl:     OM-3/E/LINOL/ALA/OLEIC/GLA/LIP (OMEGA 3-6-9 ORAL), Take by mouth once daily. , Disp: , Rfl:     pravastatin (PRAVACHOL) 20 MG tablet, TAKE 1 TABLET(20 MG) BY MOUTH EVERY DAY, Disp: 90 tablet, Rfl: 0    SAW PALMETTO ORAL, Take by mouth once daily. , Disp: , Rfl:     verapamil (CALAN-SR) 120 MG CR tablet, Take 1 tablet (120 mg total) by mouth every evening., Disp: 30 tablet, Rfl: 11    verapamil (VERELAN) 240 MG C24P, TAKE 1 CAPSULE BY MOUTH EVERY DAY (Patient taking differently: TAKE 1 CAPSULE BY MOUTH EVERY MORNING),  Disp: 90 capsule, Rfl: 1    Review of Systems   Constitutional: Negative for activity change, appetite change, chills, diaphoresis, fatigue, fever and unexpected weight change.   HENT: Negative for congestion, dental problem, hearing loss, mouth sores, postnasal drip, rhinorrhea, sinus pressure and trouble swallowing.    Eyes: Negative for pain, discharge and itching.   Respiratory: Negative for apnea, cough, choking, chest tightness, shortness of breath and wheezing.    Cardiovascular: Negative for chest pain, palpitations and leg swelling.   Gastrointestinal: Negative for abdominal distention, abdominal pain, anal bleeding, blood in stool, constipation, diarrhea, nausea, rectal pain and vomiting.   Endocrine: Negative for polydipsia and polyuria.   Genitourinary: Negative for decreased urine volume, difficulty urinating, discharge, dysuria, enuresis, flank pain, frequency, genital sores, hematuria, penile pain, penile swelling and scrotal swelling.        Nocturia 1-3x.   Musculoskeletal: Negative for arthralgias, back pain and myalgias.   Skin: Negative for color change, rash and wound.   Neurological: Negative for dizziness, syncope, speech difficulty, light-headedness and headaches.   Hematological: Negative for adenopathy. Does not bruise/bleed easily.   Psychiatric/Behavioral: Negative for behavioral problems, confusion and sleep disturbance.       Objective:      Physical Exam   Constitutional: He appears well-developed.   HENT:   Head: Normocephalic.   Neck: Neck supple.   Cardiovascular: Normal rate.    Pulmonary/Chest: Effort normal.   Abdominal: Soft.   Genitourinary:   Genitourinary Comments: Prostate was smooth without nodularity. No rectal masses. 30 grams. External hemorrhoids were present. Normal perineum.     Neurological: He is alert.   Skin: Skin is warm.     Psychiatric: He has a normal mood and affect.       Labs:     Ref Range & Units 10d ago   PSA DIAGNOSTIC 0.00 - 4.00 ng/mL 2.9       Ref  Range & Units 4mo ago 9mo ago   PSA, SCREEN 0.00 - 4.00 ng/mL 3.1  3.6 CM     Assessment:       1. BPH with urinary obstruction        Plan:       Denis was seen today for bph with urinary obstruction.    Diagnoses and all orders for this visit:    BPH with urinary obstruction  -     Prostate Specific Antigen, Diagnostic; Future          Recommended reducing fluid intake in the evening or taking a Benadryl.   RTC 1 year with PSA.    I, Donny Monae, am acting as a scribe on this patient encounter in the presence and under the supervision of Dr. Marie.    03/11/2019 9:07 AM    I, Dr. Marie, personally performed the services described in this documentation.   All medical record entries made by the scribe were at my direction and in my presence.   I have reviewed the chart and agree that the record is accurate and complete.   Emmanuel Marie MD.  9:15 AM 03/11/2019

## 2019-03-28 ENCOUNTER — OFFICE VISIT (OUTPATIENT)
Dept: FAMILY MEDICINE | Facility: CLINIC | Age: 64
End: 2019-03-28
Payer: COMMERCIAL

## 2019-03-28 VITALS
SYSTOLIC BLOOD PRESSURE: 157 MMHG | HEIGHT: 68 IN | BODY MASS INDEX: 25.73 KG/M2 | WEIGHT: 169.75 LBS | HEART RATE: 43 BPM | DIASTOLIC BLOOD PRESSURE: 89 MMHG | TEMPERATURE: 98 F | RESPIRATION RATE: 18 BRPM | OXYGEN SATURATION: 98 %

## 2019-03-28 DIAGNOSIS — J00 COMMON COLD: Primary | ICD-10-CM

## 2019-03-28 LAB
CTP QC/QA: YES
FLUAV AG NPH QL: NEGATIVE
FLUBV AG NPH QL: NEGATIVE

## 2019-03-28 PROCEDURE — 99999 PR PBB SHADOW E&M-EST. PATIENT-LVL III: CPT | Mod: PBBFAC,,, | Performed by: FAMILY MEDICINE

## 2019-03-28 PROCEDURE — 3077F PR MOST RECENT SYSTOLIC BLOOD PRESSURE >= 140 MM HG: ICD-10-PCS | Mod: CPTII,S$GLB,, | Performed by: FAMILY MEDICINE

## 2019-03-28 PROCEDURE — 3079F DIAST BP 80-89 MM HG: CPT | Mod: CPTII,S$GLB,, | Performed by: FAMILY MEDICINE

## 2019-03-28 PROCEDURE — 3077F SYST BP >= 140 MM HG: CPT | Mod: CPTII,S$GLB,, | Performed by: FAMILY MEDICINE

## 2019-03-28 PROCEDURE — 3079F PR MOST RECENT DIASTOLIC BLOOD PRESSURE 80-89 MM HG: ICD-10-PCS | Mod: CPTII,S$GLB,, | Performed by: FAMILY MEDICINE

## 2019-03-28 PROCEDURE — 3008F PR BODY MASS INDEX (BMI) DOCUMENTED: ICD-10-PCS | Mod: CPTII,S$GLB,, | Performed by: FAMILY MEDICINE

## 2019-03-28 PROCEDURE — 99999 PR PBB SHADOW E&M-EST. PATIENT-LVL III: ICD-10-PCS | Mod: PBBFAC,,, | Performed by: FAMILY MEDICINE

## 2019-03-28 PROCEDURE — 99214 PR OFFICE/OUTPT VISIT, EST, LEVL IV, 30-39 MIN: ICD-10-PCS | Mod: S$GLB,,, | Performed by: FAMILY MEDICINE

## 2019-03-28 PROCEDURE — 3008F BODY MASS INDEX DOCD: CPT | Mod: CPTII,S$GLB,, | Performed by: FAMILY MEDICINE

## 2019-03-28 PROCEDURE — 87804 POCT INFLUENZA A/B: ICD-10-PCS | Mod: 59,QW,S$GLB, | Performed by: FAMILY MEDICINE

## 2019-03-28 PROCEDURE — 87804 INFLUENZA ASSAY W/OPTIC: CPT | Mod: QW,S$GLB,, | Performed by: FAMILY MEDICINE

## 2019-03-28 PROCEDURE — 99214 OFFICE O/P EST MOD 30 MIN: CPT | Mod: S$GLB,,, | Performed by: FAMILY MEDICINE

## 2019-03-28 RX ORDER — BENZONATATE 100 MG/1
100 CAPSULE ORAL 3 TIMES DAILY PRN
Qty: 45 CAPSULE | Refills: 0 | Status: SHIPPED | OUTPATIENT
Start: 2019-03-28 | End: 2019-05-31 | Stop reason: ALTCHOICE

## 2019-03-28 RX ORDER — METHYLPREDNISOLONE 4 MG/1
TABLET ORAL
Qty: 1 PACKAGE | Refills: 0 | Status: SHIPPED | OUTPATIENT
Start: 2019-03-28 | End: 2019-05-31 | Stop reason: ALTCHOICE

## 2019-03-28 NOTE — PROGRESS NOTES
Routine Office Visit    Patient Name: Denis Bunn    : 1955  MRN: [id number]    Subjective:  Denis is a 63 y.o. male who presents today for:   Chief Complaint   Patient presents with    Cough     2 day onset--took allegra for symptoms yesterday       63-year-old male comes in with complaint of 2-3 days of coughing, and postnasal drip.  He also reports that he feels extremely fatigued.  He reports slight runny nose.  He is using Flonase which helps with the nasal symptoms.  He started taking Allegra yesterday but reports that it did not help.  He reports that his wife has been having same thing but more severe for the last week.  Patient is insistent on the steroid injection.  He states that he knows that this help him.  He denies fevers and chills.  He denies chest pain, palpitations, and shortness of breath.  He denies gastrointestinal on urinary symptoms.    Past Medical History  Past Medical History:   Diagnosis Date    Back pain     Cataract     Constipation - functional     History of gout     HTN (hypertension), benign 2013    Prostatitis     Vision changes        Past Surgical History  Past Surgical History:   Procedure Laterality Date    BACK SURGERY      CATARACT EXTRACTION      left eye    COLONOSCOPY N/A 2014    [facility]formed by [facility] at [facility] ([facility])    ELBOW SURGERY      scope/right    EYE SURGERY      Dr. Hope.retina x2 left    LAMINECTOMY      WRIST SURGERY      right        Family History  Family History   Problem Relation Age of Onset    Blindness Mother         from cva    Cataracts Mother     Cataracts Father     Cancer Father     No Known Problems Sister     No Known Problems Brother     No Known Problems Maternal Aunt     No Known Problems Maternal Uncle     No Known Problems Paternal Aunt     No Known Problems Paternal Uncle     No Known Problems Maternal Grandmother     No Known Problems Maternal Grandfather     No Known Problems  Paternal Grandmother     No Known Problems Paternal Grandfather     Amblyopia Neg Hx     Diabetes Neg Hx     Glaucoma Neg Hx     Hypertension Neg Hx     Macular degeneration Neg Hx     Retinal detachment Neg Hx     Strabismus Neg Hx     Stroke Neg Hx     Thyroid disease Neg Hx        Social History  Social History     Socioeconomic History    Marital status:      Spouse name: Not on file    Number of children: Not on file    Years of education: Not on file    Highest education level: Not on file   Occupational History    Not on file   Social Needs    Financial resource strain: Not on file    Food insecurity:     Worry: Not on file     Inability: Not on file    Transportation needs:     Medical: Not on file     Non-medical: Not on file   Tobacco Use    Smoking status: Never Smoker    Smokeless tobacco: Never Used    Tobacco comment: , 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   Substance and Sexual Activity    Alcohol use: Yes     Alcohol/week: 1.8 oz     Types: 3 Glasses of wine per week     Comment: 8-10 glasses wine per week    Drug use: No    Sexual activity: Yes     Partners: Female   Lifestyle    Physical activity:     Days per week: Not on file     Minutes per session: Not on file    Stress: Not on file   Relationships    Social connections:     Talks on phone: Not on file     Gets together: Not on file     Attends Gnosticist service: Not on file     Active member of club or organization: Not on file     Attends meetings of clubs or organizations: Not on file     Relationship status: Not on file    Intimate partner violence:     Fear of current or ex partner: Not on file     Emotionally abused: Not on file     Physically abused: Not on file     Forced sexual activity: Not on file   Other Topics Concern    Not on file   Social History Narrative    Not on file       Current Medications  Current Outpatient Medications on File Prior to Visit   Medication Sig Dispense Refill     "allopurinol (ZYLOPRIM) 100 MG tablet TAKE 1 TABLET (100 MG TOTAL) BY MOUTH 2 (TWO) TIMES DAILY. 180 tablet 3    ascorbic acid (VITAMIN C) 500 MG tablet Take 500 mg by mouth once daily.      aspirin 81 MG chewable tablet Take 1 tablet by mouth.      fluticasone (FLONASE) 50 mcg/actuation nasal spray 1 spray (50 mcg total) by Each Nare route once daily. 16 g 3    magnesium 250 mg Tab Take 250 mg by mouth once. Takes two 250 mg tablets daily      OM-3/E/LINOL/ALA/OLEIC/GLA/LIP (OMEGA 3-6-9 ORAL) Take by mouth once daily.       pravastatin (PRAVACHOL) 20 MG tablet TAKE 1 TABLET(20 MG) BY MOUTH EVERY DAY 90 tablet 0    SAW PALMETTO ORAL Take by mouth once daily.       verapamil (CALAN-SR) 120 MG CR tablet Take 1 tablet (120 mg total) by mouth every evening. 30 tablet 11    verapamil (VERELAN) 240 MG C24P TAKE 1 CAPSULE BY MOUTH EVERY DAY (Patient taking differently: TAKE 1 CAPSULE BY MOUTH EVERY MORNING) 90 capsule 1     No current facility-administered medications on file prior to visit.        Allergies   Review of patient's allergies indicates:   Allergen Reactions    Amoxicillin-pot clavulanate      Other reaction(s): Stomach upset  Other reaction(s): Stomach upset    Celebrex [celecoxib] Other (See Comments)       Review of Systems   Constitutional: Positive for fatigue. Negative for chills and fever.   HENT: Positive for postnasal drip and sore throat. Negative for ear pain and rhinorrhea.    Respiratory: Positive for cough. Negative for shortness of breath and wheezing.    Cardiovascular: Negative for chest pain.   Musculoskeletal: Positive for myalgias.   Skin: Negative for rash.   Allergic/Immunologic: Negative for environmental allergies.   Neurological: Negative for headaches.     BP (!) 157/89 (BP Location: Left arm, Patient Position: Sitting, BP Method: Small (Automatic))   Pulse (!) 43   Temp 98.2 °F (36.8 °C) (Oral)   Resp 18   Ht 5' 8" (1.727 m)   Wt 77 kg (169 lb 12.1 oz)   SpO2 98%   " BMI 25.81 kg/m²     Physical Exam   Constitutional: He appears well-developed. He appears ill. No distress.   HENT:   Head: Normocephalic and atraumatic.   Right Ear: Tympanic membrane, external ear and ear canal normal.   Left Ear: Tympanic membrane, external ear and ear canal normal.   Nose: Mucosal edema and rhinorrhea present.  No foreign bodies. Right sinus exhibits no maxillary sinus tenderness. Left sinus exhibits no maxillary sinus tenderness.   Mouth/Throat: Posterior oropharyngeal erythema present.   Eyes: Pupils are equal, round, and reactive to light. EOM and lids are normal.   Neck: Trachea normal and normal range of motion. No tracheal tenderness present. No thyroid mass present.   Cardiovascular: Normal rate, regular rhythm, normal heart sounds and intact distal pulses.   Pulmonary/Chest: Effort normal and breath sounds normal. No respiratory distress. He has no wheezes. He has no rales.   Lymphadenopathy:     He has no cervical adenopathy.   Psychiatric: He has a normal mood and affect. His behavior is normal.   Vitals reviewed.        Assessment/Plan:  Denis was seen today for cough.    Diagnoses and all orders for this visit:    Common cold  -     methylPREDNISolone (MEDROL DOSEPACK) 4 mg tablet; use as directed  -     benzonatate (TESSALON) 100 MG capsule; Take 1 capsule (100 mg total) by mouth 3 (three) times daily as needed for Cough.  -     POCT Influenza A/B     Reviewed with patient course of the call.  Discussed with patient that this may last for 2 weeks.  Advised to Medicare with rest and hydration.  Discussed with patient risks of steroids.  Discussed with him that steroids increase risk of increased sugar, decreased bone density and consequently risk of fractures, and increase of cataracts.  He continues to insist on steroid.  Discussed with him that rather than an injection I would prescribe him an oral medication however he needs to understand the risks involved.  He states that he  has episodes risks.  Also prescribed Tessalon for symptom relief.      This office note has been dictated.  This dictation has been generated using [employer] Fluency Direct dictation; some phonetic errors may occur.     Answers for HPI/ROS submitted by the patient on 3/28/2019   Cough  Chronicity: new  Onset: in the past 7 days  Progression since onset: gradually worsening  Frequency: hourly  Cough characteristics: non-productive  ear congestion: No  heartburn: No  hemoptysis: No  nasal congestion: No  sweats: No  weight loss: No  Aggravated by: nothing  Risk factors for lung disease: animal exposure  asthma: No  bronchiectasis: No  bronchitis: No  COPD: No  emphysema: No  pneumonia: No  Treatments tried: prescription cough suppressant  Improvement on treatment: no relief

## 2019-04-01 ENCOUNTER — OFFICE VISIT (OUTPATIENT)
Dept: FAMILY MEDICINE | Facility: CLINIC | Age: 64
End: 2019-04-01
Payer: COMMERCIAL

## 2019-04-01 VITALS
BODY MASS INDEX: 25.9 KG/M2 | TEMPERATURE: 99 F | RESPIRATION RATE: 17 BRPM | SYSTOLIC BLOOD PRESSURE: 154 MMHG | WEIGHT: 170.88 LBS | HEART RATE: 59 BPM | OXYGEN SATURATION: 97 % | HEIGHT: 68 IN | DIASTOLIC BLOOD PRESSURE: 87 MMHG

## 2019-04-01 DIAGNOSIS — Z79.899 MEDICATION MANAGEMENT: ICD-10-CM

## 2019-04-01 DIAGNOSIS — J00 COMMON COLD: Primary | ICD-10-CM

## 2019-04-01 PROCEDURE — 3079F DIAST BP 80-89 MM HG: CPT | Mod: CPTII,S$GLB,, | Performed by: FAMILY MEDICINE

## 2019-04-01 PROCEDURE — 3077F PR MOST RECENT SYSTOLIC BLOOD PRESSURE >= 140 MM HG: ICD-10-PCS | Mod: CPTII,S$GLB,, | Performed by: FAMILY MEDICINE

## 2019-04-01 PROCEDURE — 3008F PR BODY MASS INDEX (BMI) DOCUMENTED: ICD-10-PCS | Mod: CPTII,S$GLB,, | Performed by: FAMILY MEDICINE

## 2019-04-01 PROCEDURE — 99214 OFFICE O/P EST MOD 30 MIN: CPT | Mod: S$GLB,,, | Performed by: FAMILY MEDICINE

## 2019-04-01 PROCEDURE — 99214 PR OFFICE/OUTPT VISIT, EST, LEVL IV, 30-39 MIN: ICD-10-PCS | Mod: S$GLB,,, | Performed by: FAMILY MEDICINE

## 2019-04-01 PROCEDURE — 99999 PR PBB SHADOW E&M-EST. PATIENT-LVL III: CPT | Mod: PBBFAC,,, | Performed by: FAMILY MEDICINE

## 2019-04-01 PROCEDURE — 3008F BODY MASS INDEX DOCD: CPT | Mod: CPTII,S$GLB,, | Performed by: FAMILY MEDICINE

## 2019-04-01 PROCEDURE — 99999 PR PBB SHADOW E&M-EST. PATIENT-LVL III: ICD-10-PCS | Mod: PBBFAC,,, | Performed by: FAMILY MEDICINE

## 2019-04-01 PROCEDURE — 3077F SYST BP >= 140 MM HG: CPT | Mod: CPTII,S$GLB,, | Performed by: FAMILY MEDICINE

## 2019-04-01 PROCEDURE — 3079F PR MOST RECENT DIASTOLIC BLOOD PRESSURE 80-89 MM HG: ICD-10-PCS | Mod: CPTII,S$GLB,, | Performed by: FAMILY MEDICINE

## 2019-04-01 RX ORDER — IPRATROPIUM BROMIDE 42 UG/1
2 SPRAY, METERED NASAL 4 TIMES DAILY
Qty: 15 ML | Refills: 0 | Status: SHIPPED | OUTPATIENT
Start: 2019-04-01 | End: 2019-07-15

## 2019-04-01 NOTE — PROGRESS NOTES
Routine Office Visit    Patient Name: Denis Bunn    : 1955  MRN: [id number]    Subjective:  Denis is a 63 y.o. male who presents today for:   Chief Complaint   Patient presents with    Cough     flushing stopped predisone & took wifes augmentin whic caused vomitting repeadily     63-year-old male comes in for follow-up on upper respiratory infection.  He reports that the day after I saw him use ruling better.  However when he took his blood pressure at home it was elevated with a systolic in the 150s and the diastolic was normal in the the 80s.  As such he stopped taking his steroid.  In addition, he took Augmentin which was prescribed to his wife for bronchitis.  He states that afterwards he felt very lightheaded and nauseous and threw up.  The he then read the bottle and noticed that it was Augmentin not penicillin as he thought and he has a reported allergy to this.  This was on Saturday.  Since then, he is not taking anymore Augmentin and those symptoms are resolved.  He is concerned because he still is having cough and phlegm, and states that is coming from his lungs.  There are no fevers or chills.  He also has not taking the Tessalon Perles.  He is taking the Coricidin HBP which was advised to him a states this helps some.      Past Medical History  Past Medical History:   Diagnosis Date    Back pain     Cataract     Constipation - functional     History of gout     HTN (hypertension), benign 2013    Prostatitis     Vision changes        Past Surgical History  Past Surgical History:   Procedure Laterality Date    BACK SURGERY      CATARACT EXTRACTION      left eye    COLONOSCOPY N/A 2014    Performed by Daryl Ramsey MD at [facility] ([address] [place] [address])    ELBOW SURGERY      scope/right    EYE SURGERY      Dr. Hope.retina x2 left    LAMINECTOMY      WRIST SURGERY      right        Family History  Family History   Problem Relation Age of Onset    Blindness Mother         from cva     Cataracts Mother     Cataracts Father     Cancer Father     No Known Problems Sister     No Known Problems Brother     No Known Problems Maternal Aunt     No Known Problems Maternal Uncle     No Known Problems Paternal Aunt     No Known Problems Paternal Uncle     No Known Problems Maternal Grandmother     No Known Problems Maternal Grandfather     No Known Problems Paternal Grandmother     No Known Problems Paternal Grandfather     Amblyopia Neg Hx     Diabetes Neg Hx     Glaucoma Neg Hx     Hypertension Neg Hx     Macular degeneration Neg Hx     Retinal detachment Neg Hx     Strabismus Neg Hx     Stroke Neg Hx     Thyroid disease Neg Hx        Social History  Social History     Socioeconomic History    Marital status:      Spouse name: Not on file    Number of children: Not on file    Years of education: Not on file    Highest education level: Not on file   Occupational History    Not on file   Social Needs    Financial resource strain: Not on file    Food insecurity:     Worry: Not on file     Inability: Not on file    Transportation needs:     Medical: Not on file     Non-medical: Not on file   Tobacco Use    Smoking status: Never Smoker    Smokeless tobacco: Never Used    Tobacco comment: , 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   Substance and Sexual Activity    Alcohol use: Yes     Alcohol/week: 1.8 oz     Types: 3 Glasses of wine per week     Comment: 8-10 glasses wine per week    Drug use: No    Sexual activity: Yes     Partners: Female   Lifestyle    Physical activity:     Days per week: Not on file     Minutes per session: Not on file    Stress: Not on file   Relationships    Social connections:     Talks on phone: Not on file     Gets together: Not on file     Attends Jewish service: Not on file     Active member of club or organization: Not on file     Attends meetings of clubs or organizations: Not on file     Relationship status: Not on file    Intimate  partner violence:     Fear of current or ex partner: Not on file     Emotionally abused: Not on file     Physically abused: Not on file     Forced sexual activity: Not on file   Other Topics Concern    Not on file   Social History Narrative    Not on file       Current Medications  Current Outpatient Medications on File Prior to Visit   Medication Sig Dispense Refill    allopurinol (ZYLOPRIM) 100 MG tablet TAKE 1 TABLET (100 MG TOTAL) BY MOUTH 2 (TWO) TIMES DAILY. 180 tablet 3    ascorbic acid (VITAMIN C) 500 MG tablet Take 500 mg by mouth once daily.      aspirin 81 MG chewable tablet Take 1 tablet by mouth.      benzonatate (TESSALON) 100 MG capsule Take 1 capsule (100 mg total) by mouth 3 (three) times daily as needed for Cough. 45 capsule 0    fluticasone (FLONASE) 50 mcg/actuation nasal spray 1 spray (50 mcg total) by Each Nare route once daily. 16 g 3    magnesium 250 mg Tab Take 250 mg by mouth once. Takes two 250 mg tablets daily      methylPREDNISolone (MEDROL DOSEPACK) 4 mg tablet use as directed 1 Package 0    OM-3/E/LINOL/ALA/OLEIC/GLA/LIP (OMEGA 3-6-9 ORAL) Take by mouth once daily.       pravastatin (PRAVACHOL) 20 MG tablet TAKE 1 TABLET(20 MG) BY MOUTH EVERY DAY 90 tablet 0    SAW PALMETTO ORAL Take by mouth once daily.       verapamil (CALAN-SR) 120 MG CR tablet Take 1 tablet (120 mg total) by mouth every evening. 30 tablet 11    verapamil (VERELAN) 240 MG C24P TAKE 1 CAPSULE BY MOUTH EVERY DAY (Patient taking differently: TAKE 1 CAPSULE BY MOUTH EVERY MORNING) 90 capsule 1     No current facility-administered medications on file prior to visit.        Allergies   Review of patient's allergies indicates:   Allergen Reactions    Amoxicillin-pot clavulanate      Other reaction(s): Stomach upset  Other reaction(s): Stomach upset    Celebrex [celecoxib] Other (See Comments)       Review of Systems   Constitutional: Negative for chills and fever.   HENT: Negative for ear pain, postnasal  "drip, rhinorrhea and sore throat.    Respiratory: Positive for cough. Negative for shortness of breath and wheezing.    Cardiovascular: Negative for chest pain.   Musculoskeletal: Negative for myalgias.   Skin: Negative for rash.   Allergic/Immunologic: Negative for environmental allergies.   Neurological: Negative for headaches.         BP (!) 154/87 (BP Location: Left arm, Patient Position: Sitting, BP Method: Small (Automatic))   Pulse (!) 59   Temp 98.6 °F (37 °C) (Oral)   Resp 17   Ht 5' 8" (1.727 m)   Wt 77.5 kg (170 lb 13.7 oz)   SpO2 97%   BMI 25.98 kg/m²     Physical Exam   Constitutional: He appears well-developed and well-nourished.   HENT:   Head: Normocephalic.   Right Ear: External ear normal.   Left Ear: External ear normal.   Nose: Mucosal edema and rhinorrhea present.  No foreign bodies. Right sinus exhibits maxillary sinus tenderness. Right sinus exhibits no frontal sinus tenderness. Left sinus exhibits no maxillary sinus tenderness and no frontal sinus tenderness.   Mouth/Throat: Posterior oropharyngeal erythema present. No oropharyngeal exudate. No tonsillar exudate.   Neck: Normal range of motion. Neck supple. No tracheal deviation present.   Cardiovascular: Normal rate, regular rhythm, normal heart sounds and intact distal pulses.   No murmur heard.  Pulmonary/Chest: Effort normal and breath sounds normal. He has no wheezes. He has no rales.   Abdominal: Soft. Bowel sounds are normal. He exhibits no mass. There is no tenderness.   Musculoskeletal: He exhibits no edema.   Lymphadenopathy:     He has no cervical adenopathy.   Skin: He is not diaphoretic.   Vitals reviewed.        Assessment/Plan:  Denis was seen today for cough.    Diagnoses and all orders for this visit:    Common cold  -     ipratropium (ATROVENT) 42 mcg (0.06 %) nasal spray; 2 sprays by Nasal route 4 (four) times daily.  Once again discussed course of cold with patient.  Discussed with patient that the called has " reddish course.  He may continue the Coricidin HBP and his Tessalon Perles.  Will add Atrovent for symptom relief.  Advised rest and fluids.  Patient's lung sounds were completely normal, and this was conveyed to him.    Medication management  Discussed with patient dangers of taking someone else's medication, and he complete his life at risk, and the other person's life at risk since that other person is not taking the full amount of her own medication.  In addition this is illegal.        This office note has been dictated.  This dictation has been generated using M-Modal Fluency Direct dictation; some phonetic errors may occur.     Answers for HPI/ROS submitted by the patient on 3/30/2019   Cough  Chronicity: recurrent  Onset: in the past 7 days  Progression since onset: gradually worsening  Frequency: every few minutes  Cough characteristics: productive of brown sputum  ear congestion: No  heartburn: No  hemoptysis: No  nasal congestion: Yes  sweats: No  weight loss: No  Aggravated by: nothing  asthma: No  bronchiectasis: No  bronchitis: No  COPD: No  emphysema: No  pneumonia: No  Treatments tried: OTC cough suppressant, oral steroids, prescription cough suppressant  Improvement on treatment: no relief

## 2019-04-04 ENCOUNTER — PATIENT MESSAGE (OUTPATIENT)
Dept: FAMILY MEDICINE | Facility: CLINIC | Age: 64
End: 2019-04-04

## 2019-04-06 ENCOUNTER — NURSE TRIAGE (OUTPATIENT)
Dept: ADMINISTRATIVE | Facility: CLINIC | Age: 64
End: 2019-04-06

## 2019-04-06 ENCOUNTER — PATIENT MESSAGE (OUTPATIENT)
Dept: FAMILY MEDICINE | Facility: CLINIC | Age: 64
End: 2019-04-06

## 2019-04-06 ENCOUNTER — OFFICE VISIT (OUTPATIENT)
Dept: FAMILY MEDICINE | Facility: CLINIC | Age: 64
End: 2019-04-06
Payer: COMMERCIAL

## 2019-04-06 VITALS
OXYGEN SATURATION: 97 % | BODY MASS INDEX: 25.16 KG/M2 | WEIGHT: 166 LBS | TEMPERATURE: 99 F | HEIGHT: 68 IN | DIASTOLIC BLOOD PRESSURE: 80 MMHG | SYSTOLIC BLOOD PRESSURE: 138 MMHG | HEART RATE: 84 BPM

## 2019-04-06 DIAGNOSIS — I10 HTN (HYPERTENSION), BENIGN: ICD-10-CM

## 2019-04-06 DIAGNOSIS — B96.89 ACUTE BACTERIAL BRONCHITIS: Primary | ICD-10-CM

## 2019-04-06 DIAGNOSIS — J20.8 ACUTE BACTERIAL BRONCHITIS: Primary | ICD-10-CM

## 2019-04-06 PROCEDURE — 94640 PR INHAL RX, AIRWAY OBST/DX SPUTUM INDUCT: ICD-10-PCS | Mod: 59,S$GLB,, | Performed by: FAMILY MEDICINE

## 2019-04-06 PROCEDURE — 3008F PR BODY MASS INDEX (BMI) DOCUMENTED: ICD-10-PCS | Mod: CPTII,S$GLB,, | Performed by: FAMILY MEDICINE

## 2019-04-06 PROCEDURE — 3079F PR MOST RECENT DIASTOLIC BLOOD PRESSURE 80-89 MM HG: ICD-10-PCS | Mod: CPTII,S$GLB,, | Performed by: FAMILY MEDICINE

## 2019-04-06 PROCEDURE — 99999 PR PBB SHADOW E&M-EST. PATIENT-LVL IV: ICD-10-PCS | Mod: PBBFAC,,, | Performed by: FAMILY MEDICINE

## 2019-04-06 PROCEDURE — 94640 AIRWAY INHALATION TREATMENT: CPT | Mod: 59,S$GLB,, | Performed by: FAMILY MEDICINE

## 2019-04-06 PROCEDURE — 99214 PR OFFICE/OUTPT VISIT, EST, LEVL IV, 30-39 MIN: ICD-10-PCS | Mod: 25,S$GLB,, | Performed by: FAMILY MEDICINE

## 2019-04-06 PROCEDURE — 96372 THER/PROPH/DIAG INJ SC/IM: CPT | Mod: S$GLB,,, | Performed by: FAMILY MEDICINE

## 2019-04-06 PROCEDURE — 3075F SYST BP GE 130 - 139MM HG: CPT | Mod: CPTII,S$GLB,, | Performed by: FAMILY MEDICINE

## 2019-04-06 PROCEDURE — 3079F DIAST BP 80-89 MM HG: CPT | Mod: CPTII,S$GLB,, | Performed by: FAMILY MEDICINE

## 2019-04-06 PROCEDURE — 99214 OFFICE O/P EST MOD 30 MIN: CPT | Mod: 25,S$GLB,, | Performed by: FAMILY MEDICINE

## 2019-04-06 PROCEDURE — 96372 PR INJECTION,THERAP/PROPH/DIAG2ST, IM OR SUBCUT: ICD-10-PCS | Mod: S$GLB,,, | Performed by: FAMILY MEDICINE

## 2019-04-06 PROCEDURE — 99999 PR PBB SHADOW E&M-EST. PATIENT-LVL IV: CPT | Mod: PBBFAC,,, | Performed by: FAMILY MEDICINE

## 2019-04-06 PROCEDURE — 3075F PR MOST RECENT SYSTOLIC BLOOD PRESS GE 130-139MM HG: ICD-10-PCS | Mod: CPTII,S$GLB,, | Performed by: FAMILY MEDICINE

## 2019-04-06 PROCEDURE — 3008F BODY MASS INDEX DOCD: CPT | Mod: CPTII,S$GLB,, | Performed by: FAMILY MEDICINE

## 2019-04-06 RX ORDER — TRIAMCINOLONE ACETONIDE 40 MG/ML
40 INJECTION, SUSPENSION INTRA-ARTICULAR; INTRAMUSCULAR
Status: COMPLETED | OUTPATIENT
Start: 2019-04-06 | End: 2019-04-06

## 2019-04-06 RX ORDER — IPRATROPIUM BROMIDE AND ALBUTEROL SULFATE 2.5; .5 MG/3ML; MG/3ML
3 SOLUTION RESPIRATORY (INHALATION)
Status: COMPLETED | OUTPATIENT
Start: 2019-04-06 | End: 2019-04-06

## 2019-04-06 RX ORDER — AZITHROMYCIN 250 MG/1
TABLET, FILM COATED ORAL
Qty: 6 TABLET | Refills: 0 | Status: SHIPPED | OUTPATIENT
Start: 2019-04-06 | End: 2019-04-11

## 2019-04-06 RX ORDER — ALBUTEROL SULFATE 90 UG/1
2 AEROSOL, METERED RESPIRATORY (INHALATION) EVERY 6 HOURS PRN
Qty: 18 G | Refills: 0 | Status: SHIPPED | OUTPATIENT
Start: 2019-04-06 | End: 2019-07-15

## 2019-04-06 RX ORDER — CODEINE PHOSPHATE AND GUAIFENESIN 10; 100 MG/5ML; MG/5ML
5 SOLUTION ORAL EVERY 6 HOURS PRN
Qty: 150 ML | Refills: 0 | Status: SHIPPED | OUTPATIENT
Start: 2019-04-06 | End: 2019-04-16

## 2019-04-06 RX ADMIN — TRIAMCINOLONE ACETONIDE 40 MG: 40 INJECTION, SUSPENSION INTRA-ARTICULAR; INTRAMUSCULAR at 08:04

## 2019-04-06 RX ADMIN — IPRATROPIUM BROMIDE AND ALBUTEROL SULFATE 3 ML: 2.5; .5 SOLUTION RESPIRATORY (INHALATION) at 08:04

## 2019-04-06 NOTE — TELEPHONE ENCOUNTER
Coughing for 10 days and has been in contact with PCP. Has seen PCP and has made several calls back. Feels bad to worse. Wheezing and coughing and congestion. Getting frustrated. Head congested and headache and wanting some medication to help him feel better.    Reason for Disposition   Caller requesting a NON-URGENT new prescription or refill and triager unable to refill per unit policy    Protocols used: MEDICATION QUESTION CALL-A-AH

## 2019-04-06 NOTE — PROGRESS NOTES
Routine Office Visit    Patient Name: Denis Bunn    : 1955  MRN: [id number]    Subjective:  Denis is a 63 y.o. male who presents today for:     1.   Chief Complaint   Patient presents with    URI     cough,congestion X 11 days       Started 11 days ago. Symptoms have been gradually worsening since that time. The cough is productive of yellow sputum and is aggravated by reclining position. Associated symptoms include: postnasal drip. Patient does not have a history of asthma. Patient does not have a history of smoking. Patient does not have a history of environmental allergens. Patient has not traveled recently.   Patient has had the flu vaccine. Patient has tried coriciden HBP with no relief of symptoms. Sick contacts include: wife. He was evaluated here last week for cold. He states symptoms have persisted.     Review of Systems   Constitutional: Negative for chills and fever.   HENT: Negative for congestion.    Eyes: Negative for blurred vision.   Respiratory: Negative for cough.    Cardiovascular: Negative for chest pain.   Gastrointestinal: Negative for abdominal pain, constipation, diarrhea, heartburn, nausea and vomiting.   Genitourinary: Negative for dysuria.   Musculoskeletal: Negative for myalgias.   Skin: Negative for itching and rash.   Neurological: Negative for dizziness and headaches.   Psychiatric/Behavioral: Negative for depression.        Active Problem List  Patient Active Problem List   Diagnosis    Nuclear cataract    Macular edema    CME (cystoid macular edema) - Left Eye    Epiretinal membrane - Left Eye    Macular puckering - Left Eye    History of gout    Back pain    FH: CAD (coronary artery disease)    HTN (hypertension), benign    Dyslipidemia    Palpitations    Impaired fasting glucose    Anxiety    PVC (premature ventricular contraction)    Overweight (BMI 25.0-29.9)    Muscle spasm of back    Decreased range of motion of lumbar spine       Past Medical  History  Past Medical History:   Diagnosis Date    Back pain     Cataract     Constipation - functional     History of gout     HTN (hypertension), benign 4/24/2013    Prostatitis     Vision changes        Past Surgical History  Past Surgical History:   Procedure Laterality Date    BACK SURGERY      CATARACT EXTRACTION      left eye    COLONOSCOPY N/A 7/29/2014    Performed by Daryl Ramsey [facility] at [facility] ([facility])    ELBOW SURGERY      scope/right    EYE SURGERY      Dr. Hope.retina x2 left    LAMINECTOMY      WRIST SURGERY      right       Family History  Family History   Problem Relation Age of Onset    Blindness Mother         from cva    Cataracts Mother     Cataracts Father     Cancer Father     No Known Problems Sister     No Known Problems Brother     No Known Problems Maternal Aunt     No Known Problems Maternal Uncle     No Known Problems Paternal Aunt     No Known Problems Paternal Uncle     No Known Problems Maternal Grandmother     No Known Problems Maternal Grandfather     No Known Problems Paternal Grandmother     No Known Problems Paternal Grandfather     Amblyopia Neg Hx     Diabetes Neg Hx     Glaucoma Neg Hx     Hypertension Neg Hx     Macular degeneration Neg Hx     Retinal detachment Neg Hx     Strabismus Neg Hx     Stroke Neg Hx     Thyroid disease Neg Hx        Social History  Social History     Socioeconomic History    Marital status:      Spouse name: Not on file    Number of children: Not on file    Years of education: Not on file    Highest education level: Not on file   Occupational History    Not on file   Social Needs    Financial resource strain: Not on file    Food insecurity:     Worry: Not on file     Inability: Not on file    Transportation needs:     Medical: Not on file     Non-medical: Not on file   Tobacco Use    Smoking status: Never Smoker    Smokeless tobacco: Never Used    Tobacco comment: , no kids.  Self  "employed.   Substance and Sexual Activity    Alcohol use: Yes     Alcohol/week: 1.8 oz     Types: 3 Glasses of wine per week     Comment: 8-10 glasses wine per week    Drug use: No    Sexual activity: Yes     Partners: Female   Lifestyle    Physical activity:     Days per week: Not on file     Minutes per session: Not on file    Stress: Not on file   Relationships    Social connections:     Talks on phone: Not on file     Gets together: Not on file     Attends Zoroastrianism service: Not on file     Active member of club or organization: Not on file     Attends meetings of clubs or organizations: Not on file     Relationship status: Not on file   Other Topics Concern    Not on file   Social History Narrative    Not on file       Medications and Allergies  Reviewed and updated.       Physical Exam  /80 (BP Location: Left arm, Patient Position: Sitting, BP Method: Medium (Manual))   Pulse 84   Temp 99.2 °F (37.3 °C) (Oral)   Ht 5' 8" (1.727 m)   Wt 75.3 kg (166 lb 0.1 oz)   SpO2 97%   BMI 25.24 kg/m²   Physical Exam   Constitutional: He is oriented to person, place, and time. He appears well-developed and well-nourished.   HENT:   Head: Normocephalic and atraumatic.   Right Ear: Tympanic membrane normal.   Left Ear: Tympanic membrane normal.   Mouth/Throat: Oropharynx is clear and moist and mucous membranes are normal.   Eyes: Pupils are equal, round, and reactive to light. Conjunctivae and EOM are normal.   Neck: Normal range of motion. Neck supple. No JVD present. No thyromegaly present.   Cardiovascular: Normal rate, regular rhythm and normal heart sounds.   Pulmonary/Chest: Effort normal. He has wheezes. He has rhonchi.   Abdominal: Soft. Bowel sounds are normal. He exhibits no distension. There is no tenderness. There is no guarding.   Musculoskeletal: Normal range of motion.   Lymphadenopathy:     He has no cervical adenopathy.   Neurological: He is alert and oriented to person, place, and time. "   Skin: Skin is warm and dry.   Psychiatric: He has a normal mood and affect. His behavior is normal.       Assessment/Plan:  Denis Bunn is a 63 y.o. male who presents today for :    Acute bacterial bronchitis  -     triamcinolone acetonide injection 40 mg  -     albuterol-ipratropium 2.5 mg-0.5 mg/3 mL nebulizer solution 3 mL  -     Inhalation tx; Future; Expected date: 04/06/2019  -     azithromycin (Z-MARTHA) 250 MG tablet; Take 2 tablets by mouth on day 1; Take 1 tablet by mouth on days 2-5  Dispense: 6 tablet; Refill: 0  -     guaifenesin-codeine 100-10 mg/5 ml (TUSSI-ORGANIDIN NR)  mg/5 mL syrup; Take 5 mLs by mouth every 6 (six) hours as needed.  Dispense: 150 mL; Refill: 0  -     albuterol (VENTOLIN HFA) 90 mcg/actuation inhaler; Inhale 2 puffs into the lungs every 6 (six) hours as needed for Wheezing. Rescue  Dispense: 18 g; Refill: 0  Common side effects of this medication were discussed with the patient. Questions regarding medications were discussed during this visit.   Breathing / rhonchi improved after duoneb treatment  Pt already had short course of steroids.     HTN (hypertension), benign  The current medical regimen is effective;  continue present plan and medications.          Follow up if symptoms worsen or fail to improve.

## 2019-04-06 NOTE — PATIENT INSTRUCTIONS
Bronchitis, Antibiotic Treatment (Adult)    Bronchitis is an infection of the air passages (bronchial tubes) in your lungs. It often occurs when you have a cold. This illness is contagious during the first few days and is spread through the air by coughing and sneezing, or by direct contact (touching the sick person and then touching your own eyes, nose, or mouth).  Symptoms of bronchitis include cough with mucus (phlegm) and low-grade fever. Bronchitis usually lasts 7 to 14 days. Mild cases can be treated with simple home remedies. More severe infection is treated with an antibiotic.  Home care  Follow these guidelines when caring for yourself at home:  · If your symptoms are severe, rest at home for the first 2 to 3 days. When you go back to your usual activities, don't let yourself get too tired.  · Do not smoke. Also avoid being exposed to secondhand smoke.  · You may use over-the-counter medicines to control fever or pain, unless another medicine was prescribed. (Note: If you have chronic liver or kidney disease or have ever had a stomach ulcer or gastrointestinal bleeding, talk with your healthcare provider before using these medicines. Also talk to your provider if you are taking medicine to prevent blood clots.) Aspirin should never be given to anyone younger than 18 years of age who is ill with a viral infection or fever. It may cause severe liver or brain damage.  · Your appetite may be poor, so a light diet is fine. Avoid dehydration by drinking 6 to 8 glasses of fluids per day (such as water, soft drinks, sports drinks, juices, tea, or soup). Extra fluids will help loosen secretions in the nose and lungs.  · Over-the-counter cough, cold, and sore-throat medicines will not shorten the length of the illness, but they may be helpful to reduce symptoms. (Note: Do not use decongestants if you have high blood pressure.)  · Finish all antibiotic medicine. Do this even if you are feeling better after only a  few days.  Follow-up care  Follow up with your healthcare provider, or as advised. If you had an X-ray or ECG (electrocardiogram), a specialist will review it. You will be notified of any new findings that may affect your care.  Note: If you are age 65 or older, or if you have a chronic lung disease or condition that affects your immune system, or you smoke, talk to your healthcare provider about having pneumococcal vaccinations and a yearly influenza vaccination (flu shot).  When to seek medical advice  Call your healthcare provider right away if any of these occur:  · Fever of 100.4°F (38°C) or higher  · Coughing up increased amounts of colored sputum  · Weakness, drowsiness, headache, facial pain, ear pain, or a stiff neck  Call 911, or get immediate medical care  Contact emergency services right away if any of these occur.  · Coughing up blood  · Worsening weakness, drowsiness, headache, or stiff neck  · Trouble breathing, wheezing, or pain with breathing  Date Last Reviewed: 9/13/2015  © 0120-9475 [employer]. [address] [place] [address], [place] [address]. All rights reserved. This information is not intended as a substitute for professional medical care. Always follow your healthcare professional's instructions.

## 2019-04-24 DIAGNOSIS — J06.9 VIRAL URI: ICD-10-CM

## 2019-04-24 RX ORDER — FLUTICASONE PROPIONATE 50 MCG
SPRAY, SUSPENSION (ML) NASAL
Qty: 16 ML | Refills: 3 | Status: SHIPPED | OUTPATIENT
Start: 2019-04-24 | End: 2019-07-15

## 2019-05-20 ENCOUNTER — TELEPHONE (OUTPATIENT)
Dept: FAMILY MEDICINE | Facility: CLINIC | Age: 64
End: 2019-05-20

## 2019-05-20 ENCOUNTER — PATIENT MESSAGE (OUTPATIENT)
Dept: FAMILY MEDICINE | Facility: CLINIC | Age: 64
End: 2019-05-20

## 2019-05-20 DIAGNOSIS — I10 HYPERTENSION, ESSENTIAL: ICD-10-CM

## 2019-05-20 DIAGNOSIS — E78.5 HYPERLIPIDEMIA, UNSPECIFIED HYPERLIPIDEMIA TYPE: Primary | ICD-10-CM

## 2019-05-20 DIAGNOSIS — M1A.9XX0 CHRONIC GOUT WITHOUT TOPHUS, UNSPECIFIED CAUSE, UNSPECIFIED SITE: ICD-10-CM

## 2019-05-20 DIAGNOSIS — Z12.5 SCREENING FOR PROSTATE CANCER: ICD-10-CM

## 2019-05-22 ENCOUNTER — TELEPHONE (OUTPATIENT)
Dept: FAMILY MEDICINE | Facility: CLINIC | Age: 64
End: 2019-05-22

## 2019-05-29 ENCOUNTER — LAB VISIT (OUTPATIENT)
Dept: LAB | Facility: HOSPITAL | Age: 64
End: 2019-05-29
Attending: INTERNAL MEDICINE
Payer: COMMERCIAL

## 2019-05-29 DIAGNOSIS — Z12.5 SCREENING FOR PROSTATE CANCER: ICD-10-CM

## 2019-05-29 DIAGNOSIS — E78.5 HYPERLIPIDEMIA, UNSPECIFIED HYPERLIPIDEMIA TYPE: ICD-10-CM

## 2019-05-29 DIAGNOSIS — M1A.9XX0 CHRONIC GOUT WITHOUT TOPHUS, UNSPECIFIED CAUSE, UNSPECIFIED SITE: ICD-10-CM

## 2019-05-29 DIAGNOSIS — I10 HYPERTENSION, ESSENTIAL: ICD-10-CM

## 2019-05-29 LAB
ALBUMIN SERPL BCP-MCNC: 4.1 G/DL (ref 3.5–5.2)
ALP SERPL-CCNC: 48 U/L (ref 55–135)
ALT SERPL W/O P-5'-P-CCNC: 29 U/L (ref 10–44)
ANION GAP SERPL CALC-SCNC: 6 MMOL/L (ref 8–16)
AST SERPL-CCNC: 22 U/L (ref 10–40)
BASOPHILS # BLD AUTO: 0.02 K/UL (ref 0–0.2)
BASOPHILS NFR BLD: 0.5 % (ref 0–1.9)
BILIRUB SERPL-MCNC: 0.7 MG/DL (ref 0.1–1)
BUN SERPL-MCNC: 17 MG/DL (ref 8–23)
CALCIUM SERPL-MCNC: 9.9 MG/DL (ref 8.7–10.5)
CHLORIDE SERPL-SCNC: 104 MMOL/L (ref 95–110)
CHOLEST SERPL-MCNC: 182 MG/DL (ref 120–199)
CHOLEST/HDLC SERPL: 2.2 {RATIO} (ref 2–5)
CO2 SERPL-SCNC: 31 MMOL/L (ref 23–29)
COMPLEXED PSA SERPL-MCNC: 3.8 NG/ML (ref 0–4)
CREAT SERPL-MCNC: 1.2 MG/DL (ref 0.5–1.4)
DIFFERENTIAL METHOD: ABNORMAL
EOSINOPHIL # BLD AUTO: 0.1 K/UL (ref 0–0.5)
EOSINOPHIL NFR BLD: 1.6 % (ref 0–8)
ERYTHROCYTE [DISTWIDTH] IN BLOOD BY AUTOMATED COUNT: 13 % (ref 11.5–14.5)
EST. GFR  (AFRICAN AMERICAN): >60 ML/MIN/1.73 M^2
EST. GFR  (NON AFRICAN AMERICAN): >60 ML/MIN/1.73 M^2
GLUCOSE SERPL-MCNC: 96 MG/DL (ref 70–110)
HCT VFR BLD AUTO: 43.2 % (ref 40–54)
HDLC SERPL-MCNC: 83 MG/DL (ref 40–75)
HDLC SERPL: 45.6 % (ref 20–50)
HGB BLD-MCNC: 14.3 G/DL (ref 14–18)
LDLC SERPL CALC-MCNC: 84.6 MG/DL (ref 63–159)
LYMPHOCYTES # BLD AUTO: 1.3 K/UL (ref 1–4.8)
LYMPHOCYTES NFR BLD: 30.6 % (ref 18–48)
MCH RBC QN AUTO: 32.4 PG (ref 27–31)
MCHC RBC AUTO-ENTMCNC: 33.1 G/DL (ref 32–36)
MCV RBC AUTO: 98 FL (ref 82–98)
MONOCYTES # BLD AUTO: 0.5 K/UL (ref 0.3–1)
MONOCYTES NFR BLD: 10.6 % (ref 4–15)
NEUTROPHILS # BLD AUTO: 2.5 K/UL (ref 1.8–7.7)
NEUTROPHILS NFR BLD: 56.7 % (ref 38–73)
NONHDLC SERPL-MCNC: 99 MG/DL
PLATELET # BLD AUTO: 268 K/UL (ref 150–350)
PMV BLD AUTO: 10.3 FL (ref 9.2–12.9)
POTASSIUM SERPL-SCNC: 4.9 MMOL/L (ref 3.5–5.1)
PROT SERPL-MCNC: 7.2 G/DL (ref 6–8.4)
RBC # BLD AUTO: 4.41 M/UL (ref 4.6–6.2)
SODIUM SERPL-SCNC: 141 MMOL/L (ref 136–145)
TRIGL SERPL-MCNC: 72 MG/DL (ref 30–150)
URATE SERPL-MCNC: 6.5 MG/DL (ref 3.4–7)
WBC # BLD AUTO: 4.34 K/UL (ref 3.9–12.7)

## 2019-05-29 PROCEDURE — 80053 COMPREHEN METABOLIC PANEL: CPT

## 2019-05-29 PROCEDURE — 84153 ASSAY OF PSA TOTAL: CPT

## 2019-05-29 PROCEDURE — 85025 COMPLETE CBC W/AUTO DIFF WBC: CPT

## 2019-05-29 PROCEDURE — 80061 LIPID PANEL: CPT

## 2019-05-29 PROCEDURE — 84550 ASSAY OF BLOOD/URIC ACID: CPT

## 2019-05-29 PROCEDURE — 36415 COLL VENOUS BLD VENIPUNCTURE: CPT | Mod: PN

## 2019-05-30 ENCOUNTER — PATIENT MESSAGE (OUTPATIENT)
Dept: UROLOGY | Facility: CLINIC | Age: 64
End: 2019-05-30

## 2019-05-30 DIAGNOSIS — R97.20 ELEVATED PSA: Primary | ICD-10-CM

## 2019-05-31 ENCOUNTER — OFFICE VISIT (OUTPATIENT)
Dept: FAMILY MEDICINE | Facility: CLINIC | Age: 64
End: 2019-05-31
Payer: COMMERCIAL

## 2019-05-31 VITALS
TEMPERATURE: 98 F | HEIGHT: 68 IN | OXYGEN SATURATION: 97 % | DIASTOLIC BLOOD PRESSURE: 86 MMHG | BODY MASS INDEX: 25.53 KG/M2 | SYSTOLIC BLOOD PRESSURE: 142 MMHG | HEART RATE: 56 BPM | RESPIRATION RATE: 17 BRPM | WEIGHT: 168.44 LBS

## 2019-05-31 DIAGNOSIS — Z12.11 SCREENING FOR COLON CANCER: ICD-10-CM

## 2019-05-31 DIAGNOSIS — Z00.00 PREVENTATIVE HEALTH CARE: Primary | ICD-10-CM

## 2019-05-31 DIAGNOSIS — I10 HTN (HYPERTENSION), BENIGN: ICD-10-CM

## 2019-05-31 DIAGNOSIS — R35.1 BENIGN PROSTATIC HYPERPLASIA WITH NOCTURIA: ICD-10-CM

## 2019-05-31 DIAGNOSIS — G89.29 CHRONIC MIDLINE LOW BACK PAIN WITHOUT SCIATICA: ICD-10-CM

## 2019-05-31 DIAGNOSIS — M54.50 CHRONIC MIDLINE LOW BACK PAIN WITHOUT SCIATICA: ICD-10-CM

## 2019-05-31 DIAGNOSIS — N40.1 BENIGN PROSTATIC HYPERPLASIA WITH NOCTURIA: ICD-10-CM

## 2019-05-31 LAB
BILIRUB UR QL STRIP: NEGATIVE
CLARITY UR: CLEAR
COLOR UR: YELLOW
GLUCOSE UR QL STRIP: NEGATIVE
HGB UR QL STRIP: NEGATIVE
KETONES UR QL STRIP: NEGATIVE
LEUKOCYTE ESTERASE UR QL STRIP: NEGATIVE
MICROSCOPIC COMMENT: NORMAL
NITRITE UR QL STRIP: NEGATIVE
PH UR STRIP: 7 [PH] (ref 5–8)
PROT UR QL STRIP: NEGATIVE
SP GR UR STRIP: 1.01 (ref 1–1.03)
URN SPEC COLLECT METH UR: NORMAL
UROBILINOGEN UR STRIP-ACNC: NEGATIVE EU/DL
WBC #/AREA URNS HPF: 3 /HPF (ref 0–5)

## 2019-05-31 PROCEDURE — 99999 PR PBB SHADOW E&M-EST. PATIENT-LVL IV: ICD-10-PCS | Mod: PBBFAC,,, | Performed by: INTERNAL MEDICINE

## 2019-05-31 PROCEDURE — 3079F DIAST BP 80-89 MM HG: CPT | Mod: CPTII,S$GLB,, | Performed by: INTERNAL MEDICINE

## 2019-05-31 PROCEDURE — 3077F PR MOST RECENT SYSTOLIC BLOOD PRESSURE >= 140 MM HG: ICD-10-PCS | Mod: CPTII,S$GLB,, | Performed by: INTERNAL MEDICINE

## 2019-05-31 PROCEDURE — 3079F PR MOST RECENT DIASTOLIC BLOOD PRESSURE 80-89 MM HG: ICD-10-PCS | Mod: CPTII,S$GLB,, | Performed by: INTERNAL MEDICINE

## 2019-05-31 PROCEDURE — 87086 URINE CULTURE/COLONY COUNT: CPT

## 2019-05-31 PROCEDURE — 3077F SYST BP >= 140 MM HG: CPT | Mod: CPTII,S$GLB,, | Performed by: INTERNAL MEDICINE

## 2019-05-31 PROCEDURE — 99999 PR PBB SHADOW E&M-EST. PATIENT-LVL IV: CPT | Mod: PBBFAC,,, | Performed by: INTERNAL MEDICINE

## 2019-05-31 PROCEDURE — 81000 URINALYSIS NONAUTO W/SCOPE: CPT

## 2019-05-31 PROCEDURE — 99396 PREV VISIT EST AGE 40-64: CPT | Mod: S$GLB,,, | Performed by: INTERNAL MEDICINE

## 2019-05-31 PROCEDURE — 99396 PR PREVENTIVE VISIT,EST,40-64: ICD-10-PCS | Mod: S$GLB,,, | Performed by: INTERNAL MEDICINE

## 2019-05-31 NOTE — PROGRESS NOTES
"Subjective:       Patient ID: Denis Bunn is a 63 y.o. male.    Chief Complaint: Establish Care and Annual Exam    Well exam    HPI: 64 y/o w/ HTN chronic low back pain (followed by outside pain management) presents fro well exam. Two months ago had cough and wheezing treated for bacterial PNA with relief of symptoms. Currently main complaint is worsening lower back pain. Planning LOGAN with pain management wihtin next two weeks. No orthostatic symptoms no LE swelling he is swimming for exercise three tiems per week without limitation    Review of Systems   Constitutional: Negative for activity change, appetite change, fatigue, fever and unexpected weight change.   HENT: Negative for ear pain, hearing loss, rhinorrhea, sore throat and trouble swallowing.    Eyes: Negative for discharge and visual disturbance.   Respiratory: Negative for chest tightness, shortness of breath and wheezing.    Cardiovascular: Negative for chest pain, palpitations and leg swelling.   Gastrointestinal: Negative for abdominal pain, blood in stool, constipation, diarrhea and vomiting.   Endocrine: Negative for cold intolerance, heat intolerance, polydipsia and polyuria.   Genitourinary: Negative for difficulty urinating, dysuria, hematuria and urgency.   Musculoskeletal: Positive for back pain. Negative for arthralgias, joint swelling, neck pain and neck stiffness.   Skin: Negative for rash.   Neurological: Negative for dizziness, syncope, weakness and headaches.   Psychiatric/Behavioral: Negative for confusion, dysphoric mood and suicidal ideas.       Objective:     Vitals:    05/31/19 1014 05/31/19 1051   BP: (!) 150/82 (!) 142/86   BP Location: Right arm    Patient Position: Sitting    Pulse: (!) 57 (!) 56   Resp: 17    Temp: 97.7 °F (36.5 °C)    TempSrc: Oral    SpO2: 97%    Weight: 76.4 kg (168 lb 6.9 oz)    Height: 5' 8" (1.727 m)           Physical Exam   Constitutional: He is oriented to person, place, and time. He appears " well-developed and well-nourished.   HENT:   Head: Normocephalic and atraumatic.   Eyes: Conjunctivae are normal. No scleral icterus.   Neck: Normal range of motion.   Cardiovascular: Normal rate and regular rhythm. Exam reveals no gallop and no friction rub.   No murmur heard.  Pulmonary/Chest: Effort normal and breath sounds normal. He has no wheezes. He has no rales.   Abdominal: Soft. Bowel sounds are normal. There is no tenderness. There is no rebound and no guarding.   Musculoskeletal: Normal range of motion. He exhibits no edema or tenderness.   Neurological: He is alert and oriented to person, place, and time. No cranial nerve deficit.   Skin: Skin is warm and dry.   Psychiatric: He has a normal mood and affect.       Assessment and Plan   1. Preventative health care  Wear seatbelts at all times    Don't drink and drive    Wear bike helmet and other personal protective equipment when appropriate      2. HTN (hypertension), benign  Above goal today he is reluctant to take further medication (given bradycardia would not increase verapamil) he reports his home blood pressure cuff consistently less than 130/90. Will plan BP check in three weeks after LOGAN (as pain can be contributing as well) and if still above goal would add ARB. Request that he bring his home cuff with him to this BP check to confirm accuracy    3. Chronic midline low back pain without sciatica  Continue management per pain specialist    4. Benign prostatic hyperplasia with nocturia  With nocturia no dysuria check u/a and urine culture for infectious etiology  - Urinalysis  - Urine culture    5. Screening for colon cancer  Due for repeat cscope July 2019   - Case request GI: COLONOSCOPY

## 2019-06-02 LAB — BACTERIA UR CULT: NO GROWTH

## 2019-06-04 ENCOUNTER — PATIENT MESSAGE (OUTPATIENT)
Dept: FAMILY MEDICINE | Facility: CLINIC | Age: 64
End: 2019-06-04

## 2019-06-04 DIAGNOSIS — N40.1 BENIGN PROSTATIC HYPERPLASIA WITH NOCTURIA: Primary | ICD-10-CM

## 2019-06-04 DIAGNOSIS — R35.1 BENIGN PROSTATIC HYPERPLASIA WITH NOCTURIA: Primary | ICD-10-CM

## 2019-06-04 RX ORDER — TAMSULOSIN HYDROCHLORIDE 0.4 MG/1
0.4 CAPSULE ORAL DAILY
Qty: 30 CAPSULE | Refills: 2 | Status: SHIPPED | OUTPATIENT
Start: 2019-06-04 | End: 2019-07-15

## 2019-06-05 RX ORDER — PRAVASTATIN SODIUM 20 MG/1
TABLET ORAL
Qty: 90 TABLET | Refills: 0 | Status: SHIPPED | OUTPATIENT
Start: 2019-06-05 | End: 2019-09-03 | Stop reason: SDUPTHER

## 2019-06-06 DIAGNOSIS — Z12.11 COLON CANCER SCREENING: Primary | ICD-10-CM

## 2019-07-04 DIAGNOSIS — I49.3 PVC (PREMATURE VENTRICULAR CONTRACTION): ICD-10-CM

## 2019-07-04 DIAGNOSIS — R00.2 PALPITATIONS: ICD-10-CM

## 2019-07-04 DIAGNOSIS — Z82.49 FH: CAD (CORONARY ARTERY DISEASE): ICD-10-CM

## 2019-07-04 DIAGNOSIS — I10 HTN (HYPERTENSION), BENIGN: ICD-10-CM

## 2019-07-04 RX ORDER — VERAPAMIL HYDROCHLORIDE 240 MG/1
CAPSULE, EXTENDED RELEASE ORAL
Qty: 90 CAPSULE | Refills: 1 | Status: SHIPPED | OUTPATIENT
Start: 2019-07-04 | End: 2019-12-13 | Stop reason: SDUPTHER

## 2019-07-10 ENCOUNTER — PATIENT MESSAGE (OUTPATIENT)
Dept: CARDIOLOGY | Facility: CLINIC | Age: 64
End: 2019-07-10

## 2019-07-11 DIAGNOSIS — R00.2 PALPITATIONS: ICD-10-CM

## 2019-07-11 DIAGNOSIS — Z00.00 ANNUAL PHYSICAL EXAM: Primary | ICD-10-CM

## 2019-07-12 NOTE — PROGRESS NOTES
Subjective:   Patient ID:  Denis Bunn is a 63 y.o. male who presents for follow-up of CAD risk    HPI:The patient is here for CAD risk factors but CAC 0 in 2015 and also had palpitations.    The patient has no chest pain, SOB, TIA, palpitations, syncope or pre-syncope.Patient currently exercises several times per week but having back pains. A lot of BPs good but some high and he's getting 140s when we get 170s today.        Review of Systems   Constitution: Negative for chills, decreased appetite, diaphoresis, fever, malaise/fatigue, night sweats, weight gain and weight loss.   HENT: Negative for congestion, hoarse voice, nosebleeds, sore throat and tinnitus.    Eyes: Negative for blurred vision, double vision, vision loss in left eye, vision loss in right eye, visual disturbance and visual halos.   Cardiovascular: Negative for chest pain, claudication, cyanosis, dyspnea on exertion, irregular heartbeat, leg swelling, near-syncope, orthopnea, palpitations, paroxysmal nocturnal dyspnea and syncope.   Respiratory: Negative for cough, hemoptysis, shortness of breath, sleep disturbances due to breathing, snoring, sputum production and wheezing.    Endocrine: Negative for cold intolerance, heat intolerance, polydipsia, polyphagia and polyuria.   Hematologic/Lymphatic: Negative for adenopathy and bleeding problem. Does not bruise/bleed easily.   Skin: Negative for color change, dry skin, flushing, itching, nail changes, poor wound healing, rash, skin cancer, suspicious lesions and unusual hair distribution.   Musculoskeletal: Positive for back pain. Negative for arthritis, falls, gout, joint pain, joint swelling, muscle cramps, muscle weakness, myalgias and stiffness.   Gastrointestinal: Negative for abdominal pain, anorexia, change in bowel habit, constipation, diarrhea, dysphagia, heartburn, hematemesis, hematochezia, melena and vomiting.   Genitourinary: Negative for decreased libido, dysuria, hematuria,  "hesitancy and urgency.   Neurological: Negative for excessive daytime sleepiness, dizziness, focal weakness, headaches, light-headedness, loss of balance, numbness, paresthesias, seizures, sensory change, tremors, vertigo and weakness.   Psychiatric/Behavioral: Negative for altered mental status, depression, hallucinations, memory loss, substance abuse and suicidal ideas. The patient does not have insomnia and is not nervous/anxious.    Allergic/Immunologic: Negative for environmental allergies and hives.       Objective: BP (!) 177/87 (BP Location: Left arm, Patient Position: Sitting, BP Method: Medium (Automatic))   Pulse (!) 57   Ht 5' 8" (1.727 m)   Wt 75.8 kg (167 lb 1.7 oz)   BMI 25.41 kg/m²      Physical Exam   Constitutional: He is oriented to person, place, and time. He appears well-developed and well-nourished. No distress.   HENT:   Head: Normocephalic.   Eyes: Pupils are equal, round, and reactive to light. EOM are normal.   Neck: Normal range of motion. No thyromegaly present.   Cardiovascular: Normal rate, regular rhythm, normal heart sounds and intact distal pulses. Exam reveals no gallop and no friction rub.   No murmur heard.  Pulses:       Carotid pulses are 3+ on the right side, and 3+ on the left side.       Radial pulses are 3+ on the right side, and 3+ on the left side.        Femoral pulses are 3+ on the right side, and 3+ on the left side.       Popliteal pulses are 3+ on the right side, and 3+ on the left side.        Dorsalis pedis pulses are 3+ on the right side, and 3+ on the left side.        Posterior tibial pulses are 3+ on the right side, and 3+ on the left side.   Pulmonary/Chest: Effort normal and breath sounds normal. No respiratory distress. He has no wheezes. He has no rales. He exhibits no tenderness.   Abdominal: Soft. He exhibits no distension and no mass. There is no tenderness.   Musculoskeletal: Normal range of motion.   Lymphadenopathy:     He has no cervical " adenopathy.   Neurological: He is alert and oriented to person, place, and time.   Skin: Skin is warm. He is not diaphoretic. No cyanosis. Nails show no clubbing.   Psychiatric: He has a normal mood and affect. His speech is normal and behavior is normal. Judgment and thought content normal. Cognition and memory are normal.       Assessment:     1. FH: CAD (coronary artery disease)    2. Dyslipidemia    3. HTN (hypertension), benign    4. Impaired fasting glucose    5. Overweight (BMI 25.0-29.9)    6. Palpitations    7. PVC's (premature ventricular contractions)    8. Benign prostatic hyperplasia without lower urinary tract symptoms        Plan:   Discussed diet , achieving and maintaining ideal body weight, and exercise.   We reviewed meds in detail.  Reassured-discussed goals, options, plan.  Losartan 100 mg start with just half    Denis was seen today for palpitations.    Diagnoses and all orders for this visit:    FH: CAD (coronary artery disease)  -     Lipid panel; Future; Expected date: 09/15/2020  -     Comprehensive metabolic panel; Future; Expected date: 09/15/2020    Dyslipidemia  -     Lipid panel; Future; Expected date: 09/15/2020  -     Comprehensive metabolic panel; Future; Expected date: 09/15/2020  -     TSH; Future; Expected date: 09/15/2020    HTN (hypertension), benign  -     losartan (COZAAR) 100 MG tablet; Take 1 tablet (100 mg total) by mouth once daily.  -     EKG 12-lead; Future; Expected date: 09/15/2020  -     Comprehensive metabolic panel; Future; Expected date: 09/15/2020  -     TSH; Future; Expected date: 09/15/2020  -     Basic metabolic panel; Future; Expected date: 08/19/2019    Impaired fasting glucose  -     Basic metabolic panel; Future; Expected date: 08/19/2019    Overweight (BMI 25.0-29.9)  -     Basic metabolic panel; Future; Expected date: 08/19/2019    Palpitations  -     EKG 12-lead; Future; Expected date: 09/15/2020  -     Comprehensive metabolic panel; Future; Expected  date: 09/15/2020  -     TSH; Future; Expected date: 09/15/2020    PVC's (premature ventricular contractions)  -     EKG 12-lead; Future; Expected date: 09/15/2020  -     Comprehensive metabolic panel; Future; Expected date: 09/15/2020  -     PSA, Screening; Future; Expected date: 09/15/2020    Benign prostatic hyperplasia without lower urinary tract symptoms  -     PSA, Screening; Future; Expected date: 09/15/2020    Other orders  -     garlic 1,000 mg Cap; Take by mouth.  -     coenzyme Q10 (CO Q-10) 100 mg capsule; Take 100 mg by mouth once daily.  -     multivitamin capsule; Take 1 capsule by mouth once daily.  -     saw palmetto 160 MG capsule; Take 160 mg by mouth 2 (two) times daily.  -     turmeric (CURCUMIN MISC); by Misc.(Non-Drug; Combo Route) route.            Follow up in about 15 months (around 10/15/2020) for with ECG and labs; labs 6 weeks and find out BP.

## 2019-07-15 ENCOUNTER — OFFICE VISIT (OUTPATIENT)
Dept: CARDIOLOGY | Facility: CLINIC | Age: 64
End: 2019-07-15
Payer: COMMERCIAL

## 2019-07-15 ENCOUNTER — HOSPITAL ENCOUNTER (OUTPATIENT)
Dept: CARDIOLOGY | Facility: CLINIC | Age: 64
Discharge: HOME | End: 2019-07-15
Payer: COMMERCIAL

## 2019-07-15 VITALS
HEART RATE: 57 BPM | HEIGHT: 68 IN | BODY MASS INDEX: 25.33 KG/M2 | SYSTOLIC BLOOD PRESSURE: 177 MMHG | DIASTOLIC BLOOD PRESSURE: 87 MMHG | WEIGHT: 167.13 LBS

## 2019-07-15 DIAGNOSIS — R73.01 IMPAIRED FASTING GLUCOSE: ICD-10-CM

## 2019-07-15 DIAGNOSIS — R00.2 PALPITATIONS: ICD-10-CM

## 2019-07-15 DIAGNOSIS — E78.5 DYSLIPIDEMIA: ICD-10-CM

## 2019-07-15 DIAGNOSIS — E66.3 OVERWEIGHT (BMI 25.0-29.9): ICD-10-CM

## 2019-07-15 DIAGNOSIS — I49.3 PVC'S (PREMATURE VENTRICULAR CONTRACTIONS): ICD-10-CM

## 2019-07-15 DIAGNOSIS — Z82.49 FH: CAD (CORONARY ARTERY DISEASE): Primary | ICD-10-CM

## 2019-07-15 DIAGNOSIS — I10 HTN (HYPERTENSION), BENIGN: ICD-10-CM

## 2019-07-15 DIAGNOSIS — N40.0 BENIGN PROSTATIC HYPERPLASIA WITHOUT LOWER URINARY TRACT SYMPTOMS: ICD-10-CM

## 2019-07-15 DIAGNOSIS — Z00.00 ANNUAL PHYSICAL EXAM: ICD-10-CM

## 2019-07-15 PROCEDURE — 3077F PR MOST RECENT SYSTOLIC BLOOD PRESSURE >= 140 MM HG: ICD-10-PCS | Mod: CPTII,S$GLB,, | Performed by: INTERNAL MEDICINE

## 2019-07-15 PROCEDURE — 99999 PR PBB SHADOW E&M-EST. PATIENT-LVL III: CPT | Mod: PBBFAC,,, | Performed by: INTERNAL MEDICINE

## 2019-07-15 PROCEDURE — 3079F DIAST BP 80-89 MM HG: CPT | Mod: CPTII,S$GLB,, | Performed by: INTERNAL MEDICINE

## 2019-07-15 PROCEDURE — 93005 EKG 12-LEAD: ICD-10-PCS | Mod: S$GLB,,, | Performed by: INTERNAL MEDICINE

## 2019-07-15 PROCEDURE — 3079F PR MOST RECENT DIASTOLIC BLOOD PRESSURE 80-89 MM HG: ICD-10-PCS | Mod: CPTII,S$GLB,, | Performed by: INTERNAL MEDICINE

## 2019-07-15 PROCEDURE — 99215 OFFICE O/P EST HI 40 MIN: CPT | Mod: S$GLB,,, | Performed by: INTERNAL MEDICINE

## 2019-07-15 PROCEDURE — 99215 PR OFFICE/OUTPT VISIT, EST, LEVL V, 40-54 MIN: ICD-10-PCS | Mod: S$GLB,,, | Performed by: INTERNAL MEDICINE

## 2019-07-15 PROCEDURE — 3077F SYST BP >= 140 MM HG: CPT | Mod: CPTII,S$GLB,, | Performed by: INTERNAL MEDICINE

## 2019-07-15 PROCEDURE — 99999 PR PBB SHADOW E&M-EST. PATIENT-LVL III: ICD-10-PCS | Mod: PBBFAC,,, | Performed by: INTERNAL MEDICINE

## 2019-07-15 PROCEDURE — 93005 ELECTROCARDIOGRAM TRACING: CPT | Mod: S$GLB,,, | Performed by: INTERNAL MEDICINE

## 2019-07-15 PROCEDURE — 3008F PR BODY MASS INDEX (BMI) DOCUMENTED: ICD-10-PCS | Mod: CPTII,S$GLB,, | Performed by: INTERNAL MEDICINE

## 2019-07-15 PROCEDURE — 3008F BODY MASS INDEX DOCD: CPT | Mod: CPTII,S$GLB,, | Performed by: INTERNAL MEDICINE

## 2019-07-15 PROCEDURE — 93010 EKG 12-LEAD: ICD-10-PCS | Mod: S$GLB,,, | Performed by: INTERNAL MEDICINE

## 2019-07-15 PROCEDURE — 93010 ELECTROCARDIOGRAM REPORT: CPT | Mod: S$GLB,,, | Performed by: INTERNAL MEDICINE

## 2019-07-15 RX ORDER — LOSARTAN POTASSIUM 100 MG/1
100 TABLET ORAL DAILY
Qty: 90 TABLET | Refills: 3 | Status: SHIPPED | OUTPATIENT
Start: 2019-07-15 | End: 2020-11-04

## 2019-07-15 RX ORDER — SAW PALMETTO 160 MG
160 CAPSULE ORAL 2 TIMES DAILY
COMMUNITY
End: 2021-08-04

## 2019-07-15 RX ORDER — GARLIC 1000 MG
CAPSULE ORAL
COMMUNITY

## 2019-07-15 RX ORDER — EPINEPHRINE 0.22MG
100 AEROSOL WITH ADAPTER (ML) INHALATION DAILY
COMMUNITY

## 2019-07-15 NOTE — PATIENT INSTRUCTIONS
Discussed diet , achieving and maintaining ideal body weight, and exercise.   We reviewed meds in detail.  Reassured-discussed goals, options, plan.  Losartan 100 mg start with just half  Changing BP cuff

## 2019-07-16 ENCOUNTER — PATIENT MESSAGE (OUTPATIENT)
Dept: CARDIOLOGY | Facility: CLINIC | Age: 64
End: 2019-07-16

## 2019-07-29 ENCOUNTER — ANESTHESIA EVENT (OUTPATIENT)
Dept: ENDOSCOPY | Facility: HOSPITAL | Age: 64
End: 2019-07-29
Payer: COMMERCIAL

## 2019-07-29 ENCOUNTER — ANESTHESIA (OUTPATIENT)
Dept: ENDOSCOPY | Facility: HOSPITAL | Age: 64
End: 2019-07-29
Payer: COMMERCIAL

## 2019-07-29 ENCOUNTER — HOSPITAL ENCOUNTER (OUTPATIENT)
Facility: HOSPITAL | Age: 64
Discharge: HOME | End: 2019-07-29
Attending: INTERNAL MEDICINE | Admitting: INTERNAL MEDICINE
Payer: COMMERCIAL

## 2019-07-29 VITALS
HEART RATE: 77 BPM | WEIGHT: 168 LBS | DIASTOLIC BLOOD PRESSURE: 78 MMHG | OXYGEN SATURATION: 98 % | BODY MASS INDEX: 25.46 KG/M2 | RESPIRATION RATE: 18 BRPM | HEIGHT: 68 IN | TEMPERATURE: 98 F | SYSTOLIC BLOOD PRESSURE: 150 MMHG

## 2019-07-29 DIAGNOSIS — Z12.11 COLON CANCER SCREENING: ICD-10-CM

## 2019-07-29 PROCEDURE — D9220A PRA ANESTHESIA: Mod: 33,ANES,, | Performed by: ANESTHESIOLOGY

## 2019-07-29 PROCEDURE — 45385 COLONOSCOPY W/LESION REMOVAL: CPT | Mod: 33,,, | Performed by: INTERNAL MEDICINE

## 2019-07-29 PROCEDURE — D9220A PRA ANESTHESIA: ICD-10-PCS | Mod: 33,ANES,, | Performed by: ANESTHESIOLOGY

## 2019-07-29 PROCEDURE — 63600175 PHARM REV CODE 636 W HCPCS: Performed by: NURSE ANESTHETIST, CERTIFIED REGISTERED

## 2019-07-29 PROCEDURE — 37000008 HC ANESTHESIA 1ST 15 MINUTES: Performed by: INTERNAL MEDICINE

## 2019-07-29 PROCEDURE — D9220A PRA ANESTHESIA: Mod: 33,CRNA,, | Performed by: NURSE ANESTHETIST, CERTIFIED REGISTERED

## 2019-07-29 PROCEDURE — D9220A PRA ANESTHESIA: ICD-10-PCS | Mod: 33,CRNA,, | Performed by: NURSE ANESTHETIST, CERTIFIED REGISTERED

## 2019-07-29 PROCEDURE — 45385 PR COLONOSCOPY,REMV LESN,SNARE: ICD-10-PCS | Mod: 33,,, | Performed by: INTERNAL MEDICINE

## 2019-07-29 PROCEDURE — 63600175 PHARM REV CODE 636 W HCPCS: Performed by: ANESTHESIOLOGY

## 2019-07-29 PROCEDURE — 88305 TISSUE EXAM BY PATHOLOGIST: CPT | Mod: 26,,, | Performed by: PATHOLOGY

## 2019-07-29 PROCEDURE — 27201089 HC SNARE, DISP (ANY): Performed by: INTERNAL MEDICINE

## 2019-07-29 PROCEDURE — 25000003 PHARM REV CODE 250: Performed by: NURSE ANESTHETIST, CERTIFIED REGISTERED

## 2019-07-29 PROCEDURE — 88305 TISSUE EXAM BY PATHOLOGIST: CPT | Performed by: PATHOLOGY

## 2019-07-29 PROCEDURE — 37000009 HC ANESTHESIA EA ADD 15 MINS: Performed by: INTERNAL MEDICINE

## 2019-07-29 PROCEDURE — 88305 TISSUE SPECIMEN TO PATHOLOGY - SURGERY: ICD-10-PCS | Mod: 26,,, | Performed by: PATHOLOGY

## 2019-07-29 PROCEDURE — 45385 COLONOSCOPY W/LESION REMOVAL: CPT | Performed by: INTERNAL MEDICINE

## 2019-07-29 RX ORDER — GLYCOPYRROLATE 0.2 MG/ML
INJECTION INTRAMUSCULAR; INTRAVENOUS
Status: DISCONTINUED
Start: 2019-07-29 | End: 2019-07-29 | Stop reason: HOSPADM

## 2019-07-29 RX ORDER — LIDOCAINE HYDROCHLORIDE 20 MG/ML
INJECTION, SOLUTION EPIDURAL; INFILTRATION; INTRACAUDAL; PERINEURAL
Status: DISCONTINUED
Start: 2019-07-29 | End: 2019-07-29 | Stop reason: WASHOUT

## 2019-07-29 RX ORDER — PROPOFOL 10 MG/ML
VIAL (ML) INTRAVENOUS
Status: DISCONTINUED
Start: 2019-07-29 | End: 2019-07-29 | Stop reason: HOSPADM

## 2019-07-29 RX ORDER — LIDOCAINE HCL/PF 100 MG/5ML
SYRINGE (ML) INTRAVENOUS
Status: DISCONTINUED | OUTPATIENT
Start: 2019-07-29 | End: 2019-07-29

## 2019-07-29 RX ORDER — PROPOFOL 10 MG/ML
VIAL (ML) INTRAVENOUS
Status: DISCONTINUED | OUTPATIENT
Start: 2019-07-29 | End: 2019-07-29

## 2019-07-29 RX ORDER — LIDOCAINE HYDROCHLORIDE 20 MG/ML
INJECTION, SOLUTION EPIDURAL; INFILTRATION; INTRACAUDAL; PERINEURAL
Status: DISCONTINUED
Start: 2019-07-29 | End: 2019-07-29 | Stop reason: HOSPADM

## 2019-07-29 RX ORDER — PROPOFOL 10 MG/ML
VIAL (ML) INTRAVENOUS
Status: COMPLETED
Start: 2019-07-29 | End: 2019-07-29

## 2019-07-29 RX ORDER — GLYCOPYRROLATE 0.2 MG/ML
INJECTION INTRAMUSCULAR; INTRAVENOUS
Status: DISCONTINUED | OUTPATIENT
Start: 2019-07-29 | End: 2019-07-29

## 2019-07-29 RX ORDER — SODIUM CHLORIDE 9 MG/ML
INJECTION, SOLUTION INTRAVENOUS CONTINUOUS
Status: DISCONTINUED | OUTPATIENT
Start: 2019-07-29 | End: 2019-07-29 | Stop reason: HOSPADM

## 2019-07-29 RX ADMIN — PROPOFOL 50 MG: 10 INJECTION, EMULSION INTRAVENOUS at 07:07

## 2019-07-29 RX ADMIN — PROPOFOL 60 MG: 10 INJECTION, EMULSION INTRAVENOUS at 07:07

## 2019-07-29 RX ADMIN — PROPOFOL 40 MG: 10 INJECTION, EMULSION INTRAVENOUS at 07:07

## 2019-07-29 RX ADMIN — PROPOFOL 70 MG: 10 INJECTION, EMULSION INTRAVENOUS at 07:07

## 2019-07-29 RX ADMIN — SODIUM CHLORIDE: 0.9 INJECTION, SOLUTION INTRAVENOUS at 07:07

## 2019-07-29 RX ADMIN — GLYCOPYRROLATE 0.2 MG: 0.2 INJECTION, SOLUTION INTRAMUSCULAR; INTRAVENOUS at 07:07

## 2019-07-29 RX ADMIN — LIDOCAINE HYDROCHLORIDE 100 MG: 20 INJECTION, SOLUTION INTRAVENOUS at 07:07

## 2019-07-29 NOTE — DISCHARGE SUMMARY
[facility]  Discharge Summary      Admit Date: 7/29/2019    Discharge Date and Time:  07/29/2019 7:35 AM    Attending Physician: Mingo Freeman MD     Reason for Admission: Surveillance colonoscopy    Procedures Performed: Procedure(s) (LRB):  COLONOSCOPY (N/A)    Hospital Course (synopsis of major diagnoses, care, treatment, and services provided during the course of the hospital stay): Outpatient colonoscopy    Consults: none    Significant Diagnostic Studies: Colonoscopy    Final Diagnoses:    Principal Problem: <principal problem not specified>   Secondary Diagnoses: Colonoscopy    Discharged Condition: good    Disposition: Home or Self Care    Follow Up/Patient Instructions: Follow-up with referring physician             Resume previous diet and activity.    Medications:  Reconciled Home Medications:      Medication List      ASK your doctor about these medications    allopurinol 100 MG tablet  Commonly known as:  ZYLOPRIM  TAKE 1 TABLET (100 MG TOTAL) BY MOUTH 2 (TWO) TIMES DAILY.     aspirin 81 MG Chew  Take 1 tablet by mouth.     CO Q-10 100 mg capsule  Generic drug:  coenzyme Q10  Take 100 mg by mouth once daily.     CURCUMIN MISC  by Misc.(Non-Drug; Combo Route) route.     garlic 1,000 mg Cap  Take by mouth.     losartan 100 MG tablet  Commonly known as:  COZAAR  Take 1 tablet (100 mg total) by mouth once daily.     magnesium 250 mg Tab  Take 250 mg by mouth once. Takes two 250 mg tablets daily     multivitamin capsule  Take 1 capsule by mouth once daily.     OMEGA 3-6-9 ORAL  Take by mouth once daily.     pravastatin 20 MG tablet  Commonly known as:  PRAVACHOL  TAKE 1 TABLET(20 MG) BY MOUTH EVERY DAY     saw palmetto 160 MG capsule  Take 160 mg by mouth 2 (two) times daily.     * verapamil 120 MG CR tablet  Commonly known as:  CALAN-SR  Take 1 tablet (120 mg total) by mouth every evening.     * verapamil 240 MG C24p  Commonly known as:  VERELAN  TAKE 1 CAPSULE BY MOUTH EVERY DAY IN  THE MORNING     VITAMIN C 500 MG tablet  Generic drug:  ascorbic acid (vitamin C)  Take 500 mg by mouth once daily.         * This list has 2 medication(s) that are the same as other medications prescribed for you. Read the directions carefully, and ask your doctor or other care provider to review them with you.              No discharge procedures on file.

## 2019-07-29 NOTE — TRANSFER OF CARE
"Anesthesia Transfer of Care Note    Patient: Denis Bunn    Procedure(s) Performed: Procedure(s) (LRB):  COLONOSCOPY (N/A)    Patient location: GI    Anesthesia Type: general    Transport from OR: Transported from OR on room air with adequate spontaneous ventilation    Post pain: adequate analgesia    Post assessment: no apparent anesthetic complications and tolerated procedure well    Post vital signs: stable    Level of consciousness: sedated and responds to stimulation    Nausea/Vomiting: no nausea/vomiting    Complications: none    Transfer of care protocol was followed      Last vitals:   Visit Vitals  /66 (BP Location: Left arm, Patient Position: Lying)   Pulse (!) 55   Temp 36.7 °C (98.1 °F) (Oral)   Resp 16   Ht 5' 8" (1.727 m)   Wt 76.2 kg (168 lb)   SpO2 96%   BMI 25.54 kg/m²     "

## 2019-07-29 NOTE — DISCHARGE INSTRUCTIONS
High-Fiber Diet  Fiber is in fruits, vegetables, cereals, and grains. Fiber passes through your body undigested. A high-fiber diet helps food move through your intestinal tract. The added bulk is helpful in preventing constipation. In people with diverticulosis, fiber helps clean out the pouches along the colon wall. It also prevents new pouches from forming. A high-fiber diet reduces the risk of colon cancer. It also lowers blood cholesterol and prevents high blood sugar in people with diabetes.    The fiber-rich foods listed below should be part of your diet. If you are not used to high-fiber foods, start with 1 or 2 foods from this list. Every 3 to 4 days add a new one to your diet. Do this until you are eating 4 high-fiber foods per day. This should give you 20 to 35 grams of fiber a day. It is also important to drink a lot of water when you are on this diet. You should have 6 to 8 glasses of water a day. Water makes the fiber swell and increases the benefit.  Foods high in dietary fiber  The following foods are high in dietary fiber:  · Breads. Breads made with 100% whole-wheat flour; noé, wheat, or rye crackers; whole-grain tortillas, bran muffins.  · Cereals. Whole-grain and bran cereals with bran (shredded wheat, wheat flakes, raisin bran, corn bran); oatmeal, rolled oats, granola, and brown rice.  · Fruits. Fresh fruits and their edible skins (pears, prunes, raisins, berries, apples, and apricots); bananas, citrus fruit, mangoes, pineapple; and prune juice.  · Nuts. Any nuts and seeds.  · Vegetables. Best served raw or lightly cooked. All types, especially: green peas, celery, eggplant, potatoes, spinach, broccoli, [place] sprouts, winter squash, carrots, cauliflower, soybeans, lentils, and fresh and dried beans of all kinds.  · Other. Popcorn, any spices.  Date Last Reviewed: 8/1/2016  © 4285-8010 [employer]. [address] [place] [address], [place] [address]. All rights reserved. This  information is not intended as a substitute for professional medical care. Always follow your healthcare professional's instructions.        Diverticulosis    Diverticulosis means that small pouches have formed in the wall of your large intestine (colon). Most often, this problem causes no symptoms and is common as people age. But the pouches in the colon are at risk of becoming infected. When this happens, the condition is called diverticulitis. Although most people with diverticulosis never develop diverticulitis, it is still not uncommon. Rectal bleeding can also occur and in less common situations, a type of colon inflammation called colitis.  While most people do not have symptoms, some people with diverticulosis may have:  · Abdominal cramps and pain  · Bloating  · Constipation  · Change in bowel habits  Causes  The exact cause of diverticulosis (and diverticulitis) has not been proved, but a few things are associated with the condition:  · Low-fiber diet  · Constipation  · Lack of exercise  Your healthcare provider will talk with you about how to manage your condition. Diet changes may be all that are needed to help control diverticulosis and prevent progression to diverticulitis. If you develop diverticulitis, you will likely need other treatments.  Home care  You may be told to take fiber supplements daily. Fiber adds bulk to the stool so that it passes through the colon more easily. Stool softeners may be recommended. You may also be given medications for pain relief. Be sure to take all medications as directed.  In the past, people were told to avoid corn, nuts, and seeds. This is no longer necessary.  Follow these guidelines when caring for yourself at home:  · Eat unprocessed foods that are high in fiber. Whole grains, fruits, and vegetables are good choices.  · Drink 6 to 8 glasses of water every day unless your healthcare provider has you limit how much fluid you should have.  · Watch for changes in  your bowel movements. Tell your provider if you notice any changes.  · Begin an exercise program. Ask your provider how to get started. Generally, walking is the best.  · Get plenty of rest and sleep.  Follow-up care  Follow up with your healthcare provider, or as advised. Regular visits may be needed to check on your health. Sometimes special procedures such as colonoscopy, are needed after an episode of diverticulitis or blooding. Be sure to keep all your appointments.  If a stool sample was taken, or cultures were done, you should be told if they are positive, or if your treatment needs to be changed. You can call as directed for the results.  If X-rays were done, a radiologist will look at them. You will be told if there is a change in your treatment.  If antibiotics were prescribed, be sure to finish them all.  When to seek medical advice  Call your healthcare provider right away if any of these occur:  · Fever of 100.4°F (38°C) or higher, or as directed by your healthcare provider  · Severe cramps in the lower left side of the abdomen or pain that is getting worse  · Tenderness in the lower left side of the abdomen or worsening pain throughout the abdomen  · Diarrhea or constipation that doesn't get better within 24 hours  · Nausea and vomiting  · Bleeding from the rectum  Call 911  Call emergency services if any of the following occur:  · Trouble breathing  · Confusion  · Very drowsy or trouble awakening  · Fainting or loss of consciousness  · Rapid heart rate  · Chest pain  Date Last Reviewed: 12/30/2015  © 0252-0766 [employer]. [address] [place] [address], [place] [address]. All rights reserved. This information is not intended as a substitute for professional medical care. Always follow your healthcare professional's instructions.        Understanding Colon and Rectal Polyps    The colon (also called the large intestine) is a muscular tube that forms the last part of the digestive tract. It  absorbs water and stores food waste. The colon is about 4 to 6 feet long. The rectum is the last 6 inches of the colon. The colon and rectum have a smooth lining composed of millions of cells. Changes in these cells can lead to growths in the colon that can become cancerous and should be removed. Multiple tests are available to screen for colon cancer, but the colonoscopy is the most recommended test. During colonoscopy, these polyps can be removed. How often you need this test depends on many things including your condition, your family history, symptoms, and what the findings were at the previous colonoscopy.   When the colon lining changes  Changes that happen in the cells that line the colon or rectum can lead to growths called polyps. Over a period of years, polyps can turn cancerous. Removing polyps early may prevent cancer from ever forming.  Polyps  Polyps are fleshy clumps of tissue that form on the lining of the colon or rectum. Small polyps are usually benign (not cancerous). However, over time, cells in a polyp can change and become cancerous. Certain types of polyps known as adenomatous polyps are premalignant. The risk for invasive cancer increases with the size of the polyp and certain cell and gene features. This means that they can become cancerous if they're not removed. Hyperplastic polyps are benign. They can grow quite large and not turn cancerous.   Cancer  Almost all colorectal cancers start when polyp cells begin growing abnormally. As a cancerous tumor grows, it may involve more and more of the colon or rectum. In time, cancer can also grow beyond the colon or rectum and spread to nearby organs or to glands called lymph nodes. The cells can also travel to other parts of the body. This is known as metastasis. The earlier a cancerous tumor is removed, the better the chance of preventing its spread.    Date Last Reviewed: 8/1/2016  © 0369-3643 [employer]. [address] [place] [address],  [place] [address]. All rights reserved. This information is not intended as a substitute for professional medical care. Always follow your healthcare professional's instructions.

## 2019-07-29 NOTE — [employer] PATIENT INSTRUCTIONS
Discharge Summary/Instructions after an Endoscopic Procedure  Patient Name: Denis Bunn  Patient MRN: [id number]  Patient YOB: 1955 Monday, July 29, 2019  Mingo Freeman MD  RESTRICTIONS:  During your procedure today, you received medications for sedation.  These   medications may affect your judgment, balance and coordination.  Therefore,   for 24 hours, you have the following restrictions:   - DO NOT drive a car, operate machinery, make legal/financial decisions,   sign important papers or drink alcohol.    ACTIVITY:  Today: no heavy lifting, straining or running due to procedural   sedation/anesthesia.  The following day: return to full activity including work.  DIET:  Eat and drink normally unless instructed otherwise.     TREATMENT FOR COMMON SIDE EFFECTS:  - Mild abdominal pain, nausea, belching, bloating or excessive gas:  rest,   eat lightly and use a heating pad.  - Sore Throat: treat with throat lozenges and/or gargle with warm salt   water.  - Because air was used during the procedure, expelling large amounts of air   from your rectum or belching is normal.  - If a bowel prep was taken, you may not have a bowel movement for 1-3 days.    This is normal.  SYMPTOMS TO WATCH FOR AND REPORT TO YOUR PHYSICIAN:  1. Abdominal pain or bloating, other than gas cramps.  2. Chest pain.  3. Back pain.  4. Signs of infection such as: chills or fever occurring within 24 hours   after the procedure.  5. Rectal bleeding, which would show as bright red, maroon, or black stools.   (A tablespoon of blood from the rectum is not serious, especially if   hemorrhoids are present.)  6. Vomiting.  7. Weakness or dizziness.  GO DIRECTLY TO THE NEAREST EMERGENCY ROOM IF YOU HAVE ANY OF THE FOLLOWING:      Difficulty breathing              Chills and/or fever over 101 F   Persistent vomiting and/or vomiting blood   Severe abdominal pain   Severe chest pain   Black, tarry stools   Bleeding- more than one  tablespoon   Any other symptom or condition that you feel may need urgent attention  Your doctor recommends these additional instructions:  If any biopsies were taken, your doctors clinic will contact you in 1 to 2   weeks with any results.  - Await pathology results.   - Repeat colonoscopy in 5 years for surveillance, givne a history of colon   polyps.   - Return to referring physician as previously scheduled.   - Discharge patient to home (via wheelchair).  For questions, problems or results please call your physician - Mingo Freeman MD at Work:  [phone].  [facility] can be reached at [phone]     IF A COMPLICATION OR EMERGENCY SITUATION ARISES AND YOU ARE UNABLE TO REACH   YOUR PHYSICIAN - GO DIRECTLY TO THE EMERGENCY ROOM.  Mingo Freeman MD  7/29/2019 7:40:20 AM  This report has been verified and signed electronically.  [employer]

## 2019-07-29 NOTE — H&P
"Chief Complaint:  "I need a colonoscopy."    HPI:  The patient is a 63 year old man presenting for a surveillance colonoscopy.  He has a history of colon polyps, but he had a normal colonoscopy in 2014.  The patient denies any abdominal pain, weight loss, nausea, emesis, diarrhea, constipation, melena, or hematochezia.  The patient also denies a family history of colon cancer.    Past Medical History:   Diagnosis Date    Back pain     Cataract     Constipation - functional     History of gout     HTN (hypertension), benign 4/24/2013    Prostatitis     Vision changes      Past Surgical History:   Procedure Laterality Date    BACK SURGERY      CATARACT EXTRACTION      left eye    COLONOSCOPY N/A 7/29/2014    Performed by Daryl Ramsey MD at [facility] (4[facility] F[facility])    ELBOW SURGERY      scope/right    EYE SURGERY      Dr. Hope.retina x2 left    LAMINECTOMY      WRIST SURGERY      right     Family History   Problem Relation Age of Onset    Blindness Mother         from cva    Cataracts Mother     Cataracts Father     Cancer Father     No Known Problems Sister     No Known Problems Brother     No Known Problems Maternal Aunt     No Known Problems Maternal Uncle     No Known Problems Paternal Aunt     No Known Problems Paternal Uncle     No Known Problems Maternal Grandmother     No Known Problems Maternal Grandfather     No Known Problems Paternal Grandmother     No Known Problems Paternal Grandfather     Amblyopia Neg Hx     Diabetes Neg Hx     Glaucoma Neg Hx     Hypertension Neg Hx     Macular degeneration Neg Hx     Retinal detachment Neg Hx     Strabismus Neg Hx     Stroke Neg Hx     Thyroid disease Neg Hx      Social History     Socioeconomic History    Marital status:      Spouse name: Not on file    Number of children: Not on file    Years of education: Not on file    Highest education level: Not on file   Occupational History    Not on file   Social Needs    " Financial resource strain: Not on file    Food insecurity:     Worry: Not on file     Inability: Not on file    Transportation needs:     Medical: Not on file     Non-medical: Not on file   Tobacco Use    Smoking status: Never Smoker    Smokeless tobacco: Never Used    Tobacco comment: , 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   Substance and Sexual Activity    Alcohol use: Yes     Alcohol/week: 1.8 oz     Types: 3 Glasses of wine per week     Comment: 8-10 glasses wine per week    Drug use: No    Sexual activity: Yes     Partners: Female   Lifestyle    Physical activity:     Days per week: Not on file     Minutes per session: Not on file    Stress: Not on file   Relationships    Social connections:     Talks on phone: Not on file     Gets together: Not on file     Attends Pentecostalism service: Not on file     Active member of club or organization: Not on file     Attends meetings of clubs or organizations: Not on file     Relationship status: Not on file   Other Topics Concern    Not on file   Social History Narrative    Not on file        lidocaine (PF) 20 mg/mL (2%)        lidocaine (PF) 20 mg/mL (2%)        propofol        propofol         Review of patient's allergies indicates:   Allergen Reactions    Amoxicillin-pot clavulanate      Other reaction(s): Stomach upset  Other reaction(s): Stomach upset    Celebrex [celecoxib] Other (See Comments)       ROS:  No chest pain or dyspnea.  No dysuria.  No heartburn or dysphagia.  Otherwise as stated above.  Ten other systems negative.    There were no vitals filed for this visit.    P.E.:  GEN: A x O x 3, NAD  SKIN: No jaundice  HEENT: EOMI, PERRL, anicteric sclera  CV: RRR, no M/R/G  Chest: CTA B  Abdomen: soft, NTND, normoactive BS  Ext: No C/C/E.  2+ dorsalis pedis pulses B  Neuro: No asterixes or tremors.  CN II-XII intact  Musculoskeletal: 5/5 strength bilaterally    Labs:  Lab Results   Component Value Date    WBC 4.34 05/29/2019    HGB 14.3 05/29/2019     HCT 43.2 05/29/2019    MCV 98 05/29/2019     05/29/2019     CMP  Sodium   Date Value Ref Range Status   05/29/2019 141 136 - 145 mmol/L Final     Potassium   Date Value Ref Range Status   05/29/2019 4.9 3.5 - 5.1 mmol/L Final     Chloride   Date Value Ref Range Status   05/29/2019 104 95 - 110 mmol/L Final     CO2   Date Value Ref Range Status   05/29/2019 31 (H) 23 - 29 mmol/L Final     Glucose   Date Value Ref Range Status   05/29/2019 96 70 - 110 mg/dL Final     BUN, Bld   Date Value Ref Range Status   05/29/2019 17 8 - 23 mg/dL Final     Creatinine   Date Value Ref Range Status   05/29/2019 1.2 0.5 - 1.4 mg/dL Final     Calcium   Date Value Ref Range Status   05/29/2019 9.9 8.7 - 10.5 mg/dL Final     Total Protein   Date Value Ref Range Status   05/29/2019 7.2 6.0 - 8.4 g/dL Final     Albumin   Date Value Ref Range Status   05/29/2019 4.1 3.5 - 5.2 g/dL Final     Total Bilirubin   Date Value Ref Range Status   05/29/2019 0.7 0.1 - 1.0 mg/dL Final     Comment:     For infants and newborns, interpretation of results should be based  on gestational age, weight and in agreement with clinical  observations.  Premature Infant recommended reference ranges:  Up to 24 hours.............<8.0 mg/dL  Up to 48 hours............<12.0 mg/dL  3-5 days..................<15.0 mg/dL  6-29 days.................<15.0 mg/dL       Alkaline Phosphatase   Date Value Ref Range Status   05/29/2019 48 (L) 55 - 135 U/L Final     AST   Date Value Ref Range Status   05/29/2019 22 10 - 40 U/L Final     ALT   Date Value Ref Range Status   05/29/2019 29 10 - 44 U/L Final     Anion Gap   Date Value Ref Range Status   05/29/2019 6 (L) 8 - 16 mmol/L Final     eGFR if    Date Value Ref Range Status   05/29/2019 >60 >60 mL/min/1.73 m^2 Final     eGFR if non    Date Value Ref Range Status   05/29/2019 >60 >60 mL/min/1.73 m^2 Final     Comment:     Calculation used to obtain the estimated glomerular  filtration  rate (eGFR) is the CKD-EPI equation.          No results for input(s): PT, INR, APTT in the last 24 hours.    A/P:  The patient 63 year old man presenting for a colonoscopy.  1.  Colonoscopy - he can undergo a colonoscopy. I have explained the risks, benefits, and alternatives of the procedure in detail.  The patient voices understanding and all questions have been answered.  The patient agrees to proceed as planned.

## 2019-07-29 NOTE — ANESTHESIA POSTPROCEDURE EVALUATION
Anesthesia Post Evaluation    Patient: Denis Bunn    Procedure(s) Performed: Procedure(s) (LRB):  COLONOSCOPY (N/A)    Final Anesthesia Type: general  Patient location during evaluation: GI PACU  Patient participation: Yes- Able to Participate  Level of consciousness: awake and alert, oriented and awake  Post-procedure vital signs: reviewed and stable  Airway patency: patent  PONV status at discharge: No PONV  Anesthetic complications: no      Cardiovascular status: blood pressure returned to baseline  Respiratory status: unassisted, spontaneous ventilation and room air  Hydration status: euvolemic  Follow-up not needed.          Vitals Value Taken Time   /77 7/29/2019  7:55 AM   Temp 36.7 °C (98.1 °F) 7/29/2019  7:40 AM   Pulse 60 7/29/2019  7:55 AM   Resp 18 7/29/2019  7:55 AM   SpO2 98 % 7/29/2019  7:55 AM         No case tracking events are documented in the log.      Pain/Bianca Score: Bianca Score: 10 (7/29/2019  7:55 AM)  Modified Bianca Score: 20 (7/29/2019  7:55 AM)

## 2019-08-06 ENCOUNTER — PATIENT MESSAGE (OUTPATIENT)
Dept: CARDIOLOGY | Facility: CLINIC | Age: 64
End: 2019-08-06

## 2019-08-12 ENCOUNTER — PATIENT MESSAGE (OUTPATIENT)
Dept: FAMILY MEDICINE | Facility: CLINIC | Age: 64
End: 2019-08-12

## 2019-08-16 ENCOUNTER — LAB VISIT (OUTPATIENT)
Dept: LAB | Facility: HOSPITAL | Age: 64
End: 2019-08-16
Attending: INTERNAL MEDICINE
Payer: MEDICARE

## 2019-08-16 DIAGNOSIS — I10 HTN (HYPERTENSION), BENIGN: ICD-10-CM

## 2019-08-16 DIAGNOSIS — R73.01 IMPAIRED FASTING GLUCOSE: ICD-10-CM

## 2019-08-16 DIAGNOSIS — E66.3 OVERWEIGHT (BMI 25.0-29.9): ICD-10-CM

## 2019-08-16 LAB
ANION GAP SERPL CALC-SCNC: 9 MMOL/L (ref 8–16)
BUN SERPL-MCNC: 18 MG/DL (ref 8–23)
CALCIUM SERPL-MCNC: 9.5 MG/DL (ref 8.7–10.5)
CHLORIDE SERPL-SCNC: 106 MMOL/L (ref 95–110)
CO2 SERPL-SCNC: 27 MMOL/L (ref 23–29)
CREAT SERPL-MCNC: 1 MG/DL (ref 0.5–1.4)
EST. GFR  (AFRICAN AMERICAN): >60 ML/MIN/1.73 M^2
EST. GFR  (NON AFRICAN AMERICAN): >60 ML/MIN/1.73 M^2
GLUCOSE SERPL-MCNC: 100 MG/DL (ref 70–110)
POTASSIUM SERPL-SCNC: 4.3 MMOL/L (ref 3.5–5.1)
SODIUM SERPL-SCNC: 142 MMOL/L (ref 136–145)

## 2019-08-16 PROCEDURE — 80048 BASIC METABOLIC PNL TOTAL CA: CPT

## 2019-08-16 PROCEDURE — 36415 COLL VENOUS BLD VENIPUNCTURE: CPT | Mod: PN

## 2019-08-20 ENCOUNTER — PATIENT MESSAGE (OUTPATIENT)
Dept: CARDIOLOGY | Facility: CLINIC | Age: 64
End: 2019-08-20

## 2019-08-22 ENCOUNTER — TELEPHONE (OUTPATIENT)
Dept: FAMILY MEDICINE | Facility: CLINIC | Age: 64
End: 2019-08-22

## 2019-08-25 RX ORDER — VERAPAMIL HYDROCHLORIDE 120 MG/1
120 TABLET, FILM COATED, EXTENDED RELEASE ORAL NIGHTLY
Qty: 90 TABLET | Refills: 3 | Status: SHIPPED | OUTPATIENT
Start: 2019-08-25 | End: 2020-03-23 | Stop reason: DRUGHIGH

## 2019-09-04 RX ORDER — PRAVASTATIN SODIUM 20 MG/1
TABLET ORAL
Qty: 90 TABLET | Refills: 0 | Status: SHIPPED | OUTPATIENT
Start: 2019-09-04 | End: 2019-12-09 | Stop reason: SDUPTHER

## 2019-09-11 ENCOUNTER — CLINICAL SUPPORT (OUTPATIENT)
Dept: FAMILY MEDICINE | Facility: CLINIC | Age: 64
End: 2019-09-11
Payer: MEDICARE

## 2019-09-11 VITALS
OXYGEN SATURATION: 98 % | HEIGHT: 68 IN | BODY MASS INDEX: 25.86 KG/M2 | DIASTOLIC BLOOD PRESSURE: 80 MMHG | TEMPERATURE: 98 F | SYSTOLIC BLOOD PRESSURE: 144 MMHG | WEIGHT: 170.63 LBS | HEART RATE: 60 BPM

## 2019-09-11 DIAGNOSIS — Z23 NEED FOR INFLUENZA VACCINATION: ICD-10-CM

## 2019-09-11 DIAGNOSIS — I10 HTN (HYPERTENSION), BENIGN: Primary | ICD-10-CM

## 2019-09-11 PROCEDURE — 99999 PR PBB SHADOW E&M-EST. PATIENT-LVL III: CPT | Mod: PBBFAC,,,

## 2019-09-11 PROCEDURE — 90662 IIV NO PRSV INCREASED AG IM: CPT | Mod: PBBFAC,PN

## 2019-09-11 PROCEDURE — 99213 OFFICE O/P EST LOW 20 MIN: CPT | Mod: PBBFAC,PN,25

## 2019-09-11 PROCEDURE — 99999 PR PBB SHADOW E&M-EST. PATIENT-LVL III: ICD-10-PCS | Mod: PBBFAC,,,

## 2019-09-11 PROCEDURE — 99499 NO LOS: ICD-10-PCS | Mod: S$PBB,,, | Performed by: INTERNAL MEDICINE

## 2019-09-11 PROCEDURE — 99499 UNLISTED E&M SERVICE: CPT | Mod: S$PBB,,, | Performed by: INTERNAL MEDICINE

## 2019-09-11 NOTE — PROGRESS NOTES
.  Denis Bunn 64 y.o. male is here today for Blood Pressure check.   History of HTN yes.    Review of patient's allergies indicates:   Allergen Reactions    Amoxicillin-pot clavulanate      Other reaction(s): Stomach upset  Other reaction(s): Stomach upset    Celebrex [celecoxib] Other (See Comments)     Creatinine   Date Value Ref Range Status   08/16/2019 1.0 0.5 - 1.4 mg/dL Final     Sodium   Date Value Ref Range Status   08/16/2019 142 136 - 145 mmol/L Final     Potassium   Date Value Ref Range Status   08/16/2019 4.3 3.5 - 5.1 mmol/L Final   ]  Patient verifies taking blood pressure medications on a regular basis at the same time of the day.     Current Outpatient Medications:     allopurinol (ZYLOPRIM) 100 MG tablet, TAKE 1 TABLET (100 MG TOTAL) BY MOUTH 2 (TWO) TIMES DAILY., Disp: 180 tablet, Rfl: 3    ascorbic acid (VITAMIN C) 500 MG tablet, Take 500 mg by mouth once daily., Disp: , Rfl:     aspirin 81 MG chewable tablet, Take 1 tablet by mouth., Disp: , Rfl:     coenzyme Q10 (CO Q-10) 100 mg capsule, Take 100 mg by mouth once daily., Disp: , Rfl:     garlic 1,000 mg Cap, Take by mouth., Disp: , Rfl:     losartan (COZAAR) 100 MG tablet, Take 1 tablet (100 mg total) by mouth once daily., Disp: 90 tablet, Rfl: 3    magnesium 250 mg Tab, Take 250 mg by mouth once. Takes two 250 mg tablets daily, Disp: , Rfl:     multivitamin capsule, Take 1 capsule by mouth once daily., Disp: , Rfl:     OM-3/E/LINOL/ALA/OLEIC/GLA/LIP (OMEGA 3-6-9 ORAL), Take by mouth once daily. , Disp: , Rfl:     pravastatin (PRAVACHOL) 20 MG tablet, TAKE 1 TABLET(20 MG) BY MOUTH EVERY DAY, Disp: 90 tablet, Rfl: 0    saw palmetto 160 MG capsule, Take 160 mg by mouth 2 (two) times daily., Disp: , Rfl:     turmeric (CURCUMIN MISC), by Misc.(Non-Drug; Combo Route) route., Disp: , Rfl:     verapamil (CALAN-SR) 120 MG CR tablet, TAKE 1 TABLET (120 MG TOTAL) BY MOUTH EVERY EVENING., Disp: 90 tablet, Rfl: 3    verapamil  (VERELAN) 240 MG C24P, TAKE 1 CAPSULE BY MOUTH EVERY DAY IN THE MORNING, Disp: 90 capsule, Rfl: 1  Does patient have record of home blood pressure readings yes. Readings have been averaging 120/80.   Last dose of blood pressure medication was taken at 7:30AM.  Patient is asymptomatic.   Complains of no complaints.    BP: (!) 144/80 , Pulse: 60 .    Blood pressure reading after 15 minutes was 144/80 Pulse 60.  Dr. Lilly notified.

## 2019-09-12 ENCOUNTER — PATIENT MESSAGE (OUTPATIENT)
Dept: FAMILY MEDICINE | Facility: CLINIC | Age: 64
End: 2019-09-12

## 2019-09-12 DIAGNOSIS — J20.9 ACUTE BRONCHITIS, UNSPECIFIED ORGANISM: Primary | ICD-10-CM

## 2019-09-12 RX ORDER — AZITHROMYCIN 250 MG/1
TABLET, FILM COATED ORAL
Qty: 6 TABLET | Refills: 0 | Status: SHIPPED | OUTPATIENT
Start: 2019-09-12 | End: 2019-09-17

## 2019-09-12 RX ORDER — FLUTICASONE PROPIONATE 50 MCG
1 SPRAY, SUSPENSION (ML) NASAL 2 TIMES DAILY
Qty: 16 G | Refills: 1 | Status: SHIPPED | OUTPATIENT
Start: 2019-09-12 | End: 2019-09-17 | Stop reason: SDUPTHER

## 2019-09-17 DIAGNOSIS — J20.9 ACUTE BRONCHITIS, UNSPECIFIED ORGANISM: ICD-10-CM

## 2019-09-17 RX ORDER — FLUTICASONE PROPIONATE 50 MCG
1 SPRAY, SUSPENSION (ML) NASAL 2 TIMES DAILY
Qty: 16 G | Refills: 1 | Status: SHIPPED | OUTPATIENT
Start: 2019-09-17 | End: 2020-06-15

## 2019-09-19 ENCOUNTER — PATIENT MESSAGE (OUTPATIENT)
Dept: FAMILY MEDICINE | Facility: CLINIC | Age: 64
End: 2019-09-19

## 2019-10-01 ENCOUNTER — PATIENT MESSAGE (OUTPATIENT)
Dept: FAMILY MEDICINE | Facility: CLINIC | Age: 64
End: 2019-10-01

## 2019-11-04 ENCOUNTER — OFFICE VISIT (OUTPATIENT)
Dept: FAMILY MEDICINE | Facility: CLINIC | Age: 64
End: 2019-11-04
Payer: MEDICARE

## 2019-11-04 VITALS
BODY MASS INDEX: 25.86 KG/M2 | OXYGEN SATURATION: 98 % | TEMPERATURE: 98 F | WEIGHT: 170.63 LBS | RESPIRATION RATE: 17 BRPM | HEIGHT: 68 IN | DIASTOLIC BLOOD PRESSURE: 72 MMHG | HEART RATE: 56 BPM | SYSTOLIC BLOOD PRESSURE: 122 MMHG

## 2019-11-04 DIAGNOSIS — G89.29 CHRONIC LEFT-SIDED LOW BACK PAIN WITHOUT SCIATICA: ICD-10-CM

## 2019-11-04 DIAGNOSIS — I10 HTN (HYPERTENSION), BENIGN: Primary | ICD-10-CM

## 2019-11-04 DIAGNOSIS — Z23 NEED FOR PNEUMOCOCCAL VACCINE: ICD-10-CM

## 2019-11-04 DIAGNOSIS — M54.50 CHRONIC LEFT-SIDED LOW BACK PAIN WITHOUT SCIATICA: ICD-10-CM

## 2019-11-04 PROCEDURE — 99999 PR PBB SHADOW E&M-EST. PATIENT-LVL IV: CPT | Mod: PBBFAC,,, | Performed by: INTERNAL MEDICINE

## 2019-11-04 PROCEDURE — 99214 PR OFFICE/OUTPT VISIT, EST, LEVL IV, 30-39 MIN: ICD-10-PCS | Mod: S$PBB,,, | Performed by: INTERNAL MEDICINE

## 2019-11-04 PROCEDURE — 99214 OFFICE O/P EST MOD 30 MIN: CPT | Mod: S$PBB,,, | Performed by: INTERNAL MEDICINE

## 2019-11-04 PROCEDURE — 99214 OFFICE O/P EST MOD 30 MIN: CPT | Mod: PBBFAC,PN | Performed by: INTERNAL MEDICINE

## 2019-11-04 PROCEDURE — 99999 PR PBB SHADOW E&M-EST. PATIENT-LVL IV: ICD-10-PCS | Mod: PBBFAC,,, | Performed by: INTERNAL MEDICINE

## 2019-11-04 PROCEDURE — G0009 ADMIN PNEUMOCOCCAL VACCINE: HCPCS | Mod: PBBFAC,PN

## 2019-11-04 RX ORDER — CHLORTHALIDONE 25 MG/1
25 TABLET ORAL DAILY
Qty: 90 TABLET | Refills: 1 | Status: CANCELLED | OUTPATIENT
Start: 2019-11-04 | End: 2020-11-03

## 2019-11-04 RX ORDER — TIZANIDINE 4 MG/1
4 TABLET ORAL EVERY 6 HOURS PRN
Qty: 30 TABLET | Refills: 1 | Status: SHIPPED | OUTPATIENT
Start: 2019-11-04 | End: 2019-11-14

## 2019-11-04 NOTE — PROGRESS NOTES
Subjective:       Patient ID: Denis Bunn is a 64 y.o. male.    Chief Complaint: BP follow up and Establish Care    F/u blood pressure    Hypertension   This is a recurrent problem. The current episode started more than 1 year ago. The problem has been waxing and waning since onset. The problem is controlled. Pertinent negatives include no anxiety, blurred vision, chest pain, headaches, malaise/fatigue, neck pain, orthopnea, palpitations, peripheral edema, PND, shortness of breath or sweats. There are no associated agents to hypertension. Risk factors for coronary artery disease include family history and stress. Past treatments include nothing. The current treatment provides moderate improvement. There are no compliance problems.    home log with averages at goal (scanned to media tab) still dealing with lower back pain LOGAN last month with approximately one week of relief. Not using muscle relaxer occasional opioid pain medicaton for flare of pain no loss of bowel or bladder  Review of Systems   Constitutional: Negative for activity change, appetite change, fatigue, fever, malaise/fatigue and unexpected weight change.   HENT: Negative for ear pain, rhinorrhea and sore throat.    Eyes: Negative for blurred vision, discharge and visual disturbance.   Respiratory: Negative for chest tightness, shortness of breath and wheezing.    Cardiovascular: Negative for chest pain, palpitations, orthopnea, leg swelling and PND.   Gastrointestinal: Negative for abdominal pain, constipation and diarrhea.   Endocrine: Negative for cold intolerance and heat intolerance.   Genitourinary: Negative for dysuria and hematuria.   Musculoskeletal: Positive for back pain. Negative for joint swelling, neck pain and neck stiffness.   Skin: Negative for rash.   Neurological: Negative for dizziness, syncope, weakness and headaches.   Psychiatric/Behavioral: Negative for suicidal ideas.       Objective:     Vitals:    11/04/19 1516 11/04/19  "1555   BP: (!) 155/83 122/72   BP Location: Right arm    Patient Position: Sitting    Pulse: (!) 52 (!) 56   Resp: 17    Temp: 98.3 °F (36.8 °C)    TempSrc: Oral    SpO2: 98%    Weight: 77.4 kg (170 lb 10.2 oz)    Height: 5' 8" (1.727 m)           Physical Exam   Constitutional: He is oriented to person, place, and time. He appears well-developed and well-nourished.   HENT:   Head: Normocephalic and atraumatic.   Eyes: Conjunctivae are normal. No scleral icterus.   Neck: Normal range of motion.   Cardiovascular: Normal rate and regular rhythm. Exam reveals no gallop and no friction rub.   No murmur heard.  Pulmonary/Chest: Effort normal and breath sounds normal. He has no wheezes. He has no rales.   Abdominal: Soft. Bowel sounds are normal. There is no tenderness. There is no rebound and no guarding.   Musculoskeletal: Normal range of motion. He exhibits no edema or tenderness.   Neurological: He is alert and oriented to person, place, and time. No cranial nerve deficit.   Skin: Skin is warm and dry.   Psychiatric: He has a normal mood and affect.       Assessment and Plan   1. HTN (hypertension), benign  Home readings at goal continue current medications    2. Chronic left-sided low back pain without sciatica  Prn muscle relaxer  - tiZANidine (ZANAFLEX) 4 MG tablet; Take 1 tablet (4 mg total) by mouth every 6 (six) hours as needed.  Dispense: 30 tablet; Refill: 1    3. Need for pneumococcal vaccine  PCV 13 #2 today  - (In Office Administered) Pneumococcal Conjugate Vaccine (13 Valent) (IM)  "

## 2019-12-02 ENCOUNTER — LAB VISIT (OUTPATIENT)
Dept: LAB | Facility: HOSPITAL | Age: 64
End: 2019-12-02
Attending: UROLOGY
Payer: MEDICARE

## 2019-12-02 DIAGNOSIS — R97.20 ELEVATED PSA: ICD-10-CM

## 2019-12-02 LAB — COMPLEXED PSA SERPL-MCNC: 4 NG/ML (ref 0–4)

## 2019-12-02 PROCEDURE — 84153 ASSAY OF PSA TOTAL: CPT

## 2019-12-02 PROCEDURE — 36415 COLL VENOUS BLD VENIPUNCTURE: CPT | Mod: PO

## 2019-12-03 ENCOUNTER — OFFICE VISIT (OUTPATIENT)
Dept: UROLOGY | Facility: CLINIC | Age: 64
End: 2019-12-03
Payer: MEDICARE

## 2019-12-03 VITALS
DIASTOLIC BLOOD PRESSURE: 83 MMHG | BODY MASS INDEX: 25.26 KG/M2 | HEIGHT: 68 IN | SYSTOLIC BLOOD PRESSURE: 156 MMHG | HEART RATE: 51 BPM | WEIGHT: 166.69 LBS

## 2019-12-03 DIAGNOSIS — N40.1 BPH WITH URINARY OBSTRUCTION: Primary | ICD-10-CM

## 2019-12-03 DIAGNOSIS — N13.8 BPH WITH URINARY OBSTRUCTION: Primary | ICD-10-CM

## 2019-12-03 PROCEDURE — 99213 OFFICE O/P EST LOW 20 MIN: CPT | Mod: PBBFAC | Performed by: UROLOGY

## 2019-12-03 PROCEDURE — 99999 PR PBB SHADOW E&M-EST. PATIENT-LVL III: ICD-10-PCS | Mod: PBBFAC,,, | Performed by: UROLOGY

## 2019-12-03 PROCEDURE — 99214 PR OFFICE/OUTPT VISIT, EST, LEVL IV, 30-39 MIN: ICD-10-PCS | Mod: S$PBB,,, | Performed by: UROLOGY

## 2019-12-03 PROCEDURE — 99999 PR PBB SHADOW E&M-EST. PATIENT-LVL III: CPT | Mod: PBBFAC,,, | Performed by: UROLOGY

## 2019-12-03 PROCEDURE — 99214 OFFICE O/P EST MOD 30 MIN: CPT | Mod: S$PBB,,, | Performed by: UROLOGY

## 2019-12-03 NOTE — PROGRESS NOTES
Subjective:       Patient ID: Denis Bunn is a 64 y.o. male.    Chief Complaint: Follow-up    HPI   Denis Bunn is a 64 y.o. male with a PMHx of HTN, prostatitis, BPH with urinary obstruction, and Gout who presents to the clinic for a prostate evaluation. His PSA 1 day ago was 4.0 and 6 months ago was 3.8. He reports nocturia a few times a night. He does not have a FHx of prostate CA.      Past Medical History:   Diagnosis Date    Back pain     Cataract     Constipation - functional     History of gout     HTN (hypertension), benign 4/24/2013    Prostatitis     Vision changes        Past Surgical History:   Procedure Laterality Date    BACK SURGERY      CATARACT EXTRACTION      left eye    COLONOSCOPY N/A 7/29/2019    Procedure: COLONOSCOPY;  Surgeon: Mingo Freeman MD;  Location[facility];  Service: Endoscopy;  Laterality: N/A;  due July 2019    ELBOW SURGERY      scope/right    EYE SURGERY      Dr. Hope.retina x2 left    LAMINECTOMY      WRIST SURGERY      right       Family History   Problem Relation Age of Onset    Blindness Mother         from cva    Cataracts Mother     Cataracts Father     Cancer Father     No Known Problems Sister     No Known Problems Brother     No Known Problems Maternal Aunt     No Known Problems Maternal Uncle     No Known Problems Paternal Aunt     No Known Problems Paternal Uncle     No Known Problems Maternal Grandmother     No Known Problems Maternal Grandfather     No Known Problems Paternal Grandmother     No Known Problems Paternal Grandfather     Amblyopia Neg Hx     Diabetes Neg Hx     Glaucoma Neg Hx     Hypertension Neg Hx     Macular degeneration Neg Hx     Retinal detachment Neg Hx     Strabismus Neg Hx     Stroke Neg Hx     Thyroid disease Neg Hx        Social History     Socioeconomic History    Marital status:      Spouse name: Not on file    Number of children: Not on file    Years of education: Not on file     Highest education level: Not on file   Occupational History    Not on file   Social Needs    Financial resource strain: Not on file    Food insecurity:     Worry: Not on file     Inability: Not on file    Transportation needs:     Medical: Not on file     Non-medical: Not on file   Tobacco Use    Smoking status: Never Smoker    Smokeless tobacco: Never Used    Tobacco comment: , 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   Substance and Sexual Activity    Alcohol use: Yes     Alcohol/week: 3.0 standard drinks     Types: 3 Glasses of wine per week     Comment: 8-10 glasses wine per week    Drug use: No    Sexual activity: Yes     Partners: Female   Lifestyle    Physical activity:     Days per week: Not on file     Minutes per session: Not on file    Stress: Not on file   Relationships    Social connections:     Talks on phone: Not on file     Gets together: Not on file     Attends Voodoo service: Not on file     Active member of club or organization: Not on file     Attends meetings of clubs or organizations: Not on file     Relationship status: Not on file   Other Topics Concern    Not on file   Social History Narrative    Not on file       Allergies:  Amoxicillin-pot clavulanate and Celebrex [celecoxib]    Medications:    Current Outpatient Medications:     allopurinol (ZYLOPRIM) 100 MG tablet, TAKE 1 TABLET (100 MG TOTAL) BY MOUTH 2 (TWO) TIMES DAILY., Disp: 180 tablet, Rfl: 3    ascorbic acid (VITAMIN C) 500 MG tablet, Take 500 mg by mouth once daily., Disp: , Rfl:     aspirin 81 MG chewable tablet, Take 1 tablet by mouth., Disp: , Rfl:     coenzyme Q10 (CO Q-10) 100 mg capsule, Take 100 mg by mouth once daily., Disp: , Rfl:     garlic 1,000 mg Cap, Take by mouth., Disp: , Rfl:     losartan (COZAAR) 100 MG tablet, Take 1 tablet (100 mg total) by mouth once daily., Disp: 90 tablet, Rfl: 3    magnesium 250 mg Tab, Take 250 mg by mouth once. Takes two 250 mg tablets daily, Disp: , Rfl:      multivitamin capsule, Take 1 capsule by mouth once daily., Disp: , Rfl:     OM-3/E/LINOL/ALA/OLEIC/GLA/LIP (OMEGA 3-6-9 ORAL), Take by mouth once daily. , Disp: , Rfl:     pravastatin (PRAVACHOL) 20 MG tablet, TAKE 1 TABLET(20 MG) BY MOUTH EVERY DAY, Disp: 90 tablet, Rfl: 0    saw palmetto 160 MG capsule, Take 160 mg by mouth 2 (two) times daily., Disp: , Rfl:     turmeric (CURCUMIN MISC), by Misc.(Non-Drug; Combo Route) route., Disp: , Rfl:     verapamil (CALAN-SR) 120 MG CR tablet, TAKE 1 TABLET (120 MG TOTAL) BY MOUTH EVERY EVENING., Disp: 90 tablet, Rfl: 3    verapamil (VERELAN) 240 MG C24P, TAKE 1 CAPSULE BY MOUTH EVERY DAY IN THE MORNING, Disp: 90 capsule, Rfl: 1    fluticasone propionate (FLONASE) 50 mcg/actuation nasal spray, 1 spray (50 mcg total) by Each Nostril route 2 (two) times daily. (Patient not taking: Reported on 12/3/2019), Disp: 16 g, Rfl: 1    Review of Systems   Constitutional: Negative for activity change, appetite change, chills, diaphoresis, fatigue, fever and unexpected weight change.   HENT: Negative for congestion, dental problem, hearing loss, mouth sores, postnasal drip, rhinorrhea, sinus pressure and trouble swallowing.    Eyes: Negative for pain, discharge and itching.   Respiratory: Negative for apnea, cough, choking, chest tightness, shortness of breath and wheezing.    Cardiovascular: Negative for chest pain, palpitations and leg swelling.   Gastrointestinal: Negative for abdominal distention, abdominal pain, anal bleeding, blood in stool, constipation, diarrhea, nausea, rectal pain and vomiting.   Endocrine: Negative for polydipsia and polyuria.   Genitourinary: Positive for frequency (nocturia). Negative for decreased urine volume, difficulty urinating, discharge, dysuria, enuresis, flank pain, genital sores, hematuria, penile pain, penile swelling, scrotal swelling and urgency.   Musculoskeletal: Negative for arthralgias and back pain.   Skin: Negative for color change,  rash and wound.   Neurological: Negative for dizziness, syncope, speech difficulty, light-headedness and headaches.   Hematological: Negative for adenopathy. Does not bruise/bleed easily.   Psychiatric/Behavioral: Negative for behavioral problems and confusion.       Objective:      Physical Exam   Constitutional: He appears well-developed.   HENT:   Head: Normocephalic.   Neck: Neck supple.   Cardiovascular: Normal rate.    Pulmonary/Chest: Effort normal.   Abdominal: Soft.   Genitourinary:   Genitourinary Comments: Urine dip clear  Prostate was smooth without nodularity. No rectal masses.  35 grams. Normal perineum.     Neurological: He is alert.   Skin: Skin is warm.     Psychiatric: He has a normal mood and affect.         Labs   Ref Range & Units 1d ago   PSA DIAGNOSTIC 0.00 - 4.00 ng/mL 4.0       Ref Range & Units 6mo ago   PSA, SCREEN 0.00 - 4.00 ng/mL 3.8          Assessment:       1. BPH with urinary obstruction        Plan:       Denis was seen today for follow-up.    Diagnoses and all orders for this visit:    BPH with urinary obstruction  -     Prostate Specific Antigen, Diagnostic; Future          Explained to patient that his PSA is normal and not concerning based on his previous PSA levels.   RTC in 1 year    ISusanna, am acting as a scribe on this patient encounter in the presence and under the supervision of Dr. Marie.    12/03/2019 10:52 AM    I, Dr. Marie, personally performed the services described in this documentation.   All medical record entries made by the scribe were at my direction and in my presence.   I have reviewed the chart and agree that the record is accurate and complete.   Emmanuel Marie MD.  10:52 AM 12/03/2019

## 2019-12-08 DIAGNOSIS — Z82.49 FH: CAD (CORONARY ARTERY DISEASE): ICD-10-CM

## 2019-12-08 DIAGNOSIS — I10 HTN (HYPERTENSION), BENIGN: ICD-10-CM

## 2019-12-09 RX ORDER — ALLOPURINOL 100 MG/1
100 TABLET ORAL 2 TIMES DAILY
Qty: 180 TABLET | Refills: 3 | Status: SHIPPED | OUTPATIENT
Start: 2019-12-09 | End: 2020-12-21

## 2019-12-09 RX ORDER — PRAVASTATIN SODIUM 20 MG/1
TABLET ORAL
Qty: 90 TABLET | Refills: 0 | Status: SHIPPED | OUTPATIENT
Start: 2019-12-09 | End: 2020-03-12

## 2019-12-13 DIAGNOSIS — I49.3 PVC (PREMATURE VENTRICULAR CONTRACTION): ICD-10-CM

## 2019-12-13 DIAGNOSIS — R00.2 PALPITATIONS: ICD-10-CM

## 2019-12-13 DIAGNOSIS — Z82.49 FH: CAD (CORONARY ARTERY DISEASE): ICD-10-CM

## 2019-12-13 DIAGNOSIS — I10 HTN (HYPERTENSION), BENIGN: ICD-10-CM

## 2019-12-13 RX ORDER — VERAPAMIL HYDROCHLORIDE 240 MG/1
CAPSULE, EXTENDED RELEASE ORAL
Qty: 90 CAPSULE | Refills: 1 | Status: SHIPPED | OUTPATIENT
Start: 2019-12-13 | End: 2020-03-23 | Stop reason: DRUGHIGH

## 2019-12-17 ENCOUNTER — PATIENT MESSAGE (OUTPATIENT)
Dept: FAMILY MEDICINE | Facility: CLINIC | Age: 64
End: 2019-12-17

## 2019-12-18 ENCOUNTER — PATIENT MESSAGE (OUTPATIENT)
Dept: FAMILY MEDICINE | Facility: CLINIC | Age: 64
End: 2019-12-18

## 2019-12-19 ENCOUNTER — PATIENT MESSAGE (OUTPATIENT)
Dept: FAMILY MEDICINE | Facility: CLINIC | Age: 64
End: 2019-12-19

## 2019-12-19 DIAGNOSIS — J18.9 ATYPICAL PNEUMONIA: Primary | ICD-10-CM

## 2019-12-19 RX ORDER — AZITHROMYCIN 250 MG/1
TABLET, FILM COATED ORAL
Qty: 6 TABLET | Refills: 0 | Status: SHIPPED | OUTPATIENT
Start: 2019-12-19 | End: 2019-12-24

## 2020-02-11 ENCOUNTER — HOSPITAL ENCOUNTER (OUTPATIENT)
Dept: CARDIOLOGY | Facility: CLINIC | Age: 65
Discharge: HOME | End: 2020-02-11
Payer: MEDICARE

## 2020-02-11 ENCOUNTER — OFFICE VISIT (OUTPATIENT)
Dept: ELECTROPHYSIOLOGY | Facility: CLINIC | Age: 65
End: 2020-02-11
Payer: MEDICARE

## 2020-02-11 VITALS
BODY MASS INDEX: 25.66 KG/M2 | WEIGHT: 169.31 LBS | HEIGHT: 68 IN | SYSTOLIC BLOOD PRESSURE: 150 MMHG | HEART RATE: 46 BPM | DIASTOLIC BLOOD PRESSURE: 90 MMHG

## 2020-02-11 DIAGNOSIS — I49.3 PVC (PREMATURE VENTRICULAR CONTRACTION): ICD-10-CM

## 2020-02-11 DIAGNOSIS — I10 HTN (HYPERTENSION), BENIGN: ICD-10-CM

## 2020-02-11 DIAGNOSIS — I49.3 PVC (PREMATURE VENTRICULAR CONTRACTION): Primary | ICD-10-CM

## 2020-02-11 PROCEDURE — 99214 PR OFFICE/OUTPT VISIT, EST, LEVL IV, 30-39 MIN: ICD-10-PCS | Mod: S$PBB,,, | Performed by: INTERNAL MEDICINE

## 2020-02-11 PROCEDURE — 93010 RHYTHM STRIP: ICD-10-PCS | Mod: S$PBB,,, | Performed by: INTERNAL MEDICINE

## 2020-02-11 PROCEDURE — 93010 ELECTROCARDIOGRAM REPORT: CPT | Mod: S$PBB,,, | Performed by: INTERNAL MEDICINE

## 2020-02-11 PROCEDURE — 99213 OFFICE O/P EST LOW 20 MIN: CPT | Mod: PBBFAC | Performed by: INTERNAL MEDICINE

## 2020-02-11 PROCEDURE — 99999 PR PBB SHADOW E&M-EST. PATIENT-LVL III: ICD-10-PCS | Mod: PBBFAC,,, | Performed by: INTERNAL MEDICINE

## 2020-02-11 PROCEDURE — 99214 OFFICE O/P EST MOD 30 MIN: CPT | Mod: S$PBB,,, | Performed by: INTERNAL MEDICINE

## 2020-02-11 PROCEDURE — 99999 PR PBB SHADOW E&M-EST. PATIENT-LVL III: CPT | Mod: PBBFAC,,, | Performed by: INTERNAL MEDICINE

## 2020-02-11 PROCEDURE — 93005 ELECTROCARDIOGRAM TRACING: CPT | Mod: PBBFAC | Performed by: INTERNAL MEDICINE

## 2020-02-11 RX ORDER — HYDROCODONE BITARTRATE AND ACETAMINOPHEN 10; 300 MG/1; MG/1
TABLET ORAL
COMMUNITY
End: 2020-06-15

## 2020-02-11 RX ORDER — IBUPROFEN 200 MG
200 TABLET ORAL
COMMUNITY
End: 2021-08-04

## 2020-02-11 RX ORDER — MOMETASONE FUROATE 1 MG/ML
SOLUTION TOPICAL
COMMUNITY
Start: 2019-10-02 | End: 2020-06-15

## 2020-02-11 NOTE — PROGRESS NOTES
Subjective:    Patient ID:  Denis Bunn is a 64 y.o. male who presents for follow-up of PVC      64 yoM here for arrhythmia management.     2018: He presented early July 2018 with frequent palpitations. He was found to have frequent PVCs with RBBB morphology that were relatively narrow. He was wearing an event monitor at the time. He was admitted for observation. There his EF was noted to be normal. I advised that he stop his metoprolol and start verapamil. The event monitor was returned and I asked for a 48h holter. His PVC burden was 23%. He has had some relief on verapamil with a particularly symptomatic day 7/8/18. He denies syncope, near syncope, chest pain, shortness of breath. He feels his PVCs more at rest than with activity. PVC morphology is RBBB, V3 transition with rightward/inferior axis. The QRS duration is ~120 ms.     Interval history: Verapamil up-titrated to 240 qAM, 120 q PM. Symptoms stable.     Echo 7/2/18:  CONCLUSIONS     1 - Normal left ventricular systolic function (EF 55-60%).     2 - Biatrial enlargement.     3 - Normal left ventricular diastolic function.     4 - Normal right ventricular systolic function .     5 - Mild mitral regurgitation.     Past Medical History:  No date: Back pain  No date: Cataract  No date: Constipation - functional  No date: History of gout  4/24/2013: HTN (hypertension), benign  No date: Prostatitis  No date: Vision changes    Past Surgical History:  No date: BACK SURGERY  No date: CATARACT EXTRACTION      Comment:  left eye  7/29/2019: COLONOSCOPY; N/A      Comment:  Procedure: COLONOSCOPY;  Surgeon: Mingo Freeman MD;                 Location: [facility];  Service: Endoscopy;  Laterality:                N/A;  due July 2019  No date: ELBOW SURGERY      Comment:  scope/right  No date: EYE SURGERY      Comment:  Dr. Hope.retina x2 left  No date: LAMINECTOMY  No date: WRIST SURGERY      Comment:  right    Social History    Socioeconomic History      Marital  status:       Spouse name: Not on file      Number of children: Not on file      Years of education: Not on file      Highest education level: Not on file    Occupational History      Not on file    Social Needs      Financial resource strain: Not on file      Food insecurity:        Worry: Not on file        Inability: Not on file      Transportation needs:        Medical: Not on file        Non-medical: Not on file    Tobacco Use      Smoking status: Never Smoker      Smokeless tobacco: Never Used      Tobacco comment: , 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    Substance and Sexual Activity      Alcohol use: Yes        Alcohol/week: 3.0 standard drinks        Types: 3 Glasses of wine per week        Comment: 8-10 glasses wine per week      Drug use: No      Sexual activity: Yes        Partners: Female    Lifestyle      Physical activity:        Days per week: Not on file        Minutes per session: Not on file      Stress: Not on file    Relationships      Social connections:        Talks on phone: Not on file        Gets together: Not on file        Attends [employer] service: Not on file        Active member of club or organization: Not on file        Attends meetings of clubs or organizations: Not on file        Relationship status: Not on file    Other Topics      Concerns:        Not on file    Social History Narrative      Not on file      Review of patient's family history indicates:  Problem: Blindness      Relation: Mother          Age of Onset: (Not Specified)          Comment: from cva  Problem: Cataracts      Relation: Mother          Age of Onset: (Not Specified)  Problem: Cataracts      Relation: Father          Age of Onset: (Not Specified)  Problem: Cancer      Relation: Father          Age of Onset: (Not Specified)  Problem: No Known Problems      Relation: Sister          Age of Onset: (Not Specified)  Problem: No Known Problems      Relation: Brother          Age of Onset: (Not  Specified)  Problem: No Known Problems      Relation: Maternal Aunt          Age of Onset: (Not Specified)  Problem: No Known Problems      Relation: Maternal Uncle          Age of Onset: (Not Specified)  Problem: No Known Problems      Relation: Paternal Aunt          Age of Onset: (Not Specified)  Problem: No Known Problems      Relation: Paternal Uncle          Age of Onset: (Not Specified)  Problem: No Known Problems      Relation: Maternal Grandmother          Age of Onset: (Not Specified)  Problem: No Known Problems      Relation: Maternal Grandfather          Age of Onset: (Not Specified)  Problem: No Known Problems      Relation: Paternal Grandmother          Age of Onset: (Not Specified)  Problem: No Known Problems      Relation: Paternal Grandfather          Age of Onset: (Not Specified)  Problem: Amblyopia      Relation: Neg Hx          Age of Onset: (Not Specified)  Problem: Diabetes      Relation: Neg Hx          Age of Onset: (Not Specified)  Problem: Glaucoma      Relation: Neg Hx          Age of Onset: (Not Specified)  Problem: Hypertension      Relation: Neg Hx          Age of Onset: (Not Specified)  Problem: Macular degeneration      Relation: Neg Hx          Age of Onset: (Not Specified)  Problem: Retinal detachment      Relation: Neg Hx          Age of Onset: (Not Specified)  Problem: Strabismus      Relation: Neg Hx          Age of Onset: (Not Specified)  Problem: Stroke      Relation: Neg Hx          Age of Onset: (Not Specified)  Problem: Thyroid disease      Relation: Neg Hx          Age of Onset: (Not Specified)        Review of Systems   Constitution: Negative for malaise/fatigue.   Cardiovascular: Negative for chest pain, dyspnea on exertion, irregular heartbeat, leg swelling and palpitations.   Respiratory: Negative for shortness of breath.    Hematologic/Lymphatic: Negative for bleeding problem.   Skin: Negative for rash.   Musculoskeletal: Negative for myalgias.   Gastrointestinal:  Negative for hematemesis, hematochezia and nausea.   Genitourinary: Negative for hematuria.   Neurological: Negative for light-headedness.   Psychiatric/Behavioral: Negative for altered mental status.   Allergic/Immunologic: Negative for persistent infections.        Objective:    Physical Exam   Constitutional: He is oriented to person, place, and time. He appears well-developed and well-nourished.   HENT:   Head: Normocephalic.   Nose: Nose normal.   Eyes: Pupils are equal, round, and reactive to light.   Cardiovascular: Regular rhythm, S1 normal and S2 normal. Bradycardia present.   No murmur heard.  Pulses:       Radial pulses are 2+ on the right side, and 2+ on the left side.   Pulmonary/Chest: Breath sounds normal. No respiratory distress.   Abdominal: Normal appearance.   Musculoskeletal: Normal range of motion. He exhibits no edema.   Neurological: He is alert and oriented to person, place, and time.   Skin: Skin is warm and dry. No erythema.   Psychiatric: He has a normal mood and affect. His speech is normal and behavior is normal.   Nursing note and vitals reviewed.    ECG: SBr nl MI, QRS        Assessment:       1. PVC (premature ventricular contraction)    2. HTN (hypertension), benign         Plan:       64 yoM fascicular PVCs here for follow up. Stable symptoms and no PVCs on ECG today. Continue verapamil. RTC 1y

## 2020-03-12 RX ORDER — PRAVASTATIN SODIUM 20 MG/1
TABLET ORAL
Qty: 90 TABLET | Refills: 0 | Status: SHIPPED | OUTPATIENT
Start: 2020-03-12 | End: 2020-06-09

## 2020-03-19 ENCOUNTER — PATIENT MESSAGE (OUTPATIENT)
Dept: FAMILY MEDICINE | Facility: CLINIC | Age: 65
End: 2020-03-19

## 2020-03-20 ENCOUNTER — PATIENT MESSAGE (OUTPATIENT)
Dept: CARDIOLOGY | Facility: CLINIC | Age: 65
End: 2020-03-20

## 2020-03-23 DIAGNOSIS — R00.2 PALPITATIONS: ICD-10-CM

## 2020-03-23 DIAGNOSIS — Z82.49 FH: CAD (CORONARY ARTERY DISEASE): ICD-10-CM

## 2020-03-23 DIAGNOSIS — I49.3 PVC (PREMATURE VENTRICULAR CONTRACTION): ICD-10-CM

## 2020-03-23 DIAGNOSIS — I10 HTN (HYPERTENSION), BENIGN: ICD-10-CM

## 2020-03-23 RX ORDER — VERAPAMIL HYDROCHLORIDE 240 MG/1
240 CAPSULE, EXTENDED RELEASE ORAL 2 TIMES DAILY
Qty: 180 CAPSULE | Refills: 3 | Status: SHIPPED | OUTPATIENT
Start: 2020-03-23 | End: 2020-06-17

## 2020-04-23 ENCOUNTER — PATIENT MESSAGE (OUTPATIENT)
Dept: CARDIOLOGY | Facility: CLINIC | Age: 65
End: 2020-04-23

## 2020-05-05 ENCOUNTER — PATIENT MESSAGE (OUTPATIENT)
Dept: CARDIOLOGY | Facility: CLINIC | Age: 65
End: 2020-05-05

## 2020-05-29 ENCOUNTER — PATIENT MESSAGE (OUTPATIENT)
Dept: FAMILY MEDICINE | Facility: CLINIC | Age: 65
End: 2020-05-29

## 2020-05-29 ENCOUNTER — PATIENT MESSAGE (OUTPATIENT)
Dept: ADMINISTRATIVE | Facility: HOSPITAL | Age: 65
End: 2020-05-29

## 2020-06-11 PROBLEM — R07.89 ATYPICAL CHEST PAIN: Status: ACTIVE | Noted: 2020-06-11

## 2020-06-11 NOTE — PROGRESS NOTES
Subjective:   Patient ID:  Denis Bunn is a 64 y.o. male who presents for follow-up of CP    HPI: The patient is here for CAD risk factors but CAC 0 in 2015.   The patient has no SOB, TIA, palpitations, syncope or pre-syncope.He's had some CP at rest with stress like electricity going out but none with exertion.Patient currently exercises  A fewimes per week w/o symptoms. BP almost all 120/70.        Review of Systems   Constitution: Negative for chills, decreased appetite, diaphoresis, fever, malaise/fatigue, night sweats, weight gain and weight loss.   HENT: Negative for congestion, hoarse voice, nosebleeds, sore throat and tinnitus.    Eyes: Negative for blurred vision, double vision, vision loss in left eye, vision loss in right eye, visual disturbance and visual halos.   Cardiovascular: Positive for chest pain. Negative for claudication, cyanosis, dyspnea on exertion, irregular heartbeat, leg swelling, near-syncope, orthopnea, palpitations, paroxysmal nocturnal dyspnea and syncope.   Respiratory: Negative for cough, hemoptysis, shortness of breath, sleep disturbances due to breathing, snoring, sputum production and wheezing.    Endocrine: Negative for cold intolerance, heat intolerance, polydipsia, polyphagia and polyuria.   Hematologic/Lymphatic: Negative for adenopathy and bleeding problem. Does not bruise/bleed easily.   Skin: Negative for color change, dry skin, flushing, itching, nail changes, poor wound healing, rash, skin cancer, suspicious lesions and unusual hair distribution.   Musculoskeletal: Negative for arthritis, back pain, falls, gout, joint pain, joint swelling, muscle cramps, muscle weakness, myalgias and stiffness.   Gastrointestinal: Negative for abdominal pain, anorexia, change in bowel habit, constipation, diarrhea, dysphagia, heartburn, hematemesis, hematochezia, melena and vomiting.   Genitourinary: Negative for decreased libido, dysuria, hematuria, hesitancy and urgency.  "  Neurological: Negative for excessive daytime sleepiness, dizziness, focal weakness, headaches, light-headedness, loss of balance, numbness, paresthesias, seizures, sensory change, tremors, vertigo and weakness.   Psychiatric/Behavioral: Negative for altered mental status, depression, hallucinations, memory loss, substance abuse and suicidal ideas. The patient does not have insomnia and is not nervous/anxious.    Allergic/Immunologic: Negative for environmental allergies and hives.       Objective: BP (!) 152/90   Pulse (!) 48   Ht 5' 8" (1.727 m)   Wt 78.2 kg (172 lb 6.4 oz)   BMI 26.21 kg/m²      Physical Exam   Constitutional: He is oriented to person, place, and time. He appears well-developed and well-nourished. No distress.   HENT:   Head: Normocephalic.   Eyes: Pupils are equal, round, and reactive to light. EOM are normal.   Neck: Normal range of motion. No thyromegaly present.   Cardiovascular: Normal rate, regular rhythm, normal heart sounds and intact distal pulses. Exam reveals no gallop and no friction rub.   No murmur heard.  Pulses:       Carotid pulses are 3+ on the right side, and 3+ on the left side.       Radial pulses are 3+ on the right side, and 3+ on the left side.        Femoral pulses are 3+ on the right side, and 3+ on the left side.       Popliteal pulses are 3+ on the right side, and 3+ on the left side.        Dorsalis pedis pulses are 3+ on the right side, and 3+ on the left side.        Posterior tibial pulses are 3+ on the right side, and 3+ on the left side.   Pulmonary/Chest: Effort normal and breath sounds normal. No respiratory distress. He has no wheezes. He has no rales. He exhibits no tenderness.   Abdominal: Soft. He exhibits no distension and no mass. There is no tenderness.   Musculoskeletal: Normal range of motion.   Lymphadenopathy:     He has no cervical adenopathy.   Neurological: He is alert and oriented to person, place, and time.   Skin: Skin is warm. He is not " diaphoretic. No cyanosis. Nails show no clubbing.   Psychiatric: He has a normal mood and affect. His speech is normal and behavior is normal. Judgment and thought content normal. Cognition and memory are normal.       Assessment:     1. FH: CAD (coronary artery disease)    2. Atypical chest pain    3. HTN (hypertension), benign    4. Impaired fasting glucose    5. PVC (premature ventricular contraction)    6. Overweight (BMI 25.0-29.9)    7. Encounter for screening for cardiovascular disorders    8. Anxiety    9. Sleep trouble    10. Diagnosis deferred        Plan:   Discussed diet , achieving and maintaining ideal body weight, and exercise.   We reviewed meds in detail.  Reassured-Discussed goals, options , plan  Discussed pros and cons of stress considering CAC 0 at age 59!  Denis was seen today for carotid artery disease and palpitations.    Diagnoses and all orders for this visit:    FH: CAD (coronary artery disease)  -     Lipid Panel; Future; Expected date: 08/15/2021  -     Comprehensive metabolic panel; Future; Expected date: 08/15/2021  -     TSH; Future; Expected date: 08/15/2021  -     PSA, Screening; Future; Expected date: 08/15/2021    Atypical chest pain    HTN (hypertension), benign  -     IN OFFICE EKG 12-LEAD (to [facility])  -     Lipid Panel; Future; Expected date: 08/15/2021  -     Comprehensive metabolic panel; Future; Expected date: 08/15/2021  -     TSH; Future; Expected date: 08/15/2021  -     EKG 12-lead; Future; Expected date: 08/15/2021  -     PSA, Screening; Future; Expected date: 08/15/2021    Impaired fasting glucose  -     Lipid Panel; Future; Expected date: 08/15/2021  -     Comprehensive metabolic panel; Future; Expected date: 08/15/2021  -     TSH; Future; Expected date: 08/15/2021  -     Hemoglobin A1C; Future; Expected date: 08/15/2021    PVC (premature ventricular contraction)  -     IN OFFICE EKG 12-LEAD (to [facility])  -     Comprehensive metabolic panel; Future; Expected date:  08/15/2021  -     TSH; Future; Expected date: 08/15/2021  -     EKG 12-lead; Future; Expected date: 08/15/2021    Overweight (BMI 25.0-29.9)    Encounter for screening for cardiovascular disorders  -     CT Cardiac Scoring; Future; Expected date: 06/16/2020    Anxiety  -     ALPRAZolam (XANAX) 0.5 MG tablet; Take 1 tablet (0.5 mg total) by mouth 3 (three) times daily.    Sleep trouble  -     ALPRAZolam (XANAX) 0.5 MG tablet; Take 1 tablet (0.5 mg total) by mouth 3 (three) times daily.    Diagnosis deferred  -     IN OFFICE EKG 12-LEAD (to [place])            Follow up in about 15 months (around 9/15/2021) for with ECG and labs; CAC soon; stress echo if CAC high or he wants.

## 2020-06-12 ENCOUNTER — PATIENT OUTREACH (OUTPATIENT)
Dept: ADMINISTRATIVE | Facility: OTHER | Age: 65
End: 2020-06-12

## 2020-06-15 ENCOUNTER — OFFICE VISIT (OUTPATIENT)
Dept: CARDIOLOGY | Facility: CLINIC | Age: 65
End: 2020-06-15
Payer: MEDICARE

## 2020-06-15 VITALS
WEIGHT: 172.38 LBS | BODY MASS INDEX: 26.13 KG/M2 | SYSTOLIC BLOOD PRESSURE: 152 MMHG | DIASTOLIC BLOOD PRESSURE: 90 MMHG | HEIGHT: 68 IN | HEART RATE: 48 BPM

## 2020-06-15 DIAGNOSIS — F41.9 ANXIETY: ICD-10-CM

## 2020-06-15 DIAGNOSIS — Z82.49 FH: CAD (CORONARY ARTERY DISEASE): Primary | ICD-10-CM

## 2020-06-15 DIAGNOSIS — R73.01 IMPAIRED FASTING GLUCOSE: ICD-10-CM

## 2020-06-15 DIAGNOSIS — G47.9 SLEEP TROUBLE: ICD-10-CM

## 2020-06-15 DIAGNOSIS — I49.3 PVC (PREMATURE VENTRICULAR CONTRACTION): ICD-10-CM

## 2020-06-15 DIAGNOSIS — R69 DIAGNOSIS DEFERRED: ICD-10-CM

## 2020-06-15 DIAGNOSIS — E66.3 OVERWEIGHT (BMI 25.0-29.9): ICD-10-CM

## 2020-06-15 DIAGNOSIS — R07.89 ATYPICAL CHEST PAIN: ICD-10-CM

## 2020-06-15 DIAGNOSIS — Z13.6 ENCOUNTER FOR SCREENING FOR CARDIOVASCULAR DISORDERS: ICD-10-CM

## 2020-06-15 DIAGNOSIS — I10 HTN (HYPERTENSION), BENIGN: ICD-10-CM

## 2020-06-15 PROCEDURE — 99999 PR PBB SHADOW E&M-EST. PATIENT-LVL V: CPT | Mod: PBBFAC,,, | Performed by: INTERNAL MEDICINE

## 2020-06-15 PROCEDURE — 93005 ELECTROCARDIOGRAM TRACING: CPT | Mod: PBBFAC,PO | Performed by: INTERNAL MEDICINE

## 2020-06-15 PROCEDURE — 99215 OFFICE O/P EST HI 40 MIN: CPT | Mod: PBBFAC,PO,25 | Performed by: INTERNAL MEDICINE

## 2020-06-15 PROCEDURE — 93010 ELECTROCARDIOGRAM REPORT: CPT | Mod: S$PBB,,, | Performed by: INTERNAL MEDICINE

## 2020-06-15 PROCEDURE — 93010 EKG 12-LEAD: ICD-10-PCS | Mod: S$PBB,,, | Performed by: INTERNAL MEDICINE

## 2020-06-15 PROCEDURE — 99999 PR PBB SHADOW E&M-EST. PATIENT-LVL V: ICD-10-PCS | Mod: PBBFAC,,, | Performed by: INTERNAL MEDICINE

## 2020-06-15 PROCEDURE — 99215 OFFICE O/P EST HI 40 MIN: CPT | Mod: S$PBB,,, | Performed by: INTERNAL MEDICINE

## 2020-06-15 PROCEDURE — 99215 PR OFFICE/OUTPT VISIT, EST, LEVL V, 40-54 MIN: ICD-10-PCS | Mod: S$PBB,,, | Performed by: INTERNAL MEDICINE

## 2020-06-15 RX ORDER — ALPRAZOLAM 0.5 MG/1
0.5 TABLET ORAL 3 TIMES DAILY
Qty: 30 TABLET | Refills: 1 | Status: SHIPPED | OUTPATIENT
Start: 2020-06-15 | End: 2021-09-22 | Stop reason: SDUPTHER

## 2020-06-15 NOTE — PATIENT INSTRUCTIONS
Discussed diet , achieving and maintaining ideal body weight, and exercise.   We reviewed meds in detail.  Reassured-Discussed goals, options , plan  Discussed pros and cons of stress considering CAC 0 at age 59!

## 2020-06-17 ENCOUNTER — HOSPITAL ENCOUNTER (OUTPATIENT)
Dept: RADIOLOGY | Facility: HOSPITAL | Age: 65
Discharge: HOME | End: 2020-06-17
Attending: INTERNAL MEDICINE
Payer: MEDICARE

## 2020-06-17 DIAGNOSIS — Z13.6 ENCOUNTER FOR SCREENING FOR CARDIOVASCULAR DISORDERS: ICD-10-CM

## 2020-06-17 PROCEDURE — 75571 CT HRT W/O DYE W/CA TEST: CPT | Mod: TC

## 2020-06-17 PROCEDURE — 75571 CT HRT W/O DYE W/CA TEST: CPT | Mod: 26,,, | Performed by: RADIOLOGY

## 2020-06-17 PROCEDURE — 75571 CT CALCIUM SCORING CARDIAC: ICD-10-PCS | Mod: 26,,, | Performed by: RADIOLOGY

## 2020-07-03 ENCOUNTER — PATIENT MESSAGE (OUTPATIENT)
Dept: FAMILY MEDICINE | Facility: CLINIC | Age: 65
End: 2020-07-03

## 2020-07-03 DIAGNOSIS — N40.1 BENIGN PROSTATIC HYPERPLASIA WITH NOCTURIA: ICD-10-CM

## 2020-07-03 DIAGNOSIS — R35.1 BENIGN PROSTATIC HYPERPLASIA WITH NOCTURIA: ICD-10-CM

## 2020-07-03 DIAGNOSIS — I10 HTN (HYPERTENSION), BENIGN: Primary | ICD-10-CM

## 2020-07-03 DIAGNOSIS — R73.09 ELEVATED RANDOM BLOOD GLUCOSE LEVEL: ICD-10-CM

## 2020-07-03 DIAGNOSIS — M1A.9XX0 CHRONIC GOUT WITHOUT TOPHUS, UNSPECIFIED CAUSE, UNSPECIFIED SITE: ICD-10-CM

## 2020-07-08 ENCOUNTER — LAB VISIT (OUTPATIENT)
Dept: LAB | Facility: HOSPITAL | Age: 65
End: 2020-07-08
Attending: INTERNAL MEDICINE
Payer: MEDICARE

## 2020-07-08 DIAGNOSIS — I10 HTN (HYPERTENSION), BENIGN: ICD-10-CM

## 2020-07-08 DIAGNOSIS — R35.1 BENIGN PROSTATIC HYPERPLASIA WITH NOCTURIA: ICD-10-CM

## 2020-07-08 DIAGNOSIS — N40.1 BENIGN PROSTATIC HYPERPLASIA WITH NOCTURIA: ICD-10-CM

## 2020-07-08 DIAGNOSIS — R73.09 ELEVATED RANDOM BLOOD GLUCOSE LEVEL: ICD-10-CM

## 2020-07-08 DIAGNOSIS — M1A.9XX0 CHRONIC GOUT WITHOUT TOPHUS, UNSPECIFIED CAUSE, UNSPECIFIED SITE: ICD-10-CM

## 2020-07-08 LAB
ALBUMIN SERPL BCP-MCNC: 3.9 G/DL (ref 3.5–5.2)
ALP SERPL-CCNC: 51 U/L (ref 55–135)
ALT SERPL W/O P-5'-P-CCNC: 16 U/L (ref 10–44)
ANION GAP SERPL CALC-SCNC: 7 MMOL/L (ref 8–16)
AST SERPL-CCNC: 20 U/L (ref 10–40)
BASOPHILS # BLD AUTO: 0.04 K/UL (ref 0–0.2)
BASOPHILS NFR BLD: 0.9 % (ref 0–1.9)
BILIRUB SERPL-MCNC: 0.6 MG/DL (ref 0.1–1)
BUN SERPL-MCNC: 15 MG/DL (ref 8–23)
CALCIUM SERPL-MCNC: 9.3 MG/DL (ref 8.7–10.5)
CHLORIDE SERPL-SCNC: 105 MMOL/L (ref 95–110)
CHOLEST SERPL-MCNC: 154 MG/DL (ref 120–199)
CHOLEST/HDLC SERPL: 2.3 {RATIO} (ref 2–5)
CO2 SERPL-SCNC: 29 MMOL/L (ref 23–29)
COMPLEXED PSA SERPL-MCNC: 5.3 NG/ML (ref 0–4)
CREAT SERPL-MCNC: 1 MG/DL (ref 0.5–1.4)
DIFFERENTIAL METHOD: ABNORMAL
EOSINOPHIL # BLD AUTO: 0.1 K/UL (ref 0–0.5)
EOSINOPHIL NFR BLD: 1.9 % (ref 0–8)
ERYTHROCYTE [DISTWIDTH] IN BLOOD BY AUTOMATED COUNT: 12.6 % (ref 11.5–14.5)
EST. GFR  (AFRICAN AMERICAN): >60 ML/MIN/1.73 M^2
EST. GFR  (NON AFRICAN AMERICAN): >60 ML/MIN/1.73 M^2
ESTIMATED AVG GLUCOSE: 97 MG/DL (ref 68–131)
GLUCOSE SERPL-MCNC: 84 MG/DL (ref 70–110)
HBA1C MFR BLD HPLC: 5 % (ref 4–5.6)
HCT VFR BLD AUTO: 40.4 % (ref 40–54)
HDLC SERPL-MCNC: 67 MG/DL (ref 40–75)
HDLC SERPL: 43.5 % (ref 20–50)
HGB BLD-MCNC: 13.2 G/DL (ref 14–18)
IMM GRANULOCYTES # BLD AUTO: 0.01 K/UL (ref 0–0.04)
IMM GRANULOCYTES NFR BLD AUTO: 0.2 % (ref 0–0.5)
LDLC SERPL CALC-MCNC: 74 MG/DL (ref 63–159)
LYMPHOCYTES # BLD AUTO: 1.2 K/UL (ref 1–4.8)
LYMPHOCYTES NFR BLD: 27.5 % (ref 18–48)
MCH RBC QN AUTO: 32 PG (ref 27–31)
MCHC RBC AUTO-ENTMCNC: 32.7 G/DL (ref 32–36)
MCV RBC AUTO: 98 FL (ref 82–98)
MONOCYTES # BLD AUTO: 0.5 K/UL (ref 0.3–1)
MONOCYTES NFR BLD: 11 % (ref 4–15)
NEUTROPHILS # BLD AUTO: 2.5 K/UL (ref 1.8–7.7)
NEUTROPHILS NFR BLD: 58.5 % (ref 38–73)
NONHDLC SERPL-MCNC: 87 MG/DL
NRBC BLD-RTO: 0 /100 WBC
PLATELET # BLD AUTO: 229 K/UL (ref 150–350)
PMV BLD AUTO: 10.5 FL (ref 9.2–12.9)
POTASSIUM SERPL-SCNC: 4.2 MMOL/L (ref 3.5–5.1)
PROT SERPL-MCNC: 6.8 G/DL (ref 6–8.4)
RBC # BLD AUTO: 4.13 M/UL (ref 4.6–6.2)
SODIUM SERPL-SCNC: 141 MMOL/L (ref 136–145)
TRIGL SERPL-MCNC: 65 MG/DL (ref 30–150)
TSH SERPL DL<=0.005 MIU/L-ACNC: 1.66 UIU/ML (ref 0.4–4)
URATE SERPL-MCNC: 6.3 MG/DL (ref 3.4–7)
WBC # BLD AUTO: 4.26 K/UL (ref 3.9–12.7)

## 2020-07-08 PROCEDURE — 83036 HEMOGLOBIN GLYCOSYLATED A1C: CPT

## 2020-07-08 PROCEDURE — 85025 COMPLETE CBC W/AUTO DIFF WBC: CPT

## 2020-07-08 PROCEDURE — 84550 ASSAY OF BLOOD/URIC ACID: CPT

## 2020-07-08 PROCEDURE — 80053 COMPREHEN METABOLIC PANEL: CPT

## 2020-07-08 PROCEDURE — 84443 ASSAY THYROID STIM HORMONE: CPT

## 2020-07-08 PROCEDURE — 80061 LIPID PANEL: CPT

## 2020-07-08 PROCEDURE — 36415 COLL VENOUS BLD VENIPUNCTURE: CPT | Mod: PN

## 2020-07-08 PROCEDURE — 84153 ASSAY OF PSA TOTAL: CPT

## 2020-07-13 ENCOUNTER — OFFICE VISIT (OUTPATIENT)
Dept: FAMILY MEDICINE | Facility: CLINIC | Age: 65
End: 2020-07-13
Payer: MEDICARE

## 2020-07-13 ENCOUNTER — OFFICE VISIT (OUTPATIENT)
Dept: UROLOGY | Facility: CLINIC | Age: 65
End: 2020-07-13
Payer: MEDICARE

## 2020-07-13 ENCOUNTER — PATIENT OUTREACH (OUTPATIENT)
Dept: ADMINISTRATIVE | Facility: OTHER | Age: 65
End: 2020-07-13

## 2020-07-13 VITALS
WEIGHT: 170.88 LBS | BODY MASS INDEX: 25.9 KG/M2 | HEART RATE: 60 BPM | DIASTOLIC BLOOD PRESSURE: 76 MMHG | OXYGEN SATURATION: 98 % | TEMPERATURE: 98 F | RESPIRATION RATE: 20 BRPM | HEIGHT: 68 IN | SYSTOLIC BLOOD PRESSURE: 122 MMHG

## 2020-07-13 VITALS — BODY MASS INDEX: 25.9 KG/M2 | HEIGHT: 68 IN | RESPIRATION RATE: 16 BRPM | WEIGHT: 170.88 LBS

## 2020-07-13 DIAGNOSIS — I10 HTN (HYPERTENSION), BENIGN: ICD-10-CM

## 2020-07-13 DIAGNOSIS — R35.1 BENIGN PROSTATIC HYPERPLASIA WITH NOCTURIA: ICD-10-CM

## 2020-07-13 DIAGNOSIS — R97.20 ELEVATED PSA: Primary | ICD-10-CM

## 2020-07-13 DIAGNOSIS — N40.1 BENIGN PROSTATIC HYPERPLASIA WITH NOCTURIA: ICD-10-CM

## 2020-07-13 DIAGNOSIS — Z00.00 PREVENTATIVE HEALTH CARE: Primary | ICD-10-CM

## 2020-07-13 PROCEDURE — 99999 PR PBB SHADOW E&M-EST. PATIENT-LVL V: CPT | Mod: PBBFAC,,, | Performed by: INTERNAL MEDICINE

## 2020-07-13 PROCEDURE — 99999 PR PBB SHADOW E&M-EST. PATIENT-LVL III: ICD-10-PCS | Mod: PBBFAC,,, | Performed by: UROLOGY

## 2020-07-13 PROCEDURE — 99215 OFFICE O/P EST HI 40 MIN: CPT | Mod: PBBFAC,PN | Performed by: INTERNAL MEDICINE

## 2020-07-13 PROCEDURE — 99214 OFFICE O/P EST MOD 30 MIN: CPT | Mod: S$PBB,,, | Performed by: INTERNAL MEDICINE

## 2020-07-13 PROCEDURE — 99214 OFFICE O/P EST MOD 30 MIN: CPT | Mod: S$PBB,,, | Performed by: UROLOGY

## 2020-07-13 PROCEDURE — 99214 PR OFFICE/OUTPT VISIT, EST, LEVL IV, 30-39 MIN: ICD-10-PCS | Mod: S$PBB,,, | Performed by: INTERNAL MEDICINE

## 2020-07-13 PROCEDURE — 99999 PR PBB SHADOW E&M-EST. PATIENT-LVL V: ICD-10-PCS | Mod: PBBFAC,,, | Performed by: INTERNAL MEDICINE

## 2020-07-13 PROCEDURE — 99999 PR PBB SHADOW E&M-EST. PATIENT-LVL III: CPT | Mod: PBBFAC,,, | Performed by: UROLOGY

## 2020-07-13 PROCEDURE — 99213 OFFICE O/P EST LOW 20 MIN: CPT | Mod: PBBFAC,27 | Performed by: UROLOGY

## 2020-07-13 PROCEDURE — 99214 PR OFFICE/OUTPT VISIT, EST, LEVL IV, 30-39 MIN: ICD-10-PCS | Mod: S$PBB,,, | Performed by: UROLOGY

## 2020-07-13 RX ORDER — SULFAMETHOXAZOLE AND TRIMETHOPRIM 800; 160 MG/1; MG/1
TABLET ORAL
Qty: 4 TABLET | Refills: 0 | Status: SHIPPED | OUTPATIENT
Start: 2020-07-13 | End: 2020-08-05

## 2020-07-13 NOTE — PROGRESS NOTES
"Subjective:       Patient ID: Denis Bunn is a 64 y.o. male.    Chief Complaint: Annual Exam    Well exam    HPI: 63 y/o w/ HTN presents for well exam. One week ago had suprapubic "pressure" no change inurinary habits/frequency awakens three to four times per night to urinate. No sensation of incomplete emptying no LE swelling comes with home BP log check two to three times daily with readings consistently <120/80        Review of Systems   Constitutional: Negative for activity change, fever and unexpected weight change.   HENT: Negative for congestion, hearing loss, rhinorrhea, sore throat and trouble swallowing.    Eyes: Negative for photophobia, discharge, redness and visual disturbance.   Respiratory: Negative for cough, chest tightness, shortness of breath and wheezing.    Cardiovascular: Negative for chest pain, palpitations and leg swelling.   Gastrointestinal: Negative for abdominal pain, blood in stool, constipation, diarrhea, nausea and vomiting.   Endocrine: Negative for cold intolerance, heat intolerance, polydipsia and polyuria.   Genitourinary: Negative for decreased urine volume, difficulty urinating, dysuria, hematuria and urgency.   Musculoskeletal: Negative for arthralgias, back pain, joint swelling and neck pain.   Skin: Negative for rash.   Neurological: Negative for dizziness, syncope, weakness and headaches.   Psychiatric/Behavioral: Negative for confusion, dysphoric mood, sleep disturbance and suicidal ideas.       Objective:     Vitals:    07/13/20 1101 07/13/20 1138   BP: (!) 174/90 122/76   BP Location: Right arm    Patient Position: Sitting    BP Method: Small (Manual)    Pulse: (!) 52 60   Resp: 20    Temp: 97.5 °F (36.4 °C)    TempSrc: Skin    SpO2: 98%    Weight: 77.5 kg (170 lb 13.7 oz)    Height: 5' 8" (1.727 m)           Physical Exam  Constitutional:       Appearance: He is well-developed.   HENT:      Head: Normocephalic and atraumatic.   Eyes:      General: No scleral " icterus.     Conjunctiva/sclera: Conjunctivae normal.   Neck:      Musculoskeletal: Normal range of motion.   Cardiovascular:      Rate and Rhythm: Normal rate and regular rhythm.      Heart sounds: No murmur. No friction rub. No gallop.    Pulmonary:      Effort: Pulmonary effort is normal.      Breath sounds: Normal breath sounds. No wheezing or rales.   Abdominal:      Palpations: Abdomen is soft.      Tenderness: There is no abdominal tenderness. There is no right CVA tenderness, left CVA tenderness, guarding or rebound.   Musculoskeletal: Normal range of motion.         General: No tenderness.      Right lower leg: No edema.      Left lower leg: No edema.   Skin:     General: Skin is warm and dry.   Neurological:      Mental Status: He is alert and oriented to person, place, and time.      Cranial Nerves: No cranial nerve deficit.   Psychiatric:         Mood and Affect: Mood normal.         Thought Content: Thought content normal.         Assessment and Plan   1. Preventative health care  Wear seatbelts at all times    Don't drink and drive    Wear bike helmet and other personal protective equipment when appropriate      2. HTN (hypertension), benign  Home readings at goal continue ccb per cards recommendation    3. Benign prostatic hyperplasia with nocturia  To see urology today for consideration of prostate biopsy

## 2020-07-13 NOTE — PROGRESS NOTES
Subjective:       Patient ID: Denis Bunn is a 64 y.o. male.    Chief Complaint: Elevated PSA    HPI patient is here with a PSA of 5.3.  He is voiding well.  He occasionally has some discomfort and suprapubic area.  His urinalysis is negative.  He has good stream and he empties his bladder.    Past Medical History:   Diagnosis Date    Back pain     Cataract     Constipation - functional     History of gout     HTN (hypertension), benign 4/24/2013    Prostatitis     Vision changes        Past Surgical History:   Procedure Laterality Date    BACK SURGERY      CATARACT EXTRACTION      left eye    COLONOSCOPY N/A 7/29/2019    Procedure: COLONOSCOPY;  Surgeon: Mingo Freeman MD;  Location: [facility];  Service: Endoscopy;  Laterality: N/A;  due July 2019    ELBOW SURGERY      scope/right    EYE SURGERY      Dr. Hope.retina x2 left    LAMINECTOMY      WRIST SURGERY      right       Family History   Problem Relation Age of Onset    Blindness Mother         from cva    Cataracts Mother     Cataracts Father     Cancer Father     No Known Problems Sister     No Known Problems Brother     No Known Problems Maternal Aunt     No Known Problems Maternal Uncle     No Known Problems Paternal Aunt     No Known Problems Paternal Uncle     No Known Problems Maternal Grandmother     No Known Problems Maternal Grandfather     No Known Problems Paternal Grandmother     No Known Problems Paternal Grandfather     Amblyopia Neg Hx     Diabetes Neg Hx     Glaucoma Neg Hx     Hypertension Neg Hx     Macular degeneration Neg Hx     Retinal detachment Neg Hx     Strabismus Neg Hx     Stroke Neg Hx     Thyroid disease Neg Hx        Social History     Socioeconomic History    Marital status:      Spouse name: Not on file    Number of children: Not on file    Years of education: Not on file    Highest education level: Not on file   Occupational History    Not on file   Social Needs     Financial resource strain: Not hard at all    Food insecurity     Worry: Never true     Inability: Never true    Transportation needs     Medical: No     Non-medical: No   Tobacco Use    Smoking status: Never Smoker    Smokeless tobacco: Never Used    Tobacco comment: , 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   Substance and Sexual Activity    Alcohol use: Yes     Alcohol/week: 3.0 standard drinks     Types: 3 Glasses of wine per week     Frequency: 4 or more times a week     Drinks per session: 1 or 2     Binge frequency: Never     Comment: 8-10 glasses wine per week    Drug use: No    Sexual activity: Yes     Partners: Female   Lifestyle    Physical activity     Days per week: 4 days     Minutes per session: 30 min    Stress: Rather much   Relationships    Social connections     Talks on phone: More than three times a week     Gets together: Once a week     Attends Faith service: Not on file     Active member of club or organization: Yes     Attends meetings of clubs or organizations: Never     Relationship status:    Other Topics Concern    Not on file   Social History Narrative    Not on file       Allergies:  Amoxicillin-pot clavulanate and Celebrex [celecoxib]    Medications:    Current Outpatient Medications:     allopurinol (ZYLOPRIM) 100 MG tablet, TAKE 1 TABLET (100 MG TOTAL) BY MOUTH 2 (TWO) TIMES DAILY., Disp: 180 tablet, Rfl: 3    ALPRAZolam (XANAX) 0.5 MG tablet, Take 1 tablet (0.5 mg total) by mouth 3 (three) times daily., Disp: 30 tablet, Rfl: 1    ascorbic acid (VITAMIN C) 500 MG tablet, Take 500 mg by mouth once daily., Disp: , Rfl:     aspirin 81 MG chewable tablet, Take 1 tablet by mouth., Disp: , Rfl:     coenzyme Q10 (CO Q-10) 100 mg capsule, Take 100 mg by mouth once daily., Disp: , Rfl:     garlic 1,000 mg Cap, Take by mouth., Disp: , Rfl:     ibuprofen (ADVIL,MOTRIN) 200 MG tablet, Take 200 mg by mouth., Disp: , Rfl:     losartan (COZAAR) 100 MG tablet, Take 1 tablet  (100 mg total) by mouth once daily. (Patient taking differently: Take 50 mg by mouth once daily. ), Disp: 90 tablet, Rfl: 3    magnesium 250 mg Tab, Take 250 mg by mouth once. Takes two 250 mg tablets daily, Disp: , Rfl:     multivitamin capsule, Take 1 capsule by mouth once daily., Disp: , Rfl:     OM-3/E/LINOL/ALA/OLEIC/GLA/LIP (OMEGA 3-6-9 ORAL), Take by mouth once daily. , Disp: , Rfl:     pravastatin (PRAVACHOL) 20 MG tablet, TAKE 1 TABLET(20 MG) BY MOUTH EVERY DAY, Disp: 90 tablet, Rfl: 0    saw palmetto 160 MG capsule, Take 160 mg by mouth 2 (two) times daily., Disp: , Rfl:     sulfamethoxazole-trimethoprim 800-160mg (BACTRIM DS) 800-160 mg Tab, Start night before prostate biopsy every 12 hours for 4 doses only, Disp: 4 tablet, Rfl: 0    turmeric (CURCUMIN MISC), by Misc.(Non-Drug; Combo Route) route., Disp: , Rfl:     verapamiL (VERELAN) 240 MG C24P, TAKE 1 CAPSULE BY MOUTH EVERY DAY IN THE MORNING, Disp: 90 capsule, Rfl: 1    Review of Systems   Constitutional: Negative for activity change, appetite change, chills, diaphoresis, fatigue, fever and unexpected weight change.   HENT: Negative for congestion, dental problem, hearing loss, mouth sores, postnasal drip, rhinorrhea, sinus pressure and trouble swallowing.    Eyes: Negative for pain, discharge and itching.   Respiratory: Negative for apnea, cough, choking, chest tightness, shortness of breath and wheezing.    Cardiovascular: Negative for chest pain, palpitations and leg swelling.   Gastrointestinal: Negative for abdominal distention, abdominal pain, anal bleeding, blood in stool, constipation, diarrhea, nausea, rectal pain and vomiting.   Endocrine: Negative for polydipsia and polyuria.   Genitourinary: Negative for decreased urine volume, difficulty urinating, discharge, dysuria, enuresis, flank pain, frequency, genital sores, hematuria, penile pain, penile swelling, scrotal swelling, testicular pain and urgency.   Musculoskeletal: Negative  for arthralgias, back pain and myalgias.   Skin: Negative for color change, rash and wound.   Neurological: Negative for dizziness, syncope, speech difficulty, light-headedness and headaches.   Hematological: Negative for adenopathy. Does not bruise/bleed easily.   Psychiatric/Behavioral: Negative for behavioral problems, confusion, hallucinations and sleep disturbance.       Objective:      Physical Exam   Constitutional: He appears well-developed.   HENT:   Head: Normocephalic.   Cardiovascular: Normal rate.    Pulmonary/Chest: Effort normal.   Abdominal: Soft.   Genitourinary:    Prostate normal.      Genitourinary Comments: Right-sided prosthetic asymmetry.  No discrete nodules     Neurological: He is alert.   Skin: Skin is warm.         Assessment:       1. Elevated PSA        Plan:       Denis was seen today for elevated psa.    Diagnoses and all orders for this visit:    Elevated PSA  -     Transrectal Ultrasound w/ Biopsy    Other orders  -     sulfamethoxazole-trimethoprim 800-160mg (BACTRIM DS) 800-160 mg Tab; Start night before prostate biopsy every 12 hours for 4 doses only

## 2020-07-20 ENCOUNTER — LAB VISIT (OUTPATIENT)
Dept: LAB | Facility: HOSPITAL | Age: 65
End: 2020-07-20
Attending: UROLOGY
Payer: MEDICARE

## 2020-07-20 DIAGNOSIS — R30.0 DYSURIA: Primary | ICD-10-CM

## 2020-07-20 DIAGNOSIS — R30.0 DYSURIA: ICD-10-CM

## 2020-07-20 PROCEDURE — 87086 URINE CULTURE/COLONY COUNT: CPT

## 2020-07-22 LAB — BACTERIA UR CULT: NO GROWTH

## 2020-07-24 ENCOUNTER — PATIENT MESSAGE (OUTPATIENT)
Dept: FAMILY MEDICINE | Facility: CLINIC | Age: 65
End: 2020-07-24

## 2020-07-27 DIAGNOSIS — R30.0 DYSURIA: Primary | ICD-10-CM

## 2020-07-27 DIAGNOSIS — N13.8 BPH WITH URINARY OBSTRUCTION: Primary | ICD-10-CM

## 2020-07-27 DIAGNOSIS — N40.1 BPH WITH URINARY OBSTRUCTION: Primary | ICD-10-CM

## 2020-07-28 ENCOUNTER — PROCEDURE VISIT (OUTPATIENT)
Dept: UROLOGY | Facility: CLINIC | Age: 65
End: 2020-07-28
Payer: MEDICARE

## 2020-07-28 ENCOUNTER — HOSPITAL ENCOUNTER (OUTPATIENT)
Dept: RADIOLOGY | Facility: HOSPITAL | Age: 65
Discharge: HOME | End: 2020-07-28
Attending: UROLOGY
Payer: MEDICARE

## 2020-07-28 VITALS
TEMPERATURE: 99 F | HEIGHT: 68 IN | HEART RATE: 60 BPM | RESPIRATION RATE: 18 BRPM | DIASTOLIC BLOOD PRESSURE: 74 MMHG | SYSTOLIC BLOOD PRESSURE: 153 MMHG | BODY MASS INDEX: 25.51 KG/M2 | WEIGHT: 168.31 LBS

## 2020-07-28 DIAGNOSIS — R30.0 DYSURIA: ICD-10-CM

## 2020-07-28 DIAGNOSIS — R97.20 ELEVATED PSA: Primary | ICD-10-CM

## 2020-07-28 PROCEDURE — 88305 TISSUE EXAM BY PATHOLOGIST: ICD-10-PCS | Mod: 26,,, | Performed by: PATHOLOGY

## 2020-07-28 PROCEDURE — 55700 PR BIOPSY OF PROSTATE,NEEDLE/PUNCH: CPT | Mod: PBBFAC | Performed by: UROLOGY

## 2020-07-28 PROCEDURE — 25500020 PHARM REV CODE 255: Performed by: UROLOGY

## 2020-07-28 PROCEDURE — 76942 ECHO GUIDE FOR BIOPSY: CPT | Mod: PBBFAC | Performed by: UROLOGY

## 2020-07-28 PROCEDURE — 74178 CT ABD&PLV WO CNTR FLWD CNTR: CPT | Mod: 26,,, | Performed by: RADIOLOGY

## 2020-07-28 PROCEDURE — 76872 PR US TRANSRECTAL: ICD-10-PCS | Mod: 26,S$PBB,, | Performed by: UROLOGY

## 2020-07-28 PROCEDURE — 88305 TISSUE EXAM BY PATHOLOGIST: CPT | Mod: 26,,, | Performed by: PATHOLOGY

## 2020-07-28 PROCEDURE — 52000 CYSTOURETHROSCOPY: CPT | Mod: PBBFAC | Performed by: UROLOGY

## 2020-07-28 PROCEDURE — 55700 PR BIOPSY OF PROSTATE,NEEDLE/PUNCH: CPT | Mod: S$PBB,,, | Performed by: UROLOGY

## 2020-07-28 PROCEDURE — 88305 TISSUE EXAM BY PATHOLOGIST: CPT | Mod: 59 | Performed by: PATHOLOGY

## 2020-07-28 PROCEDURE — 76872 US TRANSRECTAL: CPT | Mod: 26,S$PBB,, | Performed by: UROLOGY

## 2020-07-28 PROCEDURE — 74178 CT ABDOMEN PELVIS W WO CONTRAST: ICD-10-PCS | Mod: 26,,, | Performed by: RADIOLOGY

## 2020-07-28 PROCEDURE — 52000 CYSTOURETHROSCOPY: CPT | Mod: S$PBB,59,, | Performed by: UROLOGY

## 2020-07-28 PROCEDURE — 76872 US TRANSRECTAL: CPT | Mod: PBBFAC | Performed by: UROLOGY

## 2020-07-28 PROCEDURE — 52000 PR CYSTOURETHROSCOPY: ICD-10-PCS | Mod: S$PBB,59,, | Performed by: UROLOGY

## 2020-07-28 PROCEDURE — 55700 PR BIOPSY OF PROSTATE,NEEDLE/PUNCH: ICD-10-PCS | Mod: S$PBB,,, | Performed by: UROLOGY

## 2020-07-28 PROCEDURE — 74178 CT ABD&PLV WO CNTR FLWD CNTR: CPT | Mod: TC

## 2020-07-28 RX ORDER — LIDOCAINE HYDROCHLORIDE 10 MG/ML
20 INJECTION INFILTRATION; PERINEURAL
Status: COMPLETED | OUTPATIENT
Start: 2020-07-28 | End: 2020-07-28

## 2020-07-28 RX ORDER — LIDOCAINE HYDROCHLORIDE 20 MG/ML
JELLY TOPICAL
Status: COMPLETED | OUTPATIENT
Start: 2020-07-28 | End: 2020-07-28

## 2020-07-28 RX ADMIN — LIDOCAINE HYDROCHLORIDE: 20 JELLY TOPICAL at 02:07

## 2020-07-28 RX ADMIN — IOHEXOL 75 ML: 350 INJECTION, SOLUTION INTRAVENOUS at 10:07

## 2020-07-28 RX ADMIN — IOHEXOL 15 ML: 300 INJECTION, SOLUTION INTRAVENOUS at 10:07

## 2020-07-28 RX ADMIN — LIDOCAINE HYDROCHLORIDE 20 ML: 10 INJECTION, SOLUTION INFILTRATION; PERINEURAL at 02:07

## 2020-07-28 NOTE — PROCEDURES
* No surgery found *     Procedures: Flexible cystourethroscopy      Pre Procedure Diagnosis: BPH, SP pain    Post Procedure Diagnosis:  BPH, SP pain    Surgeon: Emmanuel Marie MD    Anesthesia: 2% uro-jet lidocaine jelly for local analgesia     Flexible cysto-urethroscopy was performed after consent was obtained.  The risks and benefits were explained.    2% lidocaine urojet was used for local analgesia.  The genitalia was prepped and draped in the sterile fashion.  The flexible scope was advanced into the urethra and into the bladder.  Bilateral ureteral orifice were evaluated and noted to be normal with clear efflux.  The bladder was completely surveyed in a systematic fashion.   No bladder tumors or lesions were seen.  No strictures were noted.  The prostate showed Mild hypertrophy.  There was No median lobe present.    The patient tolerated the procedure well without complication.    They will follow up in 6 month(s)  Azo, Nsaids

## 2020-07-28 NOTE — PROCEDURES
[facility]  Procedure Note    Pre-procedure diagnosis: Elevated PSA     Post-procedure diagnosis: same    Procedure: Transrectal ultrasound guided prostate biopsy    Complications: none    Blood loss: minimal    Surgeon: Emmanuel Marie MD    Assistant Surgeon: LAYA Velasquez MD    Anesthesia: 10cc lidocaine jelly    TRUS size: 34.4 g    Procedure: The risks and benefits of the procedure were explained to the patient and consent was obtained.  The patient laid in the lateral decubitus position.  10cc of lidocaine jelly was used for local analgesia in the rectum.  The ultrasound probe was advanced into the rectum. The prostate was visualized.  There was not a median lobe.  10cc of 1% lidocaine without epi was used for a prostatic nerve block.  12 biopsies were then taken.    The patient tolerated the procedure well and was discharged home.  He will be called with the results when available.  He will finish the course of antibiotics.  Patient advised to call if experiences fever, chills, or any other problems.

## 2020-07-28 NOTE — PATIENT INSTRUCTIONS

## 2020-07-28 NOTE — PATIENT INSTRUCTIONS
What to Expect After a Cystoscopy  For the next 24-48 hours, you may feel a mild burning when you urinate. This burning is normal and expected. Drink plenty of water to dilute the urine to help relieve the burning sensation. You may also see a small amount of blood in your urine after the procedure.    Unless you are already taking antibiotics, you may be given an antibiotic after the test to prevent infection.    Signs and Symptoms to Report  Call the [facility] at [phone] if you develop any of the following:  · Fever of 101 degrees or higher  · Chills or persistent bleeding  · Inability to urinate

## 2020-07-30 LAB
FINAL PATHOLOGIC DIAGNOSIS: NORMAL
GROSS: NORMAL

## 2020-07-31 DIAGNOSIS — C61 ADENOCARCINOMA OF PROSTATE: Primary | ICD-10-CM

## 2020-08-03 ENCOUNTER — TELEPHONE (OUTPATIENT)
Dept: UROLOGY | Facility: CLINIC | Age: 65
End: 2020-08-03

## 2020-08-03 NOTE — TELEPHONE ENCOUNTER
----- Message from Emmanuel Marie Jr., MD sent at 7/31/2020 12:56 PM CDT -----  Informed pt of gl 6 adenoca  Needs MRI prostate and appt w Dr White

## 2020-08-04 ENCOUNTER — PATIENT OUTREACH (OUTPATIENT)
Dept: ADMINISTRATIVE | Facility: OTHER | Age: 65
End: 2020-08-04

## 2020-08-04 NOTE — PROGRESS NOTES
Requested updates within Care Everywhere.  Patient's chart was reviewed for overdue ELAN topics.  Immunizations reconciled.    Orders placed:N/A  Labs Linked:N/A

## 2020-08-05 ENCOUNTER — OFFICE VISIT (OUTPATIENT)
Dept: CARDIOLOGY | Facility: CLINIC | Age: 65
End: 2020-08-05
Payer: MEDICARE

## 2020-08-05 VITALS
WEIGHT: 169.06 LBS | DIASTOLIC BLOOD PRESSURE: 80 MMHG | HEART RATE: 57 BPM | HEIGHT: 68 IN | SYSTOLIC BLOOD PRESSURE: 155 MMHG | BODY MASS INDEX: 25.62 KG/M2

## 2020-08-05 DIAGNOSIS — R00.2 PALPITATIONS: ICD-10-CM

## 2020-08-05 DIAGNOSIS — C61 PROSTATE CANCER: ICD-10-CM

## 2020-08-05 DIAGNOSIS — I49.3 PVC (PREMATURE VENTRICULAR CONTRACTION): ICD-10-CM

## 2020-08-05 DIAGNOSIS — E78.5 DYSLIPIDEMIA: ICD-10-CM

## 2020-08-05 DIAGNOSIS — I10 HTN (HYPERTENSION), BENIGN: ICD-10-CM

## 2020-08-05 DIAGNOSIS — Z82.49 FH: CAD (CORONARY ARTERY DISEASE): Primary | ICD-10-CM

## 2020-08-05 DIAGNOSIS — E66.3 OVERWEIGHT (BMI 25.0-29.9): ICD-10-CM

## 2020-08-05 DIAGNOSIS — R73.01 IMPAIRED FASTING GLUCOSE: ICD-10-CM

## 2020-08-05 PROCEDURE — 99215 OFFICE O/P EST HI 40 MIN: CPT | Mod: S$PBB,,, | Performed by: INTERNAL MEDICINE

## 2020-08-05 PROCEDURE — 99215 OFFICE O/P EST HI 40 MIN: CPT | Mod: PBBFAC | Performed by: INTERNAL MEDICINE

## 2020-08-05 PROCEDURE — 99999 PR PBB SHADOW E&M-EST. PATIENT-LVL V: CPT | Mod: PBBFAC,,, | Performed by: INTERNAL MEDICINE

## 2020-08-05 PROCEDURE — 99999 PR PBB SHADOW E&M-EST. PATIENT-LVL V: ICD-10-PCS | Mod: PBBFAC,,, | Performed by: INTERNAL MEDICINE

## 2020-08-05 PROCEDURE — 99215 PR OFFICE/OUTPT VISIT, EST, LEVL V, 40-54 MIN: ICD-10-PCS | Mod: S$PBB,,, | Performed by: INTERNAL MEDICINE

## 2020-08-05 RX ORDER — VERAPAMIL HYDROCHLORIDE 120 MG/1
120 TABLET, FILM COATED ORAL NIGHTLY
Qty: 90 TABLET | Refills: 3 | Status: SHIPPED | OUTPATIENT
Start: 2020-08-05 | End: 2020-08-26 | Stop reason: CLARIF

## 2020-08-05 RX ORDER — VERAPAMIL HYDROCHLORIDE 120 MG/1
TABLET, FILM COATED ORAL
COMMUNITY
Start: 2018-07-07 | End: 2020-08-05 | Stop reason: SDUPTHER

## 2020-08-05 NOTE — PATIENT INSTRUCTIONS
Discussed diet , achieving and maintaining ideal body weight, and exercise.   We reviewed meds in detail.   Reassured-Discussed goals, options, plan.   Cleared for A/S; can hold ASA 7-10 days pre-op

## 2020-08-05 NOTE — PROGRESS NOTES
Subjective:   Patient ID:  Denis Bunn is a 64 y.o. male who presents for follow-up of CVD concerns    HPI:He has had PVCs and palpitations and HTN and CAD risk but CAC 0 twice over past decade.  The patient has no chest pain, SOB, TIA, palpitations, syncope or pre-syncope.Patient does not exercise quite as much as directed.BP averaging 111-120s-just diagnosed with Prostate cancer.        Review of Systems   Constitution: Negative for chills, decreased appetite, diaphoresis, fever, malaise/fatigue, night sweats, weight gain and weight loss.   HENT: Negative for congestion, hoarse voice, nosebleeds, sore throat and tinnitus.    Eyes: Negative for blurred vision, double vision, vision loss in left eye, vision loss in right eye, visual disturbance and visual halos.   Cardiovascular: Negative for chest pain, claudication, cyanosis, dyspnea on exertion, irregular heartbeat, leg swelling, near-syncope, orthopnea, palpitations, paroxysmal nocturnal dyspnea and syncope.   Respiratory: Negative for cough, hemoptysis, shortness of breath, sleep disturbances due to breathing, snoring, sputum production and wheezing.    Endocrine: Negative for cold intolerance, heat intolerance, polydipsia, polyphagia and polyuria.   Hematologic/Lymphatic: Negative for adenopathy and bleeding problem. Does not bruise/bleed easily.   Skin: Negative for color change, dry skin, flushing, itching, nail changes, poor wound healing, rash, skin cancer, suspicious lesions and unusual hair distribution.   Musculoskeletal: Negative for arthritis, back pain, falls, gout, joint pain, joint swelling, muscle cramps, muscle weakness, myalgias and stiffness.   Gastrointestinal: Negative for abdominal pain, anorexia, change in bowel habit, constipation, diarrhea, dysphagia, heartburn, hematemesis, hematochezia, melena and vomiting.   Genitourinary: Negative for decreased libido, dysuria, hematuria, hesitancy and urgency.   Neurological: Negative for  "excessive daytime sleepiness, dizziness, focal weakness, headaches, light-headedness, loss of balance, numbness, paresthesias, seizures, sensory change, tremors, vertigo and weakness.   Psychiatric/Behavioral: Negative for altered mental status, depression, hallucinations, memory loss, substance abuse and suicidal ideas. The patient does not have insomnia and is not nervous/anxious.    Allergic/Immunologic: Negative for environmental allergies and hives.       Objective: BP (!) 155/80 (BP Location: Left arm, Patient Position: Sitting, BP Method: Large (Automatic))   Pulse (!) 57   Ht 5' 8" (1.727 m)   Wt 76.7 kg (169 lb 1.5 oz)   BMI 25.71 kg/m²      Physical Exam   Constitutional: He is oriented to person, place, and time. He appears well-developed and well-nourished. No distress.   HENT:   Head: Normocephalic.   Eyes: Pupils are equal, round, and reactive to light. EOM are normal.   Neck: Normal range of motion. No thyromegaly present.   Cardiovascular: Normal rate, regular rhythm, normal heart sounds and intact distal pulses. Exam reveals no gallop and no friction rub.   No murmur heard.  Pulses:       Carotid pulses are 3+ on the right side and 3+ on the left side.       Radial pulses are 3+ on the right side and 3+ on the left side.        Femoral pulses are 3+ on the right side and 3+ on the left side.       Popliteal pulses are 3+ on the right side and 3+ on the left side.        Dorsalis pedis pulses are 3+ on the right side and 3+ on the left side.        Posterior tibial pulses are 3+ on the right side and 3+ on the left side.   Pulmonary/Chest: Effort normal and breath sounds normal. No respiratory distress. He has no wheezes. He has no rales. He exhibits no tenderness.   Abdominal: Soft. He exhibits no distension and no mass. There is no abdominal tenderness.   Musculoskeletal: Normal range of motion.   Lymphadenopathy:     He has no cervical adenopathy.   Neurological: He is alert and oriented to " person, place, and time.   Skin: Skin is warm. He is not diaphoretic. No cyanosis. Nails show no clubbing.   Psychiatric: He has a normal mood and affect. His speech is normal and behavior is normal. Judgment and thought content normal. Cognition and memory are normal.       Assessment:     1. FH: CAD (coronary artery disease)    2. Dyslipidemia    3. HTN (hypertension), benign    4. Impaired fasting glucose    5. Overweight (BMI 25.0-29.9)    6. Palpitations    7. PVC (premature ventricular contraction)    8. Prostate cancer        Plan:   Discussed diet , achieving and maintaining ideal body weight, and exercise.   We reviewed meds in detail.   Reassured-Discussed goals, options, plan.   Cleared for A/S; can hold ASA 7-10 days pre-op  Denis was seen today for coronary artery disease.    Diagnoses and all orders for this visit:    FH: CAD (coronary artery disease)  -     Lipid Panel; Future; Expected date: 11/05/2021  -     Comprehensive metabolic panel; Future; Expected date: 11/05/2021  -     TSH; Future; Expected date: 11/05/2021  -     EKG 12-lead; Future; Expected date: 11/05/2021    Dyslipidemia  -     Lipid Panel; Future; Expected date: 11/05/2021  -     Comprehensive metabolic panel; Future; Expected date: 11/05/2021    HTN (hypertension), benign  -     verapamiL (CALAN) 120 MG tablet; Take 1 tablet (120 mg total) by mouth every evening.  -     Comprehensive metabolic panel; Future; Expected date: 11/05/2021  -     TSH; Future; Expected date: 11/05/2021  -     EKG 12-lead; Future; Expected date: 11/05/2021    Impaired fasting glucose  -     Comprehensive metabolic panel; Future; Expected date: 11/05/2021  -     TSH; Future; Expected date: 11/05/2021  -     Hemoglobin A1C; Future; Expected date: 11/05/2021    Overweight (BMI 25.0-29.9)    Palpitations  -     verapamiL (CALAN) 120 MG tablet; Take 1 tablet (120 mg total) by mouth every evening.  -     Comprehensive metabolic panel; Future; Expected date:  11/05/2021    PVC (premature ventricular contraction)  -     verapamiL (CALAN) 120 MG tablet; Take 1 tablet (120 mg total) by mouth every evening.    Prostate cancer  -     PSA, Screening; Future; Expected date: 11/05/2021    Other orders  -     Discontinue: verapamiL (CALAN) 120 MG tablet            Follow up in about 15 months (around 11/5/2021) for with ECG and labs.

## 2020-08-10 RX ORDER — VERAPAMIL HYDROCHLORIDE 120 MG/1
120 TABLET, FILM COATED, EXTENDED RELEASE ORAL NIGHTLY
COMMUNITY
End: 2020-08-16

## 2020-08-14 ENCOUNTER — HOSPITAL ENCOUNTER (OUTPATIENT)
Dept: RADIOLOGY | Facility: HOSPITAL | Age: 65
Discharge: HOME | End: 2020-08-14
Attending: UROLOGY
Payer: MEDICARE

## 2020-08-14 DIAGNOSIS — C61 ADENOCARCINOMA OF PROSTATE: ICD-10-CM

## 2020-08-14 PROCEDURE — 25500020 PHARM REV CODE 255: Performed by: UROLOGY

## 2020-08-14 PROCEDURE — 72197 MRI PROSTATE W W/O CONTRAST: ICD-10-PCS | Mod: 26,,, | Performed by: RADIOLOGY

## 2020-08-14 PROCEDURE — 72197 MRI PELVIS W/O & W/DYE: CPT | Mod: TC

## 2020-08-14 PROCEDURE — 72197 MRI PELVIS W/O & W/DYE: CPT | Mod: 26,,, | Performed by: RADIOLOGY

## 2020-08-14 PROCEDURE — A9585 GADOBUTROL INJECTION: HCPCS | Performed by: UROLOGY

## 2020-08-14 RX ORDER — GADOBUTROL 604.72 MG/ML
10 INJECTION INTRAVENOUS
Status: COMPLETED | OUTPATIENT
Start: 2020-08-14 | End: 2020-08-14

## 2020-08-14 RX ADMIN — GADOBUTROL 10 ML: 604.72 INJECTION INTRAVENOUS at 05:08

## 2020-08-17 ENCOUNTER — PATIENT OUTREACH (OUTPATIENT)
Dept: ADMINISTRATIVE | Facility: OTHER | Age: 65
End: 2020-08-17

## 2020-08-17 NOTE — PROGRESS NOTES
LINKS immunization registry updated  Care Everywhere updated  Health Maintenance updated  Chart reviewed for overdue Proactive [facility] ([facility]) health maintenance testing (CRS, Breast Ca, Diabetic Eye Exam)   Orders entered:N/A

## 2020-08-18 ENCOUNTER — OFFICE VISIT (OUTPATIENT)
Dept: UROLOGY | Facility: CLINIC | Age: 65
End: 2020-08-18
Payer: MEDICARE

## 2020-08-18 ENCOUNTER — TELEPHONE (OUTPATIENT)
Dept: UROLOGY | Facility: CLINIC | Age: 65
End: 2020-08-18

## 2020-08-18 VITALS
DIASTOLIC BLOOD PRESSURE: 84 MMHG | SYSTOLIC BLOOD PRESSURE: 143 MMHG | WEIGHT: 163.13 LBS | HEIGHT: 68 IN | BODY MASS INDEX: 24.72 KG/M2 | HEART RATE: 64 BPM

## 2020-08-18 DIAGNOSIS — C61 PROSTATE CANCER: Primary | ICD-10-CM

## 2020-08-18 PROCEDURE — 99215 PR OFFICE/OUTPT VISIT, EST, LEVL V, 40-54 MIN: ICD-10-PCS | Mod: S$PBB,,, | Performed by: UROLOGY

## 2020-08-18 PROCEDURE — 99999 PR PBB SHADOW E&M-EST. PATIENT-LVL IV: CPT | Mod: PBBFAC,,, | Performed by: UROLOGY

## 2020-08-18 PROCEDURE — 99214 OFFICE O/P EST MOD 30 MIN: CPT | Mod: PBBFAC | Performed by: UROLOGY

## 2020-08-18 PROCEDURE — 99999 PR PBB SHADOW E&M-EST. PATIENT-LVL IV: ICD-10-PCS | Mod: PBBFAC,,, | Performed by: UROLOGY

## 2020-08-18 PROCEDURE — 99215 OFFICE O/P EST HI 40 MIN: CPT | Mod: S$PBB,,, | Performed by: UROLOGY

## 2020-08-18 NOTE — PROGRESS NOTES
Clinic Note  8/18/2020      Subjective:         Chief Complaint:   HPI  Denis Bunn is a 64 y.o. male recently daignosed with prostate cancer.  Here with his wife Christi.  Al stays very active, self employed .  Has had 3 spinal fusions. And walks in AM.  Positive family history (father).    PSA - 5.3  Stage - T2a  Volume - 30 ccs  MRI - PI-RADS 5, positive extra prostatic extension, negative NVB, seminal vesicle, nodes  Biopsy - 7/28/2020- Jose 4+3 right apex 1/2 40%, Jose 6 left apex 2/2 25%, left middle 2/2 15%, right middle 2/2 15%, right base 1/2 10%. Overall 8/12 cores positive.  LEENA Score - 5 intermediate risk  NCCN - favorable intermediate.  Germline- discussed, declined  Somatic-    Lab Results   Component Value Date    PSA 3.8 05/29/2019    PSA 3.1 11/09/2018    PSA 3.6 06/11/2018    PSA 2.5 06/05/2017    PSA 2.4 06/07/2016    PSA 2.3 07/13/2015    PSA 2.2 06/30/2015    PSA 2.6 11/11/2014    PSA 2.3 04/14/2014    PSA 1.59 04/24/2013    PSADIAG 5.3 (H) 07/08/2020    PSADIAG 4.0 12/02/2019    PSADIAG 2.9 03/01/2019      Past Medical History:   Diagnosis Date    Back pain     Cataract     Constipation - functional     History of gout     HTN (hypertension), benign 4/24/2013    Prostatitis     Vision changes      Family History   Problem Relation Age of Onset    Blindness Mother         from cva    Cataracts Mother     Cataracts Father     Cancer Father     No Known Problems Sister     No Known Problems Brother     No Known Problems Maternal Aunt     No Known Problems Maternal Uncle     No Known Problems Paternal Aunt     No Known Problems Paternal Uncle     No Known Problems Maternal Grandmother     No Known Problems Maternal Grandfather     No Known Problems Paternal Grandmother     No Known Problems Paternal Grandfather     Amblyopia Neg Hx     Diabetes Neg Hx     Glaucoma Neg Hx     Hypertension Neg Hx     Macular degeneration Neg Hx     Retinal detachment  Neg Hx     Strabismus Neg Hx     Stroke Neg Hx     Thyroid disease Neg Hx      Social History     Socioeconomic History    Marital status:      Spouse name: Not on file    Number of children: Not on file    Years of education: Not on file    Highest education level: Not on file   Occupational History    Not on file   Social Needs    Financial resource strain: Not hard at all    Food insecurity     Worry: Never true     Inability: Never true    Transportation needs     Medical: No     Non-medical: No   Tobacco Use    Smoking status: Never Smoker    Smokeless tobacco: Never Used    Tobacco comment: , 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   Substance and Sexual Activity    Alcohol use: Yes     Alcohol/week: 3.0 standard drinks     Types: 3 Glasses of wine per week     Frequency: 4 or more times a week     Drinks per session: 1 or 2     Binge frequency: Never     Comment: 8-10 glasses wine per week    Drug use: No    Sexual activity: Yes     Partners: Female   Lifestyle    Physical activity     Days per week: 4 days     Minutes per session: 30 min    Stress: Rather much   Relationships    Social connections     Talks on phone: More than three times a week     Gets together: Once a week     Attends Christianity service: Not on file     Active member of club or organization: Yes     Attends meetings of clubs or organizations: Never     Relationship status:    Other Topics Concern    Not on file   Social History Narrative    Not on file     Past Surgical History:   Procedure Laterality Date    BACK SURGERY      CATARACT EXTRACTION      left eye    COLONOSCOPY N/A 7/29/2019    Procedure: COLONOSCOPY;  Surgeon: Mingo Freeman MD;  Location: [facility];  Service: Endoscopy;  Laterality: N/A;  due July 2019    ELBOW SURGERY      scope/right    EYE SURGERY      Dr. Hope.retina x2 left    LAMINECTOMY      WRIST SURGERY      right     Patient Active Problem List   Diagnosis    Nuclear cataract  "   Macular edema    CME (cystoid macular edema) - Left Eye    Epiretinal membrane - Left Eye    Macular puckering - Left Eye    History of gout    Back pain    FH: CAD (coronary artery disease)    HTN (hypertension), benign    Dyslipidemia    Palpitations    Impaired fasting glucose    Anxiety    PVC (premature ventricular contraction)    Overweight (BMI 25.0-29.9)    Muscle spasm of back    Decreased range of motion of lumbar spine    Colon cancer screening    Atypical chest pain    Prostate cancer     Review of Systems   Constitutional: Negative for appetite change, chills, fatigue, fever and unexpected weight change.   HENT: Negative for nosebleeds.    Respiratory: Negative for shortness of breath and wheezing.    Cardiovascular: Negative for chest pain, palpitations and leg swelling.   Gastrointestinal: Negative for abdominal distention, abdominal pain, constipation, diarrhea, nausea and vomiting.   Genitourinary: Negative for dysuria.   Musculoskeletal: Negative for arthralgias and back pain.   Skin: Negative for pallor.   Neurological: Negative for dizziness, seizures and syncope.   Hematological: Negative for adenopathy.   Psychiatric/Behavioral: Negative for dysphoric mood.         Objective:      BP (!) 143/84   Pulse 64   Ht 5' 8" (1.727 m)   Wt 74 kg (163 lb 2.3 oz)   BMI 24.81 kg/m²   Estimated body mass index is 24.81 kg/m² as calculated from the following:    Height as of this encounter: 5' 8" (1.727 m).    Weight as of this encounter: 74 kg (163 lb 2.3 oz).  Physical Exam      Assessment and Plan:           Problem List Items Addressed This Visit     Prostate cancer - Primary          Follow up:   Today's visit was spent almost entirely on counseling. We reviewed his diagnosis, stage, grade, risk group, and prognosis. We discussed [place] (NCCN) and LEENA risk stratification  We discussed the concept of low risk, intermediate risk, and high risk disease. We also reviewed the " NCCN treatment nomogram.We discussed the different treatment options including active surveillance (as well as the surveillance protocol), prostate brachytherapy, IMRT, IGRT, cryotherapy, HIFU and both open and robotic prostatectomy.We also discussed the advantages, disadvantages, risks and benefits, as well as complications of each option. Regarding radiation therapy we discussed treatment planning, the different techniques, short and long term complications. These included radiation cystitis, radiation proctitis, and impotence. We discussed success, failure, and salvage therapeutic options.Also discussed the use of SpaceOAR for brachytherapy and EBRT and fiducials for EBRT.   We discussed surgical therapy in depth including preoperative preparation, surgical technique (including bladder neck and nerve-sparing techniques), postoperative recuperation and recovery, and short and long term complications including UTI, bleeding, blood clots,catheter dislodgement, etc. We discussed the risks of reoperation, incontinence, impotence, and recurrence. We discussed preop and postop Kegels, post op penile rehab, and treatment options for incontinence and impotence. We discussed rates of cancer free survival and recurrence, as well as salvage therapeutic options. We discussed the possible  indications for adjuvant radiation therapy.   I answered questions and addressed concerns. Also discussed the Prolaris test to get genetic information about this tumors potential future behavior.   My nurse will instruct the patient on Kegel exercises today and give them the Kegel exercise instruction sheet.   The patient will meet with our  Jany today to find a date for surgery and begin preparation for surgery. Will also receive our handout on NPO guidelines and preop hydration. Patient will also receive information and education on preoperative skin preparation and postop wound care.   I spent over 45 minutes with the patient.  Over 50% of the visit was spent in counseling.     Edson White

## 2020-08-19 ENCOUNTER — TELEPHONE (OUTPATIENT)
Dept: PREADMISSION TESTING | Facility: HOSPITAL | Age: 65
End: 2020-08-19

## 2020-08-19 NOTE — TELEPHONE ENCOUNTER
----- Message from Faye Everett RN sent at 8/19/2020  1:31 PM CDT -----  Surgery date: 9/2/2020  PreOp appt: 8/26/2020    Please call Pt and schedule the following preop appts:    POC  Lab    Thank you!  Faye

## 2020-08-19 NOTE — ANESTHESIA PAT ROS NOTE
08/19/2020  Denis Bunn is a 64 y.o., male.      Pre-op Assessment          Review of Systems  Anesthesia Hx:  No problems with previous Anesthesia  History of prior surgery of interest to airway management or planning: Previous anesthesia: MAC  7/2019 colonoscopy with MAC.  Procedure performed at an [facility]. Denies Family Hx of Anesthesia complications.   Denies Personal Hx of Anesthesia complications.   Social:  Non-Smoker, Social Alcohol Use  Alcohol Use: Pt consumes 1-2 glasses wine 5 nights/week,    Hematology/Oncology:  Hematology Normal       Prostate Current/Recent Cancer.   EENT/Dental:EENT/Dental Normal   Cardiovascular:    Denies Angina. hyperlipidemia 2018 echo EF 55-60% biatrial enlargement Functional Capacity good / => 4 METS  Valvular Heart Disease: Mitral Regurgitation (MR), mild   Hypertension , Fairly Controlled on RX , Recent typical home B/P of 111/70, Recent typical clinic B/P of 148/77  Disorder of Cardiac Rhythm, Premature Ventricular Contraction (PVC) (controlled with verapamil), on medical Rx    Pulmonary:   Denies Shortness of breath.  Denies Recent URI.  Possible Obstructive Sleep Apnea , (STOP/BANG) Symptoms S - Snoring (loud), A - Age > 50, P - Pressure being treated for high BP and G - Gender (Male > Female)        Renal/:   PSA increasing-biopsy prostate cancer Renal Symptoms/Infections/Stones: frequency.    Hepatic/GI:  Hepatic/GI Normal  Hepatic/GI Symptoms: constipation.    Musculoskeletal:  Musculoskeletal General/Symptoms: low back pain. Functional capacity is ambulatory without assistance.  Joint Disease:  Gout  Lumbar Spine Disorders, S/P Lumbar Fusion, Lumbar Spine Limited Mobility   Neurological:  Neurology Normal    Endocrine:  Endocrine Normal    Psych:  Psychiatric Normal           Physical Exam  General:  Well nourished    Airway/Jaw/Neck:  Airway  Findings: Mouth Opening: Normal Tongue: Normal  Jaw/Neck Findings:  Neck ROM: Normal ROM      Dental:  Dental Findings: In tact   Chest/Lungs:  Chest/Lungs Findings: Clear to auscultation     Heart/Vascular:  Heart Findings: Rate: Normal        Mental Status:  Mental Status Findings:  Cooperative, Alert and Oriented       8/26/20 Lab results noted. Covid test scheduled on 8/30/20.     Anesthesia Assessment: Preoperative EQUATION    Planned Procedure: Procedure(s) (LRB):  ROBOTIC PROSTATECTOMY (N/A)  Requested Anesthesia Type:General  Surgeon: Edson White MD  Service: Urology  Known or anticipated Date of Surgery:9/2/2020    Surgeon notes: reviewed    Previous anesthesia records:GETA and No problems  7/29/2019 Colonoscopy  Airway/Jaw/Neck:  Airway Findings: Mouth Opening: Normal General Airway Assessment: Adult  Mallampati: II  TM Distance: Normal, at least 6 cm          Last PCP note: within 3 months , within [facility]   Subspecialty notes: Cardiology: General, Urology    Other important co-morbidities:Per Epic: HTN and Gout, Prostate CA      Tests already available:  Available tests,  within 3 months , within [facility] .  7/8/2020 TSH, Lipid Panel, A1c, CMP, CBC; 6/15/2020 EKG             Instructions given. (See in Nurse's note)    Optimization:  Anesthesia Preop Clinic Assessment  Indicated.    Medical Opinion Indicated.           Plan:    Testing:  T&S   Pre-anesthesia  visit       Visit focus: concerns in complex and/or prolonged anesthesia, position other than supine     Consultation:TBCB Dr. Pereyra with Cardiology     Patient  has previously scheduled Medical Appointment: 8/26/2020    Navigation: Tests Scheduled.              Consults scheduled.             Results will be tracked by Preop Clinic.    Per Dr. Pereyra, Cardiology:  Cleared for A/S; can hold ASA 7-10 days pre-op

## 2020-08-26 ENCOUNTER — ANESTHESIA EVENT (OUTPATIENT)
Dept: SURGERY | Facility: HOSPITAL | Age: 65
DRG: 708 | End: 2020-08-26
Payer: MEDICARE

## 2020-08-26 ENCOUNTER — TELEPHONE (OUTPATIENT)
Dept: UROLOGY | Facility: CLINIC | Age: 65
End: 2020-08-26

## 2020-08-26 ENCOUNTER — HOSPITAL ENCOUNTER (OUTPATIENT)
Dept: PREADMISSION TESTING | Facility: HOSPITAL | Age: 65
Discharge: HOME | End: 2020-08-26
Attending: ANESTHESIOLOGY
Payer: MEDICARE

## 2020-08-26 ENCOUNTER — OFFICE VISIT (OUTPATIENT)
Dept: UROLOGY | Facility: CLINIC | Age: 65
End: 2020-08-26
Payer: MEDICARE

## 2020-08-26 VITALS
WEIGHT: 169 LBS | RESPIRATION RATE: 16 BRPM | DIASTOLIC BLOOD PRESSURE: 77 MMHG | BODY MASS INDEX: 25.61 KG/M2 | TEMPERATURE: 98 F | SYSTOLIC BLOOD PRESSURE: 148 MMHG | HEART RATE: 57 BPM | OXYGEN SATURATION: 98 % | HEIGHT: 68 IN

## 2020-08-26 DIAGNOSIS — C61 PROSTATE CANCER: Primary | ICD-10-CM

## 2020-08-26 PROCEDURE — 99999 PR PBB SHADOW E&M-EST. PATIENT-LVL I: CPT | Mod: PBBFAC,,,

## 2020-08-26 PROCEDURE — 99499 UNLISTED E&M SERVICE: CPT | Mod: S$PBB,,, | Performed by: UROLOGY

## 2020-08-26 PROCEDURE — 99999 PR PBB SHADOW E&M-EST. PATIENT-LVL I: ICD-10-PCS | Mod: PBBFAC,,,

## 2020-08-26 PROCEDURE — 99211 OFF/OP EST MAY X REQ PHY/QHP: CPT | Mod: PBBFAC,25

## 2020-08-26 PROCEDURE — 99499 NO LOS: ICD-10-PCS | Mod: S$PBB,,, | Performed by: UROLOGY

## 2020-08-26 NOTE — H&P (VIEW-ONLY)
Urology ([facility]) H&P  Staff:  Edson White MD    CC: prostate adencarcinoma    HPI:  Denis Bunn is a 65 y.o. male with [id number] Hope 4+3=7 prostate adenocarcinoma.      The patient initially presented with an elevated PSA.    PSA: 5.3  TRUS biopsy on 7/28/2020 revealed the following:       LEFT   RIGHT  Base   none   3+3=6, 1/2, 10%  Mid   3+3=6, 2/2, 15% 3+3=6, 2/2,15%  [place]   3+3=6, 2/2, 25% 4+3=7, 1/2, 40%    Percentage of total cores positive: 8/12  MRI: PIRADS  Volume: 30 cc    LEENA 5 intermediate risk  NCCN risk: favorable intermediate risk    ED: absent     Nocturia x2-3.    Prior abdominal surgeries: none  History of multiple back surgeries.    ROS:  Neg except per HPI, specifically no bone pain, no unintentional weight loss, no anorexia, no night sweats    Past Medical History:   Diagnosis Date    Back pain     Cataract     Constipation - functional     History of gout     HTN (hypertension), benign 4/24/2013    Prostatitis     Vision changes        Past Surgical History:   Procedure Laterality Date    BACK SURGERY      CATARACT EXTRACTION      left eye    COLONOSCOPY N/A 7/29/2019    Procedure: COLONOSCOPY;  Surgeon: Mingo Freeman MD;  [facility]: [facility];  Service: Endoscopy;  Laterality: N/A;  due July 2019    ELBOW SURGERY      scope/right    EYE SURGERY      Dr. Hope.retina x2 left    LAMINECTOMY      WRIST SURGERY      right       Social History     Socioeconomic History    Marital status:      Spouse name: Not on file    Number of children: Not on file    Years of education: Not on file    Highest education level: Not on file   Occupational History    Not on file   Social Needs    Financial resource strain: Not hard at all    Food insecurity     Worry: Never true     Inability: Never true    Transportation needs     Medical: No     Non-medical: No   Tobacco Use    Smoking status: Never Smoker    Smokeless tobacco: Never Used    Tobacco comment:  , 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   Substance and Sexual Activity    Alcohol use: Yes     Alcohol/week: 3.0 standard drinks     Types: 3 Glasses of wine per week     Frequency: 4 or more times a week     Drinks per session: 1 or 2     Binge frequency: Never     Comment: 8-10 glasses wine per week    Drug use: No    Sexual activity: Yes     Partners: Female   Lifestyle    Physical activity     Days per week: 4 days     Minutes per session: 30 min    Stress: Rather much   Relationships    Social connections     Talks on phone: More than three times a week     Gets together: Once a week     Attends Ep[employer]opalian service: Not on file     Active member of club or organization: Yes     Attends meetings of clubs or organizations: Never     Relationship status:    Other Topics Concern    Not on file   Social History Narrative    Not on file       Family History   Problem Relation Age of Onset    Blindness Mother         from cva    Cataracts Mother     Cataracts Father     Cancer Father     No Known Problems Sister     No Known Problems Brother     No Known Problems Maternal Aunt     No Known Problems Maternal Uncle     No Known Problems Paternal Aunt     No Known Problems Paternal Uncle     No Known Problems Maternal Grandmother     No Known Problems Maternal Grandfather     No Known Problems Paternal Grandmother     No Known Problems Paternal Grandfather     Amblyopia Neg Hx     Diabetes Neg Hx     Glaucoma Neg Hx     Hypertension Neg Hx     Macular degeneration Neg Hx     Retinal detachment Neg Hx     Strabismus Neg Hx     Stroke Neg Hx     Thyroid disease Neg Hx        Review of patient's allergies indicates:   Allergen Reactions    Amoxicillin-pot clavulanate      Other reaction(s): Stomach upset  Other reaction(s): Stomach upset    Celebrex [celecoxib] Other (See Comments)       Current Outpatient Medications on File Prior to Visit   Medication Sig Dispense Refill    allopurinol  (ZYLOPRIM) 100 MG tablet TAKE 1 TABLET (100 MG TOTAL) BY MOUTH 2 (TWO) TIMES DAILY. (Patient taking differently: Take 100 mg by mouth 2 (two) times daily. Take if needed) 180 tablet 3    ALPRAZolam (XANAX) 0.5 MG tablet Take 1 tablet (0.5 mg total) by mouth 3 (three) times daily. (Patient taking differently: Take 0.5 mg by mouth 2 (two) times daily as needed. Take if needed) 30 tablet 1    ascorbic acid (VITAMIN C) 500 MG tablet Take 500 mg by mouth once daily. Hold for 1 week prior to surgery      aspirin 81 MG chewable tablet Take 1 tablet by mouth. Dr White does not require you to hold this prior to surgery-hold am of surgery only      coenzyme Q10 (CO Q-10) 100 mg capsule Take 100 mg by mouth once daily. Hold for 1 week prior to surgery      garlic 1,000 mg Cap Take by mouth. Hold for 1 week prior to surgery      ibuprofen (ADVIL,MOTRIN) 200 MG tablet Take 200 mg by mouth. Hold for 1 week prior to surgery      losartan (COZAAR) 100 MG tablet Take 1 tablet (100 mg total) by mouth once daily. (Patient taking differently: Take 50 mg by mouth once daily. Hold am of surgery) 90 tablet 3    magnesium 250 mg Tab Take 250 mg by mouth once. Takes two 250 mg tablets daily   Hold for 1 week prior to surgery      multivitamin capsule Take 1 capsule by mouth once daily. Hold for 1 week prior to surgery      OM-3/E/LINOL/ALA/OLEIC/GLA/LIP (OMEGA 3-6-9 ORAL) Take by mouth once daily. Hold for 1 week prior to surgery      pravastatin (PRAVACHOL) 20 MG tablet TAKE 1 TABLET(20 MG) BY MOUTH EVERY DAY (Patient taking differently: every evening. Takes at night) 90 tablet 0    saw palmetto 160 MG capsule Take 160 mg by mouth 2 (two) times daily. Hold for 1 week prior to surgery      turmeric (CURCUMIN MISC) by Misc.(Non-Drug; Combo Route) route. Hold for 1 week prior to surgery      verapamiL (CALAN-SR) 120 MG CR tablet TAKE 1 TABLET (120 MG TOTAL) BY MOUTH EVERY EVENING. (Patient taking differently: nightly. Takes at  "night) 90 tablet 3    verapamiL (VERELAN) 240 MG C24P TAKE 1 CAPSULE BY MOUTH EVERY DAY IN THE MORNING (Patient taking differently: Take in am of surgery) 90 capsule 1    [DISCONTINUED] verapamiL (CALAN) 120 MG tablet Take 1 tablet (120 mg total) by mouth every evening. 90 tablet 3     No current facility-administered medications on file prior to visit.        Anticoagulation:  No    Physical Exam:    Estimated body mass index is 25.7 kg/m² as calculated from the following:    Height as of an earlier encounter on 8/26/20: 5' 8" (1.727 m).    Weight as of an earlier encounter on 8/26/20: 76.7 kg (169 lb).    General: No acute distress, well developed. AAOx3  Head: Normocephalic, Atraumatic  Eyes: Extra-occular movements intact, No discharge  Neck: supple, symmetrical, trachea midline  Lungs: normal respiratory effort, no respiratory distress, no wheezes  CV: regular rate, 2+ pulses  Abdomen: soft, non-tender, non-distended, no organomegaly  MSK: no edema, no deformities, normal ROM  Skin: skin color, texture, turgor normal.  Neurologic: no focal deficits, sensation intact    Labs:  Urine dipstick today shows negative for all components.    Lab Results   Component Value Date    WBC 4.26 07/08/2020    HGB 13.2 (L) 07/08/2020    HCT 40.4 07/08/2020    MCV 98 07/08/2020     07/08/2020           BMP  Lab Results   Component Value Date     07/08/2020    K 4.2 07/08/2020     07/08/2020    CO2 29 07/08/2020    BUN 15 07/08/2020    CREATININE 1.0 07/08/2020    CALCIUM 9.3 07/08/2020    ANIONGAP 7 (L) 07/08/2020    ESTGFRAFRICA >60 07/08/2020    EGFRNONAA >60 07/08/2020       Lab Results   Component Value Date    PSA 3.8 05/29/2019    PSA 3.1 11/09/2018    PSA 3.6 06/11/2018    PSA 2.5 06/05/2017    PSA 2.4 06/07/2016    PSA 2.3 07/13/2015    PSA 2.2 06/30/2015    PSA 2.6 11/11/2014    PSA 2.3 04/14/2014    PSA 1.59 04/24/2013    PSADIAG 5.3 (H) 07/08/2020    PSADIAG 4.0 12/02/2019    PSADIAG 2.9 " 03/01/2019       Imaging:  MRI prostate PI-RADS 5, positive extra prostatic extension, negative NVB, seminal vesicle, nodes      Assessment: Denis Bunn is a 65 y.o. male with [id number] [place] 3+4=7 prostate adenocarcinoma.      Plan:   1. To OR for RALP with BPLND  2. The risks and benefits of surgical therapy were discussed in depth including preoperative preparation, surgical technique (including bladder neck and nerve-sparing techniques), postoperative recuperation and recovery, and short and long term complications. I discussed the risks of bleeding, infection, anastomotic urine leak, incontinence and impotence. I discussed the chance of rectal injury, obturator nerve injury, and occult injury to the bowel. We discussed preop and postop Kegels, post op penile rehab, and treatment options for incontinence and impotence.The patient voices understanding and all questions have been answered. The patient agrees to proceed as planned. Consents signed   3. Type and screen ordered, blood transfusion risks, benefits, and indications explained. Blood consent obtained.  4. Heparin Pre-op    Gilberto Garner MD

## 2020-08-26 NOTE — PROGRESS NOTES
Urology ([facility]) H&P  Staff:  Edson White MD    CC: prostate adencarcinoma    HPI:  Denis Bunn is a 65 y.o. male with [id number] [place] 4+3=7 prostate adenocarcinoma.      The patient initially presented with an elevated PSA.    PSA: 5.3  TRUS biopsy on 7/28/2020 revealed the following:       LEFT   RIGHT  Base   none   3+3=6, 1/2, 10%  Mid   3+3=6, 2/2, 15% 3+3=6, 2/2,15%  [place]   3+3=6, 2/2, 25% 4+3=7, 1/2, 40%    Percentage of total cores positive: 8/12  MRI: PIRADS  Volume: 30 cc    LEENA 5 intermediate risk  NCCN risk: favorable intermediate risk    ED: absent     Nocturia x2-3.    Prior abdominal surgeries: none  History of multiple back surgeries.    ROS:  Neg except per HPI, specifically no bone pain, no unintentional weight loss, no anorexia, no night sweats    Past Medical History:   Diagnosis Date    Back pain     Cataract     Constipation - functional     History of gout     HTN (hypertension), benign 4/24/2013    Prostatitis     Vision changes        Past Surgical History:   Procedure Laterality Date    BACK SURGERY      CATARACT EXTRACTION      left eye    COLONOSCOPY N/A 7/29/2019    Procedure: COLONOSCOPY;  Surgeon: Mingo Freeman MD;  Location: [facility];  Service: Endoscopy;  Laterality: N/A;  due July 2019    ELBOW SURGERY      scope/right    EYE SURGERY      Dr. Hope.retina x2 left    LAMINECTOMY      WRIST SURGERY      right       Social History     Socioeconomic History    Marital status:      Spouse name: Not on file    Number of children: Not on file    Years of education: Not on file    Highest education level: Not on file   Occupational History    Not on file   Social Needs    Financial resource strain: Not hard at all    Food insecurity     Worry: Never true     Inability: Never true    Transportation needs     Medical: No     Non-medical: No   Tobacco Use    Smoking status: Never Smoker    Smokeless tobacco: Never Used    Tobacco comment:  , 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   Substance and Sexual Activity    Alcohol use: Yes     Alcohol/week: 3.0 standard drinks     Types: 3 Glasses of wine per week     Frequency: 4 or more times a week     Drinks per session: 1 or 2     Binge frequency: Never     Comment: 8-10 glasses wine per week    Drug use: No    Sexual activity: Yes     Partners: Female   Lifestyle    Physical activity     Days per week: 4 days     Minutes per session: 30 min    Stress: Rather much   Relationships    Social connections     Talks on phone: More than three times a week     Gets together: Once a week     Attends Rastafarian service: Not on file     Active member of club or organization: Yes     Attends meetings of clubs or organizations: Never     Relationship status:    Other Topics Concern    Not on file   Social History Narrative    Not on file       Family History   Problem Relation Age of Onset    Blindness Mother         from cva    Cataracts Mother     Cataracts Father     Cancer Father     No Known Problems Sister     No Known Problems Brother     No Known Problems Maternal Aunt     No Known Problems Maternal Uncle     No Known Problems Paternal Aunt     No Known Problems Paternal Uncle     No Known Problems Maternal Grandmother     No Known Problems Maternal Grandfather     No Known Problems Paternal Grandmother     No Known Problems Paternal Grandfather     Amblyopia Neg Hx     Diabetes Neg Hx     Glaucoma Neg Hx     Hypertension Neg Hx     Macular degeneration Neg Hx     Retinal detachment Neg Hx     Strabismus Neg Hx     Stroke Neg Hx     Thyroid disease Neg Hx        Review of patient's allergies indicates:   Allergen Reactions    Amoxicillin-pot clavulanate      Other reaction(s): Stomach upset  Other reaction(s): Stomach upset    Celebrex [celecoxib] Other (See Comments)       Current Outpatient Medications on File Prior to Visit   Medication Sig Dispense Refill    allopurinol  (ZYLOPRIM) 100 MG tablet TAKE 1 TABLET (100 MG TOTAL) BY MOUTH 2 (TWO) TIMES DAILY. (Patient taking differently: Take 100 mg by mouth 2 (two) times daily. Take if needed) 180 tablet 3    ALPRAZolam (XANAX) 0.5 MG tablet Take 1 tablet (0.5 mg total) by mouth 3 (three) times daily. (Patient taking differently: Take 0.5 mg by mouth 2 (two) times daily as needed. Take if needed) 30 tablet 1    ascorbic acid (VITAMIN C) 500 MG tablet Take 500 mg by mouth once daily. Hold for 1 week prior to surgery      aspirin 81 MG chewable tablet Take 1 tablet by mouth. Dr White does not require you to hold this prior to surgery-hold am of surgery only      coenzyme Q10 (CO Q-10) 100 mg capsule Take 100 mg by mouth once daily. Hold for 1 week prior to surgery      garlic 1,000 mg Cap Take by mouth. Hold for 1 week prior to surgery      ibuprofen (ADVIL,MOTRIN) 200 MG tablet Take 200 mg by mouth. Hold for 1 week prior to surgery      losartan (COZAAR) 100 MG tablet Take 1 tablet (100 mg total) by mouth once daily. (Patient taking differently: Take 50 mg by mouth once daily. Hold am of surgery) 90 tablet 3    magnesium 250 mg Tab Take 250 mg by mouth once. Takes two 250 mg tablets daily   Hold for 1 week prior to surgery      multivitamin capsule Take 1 capsule by mouth once daily. Hold for 1 week prior to surgery      OM-3/E/LINOL/ALA/OLEIC/GLA/LIP (OMEGA 3-6-9 ORAL) Take by mouth once daily. Hold for 1 week prior to surgery      pravastatin (PRAVACHOL) 20 MG tablet TAKE 1 TABLET(20 MG) BY MOUTH EVERY DAY (Patient taking differently: every evening. Takes at night) 90 tablet 0    saw palmetto 160 MG capsule Take 160 mg by mouth 2 (two) times daily. Hold for 1 week prior to surgery      turmeric (CURCUMIN MISC) by Misc.(Non-Drug; Combo Route) route. Hold for 1 week prior to surgery      verapamiL (CALAN-SR) 120 MG CR tablet TAKE 1 TABLET (120 MG TOTAL) BY MOUTH EVERY EVENING. (Patient taking differently: nightly. Takes at  "night) 90 tablet 3    verapamiL (VERELAN) 240 MG C24P TAKE 1 CAPSULE BY MOUTH EVERY DAY IN THE MORNING (Patient taking differently: Take in am of surgery) 90 capsule 1    [DISCONTINUED] verapamiL (CALAN) 120 MG tablet Take 1 tablet (120 mg total) by mouth every evening. 90 tablet 3     No current facility-administered medications on file prior to visit.        Anticoagulation:  No    Physical Exam:    Estimated body mass index is 25.7 kg/m² as calculated from the following:    Height as of an earlier encounter on 8/26/20: 5' 8" (1.727 m).    Weight as of an earlier encounter on 8/26/20: 76.7 kg (169 lb).    General: No acute distress, well developed. AAOx3  Head: Normocephalic, Atraumatic  Eyes: Extra-occular movements intact, No discharge  Neck: supple, symmetrical, trachea midline  Lungs: normal respiratory effort, no respiratory distress, no wheezes  CV: regular rate, 2+ pulses  Abdomen: soft, non-tender, non-distended, no organomegaly  MSK: no edema, no deformities, normal ROM  Skin: skin color, texture, turgor normal.  Neurologic: no focal deficits, sensation intact    Labs:  Urine dipstick today shows negative for all components.    Lab Results   Component Value Date    WBC 4.26 07/08/2020    HGB 13.2 (L) 07/08/2020    HCT 40.4 07/08/2020    MCV 98 07/08/2020     07/08/2020           BMP  Lab Results   Component Value Date     07/08/2020    K 4.2 07/08/2020     07/08/2020    CO2 29 07/08/2020    BUN 15 07/08/2020    CREATININE 1.0 07/08/2020    CALCIUM 9.3 07/08/2020    ANIONGAP 7 (L) 07/08/2020    ESTGFRAFRICA >60 07/08/2020    EGFRNONAA >60 07/08/2020       Lab Results   Component Value Date    PSA 3.8 05/29/2019    PSA 3.1 11/09/2018    PSA 3.6 06/11/2018    PSA 2.5 06/05/2017    PSA 2.4 06/07/2016    PSA 2.3 07/13/2015    PSA 2.2 06/30/2015    PSA 2.6 11/11/2014    PSA 2.3 04/14/2014    PSA 1.59 04/24/2013    PSADIAG 5.3 (H) 07/08/2020    PSADIAG 4.0 12/02/2019    PSADIAG 2.9 " 03/01/2019       Imaging:  MRI prostate PI-RADS 5, positive extra prostatic extension, negative NVB, seminal vesicle, nodes      Assessment: Denis Bunn is a 65 y.o. male with [id number] [place] 3+4=7 prostate adenocarcinoma.      Plan:   1. To OR for RALP with BPLND  2. The risks and benefits of surgical therapy were discussed in depth including preoperative preparation, surgical technique (including bladder neck and nerve-sparing techniques), postoperative recuperation and recovery, and short and long term complications. I discussed the risks of bleeding, infection, anastomotic urine leak, incontinence and impotence. I discussed the chance of rectal injury, obturator nerve injury, and occult injury to the bowel. We discussed preop and postop Kegels, post op penile rehab, and treatment options for incontinence and impotence.The patient voices understanding and all questions have been answered. The patient agrees to proceed as planned. Consents signed   3. Type and screen ordered, blood transfusion risks, benefits, and indications explained. Blood consent obtained.  4. Heparin Pre-op    Gilberto Garner MD

## 2020-08-30 ENCOUNTER — LAB VISIT (OUTPATIENT)
Dept: URGENT CARE | Facility: CLINIC | Age: 65
DRG: 708 | End: 2020-08-30
Payer: MEDICARE

## 2020-08-30 VITALS — TEMPERATURE: 97 F

## 2020-08-30 DIAGNOSIS — C61 PROSTATE CANCER: ICD-10-CM

## 2020-08-30 PROCEDURE — U0003 INFECTIOUS AGENT DETECTION BY NUCLEIC ACID (DNA OR RNA); SEVERE ACUTE RESPIRATORY SYNDROME CORONAVIRUS 2 (SARS-COV-2) (CORONAVIRUS DISEASE [COVID-19]), AMPLIFIED PROBE TECHNIQUE, MAKING USE OF HIGH THROUGHPUT TECHNOLOGIES AS DESCRIBED BY CMS-2020-01-R: HCPCS

## 2020-08-31 LAB — SARS-COV-2 RNA RESP QL NAA+PROBE: NOT DETECTED

## 2020-09-02 ENCOUNTER — ANESTHESIA (OUTPATIENT)
Dept: SURGERY | Facility: HOSPITAL | Age: 65
DRG: 708 | End: 2020-09-02
Payer: MEDICARE

## 2020-09-02 ENCOUNTER — HOSPITAL ENCOUNTER (INPATIENT)
Facility: HOSPITAL | Age: 65
LOS: 1 days | Discharge: HOME | DRG: 708 | End: 2020-09-03
Attending: UROLOGY | Admitting: UROLOGY
Payer: MEDICARE

## 2020-09-02 DIAGNOSIS — Z01.818 PREOPERATIVE TESTING: Primary | ICD-10-CM

## 2020-09-02 DIAGNOSIS — C61 PROSTATE CANCER: ICD-10-CM

## 2020-09-02 PROCEDURE — 71000016 HC POSTOP RECOV ADDL HR: Performed by: UROLOGY

## 2020-09-02 PROCEDURE — 88309 PR  SURG PATH,LEVEL VI: ICD-10-PCS | Mod: 26,,, | Performed by: PATHOLOGY

## 2020-09-02 PROCEDURE — 88305 TISSUE EXAM BY PATHOLOGIST: CPT | Mod: 26,,, | Performed by: PATHOLOGY

## 2020-09-02 PROCEDURE — 71000033 HC RECOVERY, INTIAL HOUR: Performed by: UROLOGY

## 2020-09-02 PROCEDURE — 36000713 HC OR TIME LEV V EA ADD 15 MIN: Performed by: UROLOGY

## 2020-09-02 PROCEDURE — 88305 TISSUE EXAM BY PATHOLOGIST: CPT | Mod: 59 | Performed by: PATHOLOGY

## 2020-09-02 PROCEDURE — 38571 LAPAROSCOPY LYMPHADENECTOMY: CPT | Mod: 51,,, | Performed by: UROLOGY

## 2020-09-02 PROCEDURE — 38571 PR LAP,PELVIC LYMPHADENECTOMY: ICD-10-PCS | Mod: 51,,, | Performed by: UROLOGY

## 2020-09-02 PROCEDURE — 55866 LAPS SURG PRST8ECT RPBIC RAD: CPT | Mod: ,,, | Performed by: UROLOGY

## 2020-09-02 PROCEDURE — 63600175 PHARM REV CODE 636 W HCPCS: Performed by: STUDENT IN AN ORGANIZED HEALTH CARE EDUCATION/TRAINING PROGRAM

## 2020-09-02 PROCEDURE — 11000001 HC ACUTE MED/SURG PRIVATE ROOM

## 2020-09-02 PROCEDURE — 76942 ECHO GUIDE FOR BIOPSY: CPT | Mod: 59 | Performed by: STUDENT IN AN ORGANIZED HEALTH CARE EDUCATION/TRAINING PROGRAM

## 2020-09-02 PROCEDURE — 27201423 OPTIME MED/SURG SUP & DEVICES STERILE SUPPLY: Performed by: UROLOGY

## 2020-09-02 PROCEDURE — 25000003 PHARM REV CODE 250: Performed by: STUDENT IN AN ORGANIZED HEALTH CARE EDUCATION/TRAINING PROGRAM

## 2020-09-02 PROCEDURE — D9220A PRA ANESTHESIA: Mod: ,,, | Performed by: ANESTHESIOLOGY

## 2020-09-02 PROCEDURE — 36000712 HC OR TIME LEV V 1ST 15 MIN: Performed by: UROLOGY

## 2020-09-02 PROCEDURE — 64461 PVB THORACIC SINGLE INJ SITE: CPT | Performed by: STUDENT IN AN ORGANIZED HEALTH CARE EDUCATION/TRAINING PROGRAM

## 2020-09-02 PROCEDURE — 64461 PVB THORACIC SINGLE INJ SITE: CPT | Mod: 50,59,, | Performed by: ANESTHESIOLOGY

## 2020-09-02 PROCEDURE — 88305 TISSUE EXAM BY PATHOLOGIST: ICD-10-PCS | Mod: 26,,, | Performed by: PATHOLOGY

## 2020-09-02 PROCEDURE — 63600175 PHARM REV CODE 636 W HCPCS: Performed by: ANESTHESIOLOGY

## 2020-09-02 PROCEDURE — 37000008 HC ANESTHESIA 1ST 15 MINUTES: Performed by: UROLOGY

## 2020-09-02 PROCEDURE — 88309 TISSUE EXAM BY PATHOLOGIST: CPT | Mod: 26,,, | Performed by: PATHOLOGY

## 2020-09-02 PROCEDURE — 88309 TISSUE EXAM BY PATHOLOGIST: CPT | Performed by: PATHOLOGY

## 2020-09-02 PROCEDURE — 55866 PR LAP,PROSTATECTOMY,RADICAL,W/NERVE SPARE,INCL ROBOTIC: ICD-10-PCS | Mod: ,,, | Performed by: UROLOGY

## 2020-09-02 PROCEDURE — 64461 PERIPHERAL BLOCK: ICD-10-PCS | Mod: 50,59,, | Performed by: ANESTHESIOLOGY

## 2020-09-02 PROCEDURE — 37000009 HC ANESTHESIA EA ADD 15 MINS: Performed by: UROLOGY

## 2020-09-02 PROCEDURE — 25000003 PHARM REV CODE 250: Performed by: ANESTHESIOLOGY

## 2020-09-02 PROCEDURE — 94761 N-INVAS EAR/PLS OXIMETRY MLT: CPT

## 2020-09-02 PROCEDURE — 71000015 HC POSTOP RECOV 1ST HR: Performed by: UROLOGY

## 2020-09-02 PROCEDURE — D9220A PRA ANESTHESIA: ICD-10-PCS | Mod: ,,, | Performed by: ANESTHESIOLOGY

## 2020-09-02 RX ORDER — OXYCODONE HYDROCHLORIDE 5 MG/1
5 TABLET ORAL EVERY 4 HOURS PRN
Status: DISCONTINUED | OUTPATIENT
Start: 2020-09-02 | End: 2020-09-03 | Stop reason: HOSPADM

## 2020-09-02 RX ORDER — ONDANSETRON 2 MG/ML
4 INJECTION INTRAMUSCULAR; INTRAVENOUS ONCE AS NEEDED
Status: DISCONTINUED | OUTPATIENT
Start: 2020-09-02 | End: 2020-09-02 | Stop reason: HOSPADM

## 2020-09-02 RX ORDER — HYDROMORPHONE HYDROCHLORIDE 1 MG/ML
0.2 INJECTION, SOLUTION INTRAMUSCULAR; INTRAVENOUS; SUBCUTANEOUS EVERY 5 MIN PRN
Status: DISCONTINUED | OUTPATIENT
Start: 2020-09-02 | End: 2020-09-02 | Stop reason: HOSPADM

## 2020-09-02 RX ORDER — HEPARIN SODIUM 5000 [USP'U]/ML
5000 INJECTION, SOLUTION INTRAVENOUS; SUBCUTANEOUS EVERY 8 HOURS
Status: DISCONTINUED | OUTPATIENT
Start: 2020-09-02 | End: 2020-09-03 | Stop reason: HOSPADM

## 2020-09-02 RX ORDER — ROCURONIUM BROMIDE 10 MG/ML
INJECTION, SOLUTION INTRAVENOUS
Status: DISCONTINUED | OUTPATIENT
Start: 2020-09-02 | End: 2020-09-02

## 2020-09-02 RX ORDER — VERAPAMIL HYDROCHLORIDE 120 MG/1
120 TABLET, FILM COATED, EXTENDED RELEASE ORAL NIGHTLY
Status: DISCONTINUED | OUTPATIENT
Start: 2020-09-02 | End: 2020-09-03 | Stop reason: HOSPADM

## 2020-09-02 RX ORDER — PROPOFOL 10 MG/ML
VIAL (ML) INTRAVENOUS
Status: DISCONTINUED | OUTPATIENT
Start: 2020-09-02 | End: 2020-09-02

## 2020-09-02 RX ORDER — NEOSTIGMINE METHYLSULFATE 0.5 MG/ML
INJECTION, SOLUTION INTRAVENOUS
Status: DISCONTINUED | OUTPATIENT
Start: 2020-09-02 | End: 2020-09-02

## 2020-09-02 RX ORDER — PREGABALIN 150 MG/1
150 CAPSULE ORAL
Status: COMPLETED | OUTPATIENT
Start: 2020-09-02 | End: 2020-09-02

## 2020-09-02 RX ORDER — PREGABALIN 50 MG/1
150 CAPSULE ORAL NIGHTLY
Status: DISCONTINUED | OUTPATIENT
Start: 2020-09-02 | End: 2020-09-03 | Stop reason: HOSPADM

## 2020-09-02 RX ORDER — SUCCINYLCHOLINE CHLORIDE 20 MG/ML
INJECTION INTRAMUSCULAR; INTRAVENOUS
Status: DISCONTINUED | OUTPATIENT
Start: 2020-09-02 | End: 2020-09-02

## 2020-09-02 RX ORDER — ALLOPURINOL 100 MG/1
100 TABLET ORAL 2 TIMES DAILY
Status: DISCONTINUED | OUTPATIENT
Start: 2020-09-02 | End: 2020-09-03 | Stop reason: HOSPADM

## 2020-09-02 RX ORDER — CEFAZOLIN SODIUM 1 G/3ML
2 INJECTION, POWDER, FOR SOLUTION INTRAMUSCULAR; INTRAVENOUS
Status: COMPLETED | OUTPATIENT
Start: 2020-09-02 | End: 2020-09-02

## 2020-09-02 RX ORDER — FENTANYL CITRATE 50 UG/ML
25 INJECTION, SOLUTION INTRAMUSCULAR; INTRAVENOUS EVERY 5 MIN PRN
Status: COMPLETED | OUTPATIENT
Start: 2020-09-02 | End: 2020-09-02

## 2020-09-02 RX ORDER — LIDOCAINE HYDROCHLORIDE 20 MG/ML
INJECTION INTRAVENOUS
Status: DISCONTINUED | OUTPATIENT
Start: 2020-09-02 | End: 2020-09-02

## 2020-09-02 RX ORDER — KETOROLAC TROMETHAMINE 30 MG/ML
INJECTION, SOLUTION INTRAMUSCULAR; INTRAVENOUS
Status: DISCONTINUED | OUTPATIENT
Start: 2020-09-02 | End: 2020-09-02

## 2020-09-02 RX ORDER — ACETAMINOPHEN 500 MG
1000 TABLET ORAL EVERY 6 HOURS
Status: DISCONTINUED | OUTPATIENT
Start: 2020-09-02 | End: 2020-09-03 | Stop reason: HOSPADM

## 2020-09-02 RX ORDER — ACETAMINOPHEN 10 MG/ML
INJECTION, SOLUTION INTRAVENOUS
Status: DISCONTINUED | OUTPATIENT
Start: 2020-09-02 | End: 2020-09-02

## 2020-09-02 RX ORDER — ONDANSETRON 2 MG/ML
INJECTION INTRAMUSCULAR; INTRAVENOUS
Status: DISCONTINUED | OUTPATIENT
Start: 2020-09-02 | End: 2020-09-02

## 2020-09-02 RX ORDER — GLYCOPYRROLATE 0.2 MG/ML
INJECTION INTRAMUSCULAR; INTRAVENOUS
Status: DISCONTINUED | OUTPATIENT
Start: 2020-09-02 | End: 2020-09-02

## 2020-09-02 RX ORDER — SODIUM CHLORIDE 9 MG/ML
INJECTION, SOLUTION INTRAVENOUS CONTINUOUS
Status: DISCONTINUED | OUTPATIENT
Start: 2020-09-02 | End: 2020-09-02

## 2020-09-02 RX ORDER — OXYCODONE HYDROCHLORIDE 10 MG/1
10 TABLET ORAL EVERY 4 HOURS PRN
Status: DISCONTINUED | OUTPATIENT
Start: 2020-09-02 | End: 2020-09-03 | Stop reason: HOSPADM

## 2020-09-02 RX ORDER — PHENYLEPHRINE HYDROCHLORIDE 10 MG/ML
INJECTION INTRAVENOUS
Status: DISCONTINUED | OUTPATIENT
Start: 2020-09-02 | End: 2020-09-02

## 2020-09-02 RX ORDER — DEXAMETHASONE SODIUM PHOSPHATE 100 MG/10ML
INJECTION INTRAMUSCULAR; INTRAVENOUS
Status: DISCONTINUED | OUTPATIENT
Start: 2020-09-02 | End: 2020-09-02

## 2020-09-02 RX ORDER — ACETAMINOPHEN 500 MG
1000 TABLET ORAL
Status: DISCONTINUED | OUTPATIENT
Start: 2020-09-02 | End: 2020-09-02

## 2020-09-02 RX ORDER — KETOROLAC TROMETHAMINE 15 MG/ML
15 INJECTION, SOLUTION INTRAMUSCULAR; INTRAVENOUS ONCE
Status: COMPLETED | OUTPATIENT
Start: 2020-09-02 | End: 2020-09-02

## 2020-09-02 RX ORDER — MIDAZOLAM HYDROCHLORIDE 1 MG/ML
0.5 INJECTION INTRAMUSCULAR; INTRAVENOUS
Status: DISCONTINUED | OUTPATIENT
Start: 2020-09-02 | End: 2020-09-02

## 2020-09-02 RX ORDER — SODIUM CHLORIDE 0.9 % (FLUSH) 0.9 %
2 SYRINGE (ML) INJECTION
Status: DISCONTINUED | OUTPATIENT
Start: 2020-09-02 | End: 2020-09-02 | Stop reason: HOSPADM

## 2020-09-02 RX ORDER — PANTOPRAZOLE SODIUM 40 MG/1
40 TABLET, DELAYED RELEASE ORAL DAILY
Status: DISCONTINUED | OUTPATIENT
Start: 2020-09-02 | End: 2020-09-03 | Stop reason: HOSPADM

## 2020-09-02 RX ORDER — SODIUM CHLORIDE 9 MG/ML
INJECTION, SOLUTION INTRAVENOUS CONTINUOUS
Status: ACTIVE | OUTPATIENT
Start: 2020-09-02 | End: 2020-09-03

## 2020-09-02 RX ORDER — ONDANSETRON 2 MG/ML
8 INJECTION INTRAMUSCULAR; INTRAVENOUS EVERY 6 HOURS PRN
Status: DISCONTINUED | OUTPATIENT
Start: 2020-09-02 | End: 2020-09-03 | Stop reason: HOSPADM

## 2020-09-02 RX ORDER — PRAVASTATIN SODIUM 10 MG/1
20 TABLET ORAL DAILY
Status: DISCONTINUED | OUTPATIENT
Start: 2020-09-02 | End: 2020-09-03 | Stop reason: HOSPADM

## 2020-09-02 RX ORDER — HEPARIN SODIUM 5000 [USP'U]/ML
5000 INJECTION, SOLUTION INTRAVENOUS; SUBCUTANEOUS EVERY 8 HOURS
Status: DISCONTINUED | OUTPATIENT
Start: 2020-09-02 | End: 2020-09-02

## 2020-09-02 RX ORDER — KETOROLAC TROMETHAMINE 30 MG/ML
30 INJECTION, SOLUTION INTRAMUSCULAR; INTRAVENOUS
Status: COMPLETED | OUTPATIENT
Start: 2020-09-02 | End: 2020-09-02

## 2020-09-02 RX ADMIN — SODIUM CHLORIDE, SODIUM GLUCONATE, SODIUM ACETATE, POTASSIUM CHLORIDE, MAGNESIUM CHLORIDE, SODIUM PHOSPHATE, DIBASIC, AND POTASSIUM PHOSPHATE: .53; .5; .37; .037; .03; .012; .00082 INJECTION, SOLUTION INTRAVENOUS at 08:09

## 2020-09-02 RX ADMIN — MIDAZOLAM 1 MG: 1 INJECTION INTRAMUSCULAR; INTRAVENOUS at 08:09

## 2020-09-02 RX ADMIN — SODIUM CHLORIDE: 0.9 INJECTION, SOLUTION INTRAVENOUS at 08:09

## 2020-09-02 RX ADMIN — PROPOFOL 150 MG: 10 INJECTION, EMULSION INTRAVENOUS at 08:09

## 2020-09-02 RX ADMIN — GLYCOPYRROLATE 0.2 MG: 0.2 INJECTION, SOLUTION INTRAMUSCULAR; INTRAVENOUS at 08:09

## 2020-09-02 RX ADMIN — ALLOPURINOL 100 MG: 100 TABLET ORAL at 08:09

## 2020-09-02 RX ADMIN — HEPARIN SODIUM 5000 UNITS: 5000 INJECTION INTRAVENOUS; SUBCUTANEOUS at 09:09

## 2020-09-02 RX ADMIN — ACETAMINOPHEN 1000 MG: 500 TABLET ORAL at 05:09

## 2020-09-02 RX ADMIN — FENTANYL CITRATE 50 MCG: 50 INJECTION INTRAMUSCULAR; INTRAVENOUS at 12:09

## 2020-09-02 RX ADMIN — HYDROMORPHONE HYDROCHLORIDE 0.2 MG: 1 INJECTION, SOLUTION INTRAMUSCULAR; INTRAVENOUS; SUBCUTANEOUS at 01:09

## 2020-09-02 RX ADMIN — ROCURONIUM BROMIDE 30 MG: 10 INJECTION, SOLUTION INTRAVENOUS at 10:09

## 2020-09-02 RX ADMIN — OXYCODONE HYDROCHLORIDE 10 MG: 10 TABLET ORAL at 08:09

## 2020-09-02 RX ADMIN — ONDANSETRON 4 MG: 2 INJECTION, SOLUTION INTRAMUSCULAR; INTRAVENOUS at 12:09

## 2020-09-02 RX ADMIN — SODIUM CHLORIDE, SODIUM GLUCONATE, SODIUM ACETATE, POTASSIUM CHLORIDE, MAGNESIUM CHLORIDE, SODIUM PHOSPHATE, DIBASIC, AND POTASSIUM PHOSPHATE: .53; .5; .37; .037; .03; .012; .00082 INJECTION, SOLUTION INTRAVENOUS at 11:09

## 2020-09-02 RX ADMIN — HEPARIN SODIUM 5000 UNITS: 5000 INJECTION INTRAVENOUS; SUBCUTANEOUS at 07:09

## 2020-09-02 RX ADMIN — ROCURONIUM BROMIDE 20 MG: 10 INJECTION, SOLUTION INTRAVENOUS at 08:09

## 2020-09-02 RX ADMIN — MIDAZOLAM 1 MG: 1 INJECTION INTRAMUSCULAR; INTRAVENOUS at 07:09

## 2020-09-02 RX ADMIN — ACETAMINOPHEN 1000 MG: 500 TABLET ORAL at 11:09

## 2020-09-02 RX ADMIN — PANTOPRAZOLE SODIUM 40 MG: 40 TABLET, DELAYED RELEASE ORAL at 01:09

## 2020-09-02 RX ADMIN — CEFAZOLIN 2 G: 330 INJECTION, POWDER, FOR SOLUTION INTRAMUSCULAR; INTRAVENOUS at 08:09

## 2020-09-02 RX ADMIN — OXYCODONE HYDROCHLORIDE 10 MG: 10 TABLET ORAL at 01:09

## 2020-09-02 RX ADMIN — ROCURONIUM BROMIDE 20 MG: 10 INJECTION, SOLUTION INTRAVENOUS at 11:09

## 2020-09-02 RX ADMIN — HEPARIN SODIUM 5000 UNITS: 5000 INJECTION INTRAVENOUS; SUBCUTANEOUS at 01:09

## 2020-09-02 RX ADMIN — SODIUM CHLORIDE: 0.9 INJECTION, SOLUTION INTRAVENOUS at 01:09

## 2020-09-02 RX ADMIN — KETOROLAC TROMETHAMINE 30 MG: 30 INJECTION, SOLUTION INTRAMUSCULAR at 06:09

## 2020-09-02 RX ADMIN — DEXAMETHASONE SODIUM PHOSPHATE 8 MG: 10 INJECTION INTRAMUSCULAR; INTRAVENOUS at 08:09

## 2020-09-02 RX ADMIN — LIDOCAINE HYDROCHLORIDE 75 MG: 20 INJECTION, SOLUTION INTRAVENOUS at 08:09

## 2020-09-02 RX ADMIN — PHENYLEPHRINE HYDROCHLORIDE 200 MCG: 10 INJECTION INTRAVENOUS at 08:09

## 2020-09-02 RX ADMIN — PREGABALIN 150 MG: 50 CAPSULE ORAL at 08:09

## 2020-09-02 RX ADMIN — ACETAMINOPHEN 1000 MG: 500 TABLET ORAL at 06:09

## 2020-09-02 RX ADMIN — GLYCOPYRROLATE 0.6 MG: 0.2 INJECTION, SOLUTION INTRAMUSCULAR; INTRAVENOUS at 12:09

## 2020-09-02 RX ADMIN — ROCURONIUM BROMIDE 5 MG: 10 INJECTION, SOLUTION INTRAVENOUS at 08:09

## 2020-09-02 RX ADMIN — SUCCINYLCHOLINE CHLORIDE 140 MG: 20 INJECTION, SOLUTION INTRAMUSCULAR; INTRAVENOUS at 08:09

## 2020-09-02 RX ADMIN — KETOROLAC TROMETHAMINE 15 MG: 15 INJECTION, SOLUTION INTRAMUSCULAR; INTRAVENOUS at 10:09

## 2020-09-02 RX ADMIN — ACETAMINOPHEN 1000 MG: 10 INJECTION, SOLUTION INTRAVENOUS at 12:09

## 2020-09-02 RX ADMIN — NEOSTIGMINE METHYLSULFATE 5 MG: 0.5 INJECTION INTRAVENOUS at 12:09

## 2020-09-02 RX ADMIN — GLYCOPYRROLATE 0.2 MG: 0.2 INJECTION, SOLUTION INTRAMUSCULAR; INTRAVENOUS at 11:09

## 2020-09-02 RX ADMIN — CEFAZOLIN 2 G: 330 INJECTION, POWDER, FOR SOLUTION INTRAMUSCULAR; INTRAVENOUS at 11:09

## 2020-09-02 RX ADMIN — ROCURONIUM BROMIDE 45 MG: 10 INJECTION, SOLUTION INTRAVENOUS at 08:09

## 2020-09-02 RX ADMIN — VERAPAMIL HYDROCHLORIDE 120 MG: 120 TABLET, FILM COATED, EXTENDED RELEASE ORAL at 08:09

## 2020-09-02 RX ADMIN — KETOROLAC TROMETHAMINE 30 MG: 30 INJECTION, SOLUTION INTRAMUSCULAR; INTRAVENOUS at 12:09

## 2020-09-02 RX ADMIN — FENTANYL CITRATE 50 MCG: 50 INJECTION INTRAMUSCULAR; INTRAVENOUS at 07:09

## 2020-09-02 RX ADMIN — FENTANYL CITRATE 100 MCG: 50 INJECTION INTRAMUSCULAR; INTRAVENOUS at 08:09

## 2020-09-02 RX ADMIN — PREGABALIN 150 MG: 150 CAPSULE ORAL at 06:09

## 2020-09-02 NOTE — ANESTHESIA PROCEDURE NOTES
Intubation  Performed by: Jamir Patricia MD  Authorized by: Jamir Patricia MD     Intubation:     Induction:  Intravenous    Intubated:  Postinduction    Mask Ventilation:  Easy mask    Attempts:  1    Attempted By:  Staff anesthesiologist    Laryngeal View Grade: Grade I - full view of chords      Difficult Airway Encountered?: No      Complications:  None    Airway Device:  Oral endotracheal tube    Airway Device Size:  8.0    Style/Cuff Inflation:  Cuffed    Inflation Amount (mL):  7    Tube secured:  24    Secured at:  The lips    Placement Verified By:  Capnometry    Complicating Factors:  None    Findings Post-Intubation:  BS equal bilateral and atraumatic/condition of teeth unchanged

## 2020-09-02 NOTE — ADDENDUM NOTE
Addendum  created 09/02/20 1533 by Jamir Patricia MD    Child order released for a procedure order, Clinical Note Signed, Intraprocedure Blocks edited, LDA created via procedure documentation

## 2020-09-02 NOTE — INTERVAL H&P NOTE
The patient has been examined and the H&P has been reviewed:    I concur with the findings and no changes have occurred since H&P was written.    Surgery risks, benefits and alternative options discussed and understood by patient/family.          Active Hospital Problems    Diagnosis  POA    *Prostate cancer [C61]  Yes    Preoperative testing [Z01.818]  Not Applicable    Atypical chest pain [R07.89]  Yes    Overweight (BMI 25.0-29.9) [E66.3]  Yes    PVC (premature ventricular contraction) [I49.3]  Yes    HTN (hypertension), benign [I10]  Yes     Borderline      Dyslipidemia [E78.5]  Yes     Elevated Lp(a) but excellent HDL      History of gout [Z87.39]  Yes    Back pain [M54.9]  Yes      Resolved Hospital Problems   No resolved problems to display.

## 2020-09-02 NOTE — ANESTHESIA POSTPROCEDURE EVALUATION
Anesthesia Post Evaluation    Patient: Denis Bunn    Procedure(s) Performed: Procedure(s) (LRB):  ROBOTIC PROSTATECTOMY (N/A)    Final Anesthesia Type: general    Patient location during evaluation: PACU  Patient participation: Yes- Able to Participate  Level of consciousness: awake and alert and oriented  Post-procedure vital signs: reviewed and stable  Pain management: adequate  Airway patency: patent    PONV status at discharge: No PONV  Anesthetic complications: no      Cardiovascular status: hemodynamically stable and blood pressure returned to baseline  Respiratory status: room air, spontaneous ventilation and unassisted  Hydration status: euvolemic            Vitals Value Taken Time   /64 09/02/20 1501   Temp 36.4 °C (97.5 °F) 09/02/20 1430   Pulse 50 09/02/20 1512   Resp 12 09/02/20 1512   SpO2 99 % 09/02/20 1512   Vitals shown include unvalidated device data.      Event Time   Out of Recovery 14:00:00         Pain/Bianca Score: Pain Rating Prior to Med Admin: 6 (9/2/2020  1:56 PM)  Pain Rating Post Med Admin: 5 (9/2/2020  2:30 PM)  Bianca Score: 10 (9/2/2020  2:30 PM)

## 2020-09-02 NOTE — OP NOTE
9/2/2020      Procedure:   1) laparoscopic radical prostatectomy with robotic assistance  2) laparoscopic bilateral pelvic lymph node dissection    Preop diagnosis: Prostate Cancer  Postop diagnosis: Prostate Cancer, Stage T1C    Surgeon: Guanakito White MD (present for the entire procedure)  Assistant: JOO Cowan MD  EBL: 100 ccs    Wound Class: 2    Specimens removed: prostate, seminal vesicles, lymph nodes, bladder neck biopsy    Drain: Smith      PROCEDURE NOTE:  Preoperatively the H&P were updated. Also confirmed that patient had had their hibiclens shower and preop hydration. After general endotracheal anesthesia, the patient was carefully positioned, padded, prepped and draped.  Positioning and padding was checked by the surgeon and the circulating nurse.  IV antibiotics were administered within 1 hour prior to making initial skin incision.  An OG tube was placed.  A Smith catheter was placed.  A timeout was called. The patient's identity was confirmed with 2 identifiers.  The correct procedure, allergies, blood products were verified by the entire operative team.                                                                      A Veress needle was placed at the supraumbilical position and the abdomen insufflated to 15 mmHg.  A 12 mm trocar was placed at this site and a 0 degree camera was introduced. The abdominal cavity was carefully inspected. There was no evidence of trauma to the peritoneal contents, nor any evidence of injury to the retroperitoneum.  All accessory trocars were then placed under direct vision.                                                                             The peritoneum posterior to the bladder was incised, the right vas deferens identified and divided.  The right seminal vesicle was gently dissected away from surrounding tissue with care being taken not to cause stretch  or thermal injury to the lateral pedicle. A similar procedure was performed  on  the left side.  Both  seminal vesicles were retracted anteriorly. Denonvilliers fascia was incised and this plane of dissection carried down to the level of the apex of the prostate.  A posterior/lateral release was performed bilaterally.                                                                                                                                            An anterior bladder drop was performed.  After dropping the bladder, a right sided pelvic lymph node dissection was completed and this was sent as permanent pathologic specimen #1.  The endopelvic fascia on the right  was gently dissected posteriorly, thus beginning the lateral release.  A left sided pelvic lymph node dissection was performed and this was sent  as permanent pathologic specimen #2.  Again, the endopelvic fascia was   gently dissected posteriorly, completing the lateral release.  A V stitch was used in a figure of eight fashion to control the dorsal venous complex. Excellent hemostasis was noted at this juncture. The plane between the bladder neck and prostate base was developed and the urethra was divided, a bladder neck sparing approach was utilized.  Excellent preservation of the internal sphincter was achieved circumferentially.     The bladder neck biopsy was sent as permanent section specimen #3.      After dividing the posterior bladder neck, the seminal vesicles and vas ampulla were revisualized and retracted anteriorly. The lateral pedicle on the right was controlled with bipolar cautery.      Cold scissors were used to athermically dissect the neurovascular bundle away  from the capsule of the prostate down to the level of the urethra, thus preserving the right neurovascular bundle.  At the base a wide margin was taken extending past mid gland due to MRI and visual findings.    A similar procedure was performed on the left side.    The urethra at the apex was divided preserving maximal urethral length.The rectourethralis was divided. The  specimen was placed in the right colic gutter. The pelvis was  irrigated and carefully inspected.  There was no evidence of rectal trauma and there was excellent hemostasis.  A vesicourethral anastomosis was then performed with a running suture of 3-0 Monocryl.  After completing the anastomosis, a fresh Smith catheter was advanced into the bladder and the balloon  inflated.  The bladder was irrigated with 120  ccs twice. Once prior to tying the anastomotic suture and once after, there was noted to be no extravasation at the anastomosis. The specimen was placed in an endocatch bag.     Throughout the procedure the first assistant assisted with positioning, trocar placement, docking. She also provided assistance with exposure, visualization, traction/countertraction, suction and irrigation.     A drain .was not placed, specimen was removed from the field and port sites were closed.  Needle and sponge counts were correctThe patient was transferred to the Recovery Room in stable condition.

## 2020-09-02 NOTE — NURSING TRANSFER
Nursing Transfer Note      9/2/2020     Transfer PACu to 509    Transfer via bed    Transfer with personal belongings    Transported by PACU PCTs    Medicines sent:     Chart send with patient: yes    Notified: spouse

## 2020-09-02 NOTE — ANESTHESIA PREPROCEDURE EVALUATION
09/02/2020  Denis Bunn is a 65 y.o., male.    Anesthesia Evaluation    I have reviewed the Patient Summary Reports.    I have reviewed the Nursing Notes. I have reviewed the NPO Status.   I have reviewed the Medications.     Review of Systems  Anesthesia Hx:  No problems with previous Anesthesia   Denies Personal Hx of Anesthesia complications.   Social:  Non-Smoker  Denies Tobacco Use.   Hematology/Oncology:         Prostate Current/Recent Cancer. Other (see Oncology comments)   Cardiovascular:   Hypertension, poorly controlled Denies MI.  Denies CAD.    Denies CABG/stent.  Denies Dysrhythmias.   Denies Angina. hyperlipidemia  Denies Coronary Artery Disease.  Hypertension, Essential Hypertension , stage 3 hypertension, systolic => 180 or diastolic => 110, Poorly Controlled on RX    Pulmonary:   Denies COPD.  Denies Asthma.  Denies Shortness of breath.  Denies Recent URI.  Denies Asthma.  Denies Chronic Obstructive Pulmonary Disease (COPD).    Renal/:   Denies Chronic Renal Disease.   Neoplasm/Tumor, Prostate Cancer.  Denies Kidney Function/Disease    Hepatic/GI:   Denies GERD. Denies Liver Disease.  Denies Esophageal / Stomach Disorders  Denies Liver Disease    Neurological:   Denies TIA. Denies CVA. Denies Seizures.  Denies Seizure Disorder  Denies CVA - Cerebrovasular Accident  Denies TIA - Transient Ischemic Attack    Endocrine:   Denies Diabetes. Denies Hypothyroidism.  Denies Diabetes  Denies Thyroid Disease  Metabolic Disorders, Hyperlipoproteinemia, hypercholesterolemia, controlled on medication      Physical Exam  General:  Well nourished    Airway/Jaw/Neck:  Airway Findings: Mouth Opening: Normal Tongue: Normal  General Airway Assessment: Adult  Mallampati: III  Improves to II with phonation.  TM Distance: Normal, at least 6 cm      Dental:  Dental Findings: In tact    Chest/Lungs:  Chest/Lungs Findings: Clear to auscultation, Normal Respiratory Rate     Heart/Vascular:  Heart Findings: Rate: Normal  Rhythm: Regular Rhythm  Sounds: Normal  Heart murmur: negative       Mental Status:  Mental Status Findings:  Cooperative, Alert and Oriented         Anesthesia Plan  Type of Anesthesia, risks & benefits discussed:  Anesthesia Type:  general  Patient's Preference:   Intra-op Monitoring Plan: standard ASA monitors  Intra-op Monitoring Plan Comments:   Post Op Pain Control Plan: per primary service following discharge from PACU, multimodal analgesia and IV/PO Opioids PRN  Post Op Pain Control Plan Comments:   Induction:   IV  Beta Blocker:  Patient is not currently on a Beta-Blocker (No further documentation required).       Informed Consent: Patient understands risks and agrees with Anesthesia plan.  Questions answered. Anesthesia consent signed with patient.  ASA Score: 2     Day of Surgery Review of History & Physical:    H&P update referred to the surgeon.         Ready For Surgery From Anesthesia Perspective.

## 2020-09-02 NOTE — ANESTHESIA PROCEDURE NOTES
Peripheral Block    Patient location during procedure: pre-op   Block not for primary anesthetic.  Reason for block: at surgeon's request and post-op pain management   Post-op Pain Location: bladder and lower abdomen pain  Start time: 9/2/2020 7:13 AM  Timeout: 9/2/2020 7:12 AM   End time: 9/2/2020 7:25 AM    Staffing  Authorizing Provider: Ashley Joyner MD  Performing Provider: Tabby Fuller MD    Preanesthetic Checklist  Completed: patient identified, site marked, surgical consent, pre-op evaluation, timeout performed, IV checked, risks and benefits discussed and monitors and equipment checked  Peripheral Block  Patient position: sitting  Prep: ChloraPrep  Patient monitoring: heart rate, cardiac monitor, continuous pulse ox, continuous capnometry and frequent blood pressure checks  Block type: erector spinae plane (Erector Spinae Plane Block)  Laterality: bilateral  Injection technique: single shot  Location: T8-9  Needle  Needle type: Tuohy   Needle gauge: 17 G  Needle length: 3.5 in  Needle localization: anatomical landmarks and ultrasound guidance   -ultrasound image captured on disc.  Assessment  Injection assessment: negative aspiration, negative parasthesia and local visualized surrounding nerve  Paresthesia pain: none  Heart rate change: no  Slow fractionated injection: yes  Additional Notes  Patient tolerated well.  See [facility] RN record for vitals. 30cc 0.375% bupiv with 1:300k epi, 50mcg clonidine, and 1mg of decadron added.

## 2020-09-03 VITALS
HEIGHT: 68 IN | WEIGHT: 160 LBS | RESPIRATION RATE: 18 BRPM | SYSTOLIC BLOOD PRESSURE: 103 MMHG | BODY MASS INDEX: 24.25 KG/M2 | DIASTOLIC BLOOD PRESSURE: 59 MMHG | TEMPERATURE: 97 F | OXYGEN SATURATION: 98 % | HEART RATE: 56 BPM

## 2020-09-03 PROCEDURE — 63600175 PHARM REV CODE 636 W HCPCS: Performed by: STUDENT IN AN ORGANIZED HEALTH CARE EDUCATION/TRAINING PROGRAM

## 2020-09-03 PROCEDURE — 25000003 PHARM REV CODE 250: Performed by: STUDENT IN AN ORGANIZED HEALTH CARE EDUCATION/TRAINING PROGRAM

## 2020-09-03 RX ORDER — OXYCODONE HYDROCHLORIDE 5 MG/1
5 TABLET ORAL EVERY 4 HOURS PRN
Qty: 9 TABLET | Refills: 0 | Status: SHIPPED | OUTPATIENT
Start: 2020-09-03 | End: 2020-11-04 | Stop reason: ALTCHOICE

## 2020-09-03 RX ORDER — NITROFURANTOIN MACROCRYSTALS 50 MG/1
50 CAPSULE ORAL DAILY
Qty: 7 CAPSULE | Refills: 0 | Status: SHIPPED | OUTPATIENT
Start: 2020-09-03 | End: 2020-09-17 | Stop reason: SDUPTHER

## 2020-09-03 RX ORDER — POLYETHYLENE GLYCOL 3350 17 G/17G
17 POWDER, FOR SOLUTION ORAL DAILY
Qty: 507 G | Refills: 0 | Status: SHIPPED | OUTPATIENT
Start: 2020-09-03 | End: 2020-11-04 | Stop reason: ALTCHOICE

## 2020-09-03 RX ADMIN — HEPARIN SODIUM 5000 UNITS: 5000 INJECTION INTRAVENOUS; SUBCUTANEOUS at 05:09

## 2020-09-03 RX ADMIN — ACETAMINOPHEN 1000 MG: 500 TABLET ORAL at 06:09

## 2020-09-03 RX ADMIN — ALLOPURINOL 100 MG: 100 TABLET ORAL at 08:09

## 2020-09-03 NOTE — PLAN OF CARE
SW completed discharge planning assessment with the patient at bedside. SW verified demographic information listed on the pt.'s Face sheet. Patient doesn't report utilizing any equipment prior to this hospital stay, nor reports any DME needs upon discharge at this time. Pt reports having transportation upon discharge, pt's wife.    Taz Lilly MD    [id number] - [place], [place] - [address]  [address]  [place] [address]  Phone: [phone] Fax: [phone]    [id number] - [place], [place] - [address]  [address]  [place] [address]  Phone: [phone] Fax: [phone]    [id number] - [place], [place] - [address]OSS [address] [facility] [address]  [address] [facility] [address]  Phone: [phone] Fax: [phone]    Payor: MEDICARE / Plan: MEDICARE PART A & B / Product Type: [facility] /        09/03/20 0919   Discharge Assessment   Assessment Type Discharge Planning Assessment   Confirmed/corrected address and phone number on facesheet? Yes   Assessment information obtained from? Patient   Expected Length of Stay (days) 1   Communicated expected length of stay with patient/caregiver yes   Prior to hospitilization cognitive status: Alert/Oriented   Prior to hospitalization functional status: Independent   Current cognitive status: Alert/Oriented   Current Functional Status: Independent   Facility Arrived From: N/A   Lives With spouse   Able to Return to Prior Arrangements yes   Is patient able to care for self after discharge? Yes   Who are your caregiver(s) and their phone number(s)? N/A   Patient's perception of discharge disposition home or selfcare   Readmission Within the Last 30 Days no previous admission in last 30 days   Patient currently being followed by outpatient case management? No   Patient currently receives any other outside agency services? No   Equipment Currently Used at Home none    Do you have any problems affording any of your prescribed medications? No   Is the patient taking medications as prescribed? yes   Does the patient have transportation home? Yes   Transportation Anticipated family or friend will provide  (Patient's wife)   Dialysis Name and Scheduled days N/A   Does the patient receive services at the [facility]? No   Discharge Plan A Home with family   Discharge Plan B Home   DME Needed Upon Discharge  none   Patient/Family in Agreement with Plan yes     Jacque Lacy LMSW  Case Management   [facility]   Ext. [phone]

## 2020-09-03 NOTE — PLAN OF CARE
Patient AAOx4. Resting comfortably. VS stable, afebrile. Pain meds given as needed. Smith in place, clean and dry. Lap sites x5 clean and dry. IV site patent. Ambulatory. Room air. Free from falls. Bed at lowest point, side rails up x2 and call light within reach. Patient verbalizes understanding of care plan and voices no concerns at this time.

## 2020-09-03 NOTE — DISCHARGE INSTRUCTIONS
What to expect with your Robotic Assisted Laparoscopic Prostatectomy.  [facility]  Updated 06/10/2011   Surgery  o Your surgery will last between 1.5 - 3 hours.   After surgery  o You may or may not have a drain that is shaped like a grenade and put to suction  - This drain usually comes out on Post Op Day (POD) 1. It will remain if the output is high and the nurses will teach you how to record the output and you will come back a few days after you leave to have the drain removed  o You will have a catheter after your surgery.  - This catheter protects your tissues to allow for healing between the bladder neck and the urethra now that the prostate has been removed.  - NO ONE IS TO REMOVE THIS CATHETER OR CHANGE THE CATHETER OTHER THAN SOMEONE FROM [facility].  - If you have to go to the ER because your catheter is not draining, come to the [facility] if possible and they will call us if they are not able to irrigate your catheter.  - If you live out of town and have to go to a local ER, then DO NOT let that doctor remove your catheter. If they are unable to irrigate the catheter or are having troubles with it, have them page the [facility] resident on call immediately at [phone] to help answer any questions.  - The catheter should come out in 7-10 days.   - You will have a midline incision after surgery where the prostate was removed. This will be sewn with absorbable suture so you do not need to worry about having the sutures removed.  - You will have smaller incisions where the instruments were inserted that are also sewn closed with absorbable sutures.  o The night of surgery we expect and hope that you will:  - Walk - walking helps get the bowels moving. Also after your surgery, you are at a risk for a deep venous thrombosis (which is a clot in the legs that can form by remaining inactive or still for extended periods of time) and this can travel to your lungs and make you  feel short of breath. This is a very serious condition. Walking helps prevent a DVT from occurring.  - Eat - you do not have to eat a whole meal, but we want to make sure you can tolerate liquid and/or solid food without nausea and vomiting  - Use your incentive spirometer - this is the breathing apparatus that helps you expand your lungs. If and when you have pain you will not want to take deep breaths. But if you dont take deep breaths, you are at risk for pneumonia. The incentive spirometer will help prevent this from occurring by expanding your lungs.  o Symptoms you may experience immediately post-op:  - Bloating and/or shoulder pain - when we do this operation, we fill up your abdominal cavity with gas to better help us visualize the organs and allow our instruments to fit. After the surgery, not all the air can be removed and your body will eventually absorb this small amount of air. However this can make you feel bloated. In addition, when you sit up, the air can sit right under a muscle (the diaphragm) which has connecting nerves to the shoulders, which could explain why you have shoulder pain.  - Do not expect necessarily to have gas or to have a bowel movement - this goes along with the bloating, you may feel like you want to pass gas or have a bowel movement but you cant. This is normal and you will feel like this for a couple days. There are no pills to help with this. Small walks throughout the day should help with this.  - Pain - your pain should be able to be controlled with medicines by mouth that we prescribe. It is important for you to tell us if you are on any pain medications at home before the surgery as you may need stronger pain meds while in the hospital.  - Bladder spasms - this feels like you have to urinate but cant. Sometimes it can be due to clots clogging up your catheter. The nurse will irrigate the catheter, if there are no clots we can prescribe you an anti-spasmodic. We can also  send you home with a prescription for one.   If you go home on anti-spasmodics, do not take one the morning of your appointment to have your catheter removed or you may not be able to void.   You can go home when:  o Pain is controlled with medicines by mouth  o You are able to walk without difficulty or pain  o You are tolerating a regulating diet  o Your catheter is draining freely   When you go home:  o Catheter care  - Blood in your urine (hematuria) is normal. However if you are having clots or your catheter stops draining and you are experiencing abdominal pain, go to the ER.  - If the tip of your penis hurts with the catheter in place, you can put some Vaseline at the end of the catheter to help lubricate the catheter.  o Activity  - Continue to walk - small walks throughout the day are better than one long walk.   - Do not lift anything greater than 8 pounds for 6 weeks - we want your abdominal wall muscles to heal.  o Bowel Movements - Do not strain to have a bowel movement - the pain medicines will make you constipated. That is why we also ask you to take colace 2-3 x per day to help keep your bowels regular. If you are still having trouble, then you can also add Miralax once a day. Do not take any stool softeners if you are having diarrhea.  o Drain - If you have a drain (not your catheter, but a separate drain) then record the output and bring it with you to your next appointment  o Smoking - If you smoke, we encourage you to STOP. Smoking interferes with the healing process and will prolong your healing with continued smoking.  o Driving - Do not drive while you are on pain meds or with your catheter in place.  o Bathing - If you do not have a drain, you can shower 48 hours after your surgery. If you do have a drain, sponge bathe only until the drain is out.  o Dressing - you can remove the dressings if there is no drainage or change them as needed if there is. The little sterile band-aid strips will  fall off on their own in 10-14 days. If they have not fallen off then you can remove them yourself after 14 days.  o Kegels - do not start your Kegels until after your catheter is out.  o Restarting medicines -especially blood thinners (Coumadin, ASA,Plavix), Fish Oil. Discuss this with your physician prior to discharge   When to return to the ER  o Fever - If you have a fever >101.5, this could be due to a number of reasons such as infection of the urine or incision. If your catheter has been removed, you could possibly have a leak. It would be best to come to the ER so they can better evaluate you.  o Severe pain - pain is expected, but severe or new onset of pain is not normal.   o Inability to tolerate food or liquid with nausea and vomiting - it would be best to go to the ER for them to better evaluate you.      Consents to be obtained - Surgical and Blood.  Before you sign the consent, understand the following risks:  o Surgery consent  - Bleeding - the prostate and its surrounding vessels are very vascular and bleeding can occur requiring a blood transfusion.  - Erectile dysfunction (impotence) - you will not have erections after surgery immediately post-op.  Your potency depends on your age and if you were potent beforehand. Nerve-sparing also helps. However, it does not guarantee erectile function. There are options to discuss after surgery if you still remain impotent.  - Incontinence -95% of people regain their continence by one year. How fast you become continent again cannot be determined beforehand. Kegels, however, do help and you should start your kegels after your catheter is out.   - Blockage of ureters (the drainage tube between the kidney and the bladder)- if this occurrs, you will need another procedure to unblock the ureters.  - Stricture of the urethra or Bladder Neck Contracture - any time the urethra is instrumented, there is a risk of having strictures and this would require another  procedure to dilate the stricture. Bladder neck contracture can occur after any type of anastomotic procedure. You would present with difficulty voiding or emptying your bladder.  - Damage to rectal wall, bowel, liver, spleen or any of the surrounding organs   - Infection of urine or incision requiring further treatment  - Pulmonary Emboli (blood clots) - this is why we ask you to walk around after surgery and the night of surgery to help prevent clots  - Need for conversion to open procedure   - Air embolus - to introduce air into the abdomen a needle is inserted into the abdomen, if this enters a vein or artery it can cause an embolus that travels to the heart and cause death.  - Anastomotic leak - there is always a chance that the tissues have not healed when the catheter is removed and you may possibly have a leak. You may possibly present or complain of abdominal pain, weakness or fever. If you have these symptoms return to the ER. If you have a leak you will need a catheter to be replaced, likely under direct vision with a camera.   - Small bowel obstruction (SBO) - after surgery, people can have adhesions that form from inflammation caused by surgery and this can cause a SBO, which would present with nausea and vomiting and unable to pass stool.  - Ileus - your bowels can be in shock after surgery and you will present with nausea and vomiting. Sometimes this requires a tube that is placed into your stomach until your bowels start working again, this is temporary.  - Acute or chronic pain  - Re-insertion of catheter if there is a urinary leak  - Scars  - Death, paralysis from the neck or waist down, loss of limb

## 2020-09-03 NOTE — PLAN OF CARE
09/03/20 1509   Final Note   Assessment Type Final Discharge Note   Anticipated Discharge Disposition Home   What phone number can be called within the next 1-3 days to see how you are doing after discharge? [phone]   Hospital Follow Up  Appt(s) scheduled? Yes   Discharge plans and expectations educations in teach back method with documentation complete? Yes     Future Appointments   Date Time Provider Department Center   9/8/2020  1:45 PM Edson White MD [facility] [place]

## 2020-09-03 NOTE — PROGRESS NOTES
Pt ready for discharge. Discharge instructions and prescriptions given and explained to pt. Pt verbalizes understanding. PIV removed. Leg bag teaching done with pt and spouse, both verbalize understanding. No further questions from pt at this time. Pt to be discharged via wheelchair. Will cont to monitor until discharge.

## 2020-09-03 NOTE — DISCHARGE SUMMARY
[facility]  DISCHARGE SUMMARY      Admit Date:  9/2/2020    Discharge Date and Time:  9/3/2020  12:00 PM    Attending Physician:  Edson White MD     Discharge Provider:  Alexa Vargas MD     Reason for Admission:  Prostate cancer     Procedures Performed:  Procedure(s) (LRB):  ROBOTIC PROSTATECTOMY (N/A)    Hospital Course:  Please see the preoperative H&P and other available documentation for full details related to history prior to this admission.  Briefly, Denis Bunn is a 65 y.o. male who was admitted following scheduled elective surgery for Prostate cancer    Following a complete preoperative discussion of the risks and benefits of surgery with signed informed consent, the patient was taken to the operating room on 9/2/2020 and underwent the above stated procedures.  The patient tolerated surgery well and there were no complications.  Please see the operative report for full intraoperative findings and details.  Postoperatively, the patient did well and was transferred from the PACU to the floor in stable condition where they had a stable and uncomplicated hospital course.  Vital signs remained stable and appropriate throughout course.  Diet was advanced as tolerated and the patient's pain was controlled on oral pain medications without problem.  Currently, the patient is doing well at 1 Day Post-Op and is stable and appropriate for discharge home at this time.      Physical Exam  Vitals signs reviewed.   Constitutional:       Appearance: Normal appearance. He is not diaphoretic.   HENT:      Head: Normocephalic and atraumatic.   Neck:      Musculoskeletal: Normal range of motion.   Cardiovascular:      Rate and Rhythm: Normal rate.   Pulmonary:      Effort: Pulmonary effort is normal. No respiratory distress.   Abdominal:      General: There is no distension.      Tenderness: There is abdominal tenderness (minimal).      Comments: Incisions sites intact, dressed with  dermabond   Genitourinary:     Comments: Smith catheter draining dark but clear   Musculoskeletal: Normal range of motion.   Skin:     General: Skin is warm and dry.   Neurological:      General: No focal deficit present.      Mental Status: He is alert.   Psychiatric:         Mood and Affect: Mood normal.         Behavior: Behavior normal.           Consults:  None.    Significant Diagnostic Studies:   No results for input(s): WBC, HGB, HCT, PLT, BAND, METAMYELOCYT, MYELOPCT, HGBA1C in the last 72 hours.No results for input(s): NA, K, CL, CO2, BUN, CREATININE, GLU, CALCIUM, CAION, MG, PHOS, AST, ALT, ALKPHOS, BILITOT, BILIDIR, PROT, ALBUMIN, PREALBUMIN, AMYLASE, LIPASE, CRP, HSCRP, SEDRATE, PROCAL in the last 72 hours.No results for input(s): INR, PTT, LABHEPA, LACTATE, TROPONINI, CPK, CPKMB, MB, BNP in the last 72 hours.No results for input(s): PH, PCO2, PO2, HCO3 in the last 72 hours.      Final Diagnoses:   Principal Problem:  Prostate cancer   Secondary Diagnoses:    Active Hospital Problems    Diagnosis  POA    *Prostate cancer [C61]  Yes    Preoperative testing [Z01.818]  Not Applicable    Atypical chest pain [R07.89]  Yes    Overweight (BMI 25.0-29.9) [E66.3]  Yes    PVC (premature ventricular contraction) [I49.3]  Yes    HTN (hypertension), benign [I10]  Yes     Borderline      Dyslipidemia [E78.5]  Yes     Elevated Lp(a) but excellent HDL      History of gout [Z87.39]  Yes    Back pain [M54.9]  Yes      Resolved Hospital Problems   No resolved problems to display.       Discharged Condition:  Good    Disposition:  Home or Self Care    Follow Up/Patient Instructions:     Medications:  Reconciled Home Medications:    Current Discharge Medication List      START taking these medications    Details   nitrofurantoin (MACRODANTIN) 50 MG capsule Take 1 capsule (50 mg total) by mouth once daily. While catheter is in place  Qty: 7 capsule, Refills: 0      oxyCODONE (ROXICODONE) 5 MG immediate release  tablet Take 1 tablet (5 mg total) by mouth every 4 (four) hours as needed for Pain.  Qty: 9 tablet, Refills: 0    Comments: Quantity prescribed more than 7 day supply? No      polyethylene glycol (GLYCOLAX) 17 gram/dose powder Take 17 g by mouth once daily.  Qty: 507 g, Refills: 0         CONTINUE these medications which have NOT CHANGED    Details   allopurinol (ZYLOPRIM) 100 MG tablet TAKE 1 TABLET (100 MG TOTAL) BY MOUTH 2 (TWO) TIMES DAILY.  Qty: 180 tablet, Refills: 3    Associated Diagnoses: FH: CAD (coronary artery disease); HTN (hypertension), benign      ALPRAZolam (XANAX) 0.5 MG tablet Take 1 tablet (0.5 mg total) by mouth 3 (three) times daily.  Qty: 30 tablet, Refills: 1    Associated Diagnoses: Anxiety; Sleep trouble      coenzyme Q10 (CO Q-10) 100 mg capsule Take 100 mg by mouth once daily. Hold for 1 week prior to surgery      ibuprofen (ADVIL,MOTRIN) 200 MG tablet Take 200 mg by mouth. Hold for 1 week prior to surgery      losartan (COZAAR) 100 MG tablet Take 1 tablet (100 mg total) by mouth once daily.  Qty: 90 tablet, Refills: 3    Associated Diagnoses: HTN (hypertension), benign      pravastatin (PRAVACHOL) 20 MG tablet TAKE 1 TABLET(20 MG) BY MOUTH EVERY DAY  Qty: 90 tablet, Refills: 0      verapamiL (VERELAN) 240 MG C24P TAKE 1 CAPSULE BY MOUTH EVERY DAY IN THE MORNING  Qty: 90 capsule, Refills: 1      ascorbic acid (VITAMIN C) 500 MG tablet Take 500 mg by mouth once daily. Hold for 1 week prior to surgery      aspirin 81 MG chewable tablet Take 1 tablet by mouth. Dr Whiet does not require you to hold this prior to surgery-hold am of surgery only      ergocalciferol, vitamin D2, (VITAMIN D ORAL) Take by mouth. Hold for 1 week prior to surgery      garlic 1,000 mg Cap Take by mouth. Hold for 1 week prior to surgery      magnesium 250 mg Tab Take 250 mg by mouth once. Takes two 250 mg tablets daily   Hold for 1 week prior to surgery      multivitamin capsule Take 1 capsule by mouth once daily.  Hold for 1 week prior to surgery      OM-3/E/LINOL/ALA/OLEIC/GLA/LIP (OMEGA 3-6-9 ORAL) Take by mouth once daily. Hold for 1 week prior to surgery      saw palmetto 160 MG capsule Take 160 mg by mouth 2 (two) times daily. Hold for 1 week prior to surgery      turmeric (CURCUMIN MISC) by Misc.(Non-Drug; Combo Route) route. Hold for 1 week prior to surgery      verapamiL (CALAN-SR) 120 MG CR tablet TAKE 1 TABLET (120 MG TOTAL) BY MOUTH EVERY EVENING.  Qty: 90 tablet, Refills: 3      zinc 50 mg Tab Take by mouth. Hold for 1 week prior to surgery           Discharge Procedure Orders   Diet Adult Regular     Notify your health care provider if you experience any of the following:  temperature >100.4     Notify your health care provider if you experience any of the following:  persistent nausea and vomiting or diarrhea     Notify your health care provider if you experience any of the following:  severe uncontrolled pain     Notify your health care provider if you experience any of the following:  redness, tenderness, or signs of infection (pain, swelling, redness, odor or green/yellow discharge around incision site)     No dressing needed     Type & Screen   Standing Status: Future Number of Occurrences: 1 Standing Exp. Date: 10/18/21     Activity as tolerated     Shower on day dressing removed (No bath)   Order Comments: OK to shower. Do not soak incisions in bathtub or pool. You may have Dermabond (purple skin glue) over your incisions. This will come off on its own.     Weight bearing restrictions (specify):   Order Comments: Please do not lift anything greater than 10 lbs for 6 weeks in order to reduce your risk of getting a hernia.     Follow-up Information     Edson White MD On 9/8/2020.    Specialty: Urology  Why: as scheduled, catheter removal  Contact information:  [email or web address] [facility] [id number] [phone]                   Alexa Vargas MD

## 2020-09-04 ENCOUNTER — HOSPITAL ENCOUNTER (INPATIENT)
Facility: HOSPITAL | Age: 65
LOS: 1 days | Discharge: HOME | DRG: 699 | End: 2020-09-07
Attending: EMERGENCY MEDICINE | Admitting: UROLOGY
Payer: MEDICARE

## 2020-09-04 DIAGNOSIS — C61 PROSTATE CANCER: ICD-10-CM

## 2020-09-04 DIAGNOSIS — R10.9 ABDOMINAL PAIN: ICD-10-CM

## 2020-09-04 DIAGNOSIS — N99.89: ICD-10-CM

## 2020-09-04 DIAGNOSIS — G89.18 POSTOPERATIVE ABDOMINAL PAIN: Primary | ICD-10-CM

## 2020-09-04 DIAGNOSIS — R10.9 POSTOPERATIVE ABDOMINAL PAIN: Primary | ICD-10-CM

## 2020-09-04 DIAGNOSIS — R50.82 POSTOPERATIVE FEVER: ICD-10-CM

## 2020-09-04 LAB
ALBUMIN SERPL BCP-MCNC: 3.6 G/DL (ref 3.5–5.2)
ALP SERPL-CCNC: 58 U/L (ref 55–135)
ALT SERPL W/O P-5'-P-CCNC: 16 U/L (ref 10–44)
ANION GAP SERPL CALC-SCNC: 6 MMOL/L (ref 8–16)
AST SERPL-CCNC: 26 U/L (ref 10–40)
BACTERIA #/AREA URNS AUTO: ABNORMAL /HPF
BASOPHILS # BLD AUTO: 0.03 K/UL (ref 0–0.2)
BASOPHILS NFR BLD: 0.3 % (ref 0–1.9)
BILIRUB SERPL-MCNC: 0.7 MG/DL (ref 0.1–1)
BILIRUB UR QL STRIP: NEGATIVE
BUN SERPL-MCNC: 14 MG/DL (ref 8–23)
CALCIUM SERPL-MCNC: 9 MG/DL (ref 8.7–10.5)
CHLORIDE SERPL-SCNC: 103 MMOL/L (ref 95–110)
CLARITY UR REFRACT.AUTO: ABNORMAL
CO2 SERPL-SCNC: 24 MMOL/L (ref 23–29)
COLOR UR AUTO: ABNORMAL
CREAT SERPL-MCNC: 1.5 MG/DL (ref 0.5–1.4)
DIFFERENTIAL METHOD: ABNORMAL
EOSINOPHIL # BLD AUTO: 0 K/UL (ref 0–0.5)
EOSINOPHIL NFR BLD: 0.1 % (ref 0–8)
ERYTHROCYTE [DISTWIDTH] IN BLOOD BY AUTOMATED COUNT: 12.6 % (ref 11.5–14.5)
EST. GFR  (AFRICAN AMERICAN): 55.3 ML/MIN/1.73 M^2
EST. GFR  (NON AFRICAN AMERICAN): 47.8 ML/MIN/1.73 M^2
GLUCOSE SERPL-MCNC: 108 MG/DL (ref 70–110)
GLUCOSE UR QL STRIP: NEGATIVE
HCT VFR BLD AUTO: 30.2 % (ref 40–54)
HGB BLD-MCNC: 9.8 G/DL (ref 14–18)
HGB UR QL STRIP: ABNORMAL
HYALINE CASTS UR QL AUTO: 0 /LPF
IMM GRANULOCYTES # BLD AUTO: 0.05 K/UL (ref 0–0.04)
IMM GRANULOCYTES NFR BLD AUTO: 0.6 % (ref 0–0.5)
KETONES UR QL STRIP: NEGATIVE
LACTATE SERPL-SCNC: 0.9 MMOL/L (ref 0.5–2.2)
LEUKOCYTE ESTERASE UR QL STRIP: ABNORMAL
LIPASE SERPL-CCNC: 10 U/L (ref 4–60)
LYMPHOCYTES # BLD AUTO: 0.4 K/UL (ref 1–4.8)
LYMPHOCYTES NFR BLD: 4.5 % (ref 18–48)
MCH RBC QN AUTO: 32.7 PG (ref 27–31)
MCHC RBC AUTO-ENTMCNC: 32.5 G/DL (ref 32–36)
MCV RBC AUTO: 101 FL (ref 82–98)
MICROSCOPIC COMMENT: ABNORMAL
MONOCYTES # BLD AUTO: 0.6 K/UL (ref 0.3–1)
MONOCYTES NFR BLD: 6.8 % (ref 4–15)
NEUTROPHILS # BLD AUTO: 7.8 K/UL (ref 1.8–7.7)
NEUTROPHILS NFR BLD: 87.7 % (ref 38–73)
NITRITE UR QL STRIP: NEGATIVE
NRBC BLD-RTO: 0 /100 WBC
PH UR STRIP: 6 [PH] (ref 5–8)
PLATELET # BLD AUTO: 203 K/UL (ref 150–350)
PMV BLD AUTO: 9.9 FL (ref 9.2–12.9)
POTASSIUM SERPL-SCNC: 4.5 MMOL/L (ref 3.5–5.1)
PROT SERPL-MCNC: 6.7 G/DL (ref 6–8.4)
PROT UR QL STRIP: ABNORMAL
RBC # BLD AUTO: 3 M/UL (ref 4.6–6.2)
RBC #/AREA URNS AUTO: >100 /HPF (ref 0–4)
SARS-COV-2 RDRP RESP QL NAA+PROBE: NEGATIVE
SODIUM SERPL-SCNC: 133 MMOL/L (ref 136–145)
SP GR UR STRIP: 1.01 (ref 1–1.03)
URN SPEC COLLECT METH UR: ABNORMAL
WBC # BLD AUTO: 8.92 K/UL (ref 3.9–12.7)
WBC #/AREA URNS AUTO: 47 /HPF (ref 0–5)

## 2020-09-04 PROCEDURE — 83605 ASSAY OF LACTIC ACID: CPT

## 2020-09-04 PROCEDURE — 96375 TX/PRO/DX INJ NEW DRUG ADDON: CPT

## 2020-09-04 PROCEDURE — 83690 ASSAY OF LIPASE: CPT | Mod: 91

## 2020-09-04 PROCEDURE — 81001 URINALYSIS AUTO W/SCOPE: CPT | Mod: 91

## 2020-09-04 PROCEDURE — 96361 HYDRATE IV INFUSION ADD-ON: CPT

## 2020-09-04 PROCEDURE — 63600175 PHARM REV CODE 636 W HCPCS: Performed by: STUDENT IN AN ORGANIZED HEALTH CARE EDUCATION/TRAINING PROGRAM

## 2020-09-04 PROCEDURE — G0378 HOSPITAL OBSERVATION PER HR: HCPCS

## 2020-09-04 PROCEDURE — 25000003 PHARM REV CODE 250: Performed by: STUDENT IN AN ORGANIZED HEALTH CARE EDUCATION/TRAINING PROGRAM

## 2020-09-04 PROCEDURE — 87040 BLOOD CULTURE FOR BACTERIA: CPT

## 2020-09-04 PROCEDURE — 96365 THER/PROPH/DIAG IV INF INIT: CPT

## 2020-09-04 PROCEDURE — 99285 EMERGENCY DEPT VISIT HI MDM: CPT | Mod: 25,27

## 2020-09-04 PROCEDURE — 63600175 PHARM REV CODE 636 W HCPCS: Performed by: PHYSICIAN ASSISTANT

## 2020-09-04 PROCEDURE — 25000003 PHARM REV CODE 250: Performed by: PHYSICIAN ASSISTANT

## 2020-09-04 PROCEDURE — 85025 COMPLETE CBC W/AUTO DIFF WBC: CPT | Mod: 91

## 2020-09-04 PROCEDURE — U0002 COVID-19 LAB TEST NON-CDC: HCPCS

## 2020-09-04 PROCEDURE — 99285 PR EMERGENCY DEPT VISIT,LEVEL V: ICD-10-PCS | Mod: ,,, | Performed by: PHYSICIAN ASSISTANT

## 2020-09-04 PROCEDURE — 80053 COMPREHEN METABOLIC PANEL: CPT | Mod: 91

## 2020-09-04 PROCEDURE — 99285 EMERGENCY DEPT VISIT HI MDM: CPT | Mod: ,,, | Performed by: PHYSICIAN ASSISTANT

## 2020-09-04 PROCEDURE — 87086 URINE CULTURE/COLONY COUNT: CPT

## 2020-09-04 RX ORDER — ACETAMINOPHEN 10 MG/ML
1000 INJECTION, SOLUTION INTRAVENOUS ONCE
Status: COMPLETED | OUTPATIENT
Start: 2020-09-04 | End: 2020-09-04

## 2020-09-04 RX ORDER — ACETAMINOPHEN 500 MG
1000 TABLET ORAL EVERY 8 HOURS
Status: COMPLETED | OUTPATIENT
Start: 2020-09-05 | End: 2020-09-05

## 2020-09-04 RX ORDER — ONDANSETRON 2 MG/ML
4 INJECTION INTRAMUSCULAR; INTRAVENOUS
Status: COMPLETED | OUTPATIENT
Start: 2020-09-04 | End: 2020-09-04

## 2020-09-04 RX ORDER — SODIUM CHLORIDE 9 MG/ML
INJECTION, SOLUTION INTRAVENOUS CONTINUOUS
Status: DISCONTINUED | OUTPATIENT
Start: 2020-09-04 | End: 2020-09-05

## 2020-09-04 RX ORDER — HYDROMORPHONE HYDROCHLORIDE 1 MG/ML
1 INJECTION, SOLUTION INTRAMUSCULAR; INTRAVENOUS; SUBCUTANEOUS
Status: COMPLETED | OUTPATIENT
Start: 2020-09-04 | End: 2020-09-04

## 2020-09-04 RX ORDER — MORPHINE SULFATE 4 MG/ML
4 INJECTION, SOLUTION INTRAMUSCULAR; INTRAVENOUS ONCE
Status: COMPLETED | OUTPATIENT
Start: 2020-09-04 | End: 2020-09-04

## 2020-09-04 RX ORDER — HYDROMORPHONE HYDROCHLORIDE 1 MG/ML
1 INJECTION, SOLUTION INTRAMUSCULAR; INTRAVENOUS; SUBCUTANEOUS
Status: DISCONTINUED | OUTPATIENT
Start: 2020-09-04 | End: 2020-09-06

## 2020-09-04 RX ORDER — ONDANSETRON 2 MG/ML
4 INJECTION INTRAMUSCULAR; INTRAVENOUS EVERY 6 HOURS PRN
Status: DISCONTINUED | OUTPATIENT
Start: 2020-09-04 | End: 2020-09-07 | Stop reason: HOSPADM

## 2020-09-04 RX ORDER — POLYETHYLENE GLYCOL 3350 17 G/17G
17 POWDER, FOR SOLUTION ORAL ONCE
Status: COMPLETED | OUTPATIENT
Start: 2020-09-05 | End: 2020-09-05

## 2020-09-04 RX ORDER — SODIUM CHLORIDE 0.9 % (FLUSH) 0.9 %
10 SYRINGE (ML) INJECTION
Status: DISCONTINUED | OUTPATIENT
Start: 2020-09-04 | End: 2020-09-07 | Stop reason: HOSPADM

## 2020-09-04 RX ORDER — METHOCARBAMOL 500 MG/1
500 TABLET, FILM COATED ORAL 3 TIMES DAILY
Status: DISCONTINUED | OUTPATIENT
Start: 2020-09-04 | End: 2020-09-06

## 2020-09-04 RX ORDER — ACETAMINOPHEN 500 MG
1000 TABLET ORAL
Status: COMPLETED | OUTPATIENT
Start: 2020-09-04 | End: 2020-09-04

## 2020-09-04 RX ORDER — OXYCODONE HYDROCHLORIDE 5 MG/1
5 TABLET ORAL EVERY 4 HOURS PRN
Status: DISCONTINUED | OUTPATIENT
Start: 2020-09-04 | End: 2020-09-06

## 2020-09-04 RX ADMIN — ACETAMINOPHEN 1000 MG: 10 INJECTION, SOLUTION INTRAVENOUS at 09:09

## 2020-09-04 RX ADMIN — ONDANSETRON 4 MG: 2 INJECTION INTRAMUSCULAR; INTRAVENOUS at 06:09

## 2020-09-04 RX ADMIN — HYDROMORPHONE HYDROCHLORIDE 1 MG: 1 INJECTION, SOLUTION INTRAMUSCULAR; INTRAVENOUS; SUBCUTANEOUS at 07:09

## 2020-09-04 RX ADMIN — SODIUM CHLORIDE: 0.9 INJECTION, SOLUTION INTRAVENOUS at 09:09

## 2020-09-04 RX ADMIN — PIPERACILLIN AND TAZOBACTAM 4.5 G: 4; .5 INJECTION, POWDER, LYOPHILIZED, FOR SOLUTION INTRAVENOUS; PARENTERAL at 06:09

## 2020-09-04 RX ADMIN — ACETAMINOPHEN 1000 MG: 500 TABLET ORAL at 06:09

## 2020-09-04 RX ADMIN — SODIUM CHLORIDE, SODIUM LACTATE, POTASSIUM CHLORIDE, AND CALCIUM CHLORIDE 1000 ML: .6; .31; .03; .02 INJECTION, SOLUTION INTRAVENOUS at 06:09

## 2020-09-04 RX ADMIN — METHOCARBAMOL TABLETS 500 MG: 500 TABLET, COATED ORAL at 09:09

## 2020-09-04 RX ADMIN — MORPHINE SULFATE 4 MG: 4 INJECTION INTRAVENOUS at 06:09

## 2020-09-04 NOTE — ED NOTES
Patient identifiers verified and correct for MR Bunn  C/C: Lower abd pain SEE NN  APPEARANCE: awake and alert in NAD.  SKIN: warm, dry and intact. No breakdown or bruising.  MUSCULOSKELETAL: Patient moving all extremities spontaneously, no obvious swelling or deformities noted. Ambulates independently.  RESPIRATORY: Denies shortness of breath.Respirations unlabored. Fever in triage  CARDIAC: Denies CP, 2+ distal pulses; no peripheral edema  ABDOMEN: ABdomen soft, pain to lower abdomen positive nausea, no emesis, Last BM Tuesday  : voids spontaneously, denies difficulty  Neurologic: AAO x 4; follows commands equal strength in all extremities; denies numbness/tingling. Denies dizziness Positive gen weakness

## 2020-09-04 NOTE — ED TRIAGE NOTES
Patient continues with lower abd pain after prostate surgery Wednesday, seen in ED today for same with work up completed, home at 1400, pain restarted on the way home. Oxycodone at 1600, Robaxin  And Zofran at 1530. Arrives with Smith Cath in place. Last Bm Tuesday

## 2020-09-05 LAB
ANION GAP SERPL CALC-SCNC: 7 MMOL/L (ref 8–16)
BACTERIA UR CULT: NO GROWTH
BASOPHILS # BLD AUTO: 0.03 K/UL (ref 0–0.2)
BASOPHILS NFR BLD: 0.4 % (ref 0–1.9)
BODY FLUID SOURCE, CREATININE: NORMAL
BUN SERPL-MCNC: 14 MG/DL (ref 8–23)
CALCIUM SERPL-MCNC: 8.8 MG/DL (ref 8.7–10.5)
CHLORIDE SERPL-SCNC: 103 MMOL/L (ref 95–110)
CO2 SERPL-SCNC: 24 MMOL/L (ref 23–29)
CREAT FLD-MCNC: >30 MG/DL
CREAT SERPL-MCNC: 1.6 MG/DL (ref 0.5–1.4)
DIFFERENTIAL METHOD: ABNORMAL
EOSINOPHIL # BLD AUTO: 0.1 K/UL (ref 0–0.5)
EOSINOPHIL NFR BLD: 1.2 % (ref 0–8)
ERYTHROCYTE [DISTWIDTH] IN BLOOD BY AUTOMATED COUNT: 12.6 % (ref 11.5–14.5)
EST. GFR  (AFRICAN AMERICAN): 51.1 ML/MIN/1.73 M^2
EST. GFR  (NON AFRICAN AMERICAN): 44.2 ML/MIN/1.73 M^2
GLUCOSE SERPL-MCNC: 105 MG/DL (ref 70–110)
HCT VFR BLD AUTO: 32 % (ref 40–54)
HGB BLD-MCNC: 10.3 G/DL (ref 14–18)
IMM GRANULOCYTES # BLD AUTO: 0.02 K/UL (ref 0–0.04)
IMM GRANULOCYTES NFR BLD AUTO: 0.3 % (ref 0–0.5)
INR PPP: 1 (ref 0.8–1.2)
LYMPHOCYTES # BLD AUTO: 0.9 K/UL (ref 1–4.8)
LYMPHOCYTES NFR BLD: 12 % (ref 18–48)
MAGNESIUM SERPL-MCNC: 2 MG/DL (ref 1.6–2.6)
MCH RBC QN AUTO: 31.7 PG (ref 27–31)
MCHC RBC AUTO-ENTMCNC: 32.2 G/DL (ref 32–36)
MCV RBC AUTO: 99 FL (ref 82–98)
MONOCYTES # BLD AUTO: 0.9 K/UL (ref 0.3–1)
MONOCYTES NFR BLD: 11.6 % (ref 4–15)
NEUTROPHILS # BLD AUTO: 5.7 K/UL (ref 1.8–7.7)
NEUTROPHILS NFR BLD: 74.5 % (ref 38–73)
NRBC BLD-RTO: 0 /100 WBC
PHOSPHATE SERPL-MCNC: 2.8 MG/DL (ref 2.7–4.5)
PLATELET # BLD AUTO: 241 K/UL (ref 150–350)
PMV BLD AUTO: 10 FL (ref 9.2–12.9)
POTASSIUM SERPL-SCNC: 4.3 MMOL/L (ref 3.5–5.1)
PROTHROMBIN TIME: 10.6 SEC (ref 9–12.5)
RBC # BLD AUTO: 3.25 M/UL (ref 4.6–6.2)
SODIUM SERPL-SCNC: 134 MMOL/L (ref 136–145)
WBC # BLD AUTO: 7.68 K/UL (ref 3.9–12.7)

## 2020-09-05 PROCEDURE — 63600175 PHARM REV CODE 636 W HCPCS: Performed by: RADIOLOGY

## 2020-09-05 PROCEDURE — 85610 PROTHROMBIN TIME: CPT

## 2020-09-05 PROCEDURE — 36415 COLL VENOUS BLD VENIPUNCTURE: CPT

## 2020-09-05 PROCEDURE — 25000003 PHARM REV CODE 250: Performed by: STUDENT IN AN ORGANIZED HEALTH CARE EDUCATION/TRAINING PROGRAM

## 2020-09-05 PROCEDURE — G0378 HOSPITAL OBSERVATION PER HR: HCPCS

## 2020-09-05 PROCEDURE — 82570 ASSAY OF URINE CREATININE: CPT

## 2020-09-05 PROCEDURE — 63600175 PHARM REV CODE 636 W HCPCS: Performed by: STUDENT IN AN ORGANIZED HEALTH CARE EDUCATION/TRAINING PROGRAM

## 2020-09-05 PROCEDURE — 83735 ASSAY OF MAGNESIUM: CPT

## 2020-09-05 PROCEDURE — 25000003 PHARM REV CODE 250: Performed by: RADIOLOGY

## 2020-09-05 PROCEDURE — 96376 TX/PRO/DX INJ SAME DRUG ADON: CPT

## 2020-09-05 PROCEDURE — 84100 ASSAY OF PHOSPHORUS: CPT

## 2020-09-05 PROCEDURE — 85025 COMPLETE CBC W/AUTO DIFF WBC: CPT

## 2020-09-05 PROCEDURE — 80048 BASIC METABOLIC PNL TOTAL CA: CPT

## 2020-09-05 PROCEDURE — 96361 HYDRATE IV INFUSION ADD-ON: CPT

## 2020-09-05 RX ORDER — LIDOCAINE HYDROCHLORIDE 5 MG/ML
INJECTION, SOLUTION INFILTRATION; PERINEURAL CODE/TRAUMA/SEDATION MEDICATION
Status: COMPLETED | OUTPATIENT
Start: 2020-09-05 | End: 2020-09-05

## 2020-09-05 RX ORDER — ATROPA BELLADONNA AND OPIUM 16.2; 6 MG/1; MG/1
60 SUPPOSITORY RECTAL EVERY 12 HOURS PRN
Status: DISCONTINUED | OUTPATIENT
Start: 2020-09-05 | End: 2020-09-07 | Stop reason: HOSPADM

## 2020-09-05 RX ORDER — SYRING-NEEDL,DISP,INSUL,0.3 ML 29 G X1/2"
296 SYRINGE, EMPTY DISPOSABLE MISCELLANEOUS
Status: DISCONTINUED | OUTPATIENT
Start: 2020-09-05 | End: 2020-09-06

## 2020-09-05 RX ORDER — POLYETHYLENE GLYCOL 3350 17 G/17G
17 POWDER, FOR SOLUTION ORAL 2 TIMES DAILY
Status: DISCONTINUED | OUTPATIENT
Start: 2020-09-06 | End: 2020-09-07 | Stop reason: HOSPADM

## 2020-09-05 RX ORDER — DOCUSATE SODIUM 283 MG/5ML
1 LIQUID RECTAL ONCE
Status: COMPLETED | OUTPATIENT
Start: 2020-09-05 | End: 2020-09-05

## 2020-09-05 RX ORDER — FENTANYL CITRATE 50 UG/ML
INJECTION, SOLUTION INTRAMUSCULAR; INTRAVENOUS CODE/TRAUMA/SEDATION MEDICATION
Status: COMPLETED | OUTPATIENT
Start: 2020-09-05 | End: 2020-09-05

## 2020-09-05 RX ORDER — PSEUDOEPHEDRINE/ACETAMINOPHEN 30MG-500MG
100 TABLET ORAL
Status: DISCONTINUED | OUTPATIENT
Start: 2020-09-05 | End: 2020-09-06

## 2020-09-05 RX ORDER — SODIUM CHLORIDE 9 MG/ML
INJECTION, SOLUTION INTRAVENOUS
Status: COMPLETED | OUTPATIENT
Start: 2020-09-05 | End: 2020-09-05

## 2020-09-05 RX ORDER — MIDAZOLAM HYDROCHLORIDE 1 MG/ML
INJECTION INTRAMUSCULAR; INTRAVENOUS CODE/TRAUMA/SEDATION MEDICATION
Status: COMPLETED | OUTPATIENT
Start: 2020-09-05 | End: 2020-09-05

## 2020-09-05 RX ADMIN — FENTANYL CITRATE 50 MCG: 50 INJECTION INTRAMUSCULAR; INTRAVENOUS at 01:09

## 2020-09-05 RX ADMIN — METHOCARBAMOL TABLETS 500 MG: 500 TABLET, COATED ORAL at 08:09

## 2020-09-05 RX ADMIN — HYDROMORPHONE HYDROCHLORIDE 1 MG: 1 INJECTION, SOLUTION INTRAMUSCULAR; INTRAVENOUS; SUBCUTANEOUS at 09:09

## 2020-09-05 RX ADMIN — ACETAMINOPHEN 1000 MG: 500 TABLET ORAL at 06:09

## 2020-09-05 RX ADMIN — OXYCODONE HYDROCHLORIDE 5 MG: 5 TABLET ORAL at 07:09

## 2020-09-05 RX ADMIN — HYDROMORPHONE HYDROCHLORIDE 1 MG: 1 INJECTION, SOLUTION INTRAMUSCULAR; INTRAVENOUS; SUBCUTANEOUS at 04:09

## 2020-09-05 RX ADMIN — OXYCODONE HYDROCHLORIDE 5 MG: 5 TABLET ORAL at 12:09

## 2020-09-05 RX ADMIN — METHOCARBAMOL TABLETS 500 MG: 500 TABLET, COATED ORAL at 09:09

## 2020-09-05 RX ADMIN — SODIUM CHLORIDE 250 ML: 0.9 INJECTION, SOLUTION INTRAVENOUS at 01:09

## 2020-09-05 RX ADMIN — ACETAMINOPHEN 1000 MG: 500 TABLET ORAL at 03:09

## 2020-09-05 RX ADMIN — HYDROMORPHONE HYDROCHLORIDE 1 MG: 1 INJECTION, SOLUTION INTRAMUSCULAR; INTRAVENOUS; SUBCUTANEOUS at 08:09

## 2020-09-05 RX ADMIN — DOCUSATE SODIUM 1 ENEMA: 283 LIQUID RECTAL at 09:09

## 2020-09-05 RX ADMIN — ACETAMINOPHEN 1000 MG: 500 TABLET ORAL at 09:09

## 2020-09-05 RX ADMIN — MIDAZOLAM HYDROCHLORIDE 1 MG: 1 INJECTION, SOLUTION INTRAMUSCULAR; INTRAVENOUS at 01:09

## 2020-09-05 RX ADMIN — LIDOCAINE HYDROCHLORIDE 5 ML: 5 INJECTION, SOLUTION INFILTRATION; PERINEURAL at 01:09

## 2020-09-05 RX ADMIN — METHOCARBAMOL TABLETS 500 MG: 500 TABLET, COATED ORAL at 03:09

## 2020-09-05 RX ADMIN — OXYCODONE HYDROCHLORIDE 5 MG: 5 TABLET ORAL at 03:09

## 2020-09-05 RX ADMIN — POLYETHYLENE GLYCOL 3350 17 G: 17 POWDER, FOR SOLUTION ORAL at 05:09

## 2020-09-05 RX ADMIN — OXYCODONE HYDROCHLORIDE 5 MG: 5 TABLET ORAL at 06:09

## 2020-09-05 NOTE — PLAN OF CARE
Procedure completed. Pt tolerated well. NAD noted or reported. Drain placed to left pelvic area attached to bulb. 20cc removed of fluid. Labs collected and sent as ordered. Report given to primary nurse.

## 2020-09-05 NOTE — SUBJECTIVE & OBJECTIVE
Interval History: VSS. Continues to have intermittent severe abdominal pain. Smith draining well. Creatinine up to 1.6 this morning. Ambulating.    Review of Systems  Objective:     Temp:  [96.6 °F (35.9 °C)-101 °F (38.3 °C)] 99 °F (37.2 °C)  Pulse:  [63-83] 72  Resp:  [14-20] 14  SpO2:  [94 %-99 %] 95 %  BP: (120-156)/(66-77) 131/71     Body mass index is 24.33 kg/m².           Drains     Drain                 Urethral Catheter 09/02/20 0845 Straight-tip;Latex 18 Fr. 3 days              Physical Exam   Constitutional: He appears well-developed. No distress.   HENT:   Head: Normocephalic and atraumatic.   Neck: No tracheal deviation present.   Cardiovascular: Normal rate.    Pulmonary/Chest: Effort normal. No respiratory distress.   Abdominal: Soft. He exhibits distension. There is abdominal tenderness (mainly lower abdominal tenderness).   Incisions CDI, dressed with dermabond   Genitourinary:    Genitourinary Comments: Smiht catheter in place draining clear pink tinged urine      Neurological: He is alert.       Significant Labs:    BMP:  Recent Labs   Lab 09/04/20  0851 09/04/20  1849 09/05/20  0440   * 133* 134*   K 4.2 4.5 4.3    103 103   CO2 23 24 24   BUN 17 14 14   CREATININE 1.3 1.5* 1.6*   CALCIUM 8.3* 9.0 8.8       CBC:   Recent Labs   Lab 09/04/20  0851 09/04/20  0958 09/04/20 2034 09/05/20  0440   WBC 7.40  --  8.92 7.68   HGB 9.2*  --  9.8* 10.3*   HCT 27.5* 27* 30.2* 32.0*     --  203 241       All pertinent labs results from the past 24 hours have been reviewed.    Significant Imaging:  All pertinent imaging results/findings from the past 24 hours have been reviewed.

## 2020-09-05 NOTE — ASSESSMENT & PLAN NOTE
65M s/p RALP 9/2 admitted with anastomotic urine leak    -- IR consulted for pelvic drain placement  -- remain NPO for now until after procedure  -- pain control, B&O PRN  -- IVF at 125  -- creatinine up to 1.6, likely absorptive from leak  -- follow cultures  -- follow up repeat labs

## 2020-09-05 NOTE — CONSULTS
Radiology Consult    Denis Bunn is a 65 y.o. male with a history of prostate cancer s/p recent RALP on 9/2. Presented to ED with severe lower abd pain. CT here concerning for anastomotic leak with extravasated contrast within the pelvis. IR consulted for drain placement.    Past Medical History:   Diagnosis Date    Back pain     Cataract     Constipation - functional     History of gout     HTN (hypertension), benign 4/24/2013    Prostatitis     Vision changes      Past Surgical History:   Procedure Laterality Date    BACK SURGERY      CATARACT EXTRACTION      left eye    COLONOSCOPY N/A 7/29/2019    Procedure: COLONOSCOPY;  Surgeon: Mingo Freeman MD;  Location: [facility];  Service: Endoscopy;  Laterality: N/A;  due July 2019    ELBOW SURGERY      scope/right    EYE SURGERY      Dr. Hope.retina x2 left    LAMINECTOMY      ROBOT-ASSISTED LAPAROSCOPIC PROSTATECTOMY N/A 9/2/2020    Procedure: ROBOTIC PROSTATECTOMY;  Surgeon: Edson White MD;  Location: [facility] OR [address] [place] [address];  Service: Urology;  Laterality: N/A;  3hrs gen with regional    WRIST SURGERY      right       Discussed with primary team, Dr. Garner.    Imaging reviewed with Radiology staff, Dr. Tejada.     Procedure: IR aspiration and drainage    Scheduled Meds:    acetaminophen  1,000 mg Oral Q8H    methocarbamoL  500 mg Oral TID    polyethylene glycol  17 g Oral Once     Continuous Infusions:    sodium chloride 0.9% 125 mL/hr at 09/04/20 2137     PRN Meds:HYDROmorphone, ondansetron, oxyCODONE, sodium chloride 0.9%    Allergies:   Review of patient's allergies indicates:   Allergen Reactions    Amoxicillin-pot clavulanate      Other reaction(s): Stomach upset  Other reaction(s): Stomach upset    Celebrex [celecoxib] Other (See Comments)       Labs:  No results for input(s): INR in the last 168 hours.    Invalid input(s):  PT,  PTT    Recent Labs   Lab 09/05/20  0440   WBC 7.68   HGB 10.3*   HCT 32.0*   MCV 99*          Recent Labs   Lab 09/04/20  1849 09/05/20  0440    105   * 134*   K 4.5 4.3    103   CO2 24 24   BUN 14 14   CREATININE 1.5* 1.6*   CALCIUM 9.0 8.8   MG  --  2.0   ALT 16  --    AST 26  --    ALBUMIN 3.6  --    BILITOT 0.7  --          Vitals (Most Recent):  Temp: 99 °F (37.2 °C) (09/05/20 0757)  Pulse: 72 (09/05/20 0757)  Resp: 14 (09/05/20 0757)  BP: 131/71 (09/05/20 0757)  SpO2: 95 % (09/05/20 0757)    Plan:   1. Keep NPO.  2. Hold anticoagulants.  3. Will plan for drain placement in pelvis scheduled for today 9/5/20.      Jorge L Saba MD PGY-III  Interventional Radiology  [facility]

## 2020-09-05 NOTE — PROCEDURES
[facility]  Interventional Radiology  High Risk Procedure - Inpatient    Date: 09/05/2020 Time: 2:02 PM    Pre-Op Diagnosis: Urine leak post-op    Post-Op Diagnosis: same    Procedure Performed by: Krishna Boggs MD    Assistant: none    Procedure: CT guided pelvic drain placement urinoma    Specimen/Tissue Removed: 10 mL blood tinged fluid    Estimated Blood Loss: Less than 5 mL    Procedure Note/Findings: CT guided drainage of small urine collection in pelvis performed with placement of 8 [place] drain. No immediate post-procedure complications noted.    Please refer to dictated report for additional details.    Krishna Boggs MD  [phone]

## 2020-09-05 NOTE — PLAN OF CARE
Plan of care reviewed with pt. Pt AAOx4, VS as charted. Patient on RA. Purposeful rounding for pt care and safety. No reports of NV, pain treated with PRN meds. No falls or injuries reported during this shift. Pt ambulated in rocha for 30 minutes, tolerated well. Skin integrity as charted. Safety precautions maintained during shift- bed in low position, call light in reach, SR up x2, personal belongings in reach.

## 2020-09-05 NOTE — SUBJECTIVE & OBJECTIVE
Past Medical History:   Diagnosis Date    Back pain     Cataract     Constipation - functional     History of gout     HTN (hypertension), benign 4/24/2013    Prostatitis     Vision changes        Past Surgical History:   Procedure Laterality Date    BACK SURGERY      CATARACT EXTRACTION      left eye    COLONOSCOPY N/A 7/29/2019    Procedure: COLONOSCOPY;  Surgeon: Mingo Freeman MD;  Location: [facility];  Service: Endoscopy;  Laterality: N/A;  due July 2019    ELBOW SURGERY      scope/right    EYE SURGERY      Dr. Hope.retina x2 left    LAMINECTOMY      ROBOT-ASSISTED LAPAROSCOPIC PROSTATECTOMY N/A 9/2/2020    Procedure: ROBOTIC PROSTATECTOMY;  Surgeon: Edson White MD;  Location: [address];  Service: Urology;  Laterality: N/A;  3hrs gen with regional    WRIST SURGERY      right       Review of patient's allergies indicates:   Allergen Reactions    Amoxicillin-pot clavulanate      Other reaction(s): Stomach upset  Other reaction(s): Stomach upset    Celebrex [celecoxib] Other (See Comments)       Family History     Problem Relation (Age of Onset)    Blindness Mother    Cancer Father    Cataracts Mother, Father    No Known Problems Sister, Brother, Maternal Aunt, Maternal Uncle, Paternal Aunt, Paternal Uncle, Maternal Grandmother, Maternal Grandfather, Paternal Grandmother, Paternal Grandfather          Tobacco Use    Smoking status: Never Smoker    Smokeless tobacco: Never Used    Tobacco comment: , 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   Substance and Sexual Activity    Alcohol use: Yes     Alcohol/week: 3.0 standard drinks     Types: 3 Glasses of wine per week     Frequency: 4 or more times a week     Drinks per session: 1 or 2     Binge frequency: Never     Comment: wine daily    Drug use: No    Sexual activity: Yes     Partners: Female       Review of Systems   Constitutional: Positive for appetite change. Negative for activity change, fatigue and fever.   HENT: Negative for  facial swelling and hearing loss.    Eyes: Negative for discharge and itching.   Respiratory: Negative for chest tightness and shortness of breath.    Cardiovascular: Negative for chest pain and leg swelling.   Gastrointestinal: Positive for abdominal distention, abdominal pain and nausea. Negative for constipation, diarrhea and vomiting.   Genitourinary: Positive for hematuria. Negative for difficulty urinating and dysuria.   Musculoskeletal: Negative for arthralgias, back pain and neck pain.   Skin: Negative for color change and pallor.   Neurological: Negative for dizziness, facial asymmetry and light-headedness.   Hematological: Negative for adenopathy.   Psychiatric/Behavioral: Negative for agitation and decreased concentration. The patient is not nervous/anxious.        Objective:     Temp:  [98.8 °F (37.1 °C)-101 °F (38.3 °C)] 101 °F (38.3 °C)  Pulse:  [63-83] 83  Resp:  [16-20] 20  SpO2:  [96 %-99 %] 97 %  BP: (120-156)/(66-81) 151/68     Body mass index is 24.33 kg/m².           Drains     Drain                 Urethral Catheter 09/02/20 0845 Straight-tip;Latex 18 Fr. 2 days                Physical Exam   Constitutional: He is oriented to person, place, and time. He appears well-developed. No distress.   HENT:   Head: Normocephalic and atraumatic.   Eyes: No scleral icterus.   Neck: No tracheal deviation present.   Cardiovascular: Normal rate.    Pulmonary/Chest: Effort normal. No respiratory distress.   Abdominal: Soft. He exhibits distension. There is abdominal tenderness (mainly lower abdominal tenderness).   Incisions CDI, dressed with dermabond, abdomen taught    Genitourinary:    Genitourinary Comments: Smith catheter in place draining clear pink tinged urine      Musculoskeletal: Normal range of motion.   Neurological: He is alert and oriented to person, place, and time.   Skin: Skin is warm and dry.     Psychiatric: His behavior is normal.       Significant Labs:    BMP:  Recent Labs   Lab  09/04/20  0851   *   K 4.2      CO2 23   BUN 17   CREATININE 1.3   CALCIUM 8.3*       CBC:  Recent Labs   Lab 09/04/20  0851 09/04/20  0958   WBC 7.40  --    HGB 9.2*  --    HCT 27.5* 27*     --        All pertinent labs results from the past 24 hours have been reviewed.    Significant Imaging:  CT: I have reviewed all results within the past 24 hours and my personal findings are:  CT from earlier 9/4 showed no obvious bowel injury but contrast did not make it to large bowel, repeat CT non-con pending

## 2020-09-05 NOTE — ED NOTES
Patient on recliner, placed in low/locked position, side rails up x 2, call light is within reach of patient or family, Patient placed into position of comfort. Patient in NAD and offers no complaints at this time. Will continue to monitor.

## 2020-09-05 NOTE — HPI
This is a 65 YOM who presents with severe abdominal pain s/p RALP 9/2 for [place] 7 prostate CA. He was discharged on 9/3 and did well until 3 AM this morning when he developed severe colicky abdominal pain mostly in the lower abdomen. He did not take any of the oxycodone prescribed. His tejeda catheter continues to drain clear yellow urine. He states he has not had a bowel movement since surgery despite taking miralax and mineral oil. He did pass some gas this morning and initially felt better but then his symptoms returned.      He initially presented to the ER this morning and at that time his vitals were stable and he was afebrile. He was in obvious discomfort and lying very still and even small movements caused him pain. He received 4mg morphine and had a CT scan done which did not show any obvious sources of concern. He was re-evaluated around 1pm and his pain was much improved and his exam was reassuring. He was discharged home. He states that once home, his pain returned - again, a severe, colicky pain mainly at the lower part of his abdomen. He states he has not passed any more gas or had any bowel movements. On re-arrival to the ER, he was febrile to 101. Blood cultures were obtained and he was started at [facility]. CT non-con pending.

## 2020-09-05 NOTE — ED PROVIDER NOTES
Encounter Date: 9/4/2020       History     Chief Complaint   Patient presents with    Abdominal Pain     was here yesterday and still having excrutiating pain     The patient is a 65 year old male, who has a past medical history of HTN, chronic back pain, and prostate cancer. He is 2 days s/p robotic prostatectomy done here on 9/2/2020. He presented to the ER earlier today c/o severe abdominal pain and nausea. He was evaluated in the ER and subsequently discharged home. He now returns to the ER several hours later due to uncontrolled pain and is noted to have a fever. He rates his pain as 10/10 on the pain scale. He denies any additional symptoms.         Review of patient's allergies indicates:   Allergen Reactions    Amoxicillin-pot clavulanate      Other reaction(s): Stomach upset  Other reaction(s): Stomach upset    Celebrex [celecoxib] Other (See Comments)     Past Medical History:   Diagnosis Date    Back pain     Cataract     Constipation - functional     History of gout     HTN (hypertension), benign 4/24/2013    Prostatitis     Vision changes      Past Surgical History:   Procedure Laterality Date    BACK SURGERY      CATARACT EXTRACTION      left eye    COLONOSCOPY N/A 7/29/2019    Procedure: COLONOSCOPY;  Surgeon: Mingo Freeman MD;  Location: [facility];  Service: Endoscopy;  Laterality: N/A;  due July 2019    ELBOW SURGERY      scope/right    EYE SURGERY      Dr. Hope.retina x2 left    LAMINECTOMY      ROBOT-ASSISTED LAPAROSCOPIC PROSTATECTOMY N/A 9/2/2020    Procedure: ROBOTIC PROSTATECTOMY;  Surgeon: Edson White MD;  Location: [address];  Service: Urology;  Laterality: N/A;  3hrs gen with regional    WRIST SURGERY      right     Family History   Problem Relation Age of Onset    Blindness Mother         from cva    Cataracts Mother     Cataracts Father     Cancer Father     No Known Problems Sister     No Known Problems Brother     No Known Problems Maternal Aunt      No Known Problems Maternal Uncle     No Known Problems Paternal Aunt     No Known Problems Paternal Uncle     No Known Problems Maternal Grandmother     No Known Problems Maternal Grandfather     No Known Problems Paternal Grandmother     No Known Problems Paternal Grandfather     Amblyopia Neg Hx     Diabetes Neg Hx     Glaucoma Neg Hx     Hypertension Neg Hx     Macular degeneration Neg Hx     Retinal detachment Neg Hx     Strabismus Neg Hx     Stroke Neg Hx     Thyroid disease Neg Hx      Social History     Tobacco Use    Smoking status: Never Smoker    Smokeless tobacco: Never Used    Tobacco comment: , 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   Substance Use Topics    Alcohol use: Yes     Alcohol/week: 3.0 standard drinks     Types: 3 Glasses of wine per week     Frequency: 4 or more times a week     Drinks per session: 1 or 2     Binge frequency: Never     Comment: wine daily    Drug use: No     Review of Systems   Constitutional: Positive for chills and fever.   HENT: Negative for congestion and sore throat.    Respiratory: Negative for cough and shortness of breath.    Cardiovascular: Negative for chest pain.   Gastrointestinal: Positive for abdominal pain and nausea. Negative for diarrhea and vomiting.   Genitourinary: Positive for hematuria. Negative for flank pain.   Musculoskeletal: Negative for back pain and myalgias.   Skin: Negative for rash.   Neurological: Negative for dizziness, syncope and headaches.   Psychiatric/Behavioral: Negative for confusion. The patient is nervous/anxious.        Physical Exam     Initial Vitals [09/04/20 1720]   BP Pulse Resp Temp SpO2   (!) 151/68 83 16 (!) 101 °F (38.3 °C) 97 %      MAP       --         Physical Exam    Nursing note and vitals reviewed.  Constitutional: He appears well-developed. He appears distressed.   HENT:   Head: Normocephalic.   Eyes: Conjunctivae are normal. No scleral icterus.   Neck: Neck supple.   Cardiovascular: Normal rate.    Pulmonary/Chest: No respiratory distress.   Abdominal: There is abdominal tenderness. There is guarding.   Musculoskeletal: Normal range of motion.   Neurological: He is alert and oriented to person, place, and time.   Skin: Skin is warm and dry. No rash noted.   Psychiatric: He has a normal mood and affect.         ED Course   Procedures  Labs Reviewed   COMPREHENSIVE METABOLIC PANEL - Abnormal; Notable for the following components:       Result Value    Sodium 133 (*)     Creatinine 1.5 (*)     Anion Gap 6 (*)     eGFR if  55.3 (*)     eGFR if non  47.8 (*)     All other components within normal limits   URINALYSIS, REFLEX TO URINE CULTURE - Abnormal; Notable for the following components:    Color, UA Red (*)     Appearance, UA Hazy (*)     Protein, UA 1+ (*)     Occult Blood UA 3+ (*)     Leukocytes, UA 2+ (*)     All other components within normal limits    Narrative:     Specimen Source->Urine   CBC W/ AUTO DIFFERENTIAL - Abnormal; Notable for the following components:    RBC 3.00 (*)     Hemoglobin 9.8 (*)     Hematocrit 30.2 (*)     Mean Corpuscular Volume 101 (*)     Mean Corpuscular Hemoglobin 32.7 (*)     Immature Granulocytes 0.6 (*)     Gran # (ANC) 7.8 (*)     Immature Grans (Abs) 0.05 (*)     Lymph # 0.4 (*)     Gran% 87.7 (*)     Lymph% 4.5 (*)     All other components within normal limits   URINALYSIS MICROSCOPIC - Abnormal; Notable for the following components:    RBC, UA >100 (*)     WBC, UA 47 (*)     All other components within normal limits    Narrative:     Specimen Source->Urine   CULTURE, URINE   LIPASE   LACTIC ACID, PLASMA   SARS-COV-2 RNA AMPLIFICATION, QUAL     Results for orders placed or performed during the hospital encounter of 09/04/20   Blood Culture #1 **CANNOT BE ORDERED STAT**    Specimen: Peripheral, Antecubital, Left; Blood   Result Value Ref Range    Blood Culture, Routine No Growth to date    Blood Culture #2 **CANNOT BE ORDERED STAT**     Specimen: Peripheral, Antecubital, Right; Blood   Result Value Ref Range    Blood Culture, Routine No Growth to date    Comprehensive metabolic panel   Result Value Ref Range    Sodium 133 (L) 136 - 145 mmol/L    Potassium 4.5 3.5 - 5.1 mmol/L    Chloride 103 95 - 110 mmol/L    CO2 24 23 - 29 mmol/L    Glucose 108 70 - 110 mg/dL    BUN, Bld 14 8 - 23 mg/dL    Creatinine 1.5 (H) 0.5 - 1.4 mg/dL    Calcium 9.0 8.7 - 10.5 mg/dL    Total Protein 6.7 6.0 - 8.4 g/dL    Albumin 3.6 3.5 - 5.2 g/dL    Total Bilirubin 0.7 0.1 - 1.0 mg/dL    Alkaline Phosphatase 58 55 - 135 U/L    AST 26 10 - 40 U/L    ALT 16 10 - 44 U/L    Anion Gap 6 (L) 8 - 16 mmol/L    eGFR if African American 55.3 (A) >60 mL/min/1.73 m^2    eGFR if non  47.8 (A) >60 mL/min/1.73 m^2   Lipase   Result Value Ref Range    Lipase 10 4 - 60 U/L   Lactic acid, plasma   Result Value Ref Range    Lactate (Lactic Acid) 0.9 0.5 - 2.2 mmol/L   Urinalysis, Reflex to Urine Culture Urine, Clean Catch    Specimen: Urine   Result Value Ref Range    Specimen UA Urine, Catheterized     Color, UA Red (A) Yellow, Straw, Makenzie    Appearance, UA Hazy (A) Clear    pH, UA 6.0 5.0 - 8.0    Specific Gravity, UA 1.015 1.005 - 1.030    Protein, UA 1+ (A) Negative    Glucose, UA Negative Negative    Ketones, UA Negative Negative    Bilirubin (UA) Negative Negative    Occult Blood UA 3+ (A) Negative    Nitrite, UA Negative Negative    Leukocytes, UA 2+ (A) Negative   COVID-19 Rapid Screening   Result Value Ref Range    SARS-CoV-2 RNA, Amplification, Qual Negative Negative   CBC auto differential   Result Value Ref Range    WBC 8.92 3.90 - 12.70 K/uL    RBC 3.00 (L) 4.60 - 6.20 M/uL    Hemoglobin 9.8 (L) 14.0 - 18.0 g/dL    Hematocrit 30.2 (L) 40.0 - 54.0 %    Mean Corpuscular Volume 101 (H) 82 - 98 fL    Mean Corpuscular Hemoglobin 32.7 (H) 27.0 - 31.0 pg    Mean Corpuscular Hemoglobin Conc 32.5 32.0 - 36.0 g/dL    RDW 12.6 11.5 - 14.5 %    Platelets 203 150 - 350 K/uL     MPV 9.9 9.2 - 12.9 fL    Immature Granulocytes 0.6 (H) 0.0 - 0.5 %    Gran # (ANC) 7.8 (H) 1.8 - 7.7 K/uL    Immature Grans (Abs) 0.05 (H) 0.00 - 0.04 K/uL    Lymph # 0.4 (L) 1.0 - 4.8 K/uL    Mono # 0.6 0.3 - 1.0 K/uL    Eos # 0.0 0.0 - 0.5 K/uL    Baso # 0.03 0.00 - 0.20 K/uL    nRBC 0 0 /100 WBC    Gran% 87.7 (H) 38.0 - 73.0 %    Lymph% 4.5 (L) 18.0 - 48.0 %    Mono% 6.8 4.0 - 15.0 %    Eosinophil% 0.1 0.0 - 8.0 %    Basophil% 0.3 0.0 - 1.9 %    Differential Method Automated    Urinalysis Microscopic   Result Value Ref Range    RBC, UA >100 (H) 0 - 4 /hpf    WBC, UA 47 (H) 0 - 5 /hpf    Bacteria Occasional None-Occ /hpf    Hyaline Casts, UA 0 0-1/lpf /lpf    Microscopic Comment SEE COMMENT             Imaging Results          CT Abdomen Pelvis  Without Contrast (Edited Result - FINAL)  Result time 09/04/20 21:37:04    Addendum 1 of 1 by Venkata Mejia MD (09/04/20 21:37:04)      The body of the dictation neglected to point out that there contrast material seen in the decompressed bladder as well as extravasated adjacent to the previously identified hematoma.  I believe this most likely is secondary to a small leak of the anastomosis associated with previous prostatectomy surgery.  I do not believe that this represents an indication of injured bowel.    Suggest obtaining a repeat CT scan in the morning to follow the patient's progress.    These comments were shared with the on call urology physician at 21:31      Electronically signed by: Venkata Mejia  Date:    09/04/2020  Time:    21:37               Final result by Venkata Mejia MD (09/04/20 21:01:39)                 Impression:      Stable presentation as compared to the previous examination.      Electronically signed by: Venkata Mejia  Date:    09/04/2020  Time:    21:01             Narrative:    EXAMINATION:  CT ABDOMEN PELVIS WITHOUT CONTRAST    CLINICAL HISTORY:  Abdominal pain, fever, post-op;    TECHNIQUE:  Low dose axial images, sagittal  and coronal reformations were obtained from the lung bases to the pubic symphysis.    COMPARISON:  September 4, 11:43    FINDINGS:  There is subcutaneous emphysematous changes extending into the lower thoracic region and downward to the midthigh and scrotal region.  This represents no change from the previous examination.    The lung parenchyma reveals a small amount of atelectasis in the deep sulcal region posteriorly on the right-hand side and there is additional atelectasis seen slightly above also on the right-hand side below the level of the inferior right pulmonary vein.  The left lung appears to be well aerated.  No obvious irregularity involving the heart or the gastroesophageal junction.    Imaging below the diaphragm reveals that the liver, adrenal glands, kidneys pancreas and spleen all appear to be within normal limits.  There is dilatation of the gallbladder with fluid however no indication a gallstone.  No evidence of biliary dilatation within the parenchyma of the liver.  Stomach is distended with fluid and the oral contrast seen within the lumen of the bowel previously has advanced to the terminal ileum and is also within the ascending colon.    No obvious evidence of free air within the peritoneal cavity.  No apparent ascites.    Within the pelvic region the bladder is decompressed secondary to a Smith catheter.  There are postsurgical changes in the prostate region.    The hyperdense area between the bladder and rectum is once again appreciated in the sagittal plane and measures approximately 58 x 51 mm.  Previously the area measured 60 x 50 mm.                                 Medical Decision Making:   History:   Old Medical Records: I decided to obtain old medical records.  Initial Assessment:   Pt is 2 days s/p prostatectomy. He presents to the ER for an emergent evaluation due to severe diffuse lower abdominal pain, nausea, and fever.   Differential Diagnosis:   Peritonitis, Abscess,  Obstruction, UTI, sepsis, perforation, etc   Clinical Tests:   Lab Tests: Ordered and Reviewed  Radiological Study: Ordered and Reviewed  ED Management:  I discussed the case in detail with the ER attending physician   Septic work up, IVF's, Tylenol, Morphine and parenteral antibiotics ordered   Pt denies PCN allergy   Stat consult to urology initiated - familiar with patient - requests stat non-con CT of abdomen and pelvis - will see patient in the ER< anticipates admission   Pt updated - requesting additional pain medication stating no relief with Morphine - will order Dilaudid   I discussed the case and CT findings with the radiologist   Pt evaluated and admitted by surgical service                                      Clinical Impression:       ICD-10-CM ICD-9-CM   1. Postoperative abdominal pain  R10.9 789.00    G89.18 338.18   2. Abdominal pain  R10.9 789.00   3. Postoperative fever  R50.82 780.62         Disposition:   Disposition: Admitted  Condition: Serious     ED Disposition Condition    Observation                           Aydin Romero PA-C  09/05/20 0353

## 2020-09-05 NOTE — PROGRESS NOTES
[facility]  Urology  Progress Note    Patient Name: Denis Bunn  MRN: [id number]  Admission Date: 9/4/2020  Hospital Length of Stay: 0 days  Code Status: Full Code   Attending Provider: Gilberto King MD   Primary Care Physician: Taz Lilly MD    Subjective:     HPI:  This is a 65 YOM who presents with severe abdominal pain s/p RALP 9/2 for Jose 7 prostate CA. He was discharged on 9/3 and did well until 3 AM this morning when he developed severe colicky abdominal pain mostly in the lower abdomen. He did not take any of the oxycodone prescribed. His tejeda catheter continues to drain clear yellow urine. He states he has not had a bowel movement since surgery despite taking miralax and mineral oil. He did pass some gas this morning and initially felt better but then his symptoms returned.      He initially presented to the ER this morning and at that time his vitals were stable and he was afebrile. He was in obvious discomfort and lying very still and even small movements caused him pain. He received 4mg morphine and had a CT scan done which did not show any obvious sources of concern. He was re-evaluated around 1pm and his pain was much improved and his exam was reassuring. He was discharged home. He states that once home, his pain returned - again, a severe, colicky pain mainly at the lower part of his abdomen. He states he has not passed any more gas or had any bowel movements. On re-arrival to the ER, he was febrile to 101. Blood cultures were obtained and he was started at [facility]. CT non-con pending.     Interval History: VSS. Continues to have intermittent severe abdominal pain. Tejeda draining well. Creatinine up to 1.6 this morning. Ambulating.    Review of Systems  Objective:     Temp:  [96.6 °F (35.9 °C)-101 °F (38.3 °C)] 99 °F (37.2 °C)  Pulse:  [63-83] 72  Resp:  [14-20] 14  SpO2:  [94 %-99 %] 95 %  BP: (120-156)/(66-77) 131/71     Body mass index is 24.33 kg/m².            Drains     Drain                 Urethral Catheter 09/02/20 0845 Straight-tip;Latex 18 Fr. 3 days              Physical Exam   Constitutional: He appears well-developed. No distress.   HENT:   Head: Normocephalic and atraumatic.   Neck: No tracheal deviation present.   Cardiovascular: Normal rate.    Pulmonary/Chest: Effort normal. No respiratory distress.   Abdominal: Soft. He exhibits distension. There is abdominal tenderness (mainly lower abdominal tenderness).   Incisions CDI, dressed with dermabond   Genitourinary:    Genitourinary Comments: Smith catheter in place draining clear pink tinged urine      Neurological: He is alert.       Significant Labs:    BMP:  Recent Labs   Lab 09/04/20  0851 09/04/20  1849 09/05/20  0440   * 133* 134*   K 4.2 4.5 4.3    103 103   CO2 23 24 24   BUN 17 14 14   CREATININE 1.3 1.5* 1.6*   CALCIUM 8.3* 9.0 8.8       CBC:   Recent Labs   Lab 09/04/20  0851 09/04/20  0958 09/04/20 2034 09/05/20  0440   WBC 7.40  --  8.92 7.68   HGB 9.2*  --  9.8* 10.3*   HCT 27.5* 27* 30.2* 32.0*     --  203 241       All pertinent labs results from the past 24 hours have been reviewed.    Significant Imaging:  All pertinent imaging results/findings from the past 24 hours have been reviewed.                  Assessment/Plan:     Abdominal pain  65M s/p RALP 9/2 admitted with anastomotic urine leak    -- IR consulted for pelvic drain placement  -- remain NPO for now until after procedure  -- pain control, B&O PRN  -- IVF at 125  -- creatinine up to 1.6, likely absorptive from leak  -- follow cultures  -- follow up repeat labs         VTE Risk Mitigation (From admission, onward)         Ordered     Place YOANA hose  Until discontinued      09/04/20 2005     IP VTE HIGH RISK PATIENT  Once      09/04/20 2005     Place sequential compression device  Until discontinued      09/04/20 2005                Gilberto Garner MD  Urology  [facility]

## 2020-09-05 NOTE — H&P
Radiology History & Physical      SUBJECTIVE:     Chief Complaint: anastomotic leak    History of Present Illness:  Denis Bunn is a 65 y.o. male who presents for pelvic drain placement for evidence of anastomotic leak from recent RALP.    Past Medical History:   Diagnosis Date    Back pain     Cataract     Constipation - functional     History of gout     HTN (hypertension), benign 4/24/2013    Prostatitis     Vision changes      Past Surgical History:   Procedure Laterality Date    BACK SURGERY      CATARACT EXTRACTION      left eye    COLONOSCOPY N/A 7/29/2019    Procedure: COLONOSCOPY;  Surgeon: Mingo Freeman MD;  Location: [facility];  Service: Endoscopy;  Laterality: N/A;  due July 2019    ELBOW SURGERY      scope/right    EYE SURGERY      Dr. Hope.retina x2 left    LAMINECTOMY      ROBOT-ASSISTED LAPAROSCOPIC PROSTATECTOMY N/A 9/2/2020    Procedure: ROBOTIC PROSTATECTOMY;  Surgeon: Edson White MD;  Location: [facility] OR [address] [place] [address];  Service: Urology;  Laterality: N/A;  3hrs gen with regional    WRIST SURGERY      right       Home Meds:   Prior to Admission medications    Medication Sig Start Date End Date Taking? Authorizing Provider   allopurinol (ZYLOPRIM) 100 MG tablet TAKE 1 TABLET (100 MG TOTAL) BY MOUTH 2 (TWO) TIMES DAILY.  Patient taking differently: Take 100 mg by mouth 2 (two) times daily. Take if needed 12/9/19  Yes Domenico Pereyra MD   losartan (COZAAR) 100 MG tablet Take 1 tablet (100 mg total) by mouth once daily.  Patient taking differently: Take 50 mg by mouth once daily. Hold am of surgery 7/15/19 9/4/20 Yes Domenico Pereyra MD   methocarbamoL (ROBAXIN) 500 MG Tab Take 1 tablet (500 mg total) by mouth 3 (three) times daily. for 5 days 9/4/20 9/9/20 Yes Sachin Dumont MD   nitrofurantoin (MACRODANTIN) 50 MG capsule Take 1 capsule (50 mg total) by mouth once daily. While catheter is in place 9/3/20  Yes Alexa Vargas MD   ondansetron (ZOFRAN) 4 MG tablet Take 1 tablet  (4 mg total) by mouth every 6 (six) hours. 9/4/20  Yes Sachin Dumont MD   oxyCODONE (ROXICODONE) 5 MG immediate release tablet Take 1 tablet (5 mg total) by mouth every 4 (four) hours as needed for Pain. 9/3/20  Yes Alexa Vargas MD   pravastatin (PRAVACHOL) 20 MG tablet TAKE 1 TABLET(20 MG) BY MOUTH EVERY DAY 9/4/20  Yes Domenico Pereyra MD   ALPRAZolam (XANAX) 0.5 MG tablet Take 1 tablet (0.5 mg total) by mouth 3 (three) times daily.  Patient taking differently: Take 0.5 mg by mouth 2 (two) times daily as needed. Take if needed 6/15/20 9/2/20  Domenico Pereyra MD   ascorbic acid (VITAMIN C) 500 MG tablet Take 500 mg by mouth once daily. Hold for 1 week prior to surgery 3/31/14   Historical Provider   aspirin 81 MG chewable tablet Take 1 tablet by mouth. Dr White does not require you to hold this prior to surgery-hold am of surgery only    Historical Provider, MD   coenzyme Q10 (CO Q-10) 100 mg capsule Take 100 mg by mouth once daily. Hold for 1 week prior to surgery    Historical Provider, MD   ergocalciferol, vitamin D2, (VITAMIN D ORAL) Take by mouth. Hold for 1 week prior to surgery    Historical Provider, MD   garlic 1,000 mg Cap Take by mouth. Hold for 1 week prior to surgery    Historical Provider, MD   ibuprofen (ADVIL,MOTRIN) 200 MG tablet Take 200 mg by mouth. Hold for 1 week prior to surgery    Historical Provider, MD   magnesium 250 mg Tab Take 250 mg by mouth once. Takes two 250 mg tablets daily   Hold for 1 week prior to surgery    Historical Provider, MD   multivitamin capsule Take 1 capsule by mouth once daily. Hold for 1 week prior to surgery    Historical Provider, MD   OM-3/E/LINOL/ALA/OLEIC/GLA/LIP (OMEGA 3-6-9 ORAL) Take by mouth once daily. Hold for 1 week prior to surgery    Historical Provider, MD   polyethylene glycol (GLYCOLAX) 17 gram/dose powder Mix one capful (17 g) with liquid and take by mouth once daily. 9/3/20   Alexa Vargas MD   saw palmetto 160 MG capsule Take 160 mg by  mouth 2 (two) times daily. Hold for 1 week prior to surgery    Historical Provider, MD   turmeric (CURCUMIN MISC) by Misc.(Non-Drug; Combo Route) route. Hold for 1 week prior to surgery    Historical Provider, MD   verapamiL (CALAN-SR) 120 MG CR tablet TAKE 1 TABLET (120 MG TOTAL) BY MOUTH EVERY EVENING.  Patient taking differently: nightly. Takes at night 8/16/20   Domenico Pereyra MD   verapamiL (VERELAN) 240 MG C24P TAKE 1 CAPSULE BY MOUTH EVERY DAY IN THE MORNING  Patient taking differently: Take in am of surgery 6/17/20   Domenico Pereyra MD   zinc 50 mg Tab Take by mouth. Hold for 1 week prior to surgery    Historical Provider, MD     Anticoagulants/Antiplatelets: aspirin, held x 5 days    Allergies:   Review of patient's allergies indicates:   Allergen Reactions    Amoxicillin-pot clavulanate      Other reaction(s): Stomach upset  Other reaction(s): Stomach upset    Celebrex [celecoxib] Other (See Comments)     Sedation History:  no adverse reactions    Review of Systems:   Hematological: no known coagulopathies  Respiratory: no shortness of breath  Cardiovascular: no chest pain  Gastrointestinal: no abdominal pain  Genito-Urinary: no dysuria  Musculoskeletal: negative  Neurological: no TIA or stroke symptoms         OBJECTIVE:     Vital Signs (Most Recent)  Temp: 99 °F (37.2 °C) (09/05/20 0757)  Pulse: 72 (09/05/20 0757)  Resp: 14 (09/05/20 0757)  BP: 131/71 (09/05/20 0757)  SpO2: 95 % (09/05/20 0757)    Physical Exam:  ASA: 3  Mallampati: 3    General: no acute distress  Mental Status: alert and oriented to person, place and time  HEENT: normocephalic, atraumatic  Chest: unlabored breathing  Heart: regular heart rate  Abdomen: nondistended  Extremity: moves all extremities    Laboratory  No results found for: INR    Lab Results   Component Value Date    WBC 7.68 09/05/2020    HGB 10.3 (L) 09/05/2020    HCT 32.0 (L) 09/05/2020    MCV 99 (H) 09/05/2020     09/05/2020      Lab Results   Component Value  Date     09/05/2020     (L) 09/05/2020    K 4.3 09/05/2020     09/05/2020    CO2 24 09/05/2020    BUN 14 09/05/2020    CREATININE 1.6 (H) 09/05/2020    CALCIUM 8.8 09/05/2020    MG 2.0 09/05/2020    ALT 16 09/04/2020    AST 26 09/04/2020    ALBUMIN 3.6 09/04/2020    BILITOT 0.7 09/04/2020    BILIDIR 0.2 09/21/2004       ASSESSMENT/PLAN:     Sedation Plan: moderate  Patient will undergo abdominal/pelvic drain placement.    Jorge L Saba MD PGY-III  Interventional Radiology  [facility]

## 2020-09-05 NOTE — CONSULTS
[facility]  Urology  Consult Note    Patient Name: Denis Bunn  MRN: [id number]  Admission Date: 9/4/2020  Hospital Length of Stay: 0   Code Status: Prior   Attending Provider: Raymundo Garland MD   Consulting Provider: Alexa Vargas MD  Primary Care Physician: Taz Lilly MD  Principal Problem:<principal problem not specified>    Inpatient consult to Urology  Consult performed by: Alexa Vargas MD  Consult ordered by: Aydin Romero PA-C          Subjective:     HPI:  This is a 65 YOM who presents with severe abdominal pain s/p RALP 9/2 for [place] 7 prostate CA. He was discharged on 9/3 and did well until 3 AM this morning when he developed severe colicky abdominal pain mostly in the lower abdomen. He did not take any of the oxycodone prescribed. His tejeda catheter continues to drain clear yellow urine. He states he has not had a bowel movement since surgery despite taking miralax and mineral oil. He did pass some gas this morning and initially felt better but then his symptoms returned.      He initially presented to the ER this morning and at that time his vitals were stable and he was afebrile. He was in obvious discomfort and lying very still and even small movements caused him pain. He received 4mg morphine and had a CT scan done which did not show any obvious sources of concern. He was re-evaluated around 1pm and his pain was much improved and his exam was reassuring. He was discharged home. He states that once home, his pain returned - again, a severe, colicky pain mainly at the lower part of his abdomen. He states he has not passed any more gas or had any bowel movements. On re-arrival to the ER, he was febrile to 101. Blood cultures were obtained and he was started at [facility]. CT non-con pending.     Past Medical History:   Diagnosis Date    Back pain     Cataract     Constipation - functional     History of gout     HTN (hypertension), benign 4/24/2013     Prostatitis     Vision changes        Past Surgical History:   Procedure Laterality Date    BACK SURGERY      CATARACT EXTRACTION      left eye    COLONOSCOPY N/A 7/29/2019    Procedure: COLONOSCOPY;  Surgeon: Mingo Freeman MD;  Location: [facility];  Service: Endoscopy;  Laterality: N/A;  due July 2019    ELBOW SURGERY      scope/right    EYE SURGERY      Dr. Hope.retina x2 left    LAMINECTOMY      ROBOT-ASSISTED LAPAROSCOPIC PROSTATECTOMY N/A 9/2/2020    Procedure: ROBOTIC PROSTATECTOMY;  Surgeon: Edson White MD;  Location: [address];  Service: Urology;  Laterality: N/A;  3hrs gen with regional    WRIST SURGERY      right       Review of patient's allergies indicates:   Allergen Reactions    Amoxicillin-pot clavulanate      Other reaction(s): Stomach upset  Other reaction(s): Stomach upset    Celebrex [celecoxib] Other (See Comments)       Family History     Problem Relation (Age of Onset)    Blindness Mother    Cancer Father    Cataracts Mother, Father    No Known Problems Sister, Brother, Maternal Aunt, Maternal Uncle, Paternal Aunt, Paternal Uncle, Maternal Grandmother, Maternal Grandfather, Paternal Grandmother, Paternal Grandfather          Tobacco Use    Smoking status: Never Smoker    Smokeless tobacco: Never Used    Tobacco comment: , 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   Substance and Sexual Activity    Alcohol use: Yes     Alcohol/week: 3.0 standard drinks     Types: 3 Glasses of wine per week     Frequency: 4 or more times a week     Drinks per session: 1 or 2     Binge frequency: Never     Comment: wine daily    Drug use: No    Sexual activity: Yes     Partners: Female       Review of Systems   Constitutional: Positive for appetite change. Negative for activity change, fatigue and fever.   HENT: Negative for facial swelling and hearing loss.    Eyes: Negative for discharge and itching.   Respiratory: Negative for chest tightness and shortness of breath.    Cardiovascular:  Negative for chest pain and leg swelling.   Gastrointestinal: Positive for abdominal distention, abdominal pain and nausea. Negative for constipation, diarrhea and vomiting.   Genitourinary: Positive for hematuria. Negative for difficulty urinating and dysuria.   Musculoskeletal: Negative for arthralgias, back pain and neck pain.   Skin: Negative for color change and pallor.   Neurological: Negative for dizziness, facial asymmetry and light-headedness.   Hematological: Negative for adenopathy.   Psychiatric/Behavioral: Negative for agitation and decreased concentration. The patient is not nervous/anxious.        Objective:     Temp:  [98.8 °F (37.1 °C)-101 °F (38.3 °C)] 101 °F (38.3 °C)  Pulse:  [63-83] 83  Resp:  [16-20] 20  SpO2:  [96 %-99 %] 97 %  BP: (120-156)/(66-81) 151/68     Body mass index is 24.33 kg/m².           Drains     Drain                 Urethral Catheter 09/02/20 0845 Straight-tip;Latex 18 Fr. 2 days                Physical Exam   Constitutional: He is oriented to person, place, and time. He appears well-developed. No distress.   HENT:   Head: Normocephalic and atraumatic.   Eyes: No scleral icterus.   Neck: No tracheal deviation present.   Cardiovascular: Normal rate.    Pulmonary/Chest: Effort normal. No respiratory distress.   Abdominal: Soft. He exhibits distension. There is abdominal tenderness (mainly lower abdominal tenderness).   Incisions CDI, dressed with dermabond, abdomen taught    Genitourinary:    Genitourinary Comments: Smith catheter in place draining clear pink tinged urine      Musculoskeletal: Normal range of motion.   Neurological: He is alert and oriented to person, place, and time.   Skin: Skin is warm and dry.     Psychiatric: His behavior is normal.       Significant Labs:    BMP:  Recent Labs   Lab 09/04/20  0851   *   K 4.2      CO2 23   BUN 17   CREATININE 1.3   CALCIUM 8.3*       CBC:  Recent Labs   Lab 09/04/20  0851 09/04/20  0958   WBC 7.40  --    HGB  9.2*  --    HCT 27.5* 27*     --        All pertinent labs results from the past 24 hours have been reviewed.    Significant Imaging:  CT: I have reviewed all results within the past 24 hours and my personal findings are:  CT from earlier 9/4 showed no obvious bowel injury but contrast did not make it to large bowel, repeat CT non-con pending                    Assessment and Plan:     Abdominal pain  S/p RALP 9/2, discharged from ER this morning after abdominal pain and physical exam improved, re-presented shortly after for return of abdominal pain, now with fever to 101    -- STAT CTAP non-con ordered to further evaluate source of abdominal pain   -- will admit to urology service   -- pain control  -- remain NPO for now   -- IVF at 150  -- follow blood cultures   -- follow up repeat labs          VTE Risk Mitigation (From admission, onward)    None          Thank you for your consult. I will follow-up with patient. Please contact us if you have any additional questions.    Alexa Vargas MD  Urology  [facility]

## 2020-09-05 NOTE — ASSESSMENT & PLAN NOTE
S/p RALP 9/2, discharged from ER this morning after abdominal pain and physical exam improved, re-presented shortly after for return of abdominal pain, now with fever to 101    -- STAT CTAP non-con ordered to further evaluate source of abdominal pain   -- will admit to urology service   -- pain control  -- remain NPO for now   -- IVF at 150  -- follow blood cultures   -- follow up repeat labs

## 2020-09-05 NOTE — ED NOTES
Yuliya RN accepted report, nurse aware of pt status and last set of vitals. MD cleared patient for admission, pt stable for transport. Nurse informed of orders completed, outstanding orders, and orders unable to be completed in the ED. RN informed if any isolation precautions are required. RN agreeable with report and agrees to accept patient. Pt belongings and valuables remain with patient for transport.

## 2020-09-06 PROBLEM — N99.89: Status: ACTIVE | Noted: 2020-09-04

## 2020-09-06 LAB
ANION GAP SERPL CALC-SCNC: 8 MMOL/L (ref 8–16)
BASOPHILS # BLD AUTO: 0.03 K/UL (ref 0–0.2)
BASOPHILS NFR BLD: 0.4 % (ref 0–1.9)
BUN SERPL-MCNC: 12 MG/DL (ref 8–23)
CALCIUM SERPL-MCNC: 8.5 MG/DL (ref 8.7–10.5)
CHLORIDE SERPL-SCNC: 104 MMOL/L (ref 95–110)
CO2 SERPL-SCNC: 23 MMOL/L (ref 23–29)
CREAT SERPL-MCNC: 1.2 MG/DL (ref 0.5–1.4)
DIFFERENTIAL METHOD: ABNORMAL
EOSINOPHIL # BLD AUTO: 0.1 K/UL (ref 0–0.5)
EOSINOPHIL NFR BLD: 1.2 % (ref 0–8)
ERYTHROCYTE [DISTWIDTH] IN BLOOD BY AUTOMATED COUNT: 12.7 % (ref 11.5–14.5)
EST. GFR  (AFRICAN AMERICAN): >60 ML/MIN/1.73 M^2
EST. GFR  (NON AFRICAN AMERICAN): >60 ML/MIN/1.73 M^2
GLUCOSE SERPL-MCNC: 98 MG/DL (ref 70–110)
GRAM STN SPEC: NORMAL
GRAM STN SPEC: NORMAL
HCT VFR BLD AUTO: 29.8 % (ref 40–54)
HGB BLD-MCNC: 9.5 G/DL (ref 14–18)
IMM GRANULOCYTES # BLD AUTO: 0.03 K/UL (ref 0–0.04)
IMM GRANULOCYTES NFR BLD AUTO: 0.4 % (ref 0–0.5)
LYMPHOCYTES # BLD AUTO: 0.9 K/UL (ref 1–4.8)
LYMPHOCYTES NFR BLD: 11.3 % (ref 18–48)
MAGNESIUM SERPL-MCNC: 2 MG/DL (ref 1.6–2.6)
MCH RBC QN AUTO: 32.1 PG (ref 27–31)
MCHC RBC AUTO-ENTMCNC: 31.9 G/DL (ref 32–36)
MCV RBC AUTO: 101 FL (ref 82–98)
MONOCYTES # BLD AUTO: 0.8 K/UL (ref 0.3–1)
MONOCYTES NFR BLD: 10 % (ref 4–15)
NEUTROPHILS # BLD AUTO: 6.2 K/UL (ref 1.8–7.7)
NEUTROPHILS NFR BLD: 76.7 % (ref 38–73)
NRBC BLD-RTO: 0 /100 WBC
PHOSPHATE SERPL-MCNC: 2.6 MG/DL (ref 2.7–4.5)
PLATELET # BLD AUTO: 216 K/UL (ref 150–350)
PMV BLD AUTO: 10.2 FL (ref 9.2–12.9)
POTASSIUM SERPL-SCNC: 4 MMOL/L (ref 3.5–5.1)
RBC # BLD AUTO: 2.96 M/UL (ref 4.6–6.2)
SODIUM SERPL-SCNC: 135 MMOL/L (ref 136–145)
WBC # BLD AUTO: 8.06 K/UL (ref 3.9–12.7)

## 2020-09-06 PROCEDURE — 36415 COLL VENOUS BLD VENIPUNCTURE: CPT

## 2020-09-06 PROCEDURE — 63600175 PHARM REV CODE 636 W HCPCS: Performed by: STUDENT IN AN ORGANIZED HEALTH CARE EDUCATION/TRAINING PROGRAM

## 2020-09-06 PROCEDURE — 25000003 PHARM REV CODE 250: Performed by: STUDENT IN AN ORGANIZED HEALTH CARE EDUCATION/TRAINING PROGRAM

## 2020-09-06 PROCEDURE — 87075 CULTR BACTERIA EXCEPT BLOOD: CPT

## 2020-09-06 PROCEDURE — 87205 SMEAR GRAM STAIN: CPT

## 2020-09-06 PROCEDURE — 96375 TX/PRO/DX INJ NEW DRUG ADDON: CPT

## 2020-09-06 PROCEDURE — 83735 ASSAY OF MAGNESIUM: CPT

## 2020-09-06 PROCEDURE — 96372 THER/PROPH/DIAG INJ SC/IM: CPT

## 2020-09-06 PROCEDURE — 85025 COMPLETE CBC W/AUTO DIFF WBC: CPT

## 2020-09-06 PROCEDURE — 84100 ASSAY OF PHOSPHORUS: CPT

## 2020-09-06 PROCEDURE — 87070 CULTURE OTHR SPECIMN AEROBIC: CPT

## 2020-09-06 PROCEDURE — 80048 BASIC METABOLIC PNL TOTAL CA: CPT

## 2020-09-06 PROCEDURE — G0378 HOSPITAL OBSERVATION PER HR: HCPCS

## 2020-09-06 RX ORDER — SYRING-NEEDL,DISP,INSUL,0.3 ML 29 G X1/2"
296 SYRINGE, EMPTY DISPOSABLE MISCELLANEOUS ONCE
Status: DISCONTINUED | OUTPATIENT
Start: 2020-09-06 | End: 2020-09-06

## 2020-09-06 RX ORDER — SODIUM,POTASSIUM PHOSPHATES 280-250MG
2 POWDER IN PACKET (EA) ORAL ONCE
Status: COMPLETED | OUTPATIENT
Start: 2020-09-06 | End: 2020-09-06

## 2020-09-06 RX ORDER — CEFTRIAXONE 1 G/1
1 INJECTION, POWDER, FOR SOLUTION INTRAMUSCULAR; INTRAVENOUS
Status: DISCONTINUED | OUTPATIENT
Start: 2020-09-07 | End: 2020-09-07 | Stop reason: HOSPADM

## 2020-09-06 RX ORDER — PRAVASTATIN SODIUM 10 MG/1
20 TABLET ORAL DAILY
Status: DISCONTINUED | OUTPATIENT
Start: 2020-09-06 | End: 2020-09-07 | Stop reason: HOSPADM

## 2020-09-06 RX ORDER — ACETAMINOPHEN 325 MG/1
650 TABLET ORAL EVERY 6 HOURS
Status: DISCONTINUED | OUTPATIENT
Start: 2020-09-06 | End: 2020-09-07 | Stop reason: HOSPADM

## 2020-09-06 RX ORDER — FAMOTIDINE 20 MG/1
20 TABLET, FILM COATED ORAL 2 TIMES DAILY
Status: DISCONTINUED | OUTPATIENT
Start: 2020-09-06 | End: 2020-09-07 | Stop reason: HOSPADM

## 2020-09-06 RX ORDER — LOSARTAN POTASSIUM 25 MG/1
50 TABLET ORAL DAILY
Status: DISCONTINUED | OUTPATIENT
Start: 2020-09-06 | End: 2020-09-07 | Stop reason: HOSPADM

## 2020-09-06 RX ORDER — ALLOPURINOL 100 MG/1
100 TABLET ORAL 2 TIMES DAILY
Status: DISCONTINUED | OUTPATIENT
Start: 2020-09-06 | End: 2020-09-07 | Stop reason: HOSPADM

## 2020-09-06 RX ORDER — BISACODYL 10 MG
10 SUPPOSITORY, RECTAL RECTAL DAILY
Status: DISCONTINUED | OUTPATIENT
Start: 2020-09-06 | End: 2020-09-07 | Stop reason: HOSPADM

## 2020-09-06 RX ORDER — VERAPAMIL HYDROCHLORIDE 120 MG/1
120 TABLET, FILM COATED, EXTENDED RELEASE ORAL NIGHTLY
Status: DISCONTINUED | OUTPATIENT
Start: 2020-09-06 | End: 2020-09-07 | Stop reason: HOSPADM

## 2020-09-06 RX ORDER — VERAPAMIL HYDROCHLORIDE 120 MG/1
240 TABLET, FILM COATED, EXTENDED RELEASE ORAL DAILY
Status: DISCONTINUED | OUTPATIENT
Start: 2020-09-06 | End: 2020-09-07 | Stop reason: HOSPADM

## 2020-09-06 RX ORDER — PREGABALIN 50 MG/1
150 CAPSULE ORAL NIGHTLY
Status: DISCONTINUED | OUTPATIENT
Start: 2020-09-06 | End: 2020-09-07 | Stop reason: HOSPADM

## 2020-09-06 RX ORDER — ENOXAPARIN SODIUM 100 MG/ML
40 INJECTION SUBCUTANEOUS EVERY 24 HOURS
Status: DISCONTINUED | OUTPATIENT
Start: 2020-09-06 | End: 2020-09-07 | Stop reason: HOSPADM

## 2020-09-06 RX ORDER — ALPRAZOLAM 0.5 MG/1
0.5 TABLET ORAL 2 TIMES DAILY PRN
Status: DISCONTINUED | OUTPATIENT
Start: 2020-09-06 | End: 2020-09-07 | Stop reason: HOSPADM

## 2020-09-06 RX ORDER — LIDOCAINE HYDROCHLORIDE 20 MG/ML
JELLY TOPICAL
Status: DISCONTINUED | OUTPATIENT
Start: 2020-09-06 | End: 2020-09-07 | Stop reason: HOSPADM

## 2020-09-06 RX ORDER — LIDOCAINE 50 MG/G
1 PATCH TOPICAL
Status: DISCONTINUED | OUTPATIENT
Start: 2020-09-06 | End: 2020-09-07

## 2020-09-06 RX ORDER — METHOCARBAMOL 750 MG/1
750 TABLET, FILM COATED ORAL 3 TIMES DAILY
Status: DISCONTINUED | OUTPATIENT
Start: 2020-09-06 | End: 2020-09-07 | Stop reason: HOSPADM

## 2020-09-06 RX ADMIN — VERAPAMIL HYDROCHLORIDE 240 MG: 120 TABLET, FILM COATED, EXTENDED RELEASE ORAL at 09:09

## 2020-09-06 RX ADMIN — CEFTRIAXONE 2 G: 2 INJECTION, SOLUTION INTRAVENOUS at 09:09

## 2020-09-06 RX ADMIN — PRAVASTATIN SODIUM 20 MG: 10 TABLET ORAL at 09:09

## 2020-09-06 RX ADMIN — BISACODYL 10 MG: 10 SUPPOSITORY RECTAL at 09:09

## 2020-09-06 RX ADMIN — OXYCODONE HYDROCHLORIDE 5 MG: 5 TABLET ORAL at 04:09

## 2020-09-06 RX ADMIN — ALLOPURINOL 100 MG: 100 TABLET ORAL at 09:09

## 2020-09-06 RX ADMIN — POLYETHYLENE GLYCOL 3350 17 G: 17 POWDER, FOR SOLUTION ORAL at 09:09

## 2020-09-06 RX ADMIN — METHOCARBAMOL TABLETS 750 MG: 750 TABLET, COATED ORAL at 03:09

## 2020-09-06 RX ADMIN — VERAPAMIL HYDROCHLORIDE 120 MG: 120 TABLET, FILM COATED, EXTENDED RELEASE ORAL at 09:09

## 2020-09-06 RX ADMIN — METHOCARBAMOL TABLETS 500 MG: 500 TABLET, COATED ORAL at 09:09

## 2020-09-06 RX ADMIN — ACETAMINOPHEN 650 MG: 325 TABLET ORAL at 05:09

## 2020-09-06 RX ADMIN — PREGABALIN 150 MG: 50 CAPSULE ORAL at 11:09

## 2020-09-06 RX ADMIN — ENOXAPARIN SODIUM 40 MG: 40 INJECTION SUBCUTANEOUS at 05:09

## 2020-09-06 RX ADMIN — FAMOTIDINE 20 MG: 20 TABLET, FILM COATED ORAL at 11:09

## 2020-09-06 RX ADMIN — LIDOCAINE 1 PATCH: 50 PATCH TOPICAL at 05:09

## 2020-09-06 RX ADMIN — LOSARTAN POTASSIUM 50 MG: 25 TABLET, FILM COATED ORAL at 09:09

## 2020-09-06 RX ADMIN — FAMOTIDINE 20 MG: 20 TABLET, FILM COATED ORAL at 09:09

## 2020-09-06 RX ADMIN — ALPRAZOLAM 0.5 MG: 0.5 TABLET ORAL at 09:09

## 2020-09-06 RX ADMIN — METHOCARBAMOL TABLETS 750 MG: 750 TABLET, COATED ORAL at 09:09

## 2020-09-06 RX ADMIN — ACETAMINOPHEN 650 MG: 325 TABLET ORAL at 09:09

## 2020-09-06 RX ADMIN — POTASSIUM & SODIUM PHOSPHATES POWDER PACK 280-160-250 MG 2 PACKET: 280-160-250 PACK at 11:09

## 2020-09-06 NOTE — PLAN OF CARE
PCP: Taz Younger MD    [facility] [address] [facility] [place] [address]  [phone]    Payor: Medicare A/B ([id number])  Ezra ([id number])    SW met pt at  to complete assessment. Alert and oriented. Pt lives in home with spouse and sates that she provides care if/when needed. Stated no use of DME prior to admit. Denies hx of dialysis and coumadin. Pt stated that his family will provide transport at d/c.     No further needs at time of assessment.        09/06/20 1218   Discharge Assessment   Assessment Type Discharge Planning Assessment   Confirmed/corrected address and phone number on facesheet? Yes   Assessment information obtained from? Patient   Communicated expected length of stay with patient/caregiver no   Prior to hospitilization cognitive status: Alert/Oriented   Prior to hospitalization functional status: Independent   Current cognitive status: Alert/Oriented   Current Functional Status: Independent   Facility Arrived From: home   Lives With spouse   Able to Return to Prior Arrangements yes   Is patient able to care for self after discharge? Unable to determine at this time (comments)   Who are your caregiver(s) and their phone number(s)? Christi Hurtado; Spouse; [phone]   Patient's perception of discharge disposition home or selfcare   Patient currently being followed by outpatient case management? Unable to determine (comments)   Patient currently receives any other outside agency services? No   Equipment Currently Used at Home none   Do you have any problems affording any of your prescribed medications? No   Is the patient taking medications as prescribed? yes   Does the patient have transportation home? Yes   Transportation Anticipated family or friend will provide   Dialysis Name and Scheduled days n/a   Does the patient receive services at the Coumadin Clinic? No   Discharge Plan A Home with family   Discharge Plan B Home   DME Needed Upon Discharge  none   Patient/Family in  Agreement with Plan yes       Yeimi Mata LMSW  Case Management Social Worker   [facility]

## 2020-09-06 NOTE — PLAN OF CARE
Pt A & O x4, VS as charted, pain managed with PRN and scheduled meds, drains placed today and draining clear red fluid, tejeda catheter in place draining peach-colored urine, patient ambulating in halls independently throughout day and tolerating regular diet, mild constipation reported and miralax given, call light within reach.

## 2020-09-06 NOTE — PLAN OF CARE
09/06/20 1517   TANG Message   Medicare Outpatient and Observation Notification regarding financial responsibility Given to patient/caregiver;Explained to patient/caregiver;Other (comments)  (Pt refused to sign)   VIMAL visited pt at  and read letter to pt regarding TANG. Provided pt with copy of TANG explanation. Pt stated that he should be considered in/pt and refused to sign document. Requested that SW send message to MD to change status to in/pt. VIMAL sent SC to Dr. Lombarde noting the pt's request.  Letter with refusal documented placed in pt chart.    (Letter presented to pt 9/6/2020 at 2:58 PM)    Yeimi Mata LMSW  Case Management Social Worker   [facility]

## 2020-09-06 NOTE — PROGRESS NOTES
[facility]  Urology  Progress Note    Patient Name: Denis Bunn  MRN: [id number]  Admission Date: 9/4/2020  Hospital Length of Stay: 0 days  Code Status: Full Code   Attending Provider: Gilberto King MD   Primary Care Physician: Taz Lilly MD    Subjective:     HPI:  This is a 65 YOM who presents with severe abdominal pain s/p RALP 9/2 for Jose 7 prostate CA. He was discharged on 9/3 and did well until 3 AM this morning when he developed severe colicky abdominal pain mostly in the lower abdomen. He did not take any of the oxycodone prescribed. His tejeda catheter continues to drain clear yellow urine. He states he has not had a bowel movement since surgery despite taking miralax and mineral oil. He did pass some gas this morning and initially felt better but then his symptoms returned.      He initially presented to the ER this morning and at that time his vitals were stable and he was afebrile. He was in obvious discomfort and lying very still and even small movements caused him pain. He received 4mg morphine and had a CT scan done which did not show any obvious sources of concern. He was re-evaluated around 1pm and his pain was much improved and his exam was reassuring. He was discharged home. He states that once home, his pain returned - again, a severe, colicky pain mainly at the lower part of his abdomen. He states he has not passed any more gas or had any bowel movements. On re-arrival to the ER, he was febrile to 101. Blood cultures were obtained and he was started at [facility]. CT non-con pending.     Interval History: VSS. Continues to have intermittent severe abdominal pain. Tejeda draining well. Drain creatinine c/w urine leak. Drain output 600 yesterday. Ambulating. Enema and miralax for bowel issues without significant relief.     Review of Systems    Objective:     Temp:  [98.2 °F (36.8 °C)-99.9 °F (37.7 °C)] 99.6 °F (37.6 °C)  Pulse:  [74-88] 88  Resp:  [14-19] 16  SpO2:   [94 %-99 %] 95 %  BP: (133-165)/(68-80) 165/77     Body mass index is 24.33 kg/m².           Drains     Drain                 Urethral Catheter 09/02/20 0845 Straight-tip;Latex 18 Fr. 3 days              Physical Exam   Constitutional: He appears well-developed. No distress.   HENT:   Head: Normocephalic and atraumatic.   Neck: No tracheal deviation present.   Cardiovascular: Normal rate.    Pulmonary/Chest: Effort normal. No respiratory distress.   Abdominal: Soft. There is abdominal tenderness (mainly lower abdominal tenderness).   Distension improved  Drain in midline with pink output  Incisions CDI, dressed with dermabond     Genitourinary:    Genitourinary Comments: Smith catheter in place draining clear yellow urine      Neurological: He is alert.     Significant Labs:    BMP:  Recent Labs   Lab 09/04/20  1849 09/05/20  0440 09/06/20  0340   * 134* 135*   K 4.5 4.3 4.0    103 104   CO2 24 24 23   BUN 14 14 12   CREATININE 1.5* 1.6* 1.2   CALCIUM 9.0 8.8 8.5*       CBC:   Recent Labs   Lab 09/04/20  2034 09/05/20  0440 09/06/20  0340   WBC 8.92 7.68 8.06   HGB 9.8* 10.3* 9.5*   HCT 30.2* 32.0* 29.8*    241 216       All pertinent labs results from the past 24 hours have been reviewed.    Significant Imaging:  All pertinent imaging results/findings from the past 24 hours have been reviewed.                  Assessment/Plan:     Anastomotic urine leak after radical prostatectomy  65M s/p RALP 9/2 admitted with anastomotic urine leak    -- IR drain in place, creatinine >30 c/w urine leak  -- cultures from drain in place, follow up final cultures  -- on Rocephin  -- regular diet  -- tylenol, robaxin, lyrica, B&O PRN. Hold opioids.  -- Ambulate QID and OOBTC all day  -- HLIV  -- bowel regimen today - miralax BID, dulcolax, s/p enema yesterday  -- creatinine down to 1.2.  -- home meds - losartan, verapamil, statin  -- Ppx: lovenox, pepcid, TEDs/SCDs        VTE Risk Mitigation (From admission,  onward)         Ordered     enoxaparin injection 40 mg  Every 24 hours      09/06/20 0932     Place YOANA hose  Until discontinued      09/04/20 2005     IP VTE HIGH RISK PATIENT  Once      09/04/20 2005     Place sequential compression device  Until discontinued      09/04/20 2005                Gilberto Garner MD  Urology  [facility]

## 2020-09-06 NOTE — PLAN OF CARE
"Plan of care reviewed with pt. Pt AAOx4, VS as charted Patient on RA. Purposeful rounding for pt care and safety. No reports of NV, pain treated with PRN meds. Abd cramps treated with warm water enema and docusate enema with only reflief of cramping, not relief of constipation. No falls or injuries reported during this shift. Skin integrity as charted. Safety precautions maintained during shift- bed in low position, call light in reach, SR up x2, personal belongings in reach.     Pt visibly anxious and stated "I don't want to die like this" multiple times to nursing staff over abd cramping. Pt worried about a myriad of things; temp (99.6), BP (165/77), cramps, drainage in grenade, soreness at drain site. Nursing staff attempted to calm his worries and provide emotional support with little effects on patient's anxiety. Will update day nurse on situation.      "

## 2020-09-06 NOTE — SUBJECTIVE & OBJECTIVE
Interval History: VSS. Continues to have intermittent severe abdominal pain. Smith draining well. Drain creatinine c/w urine leak. Drain output 600 yesterday. Ambulating. Enema and miralax for bowel issues without significant relief.     Review of Systems    Objective:     Temp:  [98.2 °F (36.8 °C)-99.9 °F (37.7 °C)] 99.6 °F (37.6 °C)  Pulse:  [74-88] 88  Resp:  [14-19] 16  SpO2:  [94 %-99 %] 95 %  BP: (133-165)/(68-80) 165/77     Body mass index is 24.33 kg/m².           Drains     Drain                 Urethral Catheter 09/02/20 0845 Straight-tip;Latex 18 Fr. 3 days              Physical Exam   Constitutional: He appears well-developed. No distress.   HENT:   Head: Normocephalic and atraumatic.   Neck: No tracheal deviation present.   Cardiovascular: Normal rate.    Pulmonary/Chest: Effort normal. No respiratory distress.   Abdominal: Soft. There is abdominal tenderness (mainly lower abdominal tenderness).   Distension improved  Drain in midline with pink output  Incisions CDI, dressed with dermabond     Genitourinary:    Genitourinary Comments: Smith catheter in place draining clear yellow urine      Neurological: He is alert.     Significant Labs:    BMP:  Recent Labs   Lab 09/04/20  1849 09/05/20 0440 09/06/20  0340   * 134* 135*   K 4.5 4.3 4.0    103 104   CO2 24 24 23   BUN 14 14 12   CREATININE 1.5* 1.6* 1.2   CALCIUM 9.0 8.8 8.5*       CBC:   Recent Labs   Lab 09/04/20  2034 09/05/20  0440 09/06/20  0340   WBC 8.92 7.68 8.06   HGB 9.8* 10.3* 9.5*   HCT 30.2* 32.0* 29.8*    241 216       All pertinent labs results from the past 24 hours have been reviewed.    Significant Imaging:  All pertinent imaging results/findings from the past 24 hours have been reviewed.

## 2020-09-06 NOTE — ASSESSMENT & PLAN NOTE
65M s/p RALP 9/2 admitted with anastomotic urine leak    -- IR drain in place, creatinine >30 c/w urine leak  -- cultures from drain in place, follow up final cultures  -- on Rocephin  -- regular diet  -- tylenol, robaxin, lyrica, B&O PRN. Hold opioids.  -- HLIV  -- bowel regimen today - miralax BID, dulcolax, s/p enema yesterday  -- creatinine down to 1.2.  -- home meds - losartan, verapamil, statin  -- Ppx: lovenox, pepcid, TEDs/SCDs

## 2020-09-07 VITALS
DIASTOLIC BLOOD PRESSURE: 64 MMHG | RESPIRATION RATE: 16 BRPM | TEMPERATURE: 100 F | BODY MASS INDEX: 24.25 KG/M2 | WEIGHT: 160 LBS | HEIGHT: 68 IN | HEART RATE: 82 BPM | OXYGEN SATURATION: 98 % | SYSTOLIC BLOOD PRESSURE: 112 MMHG

## 2020-09-07 PROBLEM — R10.9 POSTOPERATIVE ABDOMINAL PAIN: Status: ACTIVE | Noted: 2020-09-07

## 2020-09-07 PROBLEM — G89.18 POSTOPERATIVE ABDOMINAL PAIN: Status: ACTIVE | Noted: 2020-09-07

## 2020-09-07 PROCEDURE — 25000003 PHARM REV CODE 250: Performed by: STUDENT IN AN ORGANIZED HEALTH CARE EDUCATION/TRAINING PROGRAM

## 2020-09-07 PROCEDURE — 11000001 HC ACUTE MED/SURG PRIVATE ROOM

## 2020-09-07 PROCEDURE — 63600175 PHARM REV CODE 636 W HCPCS: Performed by: STUDENT IN AN ORGANIZED HEALTH CARE EDUCATION/TRAINING PROGRAM

## 2020-09-07 PROCEDURE — 96375 TX/PRO/DX INJ NEW DRUG ADDON: CPT

## 2020-09-07 RX ORDER — BISACODYL 10 MG
10 SUPPOSITORY, RECTAL RECTAL ONCE
Status: COMPLETED | OUTPATIENT
Start: 2020-09-07 | End: 2020-09-07

## 2020-09-07 RX ORDER — CEPHALEXIN 500 MG/1
500 CAPSULE ORAL 2 TIMES DAILY
Qty: 20 CAPSULE | Refills: 0 | Status: SHIPPED | OUTPATIENT
Start: 2020-09-07 | End: 2020-09-17

## 2020-09-07 RX ORDER — LIDOCAINE 50 MG/G
1 PATCH TOPICAL
Status: DISCONTINUED | OUTPATIENT
Start: 2020-09-07 | End: 2020-09-07 | Stop reason: HOSPADM

## 2020-09-07 RX ORDER — METHOCARBAMOL 500 MG/1
500 TABLET, FILM COATED ORAL 4 TIMES DAILY
Qty: 40 TABLET | Refills: 0 | Status: SHIPPED | OUTPATIENT
Start: 2020-09-07 | End: 2020-09-17

## 2020-09-07 RX ORDER — POLYETHYLENE GLYCOL 3350 17 G/17G
17 POWDER, FOR SOLUTION ORAL ONCE
Status: COMPLETED | OUTPATIENT
Start: 2020-09-07 | End: 2020-09-07

## 2020-09-07 RX ADMIN — PRAVASTATIN SODIUM 20 MG: 10 TABLET ORAL at 09:09

## 2020-09-07 RX ADMIN — POLYETHYLENE GLYCOL 3350 17 G: 17 POWDER, FOR SOLUTION ORAL at 09:09

## 2020-09-07 RX ADMIN — ACETAMINOPHEN 650 MG: 325 TABLET ORAL at 12:09

## 2020-09-07 RX ADMIN — METHOCARBAMOL TABLETS 750 MG: 750 TABLET, COATED ORAL at 02:09

## 2020-09-07 RX ADMIN — BISACODYL 10 MG: 10 SUPPOSITORY RECTAL at 09:09

## 2020-09-07 RX ADMIN — VERAPAMIL HYDROCHLORIDE 240 MG: 120 TABLET, FILM COATED, EXTENDED RELEASE ORAL at 09:09

## 2020-09-07 RX ADMIN — LIDOCAINE 1 PATCH: 50 PATCH TOPICAL at 12:09

## 2020-09-07 RX ADMIN — ACETAMINOPHEN 650 MG: 325 TABLET ORAL at 05:09

## 2020-09-07 RX ADMIN — METHOCARBAMOL TABLETS 750 MG: 750 TABLET, COATED ORAL at 09:09

## 2020-09-07 RX ADMIN — LOSARTAN POTASSIUM 50 MG: 25 TABLET, FILM COATED ORAL at 09:09

## 2020-09-07 RX ADMIN — FAMOTIDINE 20 MG: 20 TABLET, FILM COATED ORAL at 09:09

## 2020-09-07 RX ADMIN — CEFTRIAXONE SODIUM 1 G: 1 INJECTION, POWDER, FOR SOLUTION INTRAMUSCULAR; INTRAVENOUS at 09:09

## 2020-09-07 RX ADMIN — BISACODYL 10 MG: 10 SUPPOSITORY RECTAL at 01:09

## 2020-09-07 RX ADMIN — POLYETHYLENE GLYCOL 3350 17 G: 17 POWDER, FOR SOLUTION ORAL at 01:09

## 2020-09-07 RX ADMIN — ALLOPURINOL 100 MG: 100 TABLET ORAL at 09:09

## 2020-09-07 NOTE — PLAN OF CARE
Plan of care reviewed with pt. Pt AAOx4, VS as charted. Patient on RA. Purposeful rounding for pt care and safety. No reports of NV, pain managed well with PRN meds. No falls or injuries reported during this shift. Skin integrity as charted. Safety precautions maintained during shift- bed in low position, call light in reach, SR up x2, personal belongings in reach.

## 2020-09-07 NOTE — DISCHARGE SUMMARY
[facility]  DISCHARGE SUMMARY      Admit Date:  9/4/2020    Discharge Date and Time:  9/7/2020  12:00 PM    Attending Physician:  Gilberto King MD     Discharge Provider:  Alexa Vargas MD     Reason for Admission:  Leakage of urological anastomosis     Procedures Performed:  Placement of pelvic drain by IR     HPI:  This is a 65 YOM who presents with severe abdominal pain s/p RALP 9/2 for Jose 7 prostate CA. He was discharged on 9/3 and did well until 3 AM this morning when he developed severe colicky abdominal pain mostly in the lower abdomen. He did not take any of the oxycodone prescribed. His tejeda catheter continues to drain clear yellow urine. He states he has not had a bowel movement since surgery despite taking miralax and mineral oil. He did pass some gas this morning and initially felt better but then his symptoms returned.      He initially presented to the ER this morning and at that time his vitals were stable and he was afebrile. He was in obvious discomfort and lying very still and even small movements caused him pain. He received 4mg morphine and had a CT scan done which did not show any obvious sources of concern. He was re-evaluated around 1pm and his pain was much improved and his exam was reassuring. He was discharged home. He states that once home, his pain returned - again, a severe, colicky pain mainly at the lower part of his abdomen. He states he has not passed any more gas or had any bowel movements. On re-arrival to the ER, he was febrile to 101. Blood cultures were obtained and he was started at [facility]. CT non-con pending.     Hospital Course:  Please see the preoperative H&P and other available documentation for full details related to history prior to this admission.  Briefly, Denis Bunn is a 65 y.o. male who was admitted after visiting the ED due to confirmation of Leakage of urological anastomosis in the setting of severe abdominal pain. He was  taken to IR for drain placement on 9/5. He was started on rocephin empirically and cultures were drawn from his drain. His vitals and lab work remained stable throughout the remainder of his hospital course. He was frustrated at not being able to pass gas or have a bowel movement but we explained that his ileus was likely caused if not worsened by his urine leak. Despite this, he ambulated daily and tolerated a regular diet. His symptoms began to improve. He began to pass gas and felt much better. Currently, the patient is doing well and is stable and appropriate for discharge home with keflex at this time.      Physical Exam   Constitutional: He appears well-developed. No distress.   HENT:   Head: Normocephalic and atraumatic.   Neck: No tracheal deviation present.   Cardiovascular: Normal rate.    Pulmonary/Chest: Effort normal. No respiratory distress.   Abdominal: Soft. There is abdominal tenderness (mainly lower abdominal tenderness).   Distension improved  Drain in midline with pink output  Incisions CDI, dressed with dermabond  Genitourinary:    Genitourinary Comments: Smith catheter in place draining red tinged urine      Neurological: He is alert.     Consults:  None.    Significant Diagnostic Studies:   Recent Labs   Lab 09/04/20 2034 09/05/20 0440 09/06/20  0340   WBC 8.92 7.68 8.06   HGB 9.8* 10.3* 9.5*   HCT 30.2* 32.0* 29.8*    241 216     Recent Labs   Lab 09/04/20 1849 09/05/20 0440 09/06/20  0340   * 134* 135*   K 4.5 4.3 4.0    103 104   CO2 24 24 23   BUN 14 14 12   CREATININE 1.5* 1.6* 1.2    105 98   CALCIUM 9.0 8.8 8.5*   MG  --  2.0 2.0   PHOS  --  2.8 2.6*   AST 26  --   --    ALT 16  --   --    ALKPHOS 58  --   --    BILITOT 0.7  --   --    PROT 6.7  --   --    ALBUMIN 3.6  --   --    LIPASE 10  --   --      Recent Labs   Lab 09/04/20 1849 09/05/20  0857   INR  --  1.0   LACTATE 0.9  --    No results for input(s): PH, PCO2, PO2, HCO3 in the last 72  hours.      Final Diagnoses:   Principal Problem:  Leakage of urological anastomosis   Secondary Diagnoses:    Active Hospital Problems    Diagnosis  POA    *Anastomotic urine leak after radical prostatectomy [N99.89]  Yes    Postoperative abdominal pain [R10.9, G89.18]  Yes    Prostate cancer [C61]  Yes    Overweight (BMI 25.0-29.9) [E66.3]  Yes    PVC (premature ventricular contraction) [I49.3]  Yes    Anxiety [F41.9]  Yes    Impaired fasting glucose [R73.01]  Yes    HTN (hypertension), benign [I10]  Yes     Borderline      Dyslipidemia [E78.5]  Yes     Elevated Lp(a) but excellent HDL        Resolved Hospital Problems   No resolved problems to display.       Discharged Condition:  Good    Disposition:  Home or Self Care    Follow Up/Patient Instructions:     Medications:  Reconciled Home Medications:    Current Discharge Medication List      START taking these medications    Details   cephALEXin (KEFLEX) 500 MG capsule Take 1 capsule (500 mg total) by mouth 2 (two) times a day. for 10 days  Qty: 20 capsule, Refills: 0      !! methocarbamoL (ROBAXIN) 500 MG Tab Take 1 tablet (500 mg total) by mouth 4 (four) times daily. for 10 days  Qty: 40 tablet, Refills: 0       !! - Potential duplicate medications found. Please discuss with provider.      CONTINUE these medications which have NOT CHANGED    Details   allopurinol (ZYLOPRIM) 100 MG tablet TAKE 1 TABLET (100 MG TOTAL) BY MOUTH 2 (TWO) TIMES DAILY.  Qty: 180 tablet, Refills: 3    Associated Diagnoses: FH: CAD (coronary artery disease); HTN (hypertension), benign      losartan (COZAAR) 100 MG tablet Take 1 tablet (100 mg total) by mouth once daily.  Qty: 90 tablet, Refills: 3    Associated Diagnoses: HTN (hypertension), benign      !! methocarbamoL (ROBAXIN) 500 MG Tab Take 1 tablet (500 mg total) by mouth 3 (three) times daily. for 5 days  Qty: 30 tablet, Refills: 0      nitrofurantoin (MACRODANTIN) 50 MG capsule Take 1 capsule (50 mg total) by mouth once  daily. While catheter is in place  Qty: 7 capsule, Refills: 0      ondansetron (ZOFRAN) 4 MG tablet Take 1 tablet (4 mg total) by mouth every 6 (six) hours.  Qty: 12 tablet, Refills: 0      oxyCODONE (ROXICODONE) 5 MG immediate release tablet Take 1 tablet (5 mg total) by mouth every 4 (four) hours as needed for Pain.  Qty: 9 tablet, Refills: 0    Comments: Quantity prescribed more than 7 day supply? No      pravastatin (PRAVACHOL) 20 MG tablet TAKE 1 TABLET(20 MG) BY MOUTH EVERY DAY  Qty: 90 tablet, Refills: 0      ALPRAZolam (XANAX) 0.5 MG tablet Take 1 tablet (0.5 mg total) by mouth 3 (three) times daily.  Qty: 30 tablet, Refills: 1    Associated Diagnoses: Anxiety; Sleep trouble      ascorbic acid (VITAMIN C) 500 MG tablet Take 500 mg by mouth once daily. Hold for 1 week prior to surgery      aspirin 81 MG chewable tablet Take 1 tablet by mouth. Dr White does not require you to hold this prior to surgery-hold am of surgery only      coenzyme Q10 (CO Q-10) 100 mg capsule Take 100 mg by mouth once daily. Hold for 1 week prior to surgery      ergocalciferol, vitamin D2, (VITAMIN D ORAL) Take by mouth. Hold for 1 week prior to surgery      garlic 1,000 mg Cap Take by mouth. Hold for 1 week prior to surgery      ibuprofen (ADVIL,MOTRIN) 200 MG tablet Take 200 mg by mouth. Hold for 1 week prior to surgery      magnesium 250 mg Tab Take 250 mg by mouth once. Takes two 250 mg tablets daily   Hold for 1 week prior to surgery      multivitamin capsule Take 1 capsule by mouth once daily. Hold for 1 week prior to surgery      OM-3/E/LINOL/ALA/OLEIC/GLA/LIP (OMEGA 3-6-9 ORAL) Take by mouth once daily. Hold for 1 week prior to surgery      polyethylene glycol (GLYCOLAX) 17 gram/dose powder Mix one capful (17 g) with liquid and take by mouth once daily.  Qty: 507 g, Refills: 0      saw palmetto 160 MG capsule Take 160 mg by mouth 2 (two) times daily. Hold for 1 week prior to surgery      turmeric (CURCUMIN MISC) by  Misc.(Non-Drug; Combo Route) route. Hold for 1 week prior to surgery      verapamiL (CALAN-SR) 120 MG CR tablet TAKE 1 TABLET (120 MG TOTAL) BY MOUTH EVERY EVENING.  Qty: 90 tablet, Refills: 3      verapamiL (VERELAN) 240 MG C24P TAKE 1 CAPSULE BY MOUTH EVERY DAY IN THE MORNING  Qty: 90 capsule, Refills: 1      zinc 50 mg Tab Take by mouth. Hold for 1 week prior to surgery       !! - Potential duplicate medications found. Please discuss with provider.        Discharge Procedure Orders   FL Cystogram Minimum 3 Views (xpd) - Rad Performed   Standing Status: Future Standing Exp. Date: 09/07/21     Order Specific Question Answer Comments   May the Radiologist modify the order per protocol to meet the clinical needs of the patient? Yes      Diet Adult Regular     Notify your health care provider if you experience any of the following:  temperature >100.4     Notify your health care provider if you experience any of the following:  persistent nausea and vomiting or diarrhea     Notify your health care provider if you experience any of the following:  severe uncontrolled pain     Notify your health care provider if you experience any of the following:  redness, tenderness, or signs of infection (pain, swelling, redness, odor or green/yellow discharge around incision site)     No dressing needed     Activity as tolerated     Shower on day dressing removed (No bath)   Order Comments: OK to shower. Do not soak incisions in bathtub or pool. You may have Dermabond (purple skin glue) over your incisions. This will come off on its own.     Weight bearing restrictions (specify):   Order Comments: Please do not lift anything greater than 10 lbs for 6 weeks in order to reduce your risk of getting a hernia.     Follow-up Information     [facility] In 10 days.    Specialty: Urology  Why: cystogram prior, post op   Contact information:  [facility]  [facility] [id number] [phone]  Additional  information:  [facility], [place], 2nd Floor. Please check in on the 2nd Floor.                 Alexa Vargas MD

## 2020-09-07 NOTE — NURSING
Pt received discharge instructions. Education provided on home care of tejeda catheter and grenade drain. Verbalized understanding of all instructions and return demonstrated. Prescriptions delivered to bedside via pharmacy. PIV removed, no redness/swelling, catheter tip intact. Wheelchair offered, pt declined. Pt ambulated off floor with family.

## 2020-09-09 ENCOUNTER — PATIENT OUTREACH (OUTPATIENT)
Dept: ADMINISTRATIVE | Facility: CLINIC | Age: 65
End: 2020-09-09

## 2020-09-09 LAB
BACTERIA BLD CULT: NORMAL
BACTERIA BLD CULT: NORMAL
BACTERIA SPEC AEROBE CULT: NO GROWTH

## 2020-09-09 NOTE — PATIENT INSTRUCTIONS
Anatomy of the Male Urinary Tract  Your urinary tract helps to get rid of your bodys liquid waste. The kidneys constantly filter the blood to collect unneeded chemicals and water, making urine. Urine travels through the ureters to the bladder. The bladder fills with urine, holding it until youre ready to release it. Signals from the brain tell the sphincter (muscles around the opening of the bladder) when to let urine flow out of the bladder. The urethra is the tube that carries urine from the bladder out of the body. In men, the prostate gland wraps around the urethra near the bladder.     Front view of male urinary tract.     Date Last Reviewed: 1/1/2017 © 2000-2017 [employer]. [address] [place] [address], [place] [address]. All rights reserved. This information is not intended as a substitute for professional medical care. Always follow your healthcare professional's instructions.        Prostate Cancer: Radical Prostatectomy     The prostate, seminal vesicles, and a portion of the urethra are removed.   Radical prostatectomy is surgery to remove the entire prostate. It may be done if tests show that the cancer is confined to the prostate. Your surgeon will give you detailed instructions on getting ready for surgery. After surgery, youll be told how to care for yourself at home as you recover. Be sure to ask any questions you have about the procedure and recovery.  Risks and possible complications  All surgeries have risks. The risks of this surgery include:  · Blood clots  · Excess bleeding  · Hole (perforation) in the bowel  · Infection  · Loss of bladder control (incontinence)  · Lung infection (pneumonia)  · Trouble getting or keeping an erection (erectile dysfunction)  · Trouble urinating  Getting ready for your surgery     The urethra is reattached to the bladder. A catheter is inserted to drain urine while you heal. A balloon holds the catheter in place.   Follow all instructions from your  healthcare team. In addition:  · Tell your healthcare provider about all medicines you take. This includes herbs and other supplements. It also includes any blood thinners, such as warfarin, clopidogrel, or daily aspirin. You may need to stop taking some or all of them before the surgery.  · You may be told to use a laxative, enemas, or both before the surgery. This is to empty the colon and rectum of stool. Follow the instructions you are given.  · Dont eat or drink after midnight the night before surgery.  How the surgery is done  The surgery may be done through several small incisions in the abdomen. This is called laparoscopy. In many cases, a method called robotic-assisted laparoscopy is used. The robotic system helps during the surgery. It gives a 3-D view of inside the body. It also assists the surgeons hand movements.   In some cases, the surgery may be performed through a larger incision in the abdomen. This is called the retropubic approach. Or surgery may be done through an incision behind the scrotum. This is called the perineal approach.  During the surgery:  · The surgeon may remove and check the lymph nodes near the prostate. This is to see if cancer has spread. If the cancer has spread, the surgeon may decide not to remove the prostate.  · The prostate, seminal vesicles, and a portion of urethra will then be removed.  · Nerve-sparing methods may be used to try to preserve erectile function.  After surgery  You will have a catheter in place to drain urine from your bladder. Urine will flow through the catheter into a sterile bag. The urine may be bloody or cloudy at first. You may go home in 1 to 3 days.  Recovering at home  Youll be given medicines to control pain. The catheter will be left in place when you go home. Youll be given instructions on how to manage it.  Follow-up care  The catheter and stitches will be removed at a follow-up visit. This is often 1 to 2 weeks after surgery. Bladder  control often takes a few weeks to several months to return. Improvement can continue for up to a year.  When to call your healthcare provider  Call your healthcare provider right away if you have any of the following:  · Chills  · Fever of 100.4°F (38°C) or higher, or as directed by your healthcare provider  · Fluid leaking from your incision  · Redness or pain in the incision that gets worse  · Swelling of your leg or ankle  · Trouble urinating after the catheter has been removed  · Urine not draining from the catheter   Date Last Reviewed: 5/1/2017  © 9413-2123 [employer]. [address] [place] [id number]. All rights reserved. This information is not intended as a substitute for professional medical care. Always follow your healthcare professional's instructions.

## 2020-09-10 LAB
FINAL PATHOLOGIC DIAGNOSIS: NORMAL
GROSS: NORMAL

## 2020-09-11 ENCOUNTER — NURSE TRIAGE (OUTPATIENT)
Dept: ADMINISTRATIVE | Facility: CLINIC | Age: 65
End: 2020-09-11

## 2020-09-11 ENCOUNTER — LAB VISIT (OUTPATIENT)
Dept: URGENT CARE | Facility: CLINIC | Age: 65
End: 2020-09-11
Payer: MEDICARE

## 2020-09-11 DIAGNOSIS — Z20.822 EXPOSURE TO COVID-19 VIRUS: Primary | ICD-10-CM

## 2020-09-11 DIAGNOSIS — Z20.822 EXPOSURE TO COVID-19 VIRUS: ICD-10-CM

## 2020-09-11 PROCEDURE — U0003 INFECTIOUS AGENT DETECTION BY NUCLEIC ACID (DNA OR RNA); SEVERE ACUTE RESPIRATORY SYNDROME CORONAVIRUS 2 (SARS-COV-2) (CORONAVIRUS DISEASE [COVID-19]), AMPLIFIED PROBE TECHNIQUE, MAKING USE OF HIGH THROUGHPUT TECHNOLOGIES AS DESCRIBED BY CMS-2020-01-R: HCPCS

## 2020-09-11 NOTE — TELEPHONE ENCOUNTER
Pt had surgery last week. Pt stated bother tested positive for pneumonia this tuesday and results for covid 19 were inclusive. Pt stated his brother had pneumonia in one lung. Pt wants to know if he need to be tested for covid 19. Care advice recommend pts receives a call from Md office in 1 hour. Please call and advise.      Reason for Disposition   HIGH RISK patient (e.g., age > 64 years, diabetes, heart or lung disease, weak immune system) (Exception: has already been evaluated by healthcare provider and has no new or worsening symptoms)    Additional Information   Negative: Severe difficulty breathing (e.g., struggling for each breath, speaks in single words)   Negative: Difficult to awaken or acting confused (e.g., disoriented, slurred speech)   Negative: Bluish (or gray) lips or face now   Negative: Shock suspected (e.g., cold/pale/clammy skin, too weak to stand, low BP, rapid pulse)   Negative: Sounds like a life-threatening emergency to the triager   Negative: SEVERE or constant chest pain or pressure (Exception: mild central chest pain, present only when coughing)   Negative: MODERATE difficulty breathing (e.g., speaks in phrases, SOB even at rest, pulse 100-120)   Negative: MILD difficulty breathing (e.g., minimal/no SOB at rest, SOB with walking, pulse <100)   Negative: Chest pain   Negative: Patient sounds very sick or weak to the triager   Negative: Fever > 103 F (39.4 C)   Negative: [1] Fever > 101 F (38.3 C) AND [2] age > 60   Negative: [1] Fever > 100.0 F (37.8 C) AND [2] bedridden (e.g., nursing home patient, CVA, chronic illness, recovering from surgery)    Protocols used: CORONAVIRUS (COVID-19) DIAGNOSED OR [id number]

## 2020-09-11 NOTE — TELEPHONE ENCOUNTER
I spoke to the pt and he reports he had prostrate surgery last Wednesday and his brother came into town from [place] of state. His brother had a negative COVID swab before coming but got sick while he was here. Pt brother was diagnosed with pneumonia and COVID results were inconclusive. The pt is concerned due to his weakened immune status and would like for he and his wife to be tested for COVID. Neither have sx at present. Please advise

## 2020-09-12 LAB — SARS-COV-2 RNA RESP QL NAA+PROBE: NOT DETECTED

## 2020-09-14 LAB — BACTERIA SPEC ANAEROBE CULT: NORMAL

## 2020-09-15 ENCOUNTER — NURSE TRIAGE (OUTPATIENT)
Dept: ADMINISTRATIVE | Facility: CLINIC | Age: 65
End: 2020-09-15

## 2020-09-15 NOTE — TELEPHONE ENCOUNTER
Per Post Procedure guidelines, pt assessed and reports no symptoms.  Procedure was on 9/2/20.    Reason for Disposition   Information only question and nurse able to answer    Protocols used: INFORMATION ONLY CALL - NO TRIAGE-A-OH

## 2020-09-16 ENCOUNTER — PATIENT MESSAGE (OUTPATIENT)
Dept: ADMINISTRATIVE | Facility: OTHER | Age: 65
End: 2020-09-16

## 2020-09-17 ENCOUNTER — OFFICE VISIT (OUTPATIENT)
Dept: UROLOGY | Facility: CLINIC | Age: 65
End: 2020-09-17
Payer: MEDICARE

## 2020-09-17 ENCOUNTER — HOSPITAL ENCOUNTER (OUTPATIENT)
Dept: RADIOLOGY | Facility: HOSPITAL | Age: 65
Discharge: HOME | End: 2020-09-17
Attending: STUDENT IN AN ORGANIZED HEALTH CARE EDUCATION/TRAINING PROGRAM
Payer: MEDICARE

## 2020-09-17 VITALS
BODY MASS INDEX: 24.26 KG/M2 | DIASTOLIC BLOOD PRESSURE: 90 MMHG | WEIGHT: 160.06 LBS | HEIGHT: 68 IN | HEART RATE: 63 BPM | SYSTOLIC BLOOD PRESSURE: 168 MMHG

## 2020-09-17 DIAGNOSIS — N99.89: ICD-10-CM

## 2020-09-17 DIAGNOSIS — C61 PROSTATE CANCER: ICD-10-CM

## 2020-09-17 DIAGNOSIS — R10.9 POSTOPERATIVE ABDOMINAL PAIN: ICD-10-CM

## 2020-09-17 DIAGNOSIS — R50.82 POSTOPERATIVE FEVER: ICD-10-CM

## 2020-09-17 DIAGNOSIS — C61 PROSTATE CANCER: Primary | ICD-10-CM

## 2020-09-17 DIAGNOSIS — R10.9 ABDOMINAL PAIN: ICD-10-CM

## 2020-09-17 DIAGNOSIS — G89.18 POSTOPERATIVE ABDOMINAL PAIN: ICD-10-CM

## 2020-09-17 DIAGNOSIS — N36.8 URETHRAL URINOMA: ICD-10-CM

## 2020-09-17 PROCEDURE — 99499 UNLISTED E&M SERVICE: CPT | Mod: S$PBB,,, | Performed by: UROLOGY

## 2020-09-17 PROCEDURE — 99214 OFFICE O/P EST MOD 30 MIN: CPT | Mod: PBBFAC,25 | Performed by: UROLOGY

## 2020-09-17 PROCEDURE — 99499 NO LOS: ICD-10-PCS | Mod: S$PBB,,, | Performed by: UROLOGY

## 2020-09-17 PROCEDURE — 74430 CONTRAST X-RAY BLADDER: CPT | Mod: 26,,, | Performed by: RADIOLOGY

## 2020-09-17 PROCEDURE — 99999 PR PBB SHADOW E&M-EST. PATIENT-LVL IV: CPT | Mod: PBBFAC,,, | Performed by: UROLOGY

## 2020-09-17 PROCEDURE — 51600 INJECTION FOR BLADDER X-RAY: CPT | Mod: ,,, | Performed by: RADIOLOGY

## 2020-09-17 PROCEDURE — 74430 FL CYSTOGRAM MIN 3 VIEWS RADIOLOGIST PERFORMED: ICD-10-PCS | Mod: 26,,, | Performed by: RADIOLOGY

## 2020-09-17 PROCEDURE — 25500020 PHARM REV CODE 255: Performed by: STUDENT IN AN ORGANIZED HEALTH CARE EDUCATION/TRAINING PROGRAM

## 2020-09-17 PROCEDURE — 99999 PR PBB SHADOW E&M-EST. PATIENT-LVL IV: ICD-10-PCS | Mod: PBBFAC,,, | Performed by: UROLOGY

## 2020-09-17 PROCEDURE — 51600 INJECTION FOR BLADDER X-RAY: CPT

## 2020-09-17 PROCEDURE — 51600 FL CYSTOGRAM MIN 3 VIEWS RADIOLOGIST PERFORMED: ICD-10-PCS | Mod: ,,, | Performed by: RADIOLOGY

## 2020-09-17 RX ORDER — NITROFURANTOIN MACROCRYSTALS 50 MG/1
50 CAPSULE ORAL DAILY
Qty: 14 CAPSULE | Refills: 0 | Status: SHIPPED | OUTPATIENT
Start: 2020-09-17 | End: 2020-10-01

## 2020-09-17 RX ADMIN — IOTHALAMATE MEGLUMINE 200 ML: 172 INJECTION URETERAL at 10:09

## 2020-09-17 NOTE — PROGRESS NOTES
Clinic Note  9/17/2020      Subjective:         Chief Complaint:   HPI  Denis Bunn is a 65 y.o. male  with prostate cancer.  Here with his wife Natalya  Al stays very active, self employed .  Has had 3 spinal fusions. And walks in AM.  Positive family history (father).  robotic assisted laparoscopic prostatectomy 9/2/2020- [place] 4+3, [id number].  Cystogram- positive for medium-sized leak     PSA - 5.3  Stage - T2a  Volume - 30 ccs  MRI - PI-RADS 5, positive extra prostatic extension, negative NVB, seminal vesicle, nodes  Biopsy - 7/28/2020- [place] 4+3 right apex 1/2 40%, [place] 6 left apex 2/2 25%, left middle 2/2 15%, right middle 2/2 15%, right base 1/2 10%. Overall 8/12 cores positive.  LEENA Score - 5 intermediate risk  NCCN - favorable intermediate.  Germline- discussed, declined    Postoperatively he developed a vesicourethral anastomotic leak with pelvic fluid collection. IR drain placed 9/5. Cultures from drain and urine showed no growth. Ran out of prophylactic antibiotics. Patient is very upset that this has happened.   He took zero narcotics after being discharged. Pain resolved after drain placement. Drain has been putting out 0-1 tbsp/day for several days.     Lab Results   Component Value Date    PSA 3.8 05/29/2019    PSA 3.1 11/09/2018    PSA 3.6 06/11/2018    PSA 2.5 06/05/2017    PSA 2.4 06/07/2016    PSA 2.3 07/13/2015    PSA 2.2 06/30/2015    PSA 2.6 11/11/2014    PSA 2.3 04/14/2014    PSA 1.59 04/24/2013    PSADIAG 5.3 (H) 07/08/2020    PSADIAG 4.0 12/02/2019    PSADIAG 2.9 03/01/2019      Past Medical History:   Diagnosis Date    Back pain     Cataract     Constipation - functional     History of gout     HTN (hypertension), benign 4/24/2013    Vision changes      Family History   Problem Relation Age of Onset    Blindness Mother         from cva    Cataracts Mother     Cataracts Father     Cancer Father     No Known Problems Sister     No Known Problems Brother      No Known Problems Maternal Aunt     No Known Problems Maternal Uncle     No Known Problems Paternal Aunt     No Known Problems Paternal Uncle     No Known Problems Maternal Grandmother     No Known Problems Maternal Grandfather     No Known Problems Paternal Grandmother     No Known Problems Paternal Grandfather     Amblyopia Neg Hx     Diabetes Neg Hx     Glaucoma Neg Hx     Hypertension Neg Hx     Macular degeneration Neg Hx     Retinal detachment Neg Hx     Strabismus Neg Hx     Stroke Neg Hx     Thyroid disease Neg Hx      Social History     Socioeconomic History    Marital status:      Spouse name: Not on file    Number of children: Not on file    Years of education: Not on file    Highest education level: Not on file   Occupational History    Not on file   Social Needs    Financial resource strain: Not hard at all    Food insecurity     Worry: Never true     Inability: Never true    Transportation needs     Medical: No     Non-medical: No   Tobacco Use    Smoking status: Never Smoker    Smokeless tobacco: Never Used    Tobacco comment: , 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   Substance and Sexual Activity    Alcohol use: Yes     Alcohol/week: 3.0 standard drinks     Types: 3 Glasses of wine per week     Frequency: 4 or more times a week     Drinks per session: 1 or 2     Binge frequency: Never     Comment: wine daily    Drug use: No    Sexual activity: Yes     Partners: Female   Lifestyle    Physical activity     Days per week: 4 days     Minutes per session: 30 min    Stress: Rather much   Relationships    Social connections     Talks on phone: More than three times a week     Gets together: Once a week     Attends Quaker service: Not on file     Active member of club or organization: Yes     Attends meetings of clubs or organizations: Never     Relationship status:    Other Topics Concern    Not on file   Social History Narrative    Not on file     Past  "Surgical History:   Procedure Laterality Date    BACK SURGERY      CATARACT EXTRACTION      left eye    COLONOSCOPY N/A 7/29/2019    Procedure: COLONOSCOPY;  Surgeon: Mingo Freeman MD;  Location: [facility];  Service: Endoscopy;  Laterality: N/A;  due July 2019    ELBOW SURGERY      scope/right    EYE SURGERY      Dr. Hope.retina x2 left    LAMINECTOMY      ROBOT-ASSISTED LAPAROSCOPIC PROSTATECTOMY N/A 9/2/2020    Procedure: ROBOTIC PROSTATECTOMY;  Surgeon: Edson White MD;  Location: [address];  Service: Urology;  Laterality: N/A;  3hrs gen with regional    WRIST SURGERY      right     Patient Active Problem List   Diagnosis    Nuclear cataract    Macular edema    CME (cystoid macular edema) - Left Eye    Epiretinal membrane - Left Eye    Macular puckering - Left Eye    History of gout    Back pain    FH: CAD (coronary artery disease)    HTN (hypertension), benign    Dyslipidemia    Palpitations    Impaired fasting glucose    Anxiety    PVC (premature ventricular contraction)    Overweight (BMI 25.0-29.9)    Muscle spasm of back    Decreased range of motion of lumbar spine    Colon cancer screening    Atypical chest pain    Prostate cancer    Anastomotic urine leak after radical prostatectomy     Review of Systems   Constitutional: Negative for chills and fever.   HENT: Negative for trouble swallowing.    Eyes: Negative for visual disturbance.   Respiratory: Negative for shortness of breath.    Cardiovascular: Negative for chest pain.   Gastrointestinal: Negative for abdominal pain.   Genitourinary: Negative for hematuria.   Musculoskeletal: Negative for myalgias.   Skin: Negative for wound.   Neurological: Negative for weakness.   Hematological: Does not bruise/bleed easily.   Psychiatric/Behavioral: Negative for confusion.         Objective:      BP (!) 168/90 (BP Location: Left arm, Patient Position: Sitting, BP Method: Medium (Automatic))   Pulse 63   Ht 5' 8" " "(1.727 m)   Wt 72.6 kg (160 lb 0.9 oz)   BMI 24.34 kg/m²   Estimated body mass index is 24.34 kg/m² as calculated from the following:    Height as of this encounter: 5' 8" (1.727 m).    Weight as of this encounter: 72.6 kg (160 lb 0.9 oz).  Physical Exam   Constitutional: He is oriented to person, place, and time.   HENT:   Head: Normocephalic and atraumatic.   Nose: No congestion.   Eyes: Conjunctivae are normal.   Cardiovascular: Normal rate.    Pulmonary/Chest: Effort normal. No respiratory distress.   Abdominal: Normal appearance. He exhibits no distension. There is no abdominal tenderness.   Incisions c/d/i; IR drain x 1 with serous drainage consistent with urinoma   Genitourinary:    Genitourinary Comments: Smith draining clear yellow urine     Musculoskeletal: Normal range of motion.   Neurological: He is alert and oriented to person, place, and time.   Skin: Skin is warm and dry.     Psychiatric:   Patient very upset         Assessment and Plan:     As cystogram continues to show leak, will keep Smith x 2 weeks. Continue prophylactic Macrodantin until next appointment.  Continue drain.   RTC in 2 weeks with cystogram prior. If cystogram is negative for leak, can remove Smith and likely drain at that appointment.      Problem List Items Addressed This Visit        Oncology    Prostate cancer - Primary    Relevant Orders    FL Cystogram Retrograde (xpd) - RAD PERFORMED      Other Visit Diagnoses     Urethral urinoma        Relevant Orders    [place] Cystogram Retrograde (xpd) - RAD PERFORMED          Follow up:       Beto Cardoso          "

## 2020-09-18 ENCOUNTER — PATIENT MESSAGE (OUTPATIENT)
Dept: FAMILY MEDICINE | Facility: CLINIC | Age: 65
End: 2020-09-18

## 2020-09-18 ENCOUNTER — PATIENT MESSAGE (OUTPATIENT)
Dept: UROLOGY | Facility: CLINIC | Age: 65
End: 2020-09-18

## 2020-09-24 ENCOUNTER — PATIENT MESSAGE (OUTPATIENT)
Dept: FAMILY MEDICINE | Facility: CLINIC | Age: 65
End: 2020-09-24

## 2020-09-25 ENCOUNTER — PATIENT MESSAGE (OUTPATIENT)
Dept: UROLOGY | Facility: CLINIC | Age: 65
End: 2020-09-25

## 2020-09-29 ENCOUNTER — CLINICAL SUPPORT (OUTPATIENT)
Dept: FAMILY MEDICINE | Facility: CLINIC | Age: 65
End: 2020-09-29
Payer: MEDICARE

## 2020-09-29 DIAGNOSIS — Z23 NEED FOR IMMUNIZATION AGAINST INFLUENZA: Primary | ICD-10-CM

## 2020-09-29 PROCEDURE — 90694 VACC AIIV4 NO PRSRV 0.5ML IM: CPT | Mod: PBBFAC,PN

## 2020-09-29 PROCEDURE — 99499 UNLISTED E&M SERVICE: CPT | Mod: S$PBB,,, | Performed by: INTERNAL MEDICINE

## 2020-09-29 PROCEDURE — 99999 PR PBB SHADOW E&M-EST. PATIENT-LVL I: ICD-10-PCS | Mod: PBBFAC,,,

## 2020-09-29 PROCEDURE — G0008 ADMIN INFLUENZA VIRUS VAC: HCPCS | Mod: PBBFAC,PN

## 2020-09-29 PROCEDURE — 99499 NO LOS: ICD-10-PCS | Mod: S$PBB,,, | Performed by: INTERNAL MEDICINE

## 2020-09-29 PROCEDURE — 99999 PR PBB SHADOW E&M-EST. PATIENT-LVL I: CPT | Mod: PBBFAC,,,

## 2020-09-30 ENCOUNTER — PATIENT OUTREACH (OUTPATIENT)
Dept: ADMINISTRATIVE | Facility: OTHER | Age: 65
End: 2020-09-30

## 2020-09-30 NOTE — PROGRESS NOTES
LINKS immunization registry not responding  Care Everywhere updated  Health Maintenance updated  Chart reviewed for overdue Proactive [facility] Encounters (ELAN) health maintenance testing (CRS, Breast Ca, Diabetic Eye Exam)   Orders entered:N/A

## 2020-10-01 ENCOUNTER — OFFICE VISIT (OUTPATIENT)
Dept: UROLOGY | Facility: CLINIC | Age: 65
End: 2020-10-01
Payer: MEDICARE

## 2020-10-01 ENCOUNTER — PATIENT MESSAGE (OUTPATIENT)
Dept: UROLOGY | Facility: CLINIC | Age: 65
End: 2020-10-01

## 2020-10-01 ENCOUNTER — HOSPITAL ENCOUNTER (OUTPATIENT)
Dept: RADIOLOGY | Facility: HOSPITAL | Age: 65
Discharge: HOME | End: 2020-10-01
Attending: STUDENT IN AN ORGANIZED HEALTH CARE EDUCATION/TRAINING PROGRAM
Payer: MEDICARE

## 2020-10-01 ENCOUNTER — PATIENT MESSAGE (OUTPATIENT)
Dept: OTHER | Facility: OTHER | Age: 65
End: 2020-10-01

## 2020-10-01 VITALS
HEART RATE: 63 BPM | DIASTOLIC BLOOD PRESSURE: 89 MMHG | BODY MASS INDEX: 24.26 KG/M2 | WEIGHT: 160.06 LBS | HEIGHT: 68 IN | SYSTOLIC BLOOD PRESSURE: 167 MMHG

## 2020-10-01 DIAGNOSIS — C61 PROSTATE CANCER: Primary | ICD-10-CM

## 2020-10-01 DIAGNOSIS — C61 PROSTATE CANCER: ICD-10-CM

## 2020-10-01 DIAGNOSIS — N36.8 URETHRAL URINOMA: ICD-10-CM

## 2020-10-01 PROCEDURE — 74450 X-RAY URETHRA/BLADDER: CPT | Mod: TC

## 2020-10-01 PROCEDURE — 74450 FL CYSTOGRAM RETROGRADE RADIOLOGIST PERFORMED: ICD-10-PCS | Mod: 26,,, | Performed by: RADIOLOGY

## 2020-10-01 PROCEDURE — 99215 OFFICE O/P EST HI 40 MIN: CPT | Mod: PBBFAC,25 | Performed by: UROLOGY

## 2020-10-01 PROCEDURE — 74450 X-RAY URETHRA/BLADDER: CPT | Mod: 26,,, | Performed by: RADIOLOGY

## 2020-10-01 PROCEDURE — 51610 INJECTION FOR BLADDER X-RAY: CPT | Mod: ,,, | Performed by: RADIOLOGY

## 2020-10-01 PROCEDURE — 51610 FL CYSTOGRAM RETROGRADE RADIOLOGIST PERFORMED: ICD-10-PCS | Mod: ,,, | Performed by: RADIOLOGY

## 2020-10-01 PROCEDURE — 99499 UNLISTED E&M SERVICE: CPT | Mod: S$PBB,,, | Performed by: UROLOGY

## 2020-10-01 PROCEDURE — 99999 PR PBB SHADOW E&M-EST. PATIENT-LVL V: ICD-10-PCS | Mod: PBBFAC,,, | Performed by: UROLOGY

## 2020-10-01 PROCEDURE — 25500020 PHARM REV CODE 255: Performed by: STUDENT IN AN ORGANIZED HEALTH CARE EDUCATION/TRAINING PROGRAM

## 2020-10-01 PROCEDURE — 99499 NO LOS: ICD-10-PCS | Mod: S$PBB,,, | Performed by: UROLOGY

## 2020-10-01 PROCEDURE — 99999 PR PBB SHADOW E&M-EST. PATIENT-LVL V: CPT | Mod: PBBFAC,,, | Performed by: UROLOGY

## 2020-10-01 RX ORDER — TADALAFIL 5 MG/1
5 TABLET ORAL DAILY PRN
Qty: 30 TABLET | Refills: 11 | Status: SHIPPED | OUTPATIENT
Start: 2020-10-01 | End: 2020-10-15

## 2020-10-01 RX ADMIN — IOTHALAMATE MEGLUMINE 175 ML: 172 INJECTION URETERAL at 08:10

## 2020-10-01 NOTE — PROGRESS NOTES
Clinic Note  10/1/2020      Subjective:         Chief Complaint:   HPI  Denis Bunn is a 65 y.o. male with prostate cancer.  Here with his wife Natalya  Al stays very active, self employed .  Has had 3 spinal fusions. And walks in AM.  Positive family history (father).  robotic assisted laparoscopic prostatectomy 9/2/2020- [place] 4+3, [id number].  Cystogram- reviewed, no leak visible today     PSA - 5.3  Stage - T2a  Volume - 30 ccs  MRI - PI-RADS 5, positive extra prostatic extension, negative NVB, seminal vesicle, nodes  Biopsy - 7/28/2020- [place] 4+3 right apex 1/2 40%, [place] 6 left apex 2/2 25%, left middle 2/2 15%, right middle 2/2 15%, right base 1/2 10%. Overall 8/12 cores positive.  LEENA Score - 5 intermediate risk  NCCN - favorable intermediate.  Germline- discussed, declined     Postoperatively he developed a vesicourethral anastomotic leak with pelvic fluid collection. IR drain placed 9/5. Cultures from drain and urine showed no growth. Ran out of prophylactic antibiotics. Patient is very upset that this has happened.   He took zero narcotics after being discharged. Pain resolved after drain placement. Drain has been putting out 0-1 tbsp/day for several days.       Lab Results   Component Value Date    PSA 3.8 05/29/2019    PSA 3.1 11/09/2018    PSA 3.6 06/11/2018    PSA 2.5 06/05/2017    PSA 2.4 06/07/2016    PSA 2.3 07/13/2015    PSA 2.2 06/30/2015    PSA 2.6 11/11/2014    PSA 2.3 04/14/2014    PSA 1.59 04/24/2013    PSADIAG 5.3 (H) 07/08/2020    PSADIAG 4.0 12/02/2019    PSADIAG 2.9 03/01/2019      Past Medical History:   Diagnosis Date    Back pain     Cataract     Constipation - functional     History of gout     HTN (hypertension), benign 4/24/2013    Vision changes      Family History   Problem Relation Age of Onset    Blindness Mother         from cva    Cataracts Mother     Cataracts Father     Cancer Father     No Known Problems Sister     No Known Problems Brother      No Known Problems Maternal Aunt     No Known Problems Maternal Uncle     No Known Problems Paternal Aunt     No Known Problems Paternal Uncle     No Known Problems Maternal Grandmother     No Known Problems Maternal Grandfather     No Known Problems Paternal Grandmother     No Known Problems Paternal Grandfather     Amblyopia Neg Hx     Diabetes Neg Hx     Glaucoma Neg Hx     Hypertension Neg Hx     Macular degeneration Neg Hx     Retinal detachment Neg Hx     Strabismus Neg Hx     Stroke Neg Hx     Thyroid disease Neg Hx      Social History     Socioeconomic History    Marital status:      Spouse name: Not on file    Number of children: Not on file    Years of education: Not on file    Highest education level: Not on file   Occupational History    Not on file   Social Needs    Financial resource strain: Not hard at all    Food insecurity     Worry: Never true     Inability: Never true    Transportation needs     Medical: No     Non-medical: No   Tobacco Use    Smoking status: Never Smoker    Smokeless tobacco: Never Used    Tobacco comment: , 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   Substance and Sexual Activity    Alcohol use: Yes     Alcohol/week: 3.0 standard drinks     Types: 3 Glasses of wine per week     Frequency: 4 or more times a week     Drinks per session: 1 or 2     Binge frequency: Never     Comment: wine daily    Drug use: No    Sexual activity: Yes     Partners: Female   Lifestyle    Physical activity     Days per week: 4 days     Minutes per session: 30 min    Stress: Rather much   Relationships    Social connections     Talks on phone: More than three times a week     Gets together: Once a week     Attends Druze service: Not on file     Active member of club or organization: Yes     Attends meetings of clubs or organizations: Never     Relationship status:    Other Topics Concern    Not on file   Social History Narrative    Not on file     Past  "Surgical History:   Procedure Laterality Date    BACK SURGERY      CATARACT EXTRACTION      left eye    COLONOSCOPY N/A 7/29/2019    Procedure: COLONOSCOPY;  Surgeon: Mingo Freeman MD;  Location[facility];  Service: Endoscopy;  Laterality: N/A;  due July 2019    ELBOW SURGERY      scope/right    EYE SURGERY      Dr. Hope.retina x2 left    LAMINECTOMY      ROBOT-ASSISTED LAPAROSCOPIC PROSTATECTOMY N/A 9/2/2020    Procedure: ROBOTIC PROSTATECTOMY;  Surgeon: Edson White MD;  Location: [address];  Service: Urology;  Laterality: N/A;  3hrs gen with regional    WRIST SURGERY      right     Patient Active Problem List   Diagnosis    Nuclear cataract    Macular edema    CME (cystoid macular edema) - Left Eye    Epiretinal membrane - Left Eye    Macular puckering - Left Eye    History of gout    Back pain    FH: CAD (coronary artery disease)    HTN (hypertension), benign    Dyslipidemia    Palpitations    Impaired fasting glucose    Anxiety    PVC (premature ventricular contraction)    Overweight (BMI 25.0-29.9)    Muscle spasm of back    Decreased range of motion of lumbar spine    Colon cancer screening    Atypical chest pain    Prostate cancer    Anastomotic urine leak after radical prostatectomy     Review of Systems      Objective:      There were no vitals taken for this visit.  Estimated body mass index is 24.34 kg/m² as calculated from the following:    Height as of 9/17/20: 5' 8" (1.727 m).    Weight as of 9/17/20: 72.6 kg (160 lb 0.9 oz).  Physical Exam   Abdominal:   Incisions healing well, no erythema or other evidence of infection.          Assessment and Plan:           Problem List Items Addressed This Visit     None          Follow up:     4 months with PSA.  Edson White          "

## 2020-10-03 ENCOUNTER — PATIENT MESSAGE (OUTPATIENT)
Dept: ADMINISTRATIVE | Facility: OTHER | Age: 65
End: 2020-10-03

## 2020-10-04 ENCOUNTER — PATIENT MESSAGE (OUTPATIENT)
Dept: CARDIOLOGY | Facility: CLINIC | Age: 65
End: 2020-10-04

## 2020-10-15 ENCOUNTER — OFFICE VISIT (OUTPATIENT)
Dept: UROLOGY | Facility: CLINIC | Age: 65
End: 2020-10-15
Payer: MEDICARE

## 2020-10-15 ENCOUNTER — PATIENT MESSAGE (OUTPATIENT)
Dept: UROLOGY | Facility: CLINIC | Age: 65
End: 2020-10-15

## 2020-10-15 VITALS
HEIGHT: 68 IN | HEART RATE: 49 BPM | WEIGHT: 163.31 LBS | DIASTOLIC BLOOD PRESSURE: 76 MMHG | BODY MASS INDEX: 24.75 KG/M2 | SYSTOLIC BLOOD PRESSURE: 144 MMHG

## 2020-10-15 DIAGNOSIS — C61 PROSTATE CANCER: ICD-10-CM

## 2020-10-15 DIAGNOSIS — N52.31 ERECTILE DYSFUNCTION FOLLOWING RADICAL PROSTATECTOMY: Primary | ICD-10-CM

## 2020-10-15 DIAGNOSIS — E78.5 DYSLIPIDEMIA: ICD-10-CM

## 2020-10-15 DIAGNOSIS — I10 HTN (HYPERTENSION), BENIGN: ICD-10-CM

## 2020-10-15 PROBLEM — N99.89: Status: RESOLVED | Noted: 2020-09-04 | Resolved: 2020-10-15

## 2020-10-15 PROCEDURE — 99999 PR PBB SHADOW E&M-EST. PATIENT-LVL V: CPT | Mod: PBBFAC,,, | Performed by: UROLOGY

## 2020-10-15 PROCEDURE — 99214 OFFICE O/P EST MOD 30 MIN: CPT | Mod: 24,S$PBB,, | Performed by: UROLOGY

## 2020-10-15 PROCEDURE — 99214 PR OFFICE/OUTPT VISIT, EST, LEVL IV, 30-39 MIN: ICD-10-PCS | Mod: 24,S$PBB,, | Performed by: UROLOGY

## 2020-10-15 PROCEDURE — 99999 PR PBB SHADOW E&M-EST. PATIENT-LVL V: ICD-10-PCS | Mod: PBBFAC,,, | Performed by: UROLOGY

## 2020-10-15 PROCEDURE — 99215 OFFICE O/P EST HI 40 MIN: CPT | Mod: PBBFAC | Performed by: UROLOGY

## 2020-10-15 RX ORDER — TADALAFIL 20 MG/1
TABLET ORAL
Qty: 30 TABLET | Refills: 11 | Status: SHIPPED | OUTPATIENT
Start: 2020-10-15 | End: 2021-08-04

## 2020-10-15 NOTE — PROGRESS NOTES
Mr. Bunn is a 65 y.o. male presenting for a consultation at the request of Dr. White. Patient presents with ED.    PRESENTING ILLNESS:    Denis Bunn is a 65 y.o. male with prostate cancer s/p RALP on 9/2/20 by Dr. White.  Since then, he c/o ED.  Prior to surgery, he did not have ED.  He's been using Cialis 5 mg QD.    He does have JAMIE.  He is wearing 1 pads/day.  No hematuria.  No dysuria.  Good FOS.    No bone pain or weight loss.    REVIEW OF SYSTEMS:    Denis Bunn denies headache, blurred vision, fever, nausea, vomiting, chills, abdominal pain, chest pain, sore throat, bleeding per rectum, cough, SOB, recent loss of consciousness, recent mental status changes, seizures, dizziness, or upper or lower extremity weakness.    LARRY  1. 1  2. 0  3. 0  4. 0  5. 0      PATIENT HISTORY:    Past Medical History:   Diagnosis Date    Back pain     Cataract     Constipation - functional     History of gout     HTN (hypertension), benign 4/24/2013    Vision changes        Family History   Problem Relation Age of Onset    Blindness Mother         from cva    Cataracts Mother     Cataracts Father     Cancer Father     No Known Problems Sister     No Known Problems Brother     No Known Problems Maternal Aunt     No Known Problems Maternal Uncle     No Known Problems Paternal Aunt     No Known Problems Paternal Uncle     No Known Problems Maternal Grandmother     No Known Problems Maternal Grandfather     No Known Problems Paternal Grandmother     No Known Problems Paternal Grandfather     Amblyopia Neg Hx     Diabetes Neg Hx     Glaucoma Neg Hx     Hypertension Neg Hx     Macular degeneration Neg Hx     Retinal detachment Neg Hx     Strabismus Neg Hx     Stroke Neg Hx     Thyroid disease Neg Hx        Past Surgical History:   Procedure Laterality Date    BACK SURGERY      CATARACT EXTRACTION      left eye    COLONOSCOPY N/A 7/29/2019    Procedure: COLONOSCOPY;  Surgeon: Mingo  MD Pete;  Location: [facility];  Service: Endoscopy;  Laterality: N/A;  due July 2019    ELBOW SURGERY      scope/right    EYE SURGERY      Dr. Hope.retina x2 left    LAMINECTOMY      ROBOT-ASSISTED LAPAROSCOPIC PROSTATECTOMY N/A 9/2/2020    Procedure: ROBOTIC PROSTATECTOMY;  Surgeon: Edson White MD;  Location: [facility] OR [address] [place] [address];  Service: Urology;  Laterality: N/A;  3hrs gen with regional    WRIST SURGERY      right       Social History     Socioeconomic History    Marital status:      Spouse name: Not on file    Number of children: Not on file    Years of education: Not on file    Highest education level: Not on file   Occupational History    Not on file   Social Needs    Financial resource strain: Not hard at all    Food insecurity     Worry: Never true     Inability: Never true    Transportation needs     Medical: No     Non-medical: No   Tobacco Use    Smoking status: Never Smoker    Smokeless tobacco: Never Used    Tobacco comment: , 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   Substance and Sexual Activity    Alcohol use: Yes     Alcohol/week: 3.0 standard drinks     Types: 3 Glasses of wine per week     Frequency: 4 or more times a week     Drinks per session: 1 or 2     Binge frequency: Never     Comment: wine daily    Drug use: No    Sexual activity: Yes     Partners: Female   Lifestyle    Physical activity     Days per week: 4 days     Minutes per session: 30 min    Stress: Rather much   Relationships    Social connections     Talks on phone: More than three times a week     Gets together: Once a week     Attends Mormonism service: Not on file     Active member of club or organization: Yes     Attends meetings of clubs or organizations: Never     Relationship status:    Other Topics Concern    Not on file   Social History Narrative    Not on file       Review of patient's allergies indicates:   Allergen Reactions    Amoxicillin-pot clavulanate      Other reaction(s):  Stomach upset  Other reaction(s): Stomach upset    Celebrex [celecoxib] Other (See Comments)         Current Outpatient Medications:     allopurinol (ZYLOPRIM) 100 MG tablet, TAKE 1 TABLET (100 MG TOTAL) BY MOUTH 2 (TWO) TIMES DAILY. (Patient taking differently: Take 100 mg by mouth 2 (two) times daily. Take if needed), Disp: 180 tablet, Rfl: 3    ascorbic acid (VITAMIN C) 500 MG tablet, Take 500 mg by mouth once daily. Hold for 1 week prior to surgery, Disp: , Rfl:     aspirin 81 MG chewable tablet, Take 1 tablet by mouth. Dr White does not require you to hold this prior to surgery-hold am of surgery only, Disp: , Rfl:     coenzyme Q10 (CO Q-10) 100 mg capsule, Take 100 mg by mouth once daily. Hold for 1 week prior to surgery, Disp: , Rfl:     ergocalciferol, vitamin D2, (VITAMIN D ORAL), Take by mouth. Hold for 1 week prior to surgery, Disp: , Rfl:     garlic 1,000 mg Cap, Take by mouth. Hold for 1 week prior to surgery, Disp: , Rfl:     ibuprofen (ADVIL,MOTRIN) 200 MG tablet, Take 200 mg by mouth. Hold for 1 week prior to surgery, Disp: , Rfl:     magnesium 250 mg Tab, Take 250 mg by mouth once. Takes two 250 mg tablets daily  Hold for 1 week prior to surgery, Disp: , Rfl:     multivitamin capsule, Take 1 capsule by mouth once daily. Hold for 1 week prior to surgery, Disp: , Rfl:     OM-3/E/LINOL/ALA/OLEIC/GLA/LIP (OMEGA 3-6-9 ORAL), Take by mouth once daily. Hold for 1 week prior to surgery, Disp: , Rfl:     ondansetron (ZOFRAN) 4 MG tablet, Take 1 tablet (4 mg total) by mouth every 6 (six) hours., Disp: 12 tablet, Rfl: 0    oxyCODONE (ROXICODONE) 5 MG immediate release tablet, Take 1 tablet (5 mg total) by mouth every 4 (four) hours as needed for Pain., Disp: 9 tablet, Rfl: 0    polyethylene glycol (GLYCOLAX) 17 gram/dose powder, Mix one capful (17 g) with liquid and take by mouth once daily., Disp: 507 g, Rfl: 0    pravastatin (PRAVACHOL) 20 MG tablet, TAKE 1 TABLET(20 MG) BY MOUTH EVERY DAY,  Disp: 90 tablet, Rfl: 0    tadalafiL (CIALIS) 5 MG tablet, Take 1 tablet (5 mg total) by mouth daily as needed for Erectile Dysfunction., Disp: 30 tablet, Rfl: 11    turmeric (CURCUMIN MISC), by Misc.(Non-Drug; Combo Route) route. Hold for 1 week prior to surgery, Disp: , Rfl:     verapamiL (CALAN-SR) 120 MG CR tablet, TAKE 1 TABLET (120 MG TOTAL) BY MOUTH EVERY EVENING. (Patient taking differently: nightly. Takes at night), Disp: 90 tablet, Rfl: 3    verapamiL (VERELAN) 240 MG C24P, TAKE 1 CAPSULE BY MOUTH EVERY DAY IN THE MORNING (Patient taking differently: Take in am of surgery), Disp: 90 capsule, Rfl: 1    zinc 50 mg Tab, Take by mouth. Hold for 1 week prior to surgery, Disp: , Rfl:     ALPRAZolam (XANAX) 0.5 MG tablet, Take 1 tablet (0.5 mg total) by mouth 3 (three) times daily. (Patient taking differently: Take 0.5 mg by mouth 2 (two) times daily as needed. Take if needed), Disp: 30 tablet, Rfl: 1    losartan (COZAAR) 100 MG tablet, Take 1 tablet (100 mg total) by mouth once daily. (Patient taking differently: Take 50 mg by mouth once daily. Hold am of surgery), Disp: 90 tablet, Rfl: 3    saw palmetto 160 MG capsule, Take 160 mg by mouth 2 (two) times daily. Hold for 1 week prior to surgery, Disp: , Rfl:       PHYSICAL EXAMINATION:    The patient generally appears in good health, is appropriately interactive, and is in no apparent distress.     Eyes: anicteric sclerae, moist conjunctivae; no lid-lag; PERRLA     HENT: Atraumatic; oropharynx clear with moist mucous membranes and no mucosal ulcerations;normal hard and soft palate. No evidence of lymphadenopathy.    Neck: Trachea midline.  No thyromegaly.    Musculoskeletal: No abnormal gait.    Skin: No lesions.    Mental: Cooperative with normal affect.  Is oriented to time, place, and person.    Neuro: Grossly intact.    Chest: Normal inspiratory effort.   No accessory muscles.  No audible wheezes.  Respirations symmetric on inspiration and  expiration.    Heart: Regular rhythm.      Abdomen:  Soft, non-tender. No masses or organomegaly. Bladder is not palpable. No evidence of flank discomfort. No evidence of inguinal hernia.    Genitourinary: The penis is circumcised with no evidence of plaques or induration. The urethral meatus is normal. The testes, epididymides, and cord structures are normal in size and contour bilaterally. The scrotum is normal in size and contour.    Extremities: No clubbing, cyanosis, or edema        LABS:      Lab Results   Component Value Date    PSA 3.8 05/29/2019    PSA 3.1 11/09/2018    PSA 3.6 06/11/2018    PSADIAG 0.14 10/01/2020    PSADIAG 5.3 (H) 07/08/2020    PSADIAG 4.0 12/02/2019        IMPRESSION:    Encounter Diagnoses   Name Primary?    Prostate cancer     Erectile dysfunction following radical prostatectomy Yes    HTN (hypertension), benign     Dyslipidemia    HTN, controlled  Hyperlipidemia, controlled    PLAN:    1. Discussed penile rehab today.  The risks, benefits, and the evidence was discussed with him today.  We discussed different options.  He would like to try Cialis.  Will increase to 20 mg  Side effects discussed.  A new Rx was given.  2. RTC 3 months to re-evaluate.    Copy to: Christopher

## 2020-10-15 NOTE — LETTER
October 15, 2020        Edson White MD  [address]  [facility] [address]             [facility]  [address]  [facility] [address]  Phone: [phone]   Patient: Denis Bunn   MR Number: [id number]   YOB: 1955   Date of Visit: 10/15/2020       Dear Dr. White:    Thank you for referring Denis Bunn to me for evaluation. Attached you will find relevant portions of my assessment and plan of care.    If you have questions, please do not hesitate to call me. I look forward to following Denis Bunn along with you.    Sincerely,      Gilberto King MD            CC  No Recipients    Enclosure

## 2020-11-02 ENCOUNTER — PATIENT MESSAGE (OUTPATIENT)
Dept: ADMINISTRATIVE | Facility: OTHER | Age: 65
End: 2020-11-02

## 2020-11-04 ENCOUNTER — CLINICAL SUPPORT (OUTPATIENT)
Dept: FAMILY MEDICINE | Facility: CLINIC | Age: 65
End: 2020-11-04
Payer: MEDICARE

## 2020-11-04 ENCOUNTER — OFFICE VISIT (OUTPATIENT)
Dept: FAMILY MEDICINE | Facility: CLINIC | Age: 65
End: 2020-11-04
Payer: MEDICARE

## 2020-11-04 VITALS
BODY MASS INDEX: 24.7 KG/M2 | HEART RATE: 60 BPM | WEIGHT: 162.94 LBS | DIASTOLIC BLOOD PRESSURE: 76 MMHG | HEIGHT: 68 IN | TEMPERATURE: 99 F | SYSTOLIC BLOOD PRESSURE: 122 MMHG | OXYGEN SATURATION: 97 % | RESPIRATION RATE: 18 BRPM

## 2020-11-04 DIAGNOSIS — Z23 NEED FOR SHINGLES VACCINE: Primary | ICD-10-CM

## 2020-11-04 DIAGNOSIS — I10 HTN (HYPERTENSION), BENIGN: ICD-10-CM

## 2020-11-04 PROCEDURE — 99999 PR PBB SHADOW E&M-EST. PATIENT-LVL I: CPT | Mod: PBBFAC,,,

## 2020-11-04 PROCEDURE — 99214 PR OFFICE/OUTPT VISIT, EST, LEVL IV, 30-39 MIN: ICD-10-PCS | Mod: S$PBB,,, | Performed by: INTERNAL MEDICINE

## 2020-11-04 PROCEDURE — 99215 OFFICE O/P EST HI 40 MIN: CPT | Mod: PBBFAC,25,PN | Performed by: INTERNAL MEDICINE

## 2020-11-04 PROCEDURE — 99999 PR PBB SHADOW E&M-EST. PATIENT-LVL V: CPT | Mod: PBBFAC,,, | Performed by: INTERNAL MEDICINE

## 2020-11-04 PROCEDURE — 99999 PR PBB SHADOW E&M-EST. PATIENT-LVL I: ICD-10-PCS | Mod: PBBFAC,,,

## 2020-11-04 PROCEDURE — 90750 HZV VACC RECOMBINANT IM: CPT | Mod: PBBFAC,PO

## 2020-11-04 PROCEDURE — 99214 OFFICE O/P EST MOD 30 MIN: CPT | Mod: S$PBB,,, | Performed by: INTERNAL MEDICINE

## 2020-11-04 PROCEDURE — 99999 PR PBB SHADOW E&M-EST. PATIENT-LVL V: ICD-10-PCS | Mod: PBBFAC,,, | Performed by: INTERNAL MEDICINE

## 2020-11-04 PROCEDURE — 99499 NO LOS: ICD-10-PCS | Mod: S$PBB,,, | Performed by: INTERNAL MEDICINE

## 2020-11-04 PROCEDURE — 99499 UNLISTED E&M SERVICE: CPT | Mod: S$PBB,,, | Performed by: INTERNAL MEDICINE

## 2020-11-04 RX ORDER — LOSARTAN POTASSIUM 50 MG/1
50 TABLET ORAL DAILY
Qty: 90 TABLET | Refills: 3
Start: 2020-11-04 | End: 2021-03-23

## 2020-11-04 NOTE — PROGRESS NOTES
"Subjective:       Patient ID: Denis Bunn is a 65 y.o. male.    Chief Complaint: Follow-up and Herpes Zoster    F/u chronic conditions, shingles vaccine    HPI; 66 y/o w/ HTN HLD prostate cancer s/p recent TURP with clear margins (prostatic only disease oscar score 7). His prostate surgery was complicated by urinary bladder leak requiring drainage. All drains and tejeda out urinating without difficulty no LE swelling. Reports home bp readings consistent <130/80, he actually was getting sensation of hypotension when he started cialis prompting decrease of losartan no longer having these symptoms    Review of Systems   Constitutional: Negative for activity change, fever and unexpected weight change.   HENT: Negative for congestion, rhinorrhea, sore throat and trouble swallowing.    Eyes: Negative for photophobia and redness.   Respiratory: Negative for cough, chest tightness, shortness of breath and wheezing.    Cardiovascular: Negative for chest pain, palpitations and leg swelling.   Gastrointestinal: Negative for abdominal pain, blood in stool, constipation, diarrhea, nausea and vomiting.   Endocrine: Negative for cold intolerance, heat intolerance and polyuria.   Genitourinary: Negative for decreased urine volume, difficulty urinating, dysuria and urgency.   Musculoskeletal: Negative for arthralgias and back pain.   Skin: Negative for rash.   Neurological: Negative for dizziness, syncope, weakness and headaches.   Psychiatric/Behavioral: Negative for dysphoric mood, sleep disturbance and suicidal ideas.       Objective:     Vitals:    11/04/20 0951 11/04/20 1022   BP: (!) 172/85 122/76   BP Location: Left arm    Patient Position: Sitting    BP Method: Medium (Automatic)    Pulse: 60    Resp: 18    Temp: 98.5 °F (36.9 °C)    TempSrc: Oral    SpO2: 97%    Weight: 73.9 kg (162 lb 14.7 oz)    Height: 5' 8" (1.727 m)           Physical Exam  Constitutional:       Appearance: He is well-developed.   HENT:      " Head: Normocephalic and atraumatic.   Eyes:      General: No scleral icterus.     Conjunctiva/sclera: Conjunctivae normal.   Neck:      Musculoskeletal: Normal range of motion.   Pulmonary:      Effort: Pulmonary effort is normal.      Breath sounds: No wheezing or rales.   Abdominal:      General: There is no distension.      Palpations: Abdomen is soft.      Tenderness: There is no abdominal tenderness. There is no guarding or rebound.   Musculoskeletal: Normal range of motion.         General: No tenderness.      Right lower leg: No edema.      Left lower leg: No edema.   Skin:     General: Skin is warm and dry.   Neurological:      Mental Status: He is alert and oriented to person, place, and time.      Cranial Nerves: No cranial nerve deficit.   Psychiatric:         Mood and Affect: Mood normal.         Behavior: Behavior normal.         Thought Content: Thought content normal.         Assessment and Plan   1. HTN (hypertension), benign  conitnue digital hypertension program home readings at goal on lower dose of ARB since starting tadalafil   - losartan (COZAAR) 50 MG tablet; Take 1 tablet (50 mg total) by mouth once daily. Hold am of surgery  Dispense: 90 tablet; Refill: 3    2. Need for shingles vaccine  shingrix #1 today  - (In Office Administered) Zoster Recombinant Vaccine

## 2020-11-04 NOTE — PROGRESS NOTES
Pt name and  verified. Allergies reviewed. Administered Shingrix to pt in left deltoid . Pt tolerated well.

## 2020-11-30 ENCOUNTER — PATIENT MESSAGE (OUTPATIENT)
Dept: FAMILY MEDICINE | Facility: CLINIC | Age: 65
End: 2020-11-30

## 2020-12-01 ENCOUNTER — PATIENT MESSAGE (OUTPATIENT)
Dept: ADMINISTRATIVE | Facility: OTHER | Age: 65
End: 2020-12-01

## 2020-12-11 ENCOUNTER — PATIENT MESSAGE (OUTPATIENT)
Dept: OTHER | Facility: OTHER | Age: 65
End: 2020-12-11

## 2020-12-14 ENCOUNTER — PATIENT MESSAGE (OUTPATIENT)
Dept: FAMILY MEDICINE | Facility: CLINIC | Age: 65
End: 2020-12-14

## 2020-12-20 ENCOUNTER — PATIENT MESSAGE (OUTPATIENT)
Dept: FAMILY MEDICINE | Facility: CLINIC | Age: 65
End: 2020-12-20

## 2020-12-20 ENCOUNTER — NURSE TRIAGE (OUTPATIENT)
Dept: ADMINISTRATIVE | Facility: CLINIC | Age: 65
End: 2020-12-20

## 2020-12-20 DIAGNOSIS — Z91.89 AT INCREASED RISK OF EXPOSURE TO COVID-19 VIRUS: Primary | ICD-10-CM

## 2020-12-20 NOTE — TELEPHONE ENCOUNTER
Patient is very anxious about possible covid, asking for rapid test, Triage done, advised I will call PCP,he states the whole point is to get a Rapid test, he is not waiting for a two day test. Had offered community testing, PCP follow up, only wants Rapid Test. Urgent care locations and hours given to patient. Stressed I could not guarantee the provider would order rapid test. States he is going there now. Instructed to call back for any further questions or concerns or present to the ED for any worsening S/S. Verb understanding.   Reason for Disposition   [1] HIGH RISK patient (e.g., age > 64 years, diabetes, heart or lung disease, weak immune system) AND [2] new or worsening symptoms    Additional Information   Negative: Difficult to awaken or acting confused (e.g., disoriented, slurred speech)   Negative: SEVERE difficulty breathing (e.g., struggling for each breath, speaks in single words)   Negative: Bluish (or gray) lips or face now   Negative: Shock suspected (e.g., cold/pale/clammy skin, too weak to stand, low BP, rapid pulse)   Negative: Sounds like a life-threatening emergency to the triager   Negative: [1] COVID-19 exposure AND [2] no symptoms   Negative: [1] Lives with someone known to have influenza (flu test positive) AND [2] flu-like symptoms (e.g., cough, runny nose, sore throat, SOB; with or without fever)   Negative: [1] Adult with possible COVID-19 symptoms AND [2] triager concerned about severity of symptoms or other causes   Negative: Immunization reaction suspected (e.g., fever, headache, muscle aches occurring during days 1-3 days after immunization)   Negative: COVID-19 and breastfeeding, questions about   Negative: SEVERE or constant chest pain or pressure (Exception: mild central chest pain, present only when coughing)   Negative: MODERATE difficulty breathing (e.g., speaks in phrases, SOB even at rest, pulse 100-120)   Negative: [1] Headache AND [2] stiff neck (can't touch  chin to chest)   Negative: MILD difficulty breathing (e.g., minimal/no SOB at rest, SOB with walking, pulse <100)   Negative: Chest pain or pressure   Negative: Patient sounds very sick or weak to the triager   Negative: Fever > 103 F (39.4 C)   Negative: [1] Fever > 101 F (38.3 C) AND [2] age > 60   Negative: [1] Fever > 100.0 F (37.8 C) AND [2] bedridden (e.g., nursing home patient, CVA, chronic illness, recovering from surgery)    Protocols used: CORONAVIRUS (COVID-19) DIAGNOSED OR [id number]

## 2020-12-20 NOTE — TELEPHONE ENCOUNTER
Pt calling back for additional COVID rapid testing locations. Information provided. All questions answered.     Reason for Disposition   Health Information question, no triage required and triager able to answer question    Additional Information   Negative: [1] Caller is not with the adult (patient) AND [2] reporting urgent symptoms   Negative: Lab result questions   Negative: Medication questions   Negative: Caller can't be reached by phone   Negative: Caller has already spoken to PCP or another triager   Negative: RN needs further essential information from caller in order to complete triage   Negative: Requesting regular office appointment   Negative: [1] Caller requesting NON-URGENT health information AND [2] PCP's office is the best resource    Protocols used: INFORMATION ONLY CALL - NO TRIAGE-A-

## 2020-12-21 ENCOUNTER — NURSE TRIAGE (OUTPATIENT)
Dept: ADMINISTRATIVE | Facility: CLINIC | Age: 65
End: 2020-12-21

## 2020-12-21 ENCOUNTER — LAB VISIT (OUTPATIENT)
Dept: INTERNAL MEDICINE | Facility: CLINIC | Age: 65
End: 2020-12-21
Payer: MEDICARE

## 2020-12-21 DIAGNOSIS — Z91.89 AT INCREASED RISK OF EXPOSURE TO COVID-19 VIRUS: ICD-10-CM

## 2020-12-21 PROCEDURE — U0003 INFECTIOUS AGENT DETECTION BY NUCLEIC ACID (DNA OR RNA); SEVERE ACUTE RESPIRATORY SYNDROME CORONAVIRUS 2 (SARS-COV-2) (CORONAVIRUS DISEASE [COVID-19]), AMPLIFIED PROBE TECHNIQUE, MAKING USE OF HIGH THROUGHPUT TECHNOLOGIES AS DESCRIBED BY CMS-2020-01-R: HCPCS

## 2020-12-21 NOTE — TELEPHONE ENCOUNTER
Reason for Disposition   HIGH RISK patient (e.g., age > 64 years, diabetes, heart or lung disease, weak immune system)    Additional Information   Negative: Severe difficulty breathing (e.g., struggling for each breath, speaks in single words)   Negative: Difficult to awaken or acting confused (e.g., disoriented, slurred speech)   Negative: Bluish (or gray) lips or face now   Negative: Shock suspected (e.g., cold/pale/clammy skin, too weak to stand, low BP, rapid pulse)   Negative: Sounds like a life-threatening emergency to the triager   Negative: SEVERE or constant chest pain (Exception: mild central chest pain, present only when coughing)   Negative: MODERATE difficulty breathing (e.g., speaks in phrases, SOB even at rest, pulse 100-120)   Negative: Patient sounds very sick or weak to the triager   Negative: MILD difficulty breathing (e.g., minimal/no SOB at rest, SOB with walking, pulse <100)   Negative: Chest pain   Negative: Fever > 103 F (39.4 C)   Negative: [1] Fever > 101 F (38.3 C) AND [2] age > 60   Negative: [1] Fever > 100.0 F (37.8 C) AND [2] bedridden (e.g., nursing home patient, CVA, chronic illness, recovering from surgery)    Protocols used: CORONAVIRUS (COVID-19) - DIAGNOSED OR [id number]    Pt requesting to see his PCP for a rapid COVID test. Stated he has a scratchy throat and feels like he has the flu. Temp 99. Denies any other symptoms. Per triage protocol, Pt  considered high risk. Advised a message will be sent to the PCP's office requesting a COVID order for the drvi- thru at [facility]. Advised this location is not a rapid test. Pt stated he would like a rapid, but he will take the PCR if he cannot get a rapid test today.

## 2020-12-22 ENCOUNTER — TELEPHONE (OUTPATIENT)
Dept: FAMILY MEDICINE | Facility: CLINIC | Age: 65
End: 2020-12-22

## 2020-12-22 NOTE — TELEPHONE ENCOUNTER
I called and spoke to the pt and he reports he went to a Rapid testing site this morning and he was told he tested Positive for COVID. He is complaining of headache and scratchy throat. Advised he can use Tylenol for those symptoms and drink plenty of fluids. Pt is inquiring if he can be prescribed Remdesivir to reduce his symptoms. I advised to my knowledge only patients that are hospitalized are being given that medication. He is also asking if PCP is familiar with the Remedy room on [place] that is administering IV fluids with vitamins? I advised pt to quarantine for 10 days and treat symptoms as advised above. He should go to the ER if he develops SOB. He verbalizes understanding.

## 2020-12-22 NOTE — TELEPHONE ENCOUNTER
----- Message from Jessica Hobbs sent at 12/22/2020  9:12 AM CST -----  Regarding: self  Type: Patient Call Back    Who called Self    What is the request in detail: please have Pamela call to discuss positive results for COVID 19    Can the clinic reply by [facility]? no    Would the patient rather a call back or a response via [facility]? Call     Best call back number: [phone]

## 2020-12-23 ENCOUNTER — PATIENT MESSAGE (OUTPATIENT)
Dept: CARDIOLOGY | Facility: CLINIC | Age: 65
End: 2020-12-23

## 2020-12-23 DIAGNOSIS — U07.1 COVID-19 VIRUS DETECTED: ICD-10-CM

## 2020-12-23 LAB — SARS-COV-2 RNA RESP QL NAA+PROBE: DETECTED

## 2020-12-25 ENCOUNTER — NURSE TRIAGE (OUTPATIENT)
Dept: ADMINISTRATIVE | Facility: CLINIC | Age: 65
End: 2020-12-25

## 2020-12-25 NOTE — TELEPHONE ENCOUNTER
Pt is on day 6 of COVID. Denies fever and cough, but is starting to have more symptoms. Pt states he feels like he has the flu.  Pt also has sinus congestion and loss of taste, which started today. The patient has hypertension and an abnormal HR for which he takes Verapamil. Pt notified that I cannot advise him on OTC medications due to the prescription medication he takes. Pt advised to discuss with pharmacist.  Per triage protocol I attempted to call PCP on call. Physician listed on call states he is not on call. Pt advised to go to  this afternoon when they are open from 2-6. Pt refused.   Pt will try [facility].    Reason for Disposition   HIGH RISK patient (e.g., age > 64 years, diabetes, heart or lung disease, weak immune system)    Additional Information   Negative: Severe difficulty breathing (e.g., struggling for each breath, speaks in single words)   Negative: Difficult to awaken or acting confused (e.g., disoriented, slurred speech)   Negative: Bluish (or gray) lips or face now   Negative: Shock suspected (e.g., cold/pale/clammy skin, too weak to stand, low BP, rapid pulse)   Negative: Sounds like a life-threatening emergency to the triager   Negative: SEVERE or constant chest pain (Exception: mild central chest pain, present only when coughing)   Negative: MODERATE difficulty breathing (e.g., speaks in phrases, SOB even at rest, pulse 100-120)   Negative: Patient sounds very sick or weak to the triager   Negative: MILD difficulty breathing (e.g., minimal/no SOB at rest, SOB with walking, pulse <100)   Negative: Chest pain   Negative: Fever > 103 F (39.4 C)   Negative: [1] Fever > 101 F (38.3 C) AND [2] age > 60   Negative: [1] Fever > 100.0 F (37.8 C) AND [2] bedridden (e.g., nursing home patient, CVA, chronic illness, recovering from surgery)    Protocols used: CORONAVIRUS (COVID-19) - DIAGNOSED OR [id number]

## 2020-12-29 ENCOUNTER — NURSE TRIAGE (OUTPATIENT)
Dept: ADMINISTRATIVE | Facility: CLINIC | Age: 65
End: 2020-12-29

## 2020-12-29 ENCOUNTER — TELEPHONE (OUTPATIENT)
Dept: FAMILY MEDICINE | Facility: CLINIC | Age: 65
End: 2020-12-29

## 2020-12-29 NOTE — TELEPHONE ENCOUNTER
Day 10 of covid. Only symptom now is fatigue. Pt stated he still has a loss of taste. Pt wants to know when he can end his quarantine. Please call and advise pt. Pt is awaiting a return call.     Reason for Disposition   HIGH RISK patient (e.g., age > 64 years, diabetes, heart or lung disease, weak immune system) (Exception: has already been evaluated by healthcare provider and has no new or worsening symptoms)    Additional Information   Negative: Severe difficulty breathing (e.g., struggling for each breath, speaks in single words)   Negative: Difficult to awaken or acting confused (e.g., disoriented, slurred speech)   Negative: Bluish (or gray) lips or face now   Negative: Shock suspected (e.g., cold/pale/clammy skin, too weak to stand, low BP, rapid pulse)   Negative: Sounds like a life-threatening emergency to the triager   Negative: SEVERE or constant chest pain or pressure (Exception: mild central chest pain, present only when coughing)   Negative: MODERATE difficulty breathing (e.g., speaks in phrases, SOB even at rest, pulse 100-120)   Negative: MILD difficulty breathing (e.g., minimal/no SOB at rest, SOB with walking, pulse <100)   Negative: Chest pain   Negative: Patient sounds very sick or weak to the triager   Negative: Fever > 103 F (39.4 C)   Negative: [1] Fever > 101 F (38.3 C) AND [2] age > 60   Negative: [1] Fever > 100.0 F (37.8 C) AND [2] bedridden (e.g., nursing home patient, CVA, chronic illness, recovering from surgery)    Protocols used: CORONAVIRUS (COVID-19) DIAGNOSED OR [id number]

## 2021-01-05 ENCOUNTER — TELEPHONE (OUTPATIENT)
Dept: FAMILY MEDICINE | Facility: CLINIC | Age: 66
End: 2021-01-05

## 2021-01-07 ENCOUNTER — PATIENT MESSAGE (OUTPATIENT)
Dept: UROLOGY | Facility: CLINIC | Age: 66
End: 2021-01-07

## 2021-01-25 ENCOUNTER — PATIENT MESSAGE (OUTPATIENT)
Dept: FAMILY MEDICINE | Facility: CLINIC | Age: 66
End: 2021-01-25

## 2021-01-25 DIAGNOSIS — Z20.822 EXPOSURE TO COVID-19 VIRUS: Primary | ICD-10-CM

## 2021-02-01 ENCOUNTER — LAB VISIT (OUTPATIENT)
Dept: LAB | Facility: HOSPITAL | Age: 66
End: 2021-02-01
Payer: MEDICARE

## 2021-02-01 DIAGNOSIS — C61 PROSTATE CANCER: ICD-10-CM

## 2021-02-01 LAB — COMPLEXED PSA SERPL-MCNC: 0.33 NG/ML (ref 0–4)

## 2021-02-01 PROCEDURE — 36415 COLL VENOUS BLD VENIPUNCTURE: CPT | Mod: PN

## 2021-02-01 PROCEDURE — 84153 ASSAY OF PSA TOTAL: CPT

## 2021-02-04 ENCOUNTER — PATIENT MESSAGE (OUTPATIENT)
Dept: FAMILY MEDICINE | Facility: CLINIC | Age: 66
End: 2021-02-04

## 2021-02-08 ENCOUNTER — LAB VISIT (OUTPATIENT)
Dept: LAB | Facility: HOSPITAL | Age: 66
End: 2021-02-08
Attending: INTERNAL MEDICINE
Payer: MEDICARE

## 2021-02-08 DIAGNOSIS — Z20.822 EXPOSURE TO COVID-19 VIRUS: ICD-10-CM

## 2021-02-08 LAB — SARS-COV-2 IGG SERPLBLD QL IA.RAPID: NEGATIVE

## 2021-02-08 PROCEDURE — 86769 SARS-COV-2 COVID-19 ANTIBODY: CPT

## 2021-02-08 PROCEDURE — 36415 COLL VENOUS BLD VENIPUNCTURE: CPT | Mod: PN

## 2021-02-09 ENCOUNTER — PATIENT MESSAGE (OUTPATIENT)
Dept: UROLOGY | Facility: CLINIC | Age: 66
End: 2021-02-09

## 2021-02-09 ENCOUNTER — OFFICE VISIT (OUTPATIENT)
Dept: UROLOGY | Facility: CLINIC | Age: 66
End: 2021-02-09
Payer: MEDICARE

## 2021-02-09 ENCOUNTER — LAB VISIT (OUTPATIENT)
Dept: LAB | Facility: HOSPITAL | Age: 66
End: 2021-02-09
Attending: UROLOGY
Payer: MEDICARE

## 2021-02-09 ENCOUNTER — TELEPHONE (OUTPATIENT)
Dept: FAMILY MEDICINE | Facility: CLINIC | Age: 66
End: 2021-02-09

## 2021-02-09 VITALS
DIASTOLIC BLOOD PRESSURE: 85 MMHG | BODY MASS INDEX: 25.53 KG/M2 | HEIGHT: 68 IN | HEART RATE: 61 BPM | WEIGHT: 168.44 LBS | SYSTOLIC BLOOD PRESSURE: 141 MMHG

## 2021-02-09 DIAGNOSIS — C61 PROSTATE CANCER: ICD-10-CM

## 2021-02-09 DIAGNOSIS — C61 PROSTATE CANCER: Primary | ICD-10-CM

## 2021-02-09 LAB — COMPLEXED PSA SERPL-MCNC: 0.41 NG/ML (ref 0–4)

## 2021-02-09 PROCEDURE — 99999 PR PBB SHADOW E&M-EST. PATIENT-LVL V: CPT | Mod: PBBFAC,,, | Performed by: UROLOGY

## 2021-02-09 PROCEDURE — 99215 OFFICE O/P EST HI 40 MIN: CPT | Mod: PBBFAC | Performed by: UROLOGY

## 2021-02-09 PROCEDURE — 99213 PR OFFICE/OUTPT VISIT, EST, LEVL III, 20-29 MIN: ICD-10-PCS | Mod: S$PBB,,, | Performed by: UROLOGY

## 2021-02-09 PROCEDURE — 99213 OFFICE O/P EST LOW 20 MIN: CPT | Mod: S$PBB,,, | Performed by: UROLOGY

## 2021-02-09 PROCEDURE — 84153 ASSAY OF PSA TOTAL: CPT

## 2021-02-09 PROCEDURE — 36415 COLL VENOUS BLD VENIPUNCTURE: CPT

## 2021-02-09 PROCEDURE — 99999 PR PBB SHADOW E&M-EST. PATIENT-LVL V: ICD-10-PCS | Mod: PBBFAC,,, | Performed by: UROLOGY

## 2021-02-15 ENCOUNTER — TELEPHONE (OUTPATIENT)
Dept: UROLOGY | Facility: CLINIC | Age: 66
End: 2021-02-15

## 2021-02-22 ENCOUNTER — OFFICE VISIT (OUTPATIENT)
Dept: RADIATION ONCOLOGY | Facility: CLINIC | Age: 66
End: 2021-02-22
Payer: MEDICARE

## 2021-02-22 VITALS
RESPIRATION RATE: 19 BRPM | WEIGHT: 169.81 LBS | DIASTOLIC BLOOD PRESSURE: 86 MMHG | HEIGHT: 68 IN | TEMPERATURE: 98 F | SYSTOLIC BLOOD PRESSURE: 163 MMHG | BODY MASS INDEX: 25.73 KG/M2 | HEART RATE: 89 BPM | OXYGEN SATURATION: 97 %

## 2021-02-22 DIAGNOSIS — C61 PROSTATE CANCER: ICD-10-CM

## 2021-02-22 PROCEDURE — 99999 PR PBB SHADOW E&M-EST. PATIENT-LVL V: CPT | Mod: PBBFAC,,, | Performed by: RADIOLOGY

## 2021-02-22 PROCEDURE — 99215 OFFICE O/P EST HI 40 MIN: CPT | Mod: PBBFAC | Performed by: RADIOLOGY

## 2021-02-22 PROCEDURE — 99204 OFFICE O/P NEW MOD 45 MIN: CPT | Mod: S$PBB,,, | Performed by: RADIOLOGY

## 2021-02-22 PROCEDURE — 99204 PR OFFICE/OUTPT VISIT, NEW, LEVL IV, 45-59 MIN: ICD-10-PCS | Mod: S$PBB,,, | Performed by: RADIOLOGY

## 2021-02-22 PROCEDURE — 99999 PR PBB SHADOW E&M-EST. PATIENT-LVL V: ICD-10-PCS | Mod: PBBFAC,,, | Performed by: RADIOLOGY

## 2021-02-22 RX ORDER — BICALUTAMIDE 50 MG/1
50 TABLET, FILM COATED ORAL DAILY
Qty: 90 TABLET | Refills: 1 | Status: SHIPPED | OUTPATIENT
Start: 2021-02-22 | End: 2021-08-04

## 2021-02-23 ENCOUNTER — PATIENT MESSAGE (OUTPATIENT)
Dept: RADIATION ONCOLOGY | Facility: CLINIC | Age: 66
End: 2021-02-23

## 2021-02-23 ENCOUNTER — TELEPHONE (OUTPATIENT)
Dept: RADIATION ONCOLOGY | Facility: CLINIC | Age: 66
End: 2021-02-23

## 2021-02-26 ENCOUNTER — HOSPITAL ENCOUNTER (OUTPATIENT)
Dept: RADIOLOGY | Facility: HOSPITAL | Age: 66
Discharge: HOME | End: 2021-02-26
Attending: RADIOLOGY
Payer: MEDICARE

## 2021-02-26 DIAGNOSIS — C61 PROSTATE CANCER: ICD-10-CM

## 2021-02-26 PROCEDURE — 78815 PET IMAGE W/CT SKULL-THIGH: CPT | Mod: TC,PI

## 2021-02-26 PROCEDURE — 78815 PET IMAGE W/CT SKULL-THIGH: CPT | Mod: 26,PI,, | Performed by: RADIOLOGY

## 2021-02-26 PROCEDURE — 78815 NM PET CT FLUCICLOVINE F18(PROSTATE CANCER RECURRENCE): ICD-10-PCS | Mod: 26,PI,, | Performed by: RADIOLOGY

## 2021-03-02 ENCOUNTER — TELEPHONE (OUTPATIENT)
Dept: RADIATION ONCOLOGY | Facility: CLINIC | Age: 66
End: 2021-03-02

## 2021-03-05 DIAGNOSIS — I49.3 PVC'S (PREMATURE VENTRICULAR CONTRACTIONS): Primary | ICD-10-CM

## 2021-03-22 ENCOUNTER — PATIENT OUTREACH (OUTPATIENT)
Dept: ADMINISTRATIVE | Facility: OTHER | Age: 66
End: 2021-03-22

## 2021-03-23 ENCOUNTER — HOSPITAL ENCOUNTER (OUTPATIENT)
Dept: CARDIOLOGY | Facility: CLINIC | Age: 66
Discharge: HOME | End: 2021-03-23
Payer: MEDICARE

## 2021-03-23 ENCOUNTER — OFFICE VISIT (OUTPATIENT)
Dept: ELECTROPHYSIOLOGY | Facility: CLINIC | Age: 66
End: 2021-03-23
Payer: MEDICARE

## 2021-03-23 VITALS
HEART RATE: 51 BPM | WEIGHT: 168 LBS | BODY MASS INDEX: 25.46 KG/M2 | DIASTOLIC BLOOD PRESSURE: 70 MMHG | SYSTOLIC BLOOD PRESSURE: 118 MMHG | HEIGHT: 68 IN

## 2021-03-23 DIAGNOSIS — I49.3 PVC'S (PREMATURE VENTRICULAR CONTRACTIONS): ICD-10-CM

## 2021-03-23 DIAGNOSIS — I49.3 PVC (PREMATURE VENTRICULAR CONTRACTION): Primary | ICD-10-CM

## 2021-03-23 PROCEDURE — 99214 PR OFFICE/OUTPT VISIT, EST, LEVL IV, 30-39 MIN: ICD-10-PCS | Mod: S$PBB,,, | Performed by: INTERNAL MEDICINE

## 2021-03-23 PROCEDURE — 99214 OFFICE O/P EST MOD 30 MIN: CPT | Mod: S$PBB,,, | Performed by: INTERNAL MEDICINE

## 2021-03-23 PROCEDURE — 93010 RHYTHM STRIP: ICD-10-PCS | Mod: S$PBB,,, | Performed by: INTERNAL MEDICINE

## 2021-03-23 PROCEDURE — 99214 OFFICE O/P EST MOD 30 MIN: CPT | Mod: PBBFAC | Performed by: INTERNAL MEDICINE

## 2021-03-23 PROCEDURE — 93005 ELECTROCARDIOGRAM TRACING: CPT | Mod: PBBFAC | Performed by: INTERNAL MEDICINE

## 2021-03-23 PROCEDURE — 93010 ELECTROCARDIOGRAM REPORT: CPT | Mod: S$PBB,,, | Performed by: INTERNAL MEDICINE

## 2021-03-23 PROCEDURE — 99999 PR PBB SHADOW E&M-EST. PATIENT-LVL IV: ICD-10-PCS | Mod: PBBFAC,,, | Performed by: INTERNAL MEDICINE

## 2021-03-23 PROCEDURE — 99999 PR PBB SHADOW E&M-EST. PATIENT-LVL IV: CPT | Mod: PBBFAC,,, | Performed by: INTERNAL MEDICINE

## 2021-03-23 RX ORDER — ACETAMINOPHEN, DIPHENHYDRAMINE HCL, PHENYLEPHRINE HCL 325; 25; 5 MG/1; MG/1; MG/1
10 TABLET ORAL
COMMUNITY
End: 2022-04-27

## 2021-03-24 ENCOUNTER — PATIENT MESSAGE (OUTPATIENT)
Dept: RADIATION ONCOLOGY | Facility: CLINIC | Age: 66
End: 2021-03-24

## 2021-03-25 DIAGNOSIS — C61 PROSTATE CANCER: Primary | ICD-10-CM

## 2021-04-01 ENCOUNTER — OFFICE VISIT (OUTPATIENT)
Dept: RADIATION ONCOLOGY | Facility: CLINIC | Age: 66
End: 2021-04-01
Payer: MEDICARE

## 2021-04-01 VITALS
SYSTOLIC BLOOD PRESSURE: 166 MMHG | DIASTOLIC BLOOD PRESSURE: 77 MMHG | HEART RATE: 51 BPM | BODY MASS INDEX: 25.77 KG/M2 | WEIGHT: 169.5 LBS

## 2021-04-01 DIAGNOSIS — C61 PROSTATE CANCER: Primary | ICD-10-CM

## 2021-04-01 PROCEDURE — 99999 PR PBB SHADOW E&M-EST. PATIENT-LVL II: CPT | Mod: PBBFAC,,, | Performed by: RADIOLOGY

## 2021-04-01 PROCEDURE — 99212 OFFICE O/P EST SF 10 MIN: CPT | Mod: PBBFAC | Performed by: RADIOLOGY

## 2021-04-01 PROCEDURE — 96402 CHEMO HORMON ANTINEOPL SQ/IM: CPT | Mod: PBBFAC

## 2021-04-01 PROCEDURE — 99212 OFFICE O/P EST SF 10 MIN: CPT | Mod: S$PBB,,, | Performed by: RADIOLOGY

## 2021-04-01 PROCEDURE — 99999 PR PBB SHADOW E&M-EST. PATIENT-LVL II: ICD-10-PCS | Mod: PBBFAC,,, | Performed by: RADIOLOGY

## 2021-04-01 PROCEDURE — 99212 PR OFFICE/OUTPT VISIT, EST, LEVL II, 10-19 MIN: ICD-10-PCS | Mod: S$PBB,,, | Performed by: RADIOLOGY

## 2021-04-01 RX ADMIN — LEUPROLIDE ACETATE 45 MG: KIT at 09:04

## 2021-04-16 ENCOUNTER — PATIENT MESSAGE (OUTPATIENT)
Dept: CARDIOLOGY | Facility: CLINIC | Age: 66
End: 2021-04-16

## 2021-04-19 ENCOUNTER — PATIENT MESSAGE (OUTPATIENT)
Dept: FAMILY MEDICINE | Facility: CLINIC | Age: 66
End: 2021-04-19

## 2021-04-22 ENCOUNTER — PATIENT MESSAGE (OUTPATIENT)
Dept: RADIATION ONCOLOGY | Facility: CLINIC | Age: 66
End: 2021-04-22

## 2021-04-27 DIAGNOSIS — M83.5 OTHER DRUG-INDUCED OSTEOMALACIA IN ADULTS: ICD-10-CM

## 2021-04-27 DIAGNOSIS — C61 PROSTATE CANCER: Primary | ICD-10-CM

## 2021-04-28 ENCOUNTER — PATIENT MESSAGE (OUTPATIENT)
Dept: CARDIOLOGY | Facility: CLINIC | Age: 66
End: 2021-04-28

## 2021-04-28 RX ORDER — LOSARTAN POTASSIUM 100 MG/1
100 TABLET ORAL DAILY
COMMUNITY
End: 2021-11-17 | Stop reason: SDUPTHER

## 2021-04-28 RX ORDER — APALUTAMIDE 60 MG/1
TABLET, FILM COATED ORAL
COMMUNITY
Start: 2021-04-16 | End: 2022-04-27

## 2021-05-13 ENCOUNTER — PATIENT MESSAGE (OUTPATIENT)
Dept: RADIATION ONCOLOGY | Facility: CLINIC | Age: 66
End: 2021-05-13

## 2021-05-18 DIAGNOSIS — C61 PROSTATE CANCER: Primary | ICD-10-CM

## 2021-05-24 ENCOUNTER — LAB VISIT (OUTPATIENT)
Dept: LAB | Facility: HOSPITAL | Age: 66
End: 2021-05-24
Payer: MEDICARE

## 2021-05-24 DIAGNOSIS — M83.5 OTHER DRUG-INDUCED OSTEOMALACIA IN ADULTS: ICD-10-CM

## 2021-05-24 DIAGNOSIS — C61 PROSTATE CANCER: ICD-10-CM

## 2021-05-24 LAB
25(OH)D3+25(OH)D2 SERPL-MCNC: 77 NG/ML (ref 30–96)
ALBUMIN SERPL BCP-MCNC: 4 G/DL (ref 3.5–5.2)
ALP SERPL-CCNC: 51 U/L (ref 55–135)
ALT SERPL W/O P-5'-P-CCNC: 18 U/L (ref 10–44)
ANION GAP SERPL CALC-SCNC: 7 MMOL/L (ref 8–16)
AST SERPL-CCNC: 22 U/L (ref 10–40)
BASOPHILS # BLD AUTO: 0.05 K/UL (ref 0–0.2)
BASOPHILS NFR BLD: 1.2 % (ref 0–1.9)
BILIRUB SERPL-MCNC: 0.4 MG/DL (ref 0.1–1)
BUN SERPL-MCNC: 16 MG/DL (ref 8–23)
CALCIUM SERPL-MCNC: 10 MG/DL (ref 8.7–10.5)
CHLORIDE SERPL-SCNC: 104 MMOL/L (ref 95–110)
CO2 SERPL-SCNC: 31 MMOL/L (ref 23–29)
COMPLEXED PSA SERPL-MCNC: 0.01 NG/ML (ref 0–4)
CREAT SERPL-MCNC: 1.2 MG/DL (ref 0.5–1.4)
DIFFERENTIAL METHOD: ABNORMAL
EOSINOPHIL # BLD AUTO: 0.1 K/UL (ref 0–0.5)
EOSINOPHIL NFR BLD: 3.3 % (ref 0–8)
ERYTHROCYTE [DISTWIDTH] IN BLOOD BY AUTOMATED COUNT: 12.1 % (ref 11.5–14.5)
EST. GFR  (AFRICAN AMERICAN): >60 ML/MIN/1.73 M^2
EST. GFR  (NON AFRICAN AMERICAN): >60 ML/MIN/1.73 M^2
GLUCOSE SERPL-MCNC: 95 MG/DL (ref 70–110)
HCT VFR BLD AUTO: 40.6 % (ref 40–54)
HGB BLD-MCNC: 13.8 G/DL (ref 14–18)
IMM GRANULOCYTES # BLD AUTO: 0.01 K/UL (ref 0–0.04)
IMM GRANULOCYTES NFR BLD AUTO: 0.2 % (ref 0–0.5)
LYMPHOCYTES # BLD AUTO: 1.2 K/UL (ref 1–4.8)
LYMPHOCYTES NFR BLD: 29 % (ref 18–48)
MCH RBC QN AUTO: 32.5 PG (ref 27–31)
MCHC RBC AUTO-ENTMCNC: 34 G/DL (ref 32–36)
MCV RBC AUTO: 96 FL (ref 82–98)
MONOCYTES # BLD AUTO: 0.4 K/UL (ref 0.3–1)
MONOCYTES NFR BLD: 10.3 % (ref 4–15)
NEUTROPHILS # BLD AUTO: 2.4 K/UL (ref 1.8–7.7)
NEUTROPHILS NFR BLD: 56 % (ref 38–73)
NRBC BLD-RTO: 0 /100 WBC
PLATELET # BLD AUTO: 222 K/UL (ref 150–450)
PMV BLD AUTO: 10.4 FL (ref 9.2–12.9)
POTASSIUM SERPL-SCNC: 4.4 MMOL/L (ref 3.5–5.1)
PROT SERPL-MCNC: 7.4 G/DL (ref 6–8.4)
RBC # BLD AUTO: 4.25 M/UL (ref 4.6–6.2)
SODIUM SERPL-SCNC: 142 MMOL/L (ref 136–145)
TESTOST SERPL-MCNC: 17 NG/DL (ref 304–1227)
WBC # BLD AUTO: 4.27 K/UL (ref 3.9–12.7)

## 2021-05-24 PROCEDURE — 82306 VITAMIN D 25 HYDROXY: CPT | Performed by: RADIOLOGY

## 2021-05-24 PROCEDURE — 84403 ASSAY OF TOTAL TESTOSTERONE: CPT | Performed by: RADIOLOGY

## 2021-05-24 PROCEDURE — 85025 COMPLETE CBC W/AUTO DIFF WBC: CPT | Performed by: RADIOLOGY

## 2021-05-24 PROCEDURE — 80053 COMPREHEN METABOLIC PANEL: CPT | Performed by: RADIOLOGY

## 2021-05-24 PROCEDURE — 84153 ASSAY OF PSA TOTAL: CPT | Performed by: RADIOLOGY

## 2021-05-24 PROCEDURE — 36415 COLL VENOUS BLD VENIPUNCTURE: CPT | Mod: PN | Performed by: RADIOLOGY

## 2021-05-27 ENCOUNTER — CLINICAL SUPPORT (OUTPATIENT)
Dept: HEMATOLOGY/ONCOLOGY | Facility: CLINIC | Age: 66
End: 2021-05-27
Payer: MEDICARE

## 2021-05-27 VITALS — HEIGHT: 68 IN | WEIGHT: 161.25 LBS | BODY MASS INDEX: 24.44 KG/M2

## 2021-05-27 DIAGNOSIS — C61 PROSTATE CANCER: ICD-10-CM

## 2021-05-27 DIAGNOSIS — Z71.3 NUTRITIONAL COUNSELING: Primary | ICD-10-CM

## 2021-05-27 PROCEDURE — 97802 MEDICAL NUTRITION INDIV IN: CPT | Mod: PBBFAC | Performed by: DIETITIAN, REGISTERED

## 2021-05-27 PROCEDURE — 99212 OFFICE O/P EST SF 10 MIN: CPT | Mod: PBBFAC | Performed by: DIETITIAN, REGISTERED

## 2021-05-27 PROCEDURE — 99999 PR PBB SHADOW E&M-EST. PATIENT-LVL II: CPT | Mod: PBBFAC,,, | Performed by: DIETITIAN, REGISTERED

## 2021-05-27 PROCEDURE — 99999 PR PBB SHADOW E&M-EST. PATIENT-LVL II: ICD-10-PCS | Mod: PBBFAC,,, | Performed by: DIETITIAN, REGISTERED

## 2021-06-02 ENCOUNTER — PATIENT MESSAGE (OUTPATIENT)
Dept: RADIATION ONCOLOGY | Facility: CLINIC | Age: 66
End: 2021-06-02

## 2021-06-04 ENCOUNTER — PATIENT MESSAGE (OUTPATIENT)
Dept: HEMATOLOGY/ONCOLOGY | Facility: CLINIC | Age: 66
End: 2021-06-04

## 2021-06-11 ENCOUNTER — OFFICE VISIT (OUTPATIENT)
Dept: HEMATOLOGY/ONCOLOGY | Facility: CLINIC | Age: 66
End: 2021-06-11
Payer: MEDICARE

## 2021-06-11 VITALS
HEIGHT: 68 IN | BODY MASS INDEX: 24.89 KG/M2 | SYSTOLIC BLOOD PRESSURE: 147 MMHG | DIASTOLIC BLOOD PRESSURE: 77 MMHG | HEART RATE: 52 BPM | WEIGHT: 164.25 LBS

## 2021-06-11 DIAGNOSIS — I10 HYPERTENSION, UNSPECIFIED TYPE: ICD-10-CM

## 2021-06-11 DIAGNOSIS — C61 PROSTATE CANCER: Primary | ICD-10-CM

## 2021-06-11 DIAGNOSIS — R53.83 FATIGUE, UNSPECIFIED TYPE: ICD-10-CM

## 2021-06-11 PROCEDURE — 99213 OFFICE O/P EST LOW 20 MIN: CPT | Mod: PBBFAC | Performed by: OBSTETRICS & GYNECOLOGY

## 2021-06-11 PROCEDURE — 99204 PR OFFICE/OUTPT VISIT, NEW, LEVL IV, 45-59 MIN: ICD-10-PCS | Mod: S$PBB,,, | Performed by: OBSTETRICS & GYNECOLOGY

## 2021-06-11 PROCEDURE — 99999 PR PBB SHADOW E&M-EST. PATIENT-LVL III: CPT | Mod: PBBFAC,,, | Performed by: OBSTETRICS & GYNECOLOGY

## 2021-06-11 PROCEDURE — 99999 PR PBB SHADOW E&M-EST. PATIENT-LVL III: ICD-10-PCS | Mod: PBBFAC,,, | Performed by: OBSTETRICS & GYNECOLOGY

## 2021-06-11 PROCEDURE — 99204 OFFICE O/P NEW MOD 45 MIN: CPT | Mod: S$PBB,,, | Performed by: OBSTETRICS & GYNECOLOGY

## 2021-06-21 ENCOUNTER — LAB VISIT (OUTPATIENT)
Dept: LAB | Facility: HOSPITAL | Age: 66
End: 2021-06-21
Attending: RADIOLOGY
Payer: MEDICARE

## 2021-06-21 DIAGNOSIS — M83.5 OTHER DRUG-INDUCED OSTEOMALACIA IN ADULTS: ICD-10-CM

## 2021-06-21 DIAGNOSIS — C61 PROSTATE CANCER: ICD-10-CM

## 2021-06-21 LAB
25(OH)D3+25(OH)D2 SERPL-MCNC: 70 NG/ML (ref 30–96)
ALBUMIN SERPL BCP-MCNC: 4 G/DL (ref 3.5–5.2)
ALP SERPL-CCNC: 49 U/L (ref 55–135)
ALT SERPL W/O P-5'-P-CCNC: 16 U/L (ref 10–44)
ANION GAP SERPL CALC-SCNC: 11 MMOL/L (ref 8–16)
AST SERPL-CCNC: 22 U/L (ref 10–40)
BASOPHILS # BLD AUTO: 0.04 K/UL (ref 0–0.2)
BASOPHILS NFR BLD: 0.9 % (ref 0–1.9)
BILIRUB SERPL-MCNC: 0.3 MG/DL (ref 0.1–1)
BUN SERPL-MCNC: 22 MG/DL (ref 8–23)
CALCIUM SERPL-MCNC: 10.1 MG/DL (ref 8.7–10.5)
CHLORIDE SERPL-SCNC: 104 MMOL/L (ref 95–110)
CO2 SERPL-SCNC: 26 MMOL/L (ref 23–29)
COMPLEXED PSA SERPL-MCNC: <0.01 NG/ML (ref 0–4)
CREAT SERPL-MCNC: 1 MG/DL (ref 0.5–1.4)
DIFFERENTIAL METHOD: ABNORMAL
EOSINOPHIL # BLD AUTO: 0.1 K/UL (ref 0–0.5)
EOSINOPHIL NFR BLD: 2.3 % (ref 0–8)
ERYTHROCYTE [DISTWIDTH] IN BLOOD BY AUTOMATED COUNT: 12.8 % (ref 11.5–14.5)
EST. GFR  (AFRICAN AMERICAN): >60 ML/MIN/1.73 M^2
EST. GFR  (NON AFRICAN AMERICAN): >60 ML/MIN/1.73 M^2
GLUCOSE SERPL-MCNC: 100 MG/DL (ref 70–110)
HCT VFR BLD AUTO: 36.6 % (ref 40–54)
HGB BLD-MCNC: 12.1 G/DL (ref 14–18)
IMM GRANULOCYTES # BLD AUTO: 0.01 K/UL (ref 0–0.04)
IMM GRANULOCYTES NFR BLD AUTO: 0.2 % (ref 0–0.5)
LYMPHOCYTES # BLD AUTO: 1 K/UL (ref 1–4.8)
LYMPHOCYTES NFR BLD: 23.5 % (ref 18–48)
MCH RBC QN AUTO: 31.7 PG (ref 27–31)
MCHC RBC AUTO-ENTMCNC: 33.1 G/DL (ref 32–36)
MCV RBC AUTO: 96 FL (ref 82–98)
MONOCYTES # BLD AUTO: 0.4 K/UL (ref 0.3–1)
MONOCYTES NFR BLD: 8.4 % (ref 4–15)
NEUTROPHILS # BLD AUTO: 2.8 K/UL (ref 1.8–7.7)
NEUTROPHILS NFR BLD: 64.7 % (ref 38–73)
NRBC BLD-RTO: 0 /100 WBC
PLATELET # BLD AUTO: 222 K/UL (ref 150–450)
PMV BLD AUTO: 10.1 FL (ref 9.2–12.9)
POTASSIUM SERPL-SCNC: 4.8 MMOL/L (ref 3.5–5.1)
PROT SERPL-MCNC: 7.2 G/DL (ref 6–8.4)
RBC # BLD AUTO: 3.82 M/UL (ref 4.6–6.2)
SODIUM SERPL-SCNC: 141 MMOL/L (ref 136–145)
TESTOST SERPL-MCNC: 15 NG/DL (ref 304–1227)
WBC # BLD AUTO: 4.3 K/UL (ref 3.9–12.7)

## 2021-06-21 PROCEDURE — 84403 ASSAY OF TOTAL TESTOSTERONE: CPT | Performed by: RADIOLOGY

## 2021-06-21 PROCEDURE — 85025 COMPLETE CBC W/AUTO DIFF WBC: CPT | Performed by: RADIOLOGY

## 2021-06-21 PROCEDURE — 82306 VITAMIN D 25 HYDROXY: CPT | Performed by: RADIOLOGY

## 2021-06-21 PROCEDURE — 36415 COLL VENOUS BLD VENIPUNCTURE: CPT | Performed by: RADIOLOGY

## 2021-06-21 PROCEDURE — 84153 ASSAY OF PSA TOTAL: CPT | Performed by: RADIOLOGY

## 2021-06-21 PROCEDURE — 80053 COMPREHEN METABOLIC PANEL: CPT | Performed by: RADIOLOGY

## 2021-06-22 ENCOUNTER — PATIENT MESSAGE (OUTPATIENT)
Dept: HEMATOLOGY/ONCOLOGY | Facility: CLINIC | Age: 66
End: 2021-06-22

## 2021-07-01 ENCOUNTER — OFFICE VISIT (OUTPATIENT)
Dept: RADIATION ONCOLOGY | Facility: CLINIC | Age: 66
End: 2021-07-01
Payer: MEDICARE

## 2021-07-01 VITALS
SYSTOLIC BLOOD PRESSURE: 138 MMHG | BODY MASS INDEX: 25.13 KG/M2 | HEIGHT: 68 IN | WEIGHT: 165.81 LBS | DIASTOLIC BLOOD PRESSURE: 68 MMHG | OXYGEN SATURATION: 99 % | HEART RATE: 53 BPM | RESPIRATION RATE: 16 BRPM

## 2021-07-01 DIAGNOSIS — C61 PROSTATE CANCER: Primary | ICD-10-CM

## 2021-07-01 PROCEDURE — 99212 OFFICE O/P EST SF 10 MIN: CPT | Mod: S$PBB,,, | Performed by: RADIOLOGY

## 2021-07-01 PROCEDURE — 99214 OFFICE O/P EST MOD 30 MIN: CPT | Mod: PBBFAC | Performed by: RADIOLOGY

## 2021-07-01 PROCEDURE — 99999 PR PBB SHADOW E&M-EST. PATIENT-LVL IV: CPT | Mod: PBBFAC,,, | Performed by: RADIOLOGY

## 2021-07-01 PROCEDURE — 99999 PR PBB SHADOW E&M-EST. PATIENT-LVL IV: ICD-10-PCS | Mod: PBBFAC,,, | Performed by: RADIOLOGY

## 2021-07-01 PROCEDURE — 99212 PR OFFICE/OUTPT VISIT, EST, LEVL II, 10-19 MIN: ICD-10-PCS | Mod: S$PBB,,, | Performed by: RADIOLOGY

## 2021-07-15 ENCOUNTER — PATIENT MESSAGE (OUTPATIENT)
Dept: RADIATION ONCOLOGY | Facility: CLINIC | Age: 66
End: 2021-07-15

## 2021-07-19 ENCOUNTER — PATIENT MESSAGE (OUTPATIENT)
Dept: RADIATION ONCOLOGY | Facility: CLINIC | Age: 66
End: 2021-07-19

## 2021-07-26 ENCOUNTER — PATIENT MESSAGE (OUTPATIENT)
Dept: RADIATION ONCOLOGY | Facility: CLINIC | Age: 66
End: 2021-07-26

## 2021-07-29 ENCOUNTER — PATIENT MESSAGE (OUTPATIENT)
Dept: RADIATION ONCOLOGY | Facility: CLINIC | Age: 66
End: 2021-07-29

## 2021-08-02 ENCOUNTER — APPOINTMENT (OUTPATIENT)
Dept: RADIATION THERAPY | Facility: OTHER | Age: 66
End: 2021-08-02
Attending: RADIOLOGY
Payer: MEDICARE

## 2021-08-02 ENCOUNTER — PATIENT OUTREACH (OUTPATIENT)
Dept: ADMINISTRATIVE | Facility: OTHER | Age: 66
End: 2021-08-02

## 2021-08-04 ENCOUNTER — CLINICAL SUPPORT (OUTPATIENT)
Dept: AUDIOLOGY | Facility: CLINIC | Age: 66
End: 2021-08-04
Payer: MEDICARE

## 2021-08-04 ENCOUNTER — OFFICE VISIT (OUTPATIENT)
Dept: OTOLARYNGOLOGY | Facility: CLINIC | Age: 66
End: 2021-08-04
Payer: MEDICARE

## 2021-08-04 VITALS
HEART RATE: 45 BPM | SYSTOLIC BLOOD PRESSURE: 152 MMHG | DIASTOLIC BLOOD PRESSURE: 85 MMHG | BODY MASS INDEX: 24.72 KG/M2 | HEIGHT: 68 IN | WEIGHT: 163.13 LBS

## 2021-08-04 DIAGNOSIS — H93.13 TINNITUS OF BOTH EARS: ICD-10-CM

## 2021-08-04 DIAGNOSIS — H90.3 SENSORINEURAL HEARING LOSS (SNHL) OF BOTH EARS: Primary | ICD-10-CM

## 2021-08-04 PROCEDURE — 92504 PR EAR MICROSCOPY EXAMINATION: ICD-10-PCS | Mod: S$PBB,,, | Performed by: NURSE PRACTITIONER

## 2021-08-04 PROCEDURE — 92504 EAR MICROSCOPY EXAMINATION: CPT | Mod: S$PBB,,, | Performed by: NURSE PRACTITIONER

## 2021-08-04 PROCEDURE — 99203 OFFICE O/P NEW LOW 30 MIN: CPT | Mod: S$PBB,,, | Performed by: NURSE PRACTITIONER

## 2021-08-04 PROCEDURE — 92504 EAR MICROSCOPY EXAMINATION: CPT | Mod: PBBFAC | Performed by: NURSE PRACTITIONER

## 2021-08-04 PROCEDURE — 99999 PR PBB SHADOW E&M-EST. PATIENT-LVL IV: ICD-10-PCS | Mod: PBBFAC,,, | Performed by: NURSE PRACTITIONER

## 2021-08-04 PROCEDURE — 92567 TYMPANOMETRY: CPT | Mod: PBBFAC | Performed by: AUDIOLOGIST

## 2021-08-04 PROCEDURE — 99999 PR PBB SHADOW E&M-EST. PATIENT-LVL IV: CPT | Mod: PBBFAC,,, | Performed by: NURSE PRACTITIONER

## 2021-08-04 PROCEDURE — 99203 PR OFFICE/OUTPT VISIT, NEW, LEVL III, 30-44 MIN: ICD-10-PCS | Mod: S$PBB,,, | Performed by: NURSE PRACTITIONER

## 2021-08-04 PROCEDURE — 99214 OFFICE O/P EST MOD 30 MIN: CPT | Mod: PBBFAC | Performed by: NURSE PRACTITIONER

## 2021-08-04 PROCEDURE — 92557 COMPREHENSIVE HEARING TEST: CPT | Mod: PBBFAC | Performed by: AUDIOLOGIST

## 2021-08-10 ENCOUNTER — PATIENT MESSAGE (OUTPATIENT)
Dept: CARDIOLOGY | Facility: CLINIC | Age: 66
End: 2021-08-10

## 2021-08-12 ENCOUNTER — HOSPITAL ENCOUNTER (OUTPATIENT)
Dept: RADIATION THERAPY | Facility: HOSPITAL | Age: 66
Discharge: HOME | End: 2021-08-12
Attending: RADIOLOGY
Payer: MEDICARE

## 2021-08-12 ENCOUNTER — OFFICE VISIT (OUTPATIENT)
Dept: RADIATION ONCOLOGY | Facility: CLINIC | Age: 66
End: 2021-08-12
Payer: MEDICARE

## 2021-08-12 VITALS
RESPIRATION RATE: 16 BRPM | BODY MASS INDEX: 24.93 KG/M2 | SYSTOLIC BLOOD PRESSURE: 160 MMHG | TEMPERATURE: 98 F | HEIGHT: 68 IN | DIASTOLIC BLOOD PRESSURE: 79 MMHG | WEIGHT: 164.5 LBS | HEART RATE: 54 BPM

## 2021-08-12 DIAGNOSIS — C61 PROSTATE CANCER: Primary | ICD-10-CM

## 2021-08-12 PROCEDURE — 99999 PR PBB SHADOW E&M-EST. PATIENT-LVL IV: CPT | Mod: PBBFAC,,, | Performed by: RADIOLOGY

## 2021-08-12 PROCEDURE — 77334 RADIATION TREATMENT AID(S): CPT | Mod: TC | Performed by: RADIOLOGY

## 2021-08-12 PROCEDURE — 99999 PR PBB SHADOW E&M-EST. PATIENT-LVL IV: ICD-10-PCS | Mod: PBBFAC,,, | Performed by: RADIOLOGY

## 2021-08-12 PROCEDURE — 77014 PR  CT GUIDANCE PLACEMENT RAD THERAPY FIELDS: ICD-10-PCS | Mod: 26,,, | Performed by: RADIOLOGY

## 2021-08-12 PROCEDURE — 77263 THER RADIOLOGY TX PLNG CPLX: CPT | Mod: ,,, | Performed by: RADIOLOGY

## 2021-08-12 PROCEDURE — 99499 NO LOS: ICD-10-PCS | Mod: S$PBB,,, | Performed by: RADIOLOGY

## 2021-08-12 PROCEDURE — 77334 RADIATION TREATMENT AID(S): CPT | Mod: 26,,, | Performed by: RADIOLOGY

## 2021-08-12 PROCEDURE — 99214 OFFICE O/P EST MOD 30 MIN: CPT | Mod: PBBFAC,25 | Performed by: RADIOLOGY

## 2021-08-12 PROCEDURE — 77014 PR  CT GUIDANCE PLACEMENT RAD THERAPY FIELDS: CPT | Mod: 26,,, | Performed by: RADIOLOGY

## 2021-08-12 PROCEDURE — 77334 PR  RADN TREATMENT AID(S) COMPLX: ICD-10-PCS | Mod: 26,,, | Performed by: RADIOLOGY

## 2021-08-12 PROCEDURE — 99499 UNLISTED E&M SERVICE: CPT | Mod: S$PBB,,, | Performed by: RADIOLOGY

## 2021-08-12 PROCEDURE — 77263 PR  RADIATION THERAPY PLAN COMPLEX: ICD-10-PCS | Mod: ,,, | Performed by: RADIOLOGY

## 2021-08-12 PROCEDURE — 77014 HC CT GUIDANCE RADIATION THERAPY FLDS PLACEMENT: CPT | Mod: TC | Performed by: RADIOLOGY

## 2021-08-12 RX ORDER — TAFLUPROST 0 MG/.3ML
1 SOLUTION/ DROPS OPHTHALMIC NIGHTLY
COMMUNITY
Start: 2021-05-03

## 2021-08-13 ENCOUNTER — PATIENT MESSAGE (OUTPATIENT)
Dept: RADIATION ONCOLOGY | Facility: CLINIC | Age: 66
End: 2021-08-13

## 2021-08-16 ENCOUNTER — PATIENT MESSAGE (OUTPATIENT)
Dept: RADIATION ONCOLOGY | Facility: CLINIC | Age: 66
End: 2021-08-16

## 2021-08-16 ENCOUNTER — LAB VISIT (OUTPATIENT)
Dept: LAB | Facility: HOSPITAL | Age: 66
End: 2021-08-16
Payer: MEDICARE

## 2021-08-16 DIAGNOSIS — M83.5 OTHER DRUG-INDUCED OSTEOMALACIA IN ADULTS: ICD-10-CM

## 2021-08-16 DIAGNOSIS — C61 PROSTATE CANCER: ICD-10-CM

## 2021-08-16 LAB
25(OH)D3+25(OH)D2 SERPL-MCNC: 49 NG/ML (ref 30–96)
ALBUMIN SERPL BCP-MCNC: 4 G/DL (ref 3.5–5.2)
ALP SERPL-CCNC: 55 U/L (ref 55–135)
ALT SERPL W/O P-5'-P-CCNC: 16 U/L (ref 10–44)
ANION GAP SERPL CALC-SCNC: 10 MMOL/L (ref 8–16)
AST SERPL-CCNC: 21 U/L (ref 10–40)
BASOPHILS # BLD AUTO: 0.04 K/UL (ref 0–0.2)
BASOPHILS NFR BLD: 1 % (ref 0–1.9)
BILIRUB SERPL-MCNC: 0.4 MG/DL (ref 0.1–1)
BUN SERPL-MCNC: 16 MG/DL (ref 8–23)
CALCIUM SERPL-MCNC: 9.9 MG/DL (ref 8.7–10.5)
CHLORIDE SERPL-SCNC: 104 MMOL/L (ref 95–110)
CO2 SERPL-SCNC: 27 MMOL/L (ref 23–29)
COMPLEXED PSA SERPL-MCNC: <0.01 NG/ML (ref 0–4)
CREAT SERPL-MCNC: 1.1 MG/DL (ref 0.5–1.4)
DIFFERENTIAL METHOD: ABNORMAL
EOSINOPHIL # BLD AUTO: 0.1 K/UL (ref 0–0.5)
EOSINOPHIL NFR BLD: 2.4 % (ref 0–8)
ERYTHROCYTE [DISTWIDTH] IN BLOOD BY AUTOMATED COUNT: 12.5 % (ref 11.5–14.5)
EST. GFR  (AFRICAN AMERICAN): >60 ML/MIN/1.73 M^2
EST. GFR  (NON AFRICAN AMERICAN): >60 ML/MIN/1.73 M^2
GLUCOSE SERPL-MCNC: 92 MG/DL (ref 70–110)
HCT VFR BLD AUTO: 37.2 % (ref 40–54)
HGB BLD-MCNC: 12.6 G/DL (ref 14–18)
IMM GRANULOCYTES # BLD AUTO: 0.01 K/UL (ref 0–0.04)
IMM GRANULOCYTES NFR BLD AUTO: 0.3 % (ref 0–0.5)
LYMPHOCYTES # BLD AUTO: 1.1 K/UL (ref 1–4.8)
LYMPHOCYTES NFR BLD: 28 % (ref 18–48)
MCH RBC QN AUTO: 32.8 PG (ref 27–31)
MCHC RBC AUTO-ENTMCNC: 33.9 G/DL (ref 32–36)
MCV RBC AUTO: 97 FL (ref 82–98)
MONOCYTES # BLD AUTO: 0.4 K/UL (ref 0.3–1)
MONOCYTES NFR BLD: 10.7 % (ref 4–15)
NEUTROPHILS # BLD AUTO: 2.2 K/UL (ref 1.8–7.7)
NEUTROPHILS NFR BLD: 57.6 % (ref 38–73)
NRBC BLD-RTO: 0 /100 WBC
PLATELET # BLD AUTO: 204 K/UL (ref 150–450)
PMV BLD AUTO: 10.8 FL (ref 9.2–12.9)
POTASSIUM SERPL-SCNC: 4.6 MMOL/L (ref 3.5–5.1)
PROT SERPL-MCNC: 6.9 G/DL (ref 6–8.4)
RBC # BLD AUTO: 3.84 M/UL (ref 4.6–6.2)
SODIUM SERPL-SCNC: 141 MMOL/L (ref 136–145)
TESTOST SERPL-MCNC: 13 NG/DL (ref 304–1227)
WBC # BLD AUTO: 3.82 K/UL (ref 3.9–12.7)

## 2021-08-16 PROCEDURE — 84153 ASSAY OF PSA TOTAL: CPT | Performed by: RADIOLOGY

## 2021-08-16 PROCEDURE — 84403 ASSAY OF TOTAL TESTOSTERONE: CPT | Performed by: RADIOLOGY

## 2021-08-16 PROCEDURE — 80053 COMPREHEN METABOLIC PANEL: CPT | Performed by: RADIOLOGY

## 2021-08-16 PROCEDURE — 36415 COLL VENOUS BLD VENIPUNCTURE: CPT | Mod: PN | Performed by: RADIOLOGY

## 2021-08-16 PROCEDURE — 85025 COMPLETE CBC W/AUTO DIFF WBC: CPT | Performed by: RADIOLOGY

## 2021-08-16 PROCEDURE — 82306 VITAMIN D 25 HYDROXY: CPT | Performed by: RADIOLOGY

## 2021-08-17 ENCOUNTER — PATIENT MESSAGE (OUTPATIENT)
Dept: HEMATOLOGY/ONCOLOGY | Facility: CLINIC | Age: 66
End: 2021-08-17

## 2021-08-20 PROCEDURE — 77301 RADIOTHERAPY DOSE PLAN IMRT: CPT | Mod: 26,,, | Performed by: RADIOLOGY

## 2021-08-20 PROCEDURE — 77301 RADIOTHERAPY DOSE PLAN IMRT: CPT | Mod: TC | Performed by: RADIOLOGY

## 2021-08-20 PROCEDURE — 77301 PR  INTEN MOD RADIOTHER PLAN W/DOSE VOL HIST: ICD-10-PCS | Mod: 26,,, | Performed by: RADIOLOGY

## 2021-08-21 PROCEDURE — 77300 PR RADIATION THERAPY,DOSIMETRY PLAN: ICD-10-PCS | Mod: 26,,, | Performed by: RADIOLOGY

## 2021-08-21 PROCEDURE — 77338 PR  MLC IMRT DESIGN & CONSTRUCTION PER IMRT PLAN: ICD-10-PCS | Mod: 26,,, | Performed by: RADIOLOGY

## 2021-08-21 PROCEDURE — 77300 RADIATION THERAPY DOSE PLAN: CPT | Mod: 26,,, | Performed by: RADIOLOGY

## 2021-08-21 PROCEDURE — 77338 DESIGN MLC DEVICE FOR IMRT: CPT | Mod: 26,,, | Performed by: RADIOLOGY

## 2021-08-21 PROCEDURE — 77338 DESIGN MLC DEVICE FOR IMRT: CPT | Mod: TC | Performed by: RADIOLOGY

## 2021-08-21 PROCEDURE — 77300 RADIATION THERAPY DOSE PLAN: CPT | Mod: TC | Performed by: RADIOLOGY

## 2021-08-23 PROCEDURE — 77014 HC CT GUIDANCE RADIATION THERAPY FLDS PLACEMENT: CPT | Mod: TC | Performed by: RADIOLOGY

## 2021-08-23 PROCEDURE — 77014 PR  CT GUIDANCE PLACEMENT RAD THERAPY FIELDS: CPT | Mod: 26,,, | Performed by: RADIOLOGY

## 2021-08-23 PROCEDURE — 77014 PR  CT GUIDANCE PLACEMENT RAD THERAPY FIELDS: ICD-10-PCS | Mod: 26,,, | Performed by: RADIOLOGY

## 2021-08-23 PROCEDURE — 77385 HC IMRT, SIMPLE: CPT | Performed by: RADIOLOGY

## 2021-08-24 ENCOUNTER — DOCUMENTATION ONLY (OUTPATIENT)
Dept: RADIATION ONCOLOGY | Facility: CLINIC | Age: 66
End: 2021-08-24

## 2021-08-24 PROCEDURE — 77014 HC CT GUIDANCE RADIATION THERAPY FLDS PLACEMENT: CPT | Mod: TC | Performed by: RADIOLOGY

## 2021-08-24 PROCEDURE — 77014 PR  CT GUIDANCE PLACEMENT RAD THERAPY FIELDS: CPT | Mod: 26,,, | Performed by: RADIOLOGY

## 2021-08-24 PROCEDURE — 77014 PR  CT GUIDANCE PLACEMENT RAD THERAPY FIELDS: ICD-10-PCS | Mod: 26,,, | Performed by: RADIOLOGY

## 2021-08-24 PROCEDURE — 77385 HC IMRT, SIMPLE: CPT | Performed by: RADIOLOGY

## 2021-08-25 PROCEDURE — 77014 HC CT GUIDANCE RADIATION THERAPY FLDS PLACEMENT: CPT | Mod: TC | Performed by: RADIOLOGY

## 2021-08-25 PROCEDURE — 77014 PR  CT GUIDANCE PLACEMENT RAD THERAPY FIELDS: ICD-10-PCS | Mod: 26,,, | Performed by: RADIOLOGY

## 2021-08-25 PROCEDURE — 77014 PR  CT GUIDANCE PLACEMENT RAD THERAPY FIELDS: CPT | Mod: 26,,, | Performed by: RADIOLOGY

## 2021-08-25 PROCEDURE — 77385 HC IMRT, SIMPLE: CPT | Performed by: RADIOLOGY

## 2021-08-26 ENCOUNTER — PATIENT MESSAGE (OUTPATIENT)
Dept: RADIATION THERAPY | Facility: HOSPITAL | Age: 66
End: 2021-08-26

## 2021-08-26 PROCEDURE — 77385 HC IMRT, SIMPLE: CPT | Performed by: RADIOLOGY

## 2021-08-26 PROCEDURE — 77014 HC CT GUIDANCE RADIATION THERAPY FLDS PLACEMENT: CPT | Mod: TC | Performed by: RADIOLOGY

## 2021-08-26 PROCEDURE — 77014 PR  CT GUIDANCE PLACEMENT RAD THERAPY FIELDS: ICD-10-PCS | Mod: 26,,, | Performed by: RADIOLOGY

## 2021-08-26 PROCEDURE — 77014 PR  CT GUIDANCE PLACEMENT RAD THERAPY FIELDS: CPT | Mod: 26,,, | Performed by: RADIOLOGY

## 2021-08-27 PROCEDURE — 77014 HC CT GUIDANCE RADIATION THERAPY FLDS PLACEMENT: CPT | Mod: TC | Performed by: RADIOLOGY

## 2021-08-27 PROCEDURE — 77385 HC IMRT, SIMPLE: CPT | Performed by: RADIOLOGY

## 2021-08-27 PROCEDURE — 77336 RADIATION PHYSICS CONSULT: CPT | Performed by: RADIOLOGY

## 2021-08-27 PROCEDURE — 77014 PR  CT GUIDANCE PLACEMENT RAD THERAPY FIELDS: CPT | Mod: 26,,, | Performed by: RADIOLOGY

## 2021-08-27 PROCEDURE — 77014 PR  CT GUIDANCE PLACEMENT RAD THERAPY FIELDS: ICD-10-PCS | Mod: 26,,, | Performed by: RADIOLOGY

## 2021-08-30 ENCOUNTER — PATIENT MESSAGE (OUTPATIENT)
Dept: RADIATION ONCOLOGY | Facility: CLINIC | Age: 66
End: 2021-08-30

## 2021-09-02 ENCOUNTER — HOSPITAL ENCOUNTER (OUTPATIENT)
Dept: RADIATION THERAPY | Facility: HOSPITAL | Age: 66
Discharge: HOME | End: 2021-09-02
Attending: RADIOLOGY
Payer: MEDICARE

## 2021-09-02 ENCOUNTER — APPOINTMENT (OUTPATIENT)
Dept: RADIATION THERAPY | Facility: OTHER | Age: 66
End: 2021-09-02
Attending: RADIOLOGY
Payer: MEDICARE

## 2021-09-13 ENCOUNTER — LAB VISIT (OUTPATIENT)
Dept: LAB | Facility: HOSPITAL | Age: 66
End: 2021-09-13
Attending: RADIOLOGY
Payer: MEDICARE

## 2021-09-13 ENCOUNTER — PATIENT MESSAGE (OUTPATIENT)
Dept: CARDIOLOGY | Facility: CLINIC | Age: 66
End: 2021-09-13

## 2021-09-13 DIAGNOSIS — E78.5 DYSLIPIDEMIA: Primary | ICD-10-CM

## 2021-09-13 DIAGNOSIS — M83.5 OTHER DRUG-INDUCED OSTEOMALACIA IN ADULTS: ICD-10-CM

## 2021-09-13 DIAGNOSIS — C61 PROSTATE CANCER: ICD-10-CM

## 2021-09-13 DIAGNOSIS — E78.5 DYSLIPIDEMIA: ICD-10-CM

## 2021-09-13 LAB
25(OH)D3+25(OH)D2 SERPL-MCNC: 53 NG/ML (ref 30–96)
ALBUMIN SERPL BCP-MCNC: 4.1 G/DL (ref 3.5–5.2)
ALP SERPL-CCNC: 63 U/L (ref 55–135)
ALT SERPL W/O P-5'-P-CCNC: 21 U/L (ref 10–44)
ANION GAP SERPL CALC-SCNC: 10 MMOL/L (ref 8–16)
AST SERPL-CCNC: 34 U/L (ref 10–40)
BASOPHILS # BLD AUTO: 0.04 K/UL (ref 0–0.2)
BASOPHILS NFR BLD: 1.1 % (ref 0–1.9)
BILIRUB SERPL-MCNC: 0.3 MG/DL (ref 0.1–1)
BUN SERPL-MCNC: 18 MG/DL (ref 8–23)
CALCIUM SERPL-MCNC: 10.1 MG/DL (ref 8.7–10.5)
CHLORIDE SERPL-SCNC: 105 MMOL/L (ref 95–110)
CHOLEST SERPL-MCNC: 182 MG/DL (ref 120–199)
CHOLEST/HDLC SERPL: 2.3 {RATIO} (ref 2–5)
CO2 SERPL-SCNC: 27 MMOL/L (ref 23–29)
COMPLEXED PSA SERPL-MCNC: <0.01 NG/ML (ref 0–4)
CREAT SERPL-MCNC: 1.1 MG/DL (ref 0.5–1.4)
DIFFERENTIAL METHOD: ABNORMAL
EOSINOPHIL # BLD AUTO: 0.1 K/UL (ref 0–0.5)
EOSINOPHIL NFR BLD: 2.2 % (ref 0–8)
ERYTHROCYTE [DISTWIDTH] IN BLOOD BY AUTOMATED COUNT: 12.1 % (ref 11.5–14.5)
EST. GFR  (AFRICAN AMERICAN): >60 ML/MIN/1.73 M^2
EST. GFR  (NON AFRICAN AMERICAN): >60 ML/MIN/1.73 M^2
GLUCOSE SERPL-MCNC: 95 MG/DL (ref 70–110)
HCT VFR BLD AUTO: 38.2 % (ref 40–54)
HDLC SERPL-MCNC: 80 MG/DL (ref 40–75)
HDLC SERPL: 44 % (ref 20–50)
HGB BLD-MCNC: 12.8 G/DL (ref 14–18)
IMM GRANULOCYTES # BLD AUTO: 0.01 K/UL (ref 0–0.04)
IMM GRANULOCYTES NFR BLD AUTO: 0.3 % (ref 0–0.5)
LDLC SERPL CALC-MCNC: 84.8 MG/DL (ref 63–159)
LYMPHOCYTES # BLD AUTO: 0.9 K/UL (ref 1–4.8)
LYMPHOCYTES NFR BLD: 23.5 % (ref 18–48)
MCH RBC QN AUTO: 32.7 PG (ref 27–31)
MCHC RBC AUTO-ENTMCNC: 33.5 G/DL (ref 32–36)
MCV RBC AUTO: 97 FL (ref 82–98)
MONOCYTES # BLD AUTO: 0.6 K/UL (ref 0.3–1)
MONOCYTES NFR BLD: 16.7 % (ref 4–15)
NEUTROPHILS # BLD AUTO: 2.1 K/UL (ref 1.8–7.7)
NEUTROPHILS NFR BLD: 56.2 % (ref 38–73)
NONHDLC SERPL-MCNC: 102 MG/DL
NRBC BLD-RTO: 0 /100 WBC
PLATELET # BLD AUTO: 192 K/UL (ref 150–450)
PMV BLD AUTO: 10.4 FL (ref 9.2–12.9)
POTASSIUM SERPL-SCNC: 4.6 MMOL/L (ref 3.5–5.1)
PROT SERPL-MCNC: 7 G/DL (ref 6–8.4)
RBC # BLD AUTO: 3.92 M/UL (ref 4.6–6.2)
SODIUM SERPL-SCNC: 142 MMOL/L (ref 136–145)
TESTOST SERPL-MCNC: 18 NG/DL (ref 304–1227)
TRIGL SERPL-MCNC: 86 MG/DL (ref 30–150)
WBC # BLD AUTO: 3.71 K/UL (ref 3.9–12.7)

## 2021-09-13 PROCEDURE — 85025 COMPLETE CBC W/AUTO DIFF WBC: CPT | Performed by: RADIOLOGY

## 2021-09-13 PROCEDURE — 80053 COMPREHEN METABOLIC PANEL: CPT | Performed by: RADIOLOGY

## 2021-09-13 PROCEDURE — 77014 PR  CT GUIDANCE PLACEMENT RAD THERAPY FIELDS: ICD-10-PCS | Mod: 26,,, | Performed by: RADIOLOGY

## 2021-09-13 PROCEDURE — 77385 HC IMRT, SIMPLE: CPT | Performed by: RADIOLOGY

## 2021-09-13 PROCEDURE — 77014 HC CT GUIDANCE RADIATION THERAPY FLDS PLACEMENT: CPT | Mod: TC | Performed by: RADIOLOGY

## 2021-09-13 PROCEDURE — 80061 LIPID PANEL: CPT | Performed by: INTERNAL MEDICINE

## 2021-09-13 PROCEDURE — 82306 VITAMIN D 25 HYDROXY: CPT | Performed by: RADIOLOGY

## 2021-09-13 PROCEDURE — 77014 PR  CT GUIDANCE PLACEMENT RAD THERAPY FIELDS: CPT | Mod: 26,,, | Performed by: RADIOLOGY

## 2021-09-13 PROCEDURE — 36415 COLL VENOUS BLD VENIPUNCTURE: CPT | Mod: PN | Performed by: INTERNAL MEDICINE

## 2021-09-13 PROCEDURE — 84153 ASSAY OF PSA TOTAL: CPT | Performed by: RADIOLOGY

## 2021-09-13 PROCEDURE — 36415 COLL VENOUS BLD VENIPUNCTURE: CPT | Mod: PN | Performed by: RADIOLOGY

## 2021-09-13 PROCEDURE — 84403 ASSAY OF TOTAL TESTOSTERONE: CPT | Performed by: RADIOLOGY

## 2021-09-14 PROCEDURE — 77014 PR  CT GUIDANCE PLACEMENT RAD THERAPY FIELDS: ICD-10-PCS | Mod: 26,,, | Performed by: RADIOLOGY

## 2021-09-14 PROCEDURE — 77385 HC IMRT, SIMPLE: CPT | Performed by: RADIOLOGY

## 2021-09-14 PROCEDURE — 77014 HC CT GUIDANCE RADIATION THERAPY FLDS PLACEMENT: CPT | Mod: TC | Performed by: RADIOLOGY

## 2021-09-14 PROCEDURE — 77014 PR  CT GUIDANCE PLACEMENT RAD THERAPY FIELDS: CPT | Mod: 26,,, | Performed by: RADIOLOGY

## 2021-09-15 PROCEDURE — 77014 HC CT GUIDANCE RADIATION THERAPY FLDS PLACEMENT: CPT | Mod: TC | Performed by: RADIOLOGY

## 2021-09-15 PROCEDURE — 77014 PR  CT GUIDANCE PLACEMENT RAD THERAPY FIELDS: ICD-10-PCS | Mod: 26,,, | Performed by: RADIOLOGY

## 2021-09-15 PROCEDURE — 77014 PR  CT GUIDANCE PLACEMENT RAD THERAPY FIELDS: CPT | Mod: 26,,, | Performed by: RADIOLOGY

## 2021-09-15 PROCEDURE — 77385 HC IMRT, SIMPLE: CPT | Performed by: RADIOLOGY

## 2021-09-16 PROCEDURE — 77385 HC IMRT, SIMPLE: CPT | Performed by: RADIOLOGY

## 2021-09-16 PROCEDURE — 77014 PR  CT GUIDANCE PLACEMENT RAD THERAPY FIELDS: ICD-10-PCS | Mod: 26,,, | Performed by: RADIOLOGY

## 2021-09-16 PROCEDURE — 77338 PR  MLC IMRT DESIGN & CONSTRUCTION PER IMRT PLAN: ICD-10-PCS | Mod: 26,,, | Performed by: RADIOLOGY

## 2021-09-16 PROCEDURE — 77300 RADIATION THERAPY DOSE PLAN: CPT | Mod: TC | Performed by: RADIOLOGY

## 2021-09-16 PROCEDURE — 77338 DESIGN MLC DEVICE FOR IMRT: CPT | Mod: 26,,, | Performed by: RADIOLOGY

## 2021-09-16 PROCEDURE — 77014 HC CT GUIDANCE RADIATION THERAPY FLDS PLACEMENT: CPT | Mod: TC | Performed by: RADIOLOGY

## 2021-09-16 PROCEDURE — 77300 PR RADIATION THERAPY,DOSIMETRY PLAN: ICD-10-PCS | Mod: 26,,, | Performed by: RADIOLOGY

## 2021-09-16 PROCEDURE — 77014 PR  CT GUIDANCE PLACEMENT RAD THERAPY FIELDS: CPT | Mod: 26,,, | Performed by: RADIOLOGY

## 2021-09-16 PROCEDURE — 77300 RADIATION THERAPY DOSE PLAN: CPT | Mod: 26,,, | Performed by: RADIOLOGY

## 2021-09-16 PROCEDURE — 77338 DESIGN MLC DEVICE FOR IMRT: CPT | Mod: TC | Performed by: RADIOLOGY

## 2021-09-17 PROCEDURE — 77336 RADIATION PHYSICS CONSULT: CPT | Performed by: RADIOLOGY

## 2021-09-17 PROCEDURE — 77014 HC CT GUIDANCE RADIATION THERAPY FLDS PLACEMENT: CPT | Mod: TC | Performed by: RADIOLOGY

## 2021-09-17 PROCEDURE — 77385 HC IMRT, SIMPLE: CPT | Performed by: RADIOLOGY

## 2021-09-17 PROCEDURE — 77014 PR  CT GUIDANCE PLACEMENT RAD THERAPY FIELDS: ICD-10-PCS | Mod: 26,,, | Performed by: RADIOLOGY

## 2021-09-17 PROCEDURE — 77014 PR  CT GUIDANCE PLACEMENT RAD THERAPY FIELDS: CPT | Mod: 26,,, | Performed by: RADIOLOGY

## 2021-09-20 ENCOUNTER — DOCUMENTATION ONLY (OUTPATIENT)
Dept: RADIATION ONCOLOGY | Facility: CLINIC | Age: 66
End: 2021-09-20

## 2021-09-20 DIAGNOSIS — C61 PROSTATE CANCER: Primary | ICD-10-CM

## 2021-09-20 PROCEDURE — 77014 PR  CT GUIDANCE PLACEMENT RAD THERAPY FIELDS: ICD-10-PCS | Mod: 26,,, | Performed by: RADIOLOGY

## 2021-09-20 PROCEDURE — 77014 HC CT GUIDANCE RADIATION THERAPY FLDS PLACEMENT: CPT | Mod: TC | Performed by: RADIOLOGY

## 2021-09-20 PROCEDURE — 77385 HC IMRT, SIMPLE: CPT | Performed by: RADIOLOGY

## 2021-09-20 PROCEDURE — 77014 PR  CT GUIDANCE PLACEMENT RAD THERAPY FIELDS: CPT | Mod: 26,,, | Performed by: RADIOLOGY

## 2021-09-21 PROCEDURE — 77014 PR  CT GUIDANCE PLACEMENT RAD THERAPY FIELDS: ICD-10-PCS | Mod: 26,,, | Performed by: RADIOLOGY

## 2021-09-21 PROCEDURE — 77014 PR  CT GUIDANCE PLACEMENT RAD THERAPY FIELDS: CPT | Mod: 26,,, | Performed by: RADIOLOGY

## 2021-09-21 PROCEDURE — 77385 HC IMRT, SIMPLE: CPT | Performed by: RADIOLOGY

## 2021-09-21 PROCEDURE — 77014 HC CT GUIDANCE RADIATION THERAPY FLDS PLACEMENT: CPT | Mod: TC | Performed by: RADIOLOGY

## 2021-09-22 ENCOUNTER — OFFICE VISIT (OUTPATIENT)
Dept: FAMILY MEDICINE | Facility: CLINIC | Age: 66
End: 2021-09-22
Payer: MEDICARE

## 2021-09-22 VITALS
SYSTOLIC BLOOD PRESSURE: 122 MMHG | WEIGHT: 160.25 LBS | BODY MASS INDEX: 24.29 KG/M2 | HEART RATE: 51 BPM | TEMPERATURE: 98 F | OXYGEN SATURATION: 99 % | HEIGHT: 68 IN | DIASTOLIC BLOOD PRESSURE: 62 MMHG

## 2021-09-22 DIAGNOSIS — F41.9 ANXIETY: ICD-10-CM

## 2021-09-22 DIAGNOSIS — C61 PROSTATE CANCER: Primary | ICD-10-CM

## 2021-09-22 DIAGNOSIS — I10 HTN (HYPERTENSION), BENIGN: ICD-10-CM

## 2021-09-22 DIAGNOSIS — G47.9 SLEEP TROUBLE: ICD-10-CM

## 2021-09-22 PROCEDURE — 77385 HC IMRT, SIMPLE: CPT | Performed by: RADIOLOGY

## 2021-09-22 PROCEDURE — 77014 HC CT GUIDANCE RADIATION THERAPY FLDS PLACEMENT: CPT | Mod: TC | Performed by: RADIOLOGY

## 2021-09-22 PROCEDURE — 99999 PR PBB SHADOW E&M-EST. PATIENT-LVL III: CPT | Mod: PBBFAC,,, | Performed by: INTERNAL MEDICINE

## 2021-09-22 PROCEDURE — 77014 PR  CT GUIDANCE PLACEMENT RAD THERAPY FIELDS: ICD-10-PCS | Mod: 26,,, | Performed by: RADIOLOGY

## 2021-09-22 PROCEDURE — 99999 PR PBB SHADOW E&M-EST. PATIENT-LVL III: ICD-10-PCS | Mod: PBBFAC,,, | Performed by: INTERNAL MEDICINE

## 2021-09-22 PROCEDURE — 99213 OFFICE O/P EST LOW 20 MIN: CPT | Mod: PBBFAC,PN,25 | Performed by: INTERNAL MEDICINE

## 2021-09-22 PROCEDURE — 99214 OFFICE O/P EST MOD 30 MIN: CPT | Mod: S$PBB,,, | Performed by: INTERNAL MEDICINE

## 2021-09-22 PROCEDURE — 77014 PR  CT GUIDANCE PLACEMENT RAD THERAPY FIELDS: CPT | Mod: 26,,, | Performed by: RADIOLOGY

## 2021-09-22 PROCEDURE — 99214 PR OFFICE/OUTPT VISIT, EST, LEVL IV, 30-39 MIN: ICD-10-PCS | Mod: S$PBB,,, | Performed by: INTERNAL MEDICINE

## 2021-09-22 RX ORDER — ALPRAZOLAM 0.5 MG/1
0.5 TABLET ORAL 2 TIMES DAILY PRN
Qty: 30 TABLET | Refills: 1 | Status: SHIPPED | OUTPATIENT
Start: 2021-09-22 | End: 2022-11-08 | Stop reason: SDUPTHER

## 2021-09-23 ENCOUNTER — PATIENT MESSAGE (OUTPATIENT)
Dept: CARDIOLOGY | Facility: CLINIC | Age: 66
End: 2021-09-23

## 2021-09-23 PROCEDURE — 77385 HC IMRT, SIMPLE: CPT | Performed by: RADIOLOGY

## 2021-09-23 PROCEDURE — 77014 PR  CT GUIDANCE PLACEMENT RAD THERAPY FIELDS: CPT | Mod: 26,,, | Performed by: RADIOLOGY

## 2021-09-23 PROCEDURE — 77014 HC CT GUIDANCE RADIATION THERAPY FLDS PLACEMENT: CPT | Mod: TC | Performed by: RADIOLOGY

## 2021-09-23 PROCEDURE — 77014 PR  CT GUIDANCE PLACEMENT RAD THERAPY FIELDS: ICD-10-PCS | Mod: 26,,, | Performed by: RADIOLOGY

## 2021-09-24 PROCEDURE — 77014 PR  CT GUIDANCE PLACEMENT RAD THERAPY FIELDS: ICD-10-PCS | Mod: 26,,, | Performed by: RADIOLOGY

## 2021-09-24 PROCEDURE — 77385 HC IMRT, SIMPLE: CPT | Performed by: RADIOLOGY

## 2021-09-24 PROCEDURE — 77014 PR  CT GUIDANCE PLACEMENT RAD THERAPY FIELDS: CPT | Mod: 26,,, | Performed by: RADIOLOGY

## 2021-09-24 PROCEDURE — 77014 HC CT GUIDANCE RADIATION THERAPY FLDS PLACEMENT: CPT | Mod: TC | Performed by: RADIOLOGY

## 2021-09-27 ENCOUNTER — PATIENT MESSAGE (OUTPATIENT)
Dept: RADIATION ONCOLOGY | Facility: CLINIC | Age: 66
End: 2021-09-27

## 2021-09-27 ENCOUNTER — DOCUMENTATION ONLY (OUTPATIENT)
Dept: RADIATION ONCOLOGY | Facility: CLINIC | Age: 66
End: 2021-09-27

## 2021-09-27 PROCEDURE — 77014 PR  CT GUIDANCE PLACEMENT RAD THERAPY FIELDS: CPT | Mod: 26,,, | Performed by: RADIOLOGY

## 2021-09-27 PROCEDURE — 77385 HC IMRT, SIMPLE: CPT | Performed by: RADIOLOGY

## 2021-09-27 PROCEDURE — 77336 RADIATION PHYSICS CONSULT: CPT | Performed by: RADIOLOGY

## 2021-09-27 PROCEDURE — 77014 PR  CT GUIDANCE PLACEMENT RAD THERAPY FIELDS: ICD-10-PCS | Mod: 26,,, | Performed by: RADIOLOGY

## 2021-09-27 PROCEDURE — 77014 HC CT GUIDANCE RADIATION THERAPY FLDS PLACEMENT: CPT | Mod: TC | Performed by: RADIOLOGY

## 2021-09-28 PROCEDURE — 77014 HC CT GUIDANCE RADIATION THERAPY FLDS PLACEMENT: CPT | Mod: TC | Performed by: RADIOLOGY

## 2021-09-28 PROCEDURE — 77014 PR  CT GUIDANCE PLACEMENT RAD THERAPY FIELDS: ICD-10-PCS | Mod: 26,,, | Performed by: RADIOLOGY

## 2021-09-28 PROCEDURE — 77385 HC IMRT, SIMPLE: CPT | Performed by: RADIOLOGY

## 2021-09-28 PROCEDURE — 77014 PR  CT GUIDANCE PLACEMENT RAD THERAPY FIELDS: CPT | Mod: 26,,, | Performed by: RADIOLOGY

## 2021-09-29 PROCEDURE — 77385 HC IMRT, SIMPLE: CPT | Performed by: RADIOLOGY

## 2021-09-29 PROCEDURE — 77014 PR  CT GUIDANCE PLACEMENT RAD THERAPY FIELDS: ICD-10-PCS | Mod: 26,,, | Performed by: RADIOLOGY

## 2021-09-29 PROCEDURE — 77014 PR  CT GUIDANCE PLACEMENT RAD THERAPY FIELDS: CPT | Mod: 26,,, | Performed by: RADIOLOGY

## 2021-09-29 PROCEDURE — 77014 HC CT GUIDANCE RADIATION THERAPY FLDS PLACEMENT: CPT | Mod: TC | Performed by: RADIOLOGY

## 2021-09-30 PROCEDURE — 77014 PR  CT GUIDANCE PLACEMENT RAD THERAPY FIELDS: CPT | Mod: 26,,, | Performed by: RADIOLOGY

## 2021-09-30 PROCEDURE — 77014 HC CT GUIDANCE RADIATION THERAPY FLDS PLACEMENT: CPT | Mod: TC | Performed by: RADIOLOGY

## 2021-09-30 PROCEDURE — 77385 HC IMRT, SIMPLE: CPT | Performed by: RADIOLOGY

## 2021-09-30 PROCEDURE — 77014 PR  CT GUIDANCE PLACEMENT RAD THERAPY FIELDS: ICD-10-PCS | Mod: 26,,, | Performed by: RADIOLOGY

## 2021-10-01 ENCOUNTER — APPOINTMENT (OUTPATIENT)
Dept: RADIATION THERAPY | Facility: OTHER | Age: 66
End: 2021-10-01
Attending: RADIOLOGY
Payer: MEDICARE

## 2021-10-01 PROCEDURE — 77014 HC CT GUIDANCE RADIATION THERAPY FLDS PLACEMENT: CPT | Mod: TC | Performed by: RADIOLOGY

## 2021-10-01 PROCEDURE — 77385 HC IMRT, SIMPLE: CPT | Performed by: RADIOLOGY

## 2021-10-01 PROCEDURE — 77014 PR  CT GUIDANCE PLACEMENT RAD THERAPY FIELDS: ICD-10-PCS | Mod: 26,,, | Performed by: RADIOLOGY

## 2021-10-01 PROCEDURE — 77014 PR  CT GUIDANCE PLACEMENT RAD THERAPY FIELDS: CPT | Mod: 26,,, | Performed by: RADIOLOGY

## 2021-10-04 ENCOUNTER — DOCUMENTATION ONLY (OUTPATIENT)
Dept: RADIATION ONCOLOGY | Facility: CLINIC | Age: 66
End: 2021-10-04

## 2021-10-04 PROCEDURE — 77385 HC IMRT, SIMPLE: CPT | Performed by: RADIOLOGY

## 2021-10-04 PROCEDURE — 77014 PR  CT GUIDANCE PLACEMENT RAD THERAPY FIELDS: ICD-10-PCS | Mod: 26,,, | Performed by: RADIOLOGY

## 2021-10-04 PROCEDURE — 77336 RADIATION PHYSICS CONSULT: CPT | Performed by: RADIOLOGY

## 2021-10-04 PROCEDURE — 77014 PR  CT GUIDANCE PLACEMENT RAD THERAPY FIELDS: CPT | Mod: 26,,, | Performed by: RADIOLOGY

## 2021-10-04 PROCEDURE — 77014 HC CT GUIDANCE RADIATION THERAPY FLDS PLACEMENT: CPT | Mod: TC | Performed by: RADIOLOGY

## 2021-10-05 ENCOUNTER — LAB VISIT (OUTPATIENT)
Dept: LAB | Facility: HOSPITAL | Age: 66
End: 2021-10-05
Attending: RADIOLOGY
Payer: MEDICARE

## 2021-10-05 DIAGNOSIS — M83.5 OTHER DRUG-INDUCED OSTEOMALACIA IN ADULTS: ICD-10-CM

## 2021-10-05 DIAGNOSIS — C61 PROSTATE CANCER: ICD-10-CM

## 2021-10-05 LAB
25(OH)D3+25(OH)D2 SERPL-MCNC: 43 NG/ML (ref 30–96)
ALBUMIN SERPL BCP-MCNC: 4 G/DL (ref 3.5–5.2)
ALBUMIN SERPL BCP-MCNC: 4 G/DL (ref 3.5–5.2)
ALP SERPL-CCNC: 62 U/L (ref 55–135)
ALP SERPL-CCNC: 62 U/L (ref 55–135)
ALT SERPL W/O P-5'-P-CCNC: 16 U/L (ref 10–44)
ALT SERPL W/O P-5'-P-CCNC: 16 U/L (ref 10–44)
ANION GAP SERPL CALC-SCNC: 10 MMOL/L (ref 8–16)
ANION GAP SERPL CALC-SCNC: 10 MMOL/L (ref 8–16)
AST SERPL-CCNC: 19 U/L (ref 10–40)
AST SERPL-CCNC: 19 U/L (ref 10–40)
BASOPHILS # BLD AUTO: 0.04 K/UL (ref 0–0.2)
BASOPHILS # BLD AUTO: 0.04 K/UL (ref 0–0.2)
BASOPHILS NFR BLD: 1.1 % (ref 0–1.9)
BASOPHILS NFR BLD: 1.1 % (ref 0–1.9)
BILIRUB SERPL-MCNC: 0.4 MG/DL (ref 0.1–1)
BILIRUB SERPL-MCNC: 0.4 MG/DL (ref 0.1–1)
BUN SERPL-MCNC: 14 MG/DL (ref 8–23)
BUN SERPL-MCNC: 14 MG/DL (ref 8–23)
CALCIUM SERPL-MCNC: 10.4 MG/DL (ref 8.7–10.5)
CALCIUM SERPL-MCNC: 10.4 MG/DL (ref 8.7–10.5)
CHLORIDE SERPL-SCNC: 103 MMOL/L (ref 95–110)
CHLORIDE SERPL-SCNC: 103 MMOL/L (ref 95–110)
CO2 SERPL-SCNC: 27 MMOL/L (ref 23–29)
CO2 SERPL-SCNC: 27 MMOL/L (ref 23–29)
COMPLEXED PSA SERPL-MCNC: <0.01 NG/ML (ref 0–4)
CREAT SERPL-MCNC: 1.1 MG/DL (ref 0.5–1.4)
CREAT SERPL-MCNC: 1.1 MG/DL (ref 0.5–1.4)
DIFFERENTIAL METHOD: ABNORMAL
DIFFERENTIAL METHOD: ABNORMAL
EOSINOPHIL # BLD AUTO: 0.2 K/UL (ref 0–0.5)
EOSINOPHIL # BLD AUTO: 0.2 K/UL (ref 0–0.5)
EOSINOPHIL NFR BLD: 4.3 % (ref 0–8)
EOSINOPHIL NFR BLD: 4.3 % (ref 0–8)
ERYTHROCYTE [DISTWIDTH] IN BLOOD BY AUTOMATED COUNT: 12.2 % (ref 11.5–14.5)
ERYTHROCYTE [DISTWIDTH] IN BLOOD BY AUTOMATED COUNT: 12.2 % (ref 11.5–14.5)
EST. GFR  (AFRICAN AMERICAN): >60 ML/MIN/1.73 M^2
EST. GFR  (AFRICAN AMERICAN): >60 ML/MIN/1.73 M^2
EST. GFR  (NON AFRICAN AMERICAN): >60 ML/MIN/1.73 M^2
EST. GFR  (NON AFRICAN AMERICAN): >60 ML/MIN/1.73 M^2
GLUCOSE SERPL-MCNC: 108 MG/DL (ref 70–110)
GLUCOSE SERPL-MCNC: 108 MG/DL (ref 70–110)
HCT VFR BLD AUTO: 37.2 % (ref 40–54)
HCT VFR BLD AUTO: 37.2 % (ref 40–54)
HGB BLD-MCNC: 12.5 G/DL (ref 14–18)
HGB BLD-MCNC: 12.5 G/DL (ref 14–18)
IMM GRANULOCYTES # BLD AUTO: 0.01 K/UL (ref 0–0.04)
IMM GRANULOCYTES # BLD AUTO: 0.01 K/UL (ref 0–0.04)
IMM GRANULOCYTES NFR BLD AUTO: 0.3 % (ref 0–0.5)
IMM GRANULOCYTES NFR BLD AUTO: 0.3 % (ref 0–0.5)
LYMPHOCYTES # BLD AUTO: 0.4 K/UL (ref 1–4.8)
LYMPHOCYTES # BLD AUTO: 0.4 K/UL (ref 1–4.8)
LYMPHOCYTES NFR BLD: 11.7 % (ref 18–48)
LYMPHOCYTES NFR BLD: 11.7 % (ref 18–48)
MCH RBC QN AUTO: 32.6 PG (ref 27–31)
MCH RBC QN AUTO: 32.6 PG (ref 27–31)
MCHC RBC AUTO-ENTMCNC: 33.6 G/DL (ref 32–36)
MCHC RBC AUTO-ENTMCNC: 33.6 G/DL (ref 32–36)
MCV RBC AUTO: 97 FL (ref 82–98)
MCV RBC AUTO: 97 FL (ref 82–98)
MONOCYTES # BLD AUTO: 0.4 K/UL (ref 0.3–1)
MONOCYTES # BLD AUTO: 0.4 K/UL (ref 0.3–1)
MONOCYTES NFR BLD: 11.7 % (ref 4–15)
MONOCYTES NFR BLD: 11.7 % (ref 4–15)
NEUTROPHILS # BLD AUTO: 2.6 K/UL (ref 1.8–7.7)
NEUTROPHILS # BLD AUTO: 2.6 K/UL (ref 1.8–7.7)
NEUTROPHILS NFR BLD: 70.9 % (ref 38–73)
NEUTROPHILS NFR BLD: 70.9 % (ref 38–73)
NRBC BLD-RTO: 0 /100 WBC
NRBC BLD-RTO: 0 /100 WBC
PLATELET # BLD AUTO: 187 K/UL (ref 150–450)
PLATELET # BLD AUTO: 187 K/UL (ref 150–450)
PMV BLD AUTO: 10.3 FL (ref 9.2–12.9)
PMV BLD AUTO: 10.3 FL (ref 9.2–12.9)
POTASSIUM SERPL-SCNC: 4.5 MMOL/L (ref 3.5–5.1)
POTASSIUM SERPL-SCNC: 4.5 MMOL/L (ref 3.5–5.1)
PROT SERPL-MCNC: 7.2 G/DL (ref 6–8.4)
PROT SERPL-MCNC: 7.2 G/DL (ref 6–8.4)
RBC # BLD AUTO: 3.84 M/UL (ref 4.6–6.2)
RBC # BLD AUTO: 3.84 M/UL (ref 4.6–6.2)
SODIUM SERPL-SCNC: 140 MMOL/L (ref 136–145)
SODIUM SERPL-SCNC: 140 MMOL/L (ref 136–145)
TESTOST SERPL-MCNC: 16 NG/DL (ref 304–1227)
TESTOST SERPL-MCNC: 16 NG/DL (ref 304–1227)
WBC # BLD AUTO: 3.69 K/UL (ref 3.9–12.7)
WBC # BLD AUTO: 3.69 K/UL (ref 3.9–12.7)

## 2021-10-05 PROCEDURE — 80053 COMPREHEN METABOLIC PANEL: CPT | Performed by: RADIOLOGY

## 2021-10-05 PROCEDURE — 36415 COLL VENOUS BLD VENIPUNCTURE: CPT | Mod: PN | Performed by: RADIOLOGY

## 2021-10-05 PROCEDURE — 77014 PR  CT GUIDANCE PLACEMENT RAD THERAPY FIELDS: ICD-10-PCS | Mod: 26,,, | Performed by: RADIOLOGY

## 2021-10-05 PROCEDURE — 77385 HC IMRT, SIMPLE: CPT | Performed by: RADIOLOGY

## 2021-10-05 PROCEDURE — 84403 ASSAY OF TOTAL TESTOSTERONE: CPT | Performed by: RADIOLOGY

## 2021-10-05 PROCEDURE — 77014 PR  CT GUIDANCE PLACEMENT RAD THERAPY FIELDS: CPT | Mod: 26,,, | Performed by: RADIOLOGY

## 2021-10-05 PROCEDURE — 85025 COMPLETE CBC W/AUTO DIFF WBC: CPT | Performed by: RADIOLOGY

## 2021-10-05 PROCEDURE — 84153 ASSAY OF PSA TOTAL: CPT | Performed by: RADIOLOGY

## 2021-10-05 PROCEDURE — 77014 HC CT GUIDANCE RADIATION THERAPY FLDS PLACEMENT: CPT | Mod: TC | Performed by: RADIOLOGY

## 2021-10-05 PROCEDURE — 82306 VITAMIN D 25 HYDROXY: CPT | Performed by: RADIOLOGY

## 2021-10-06 PROCEDURE — 77014 PR  CT GUIDANCE PLACEMENT RAD THERAPY FIELDS: CPT | Mod: 26,,, | Performed by: RADIOLOGY

## 2021-10-06 PROCEDURE — 77014 HC CT GUIDANCE RADIATION THERAPY FLDS PLACEMENT: CPT | Mod: TC | Performed by: RADIOLOGY

## 2021-10-06 PROCEDURE — 77014 PR  CT GUIDANCE PLACEMENT RAD THERAPY FIELDS: ICD-10-PCS | Mod: 26,,, | Performed by: RADIOLOGY

## 2021-10-06 PROCEDURE — 77385 HC IMRT, SIMPLE: CPT | Performed by: RADIOLOGY

## 2021-10-07 ENCOUNTER — CLINICAL SUPPORT (OUTPATIENT)
Dept: RADIATION ONCOLOGY | Facility: CLINIC | Age: 66
End: 2021-10-07
Payer: MEDICARE

## 2021-10-07 PROCEDURE — 77385 HC IMRT, SIMPLE: CPT | Performed by: RADIOLOGY

## 2021-10-07 PROCEDURE — 77014 HC CT GUIDANCE RADIATION THERAPY FLDS PLACEMENT: CPT | Mod: TC | Performed by: RADIOLOGY

## 2021-10-07 PROCEDURE — 96402 CHEMO HORMON ANTINEOPL SQ/IM: CPT | Mod: PBBFAC

## 2021-10-07 PROCEDURE — 77014 PR  CT GUIDANCE PLACEMENT RAD THERAPY FIELDS: ICD-10-PCS | Mod: 26,,, | Performed by: RADIOLOGY

## 2021-10-07 PROCEDURE — 77014 PR  CT GUIDANCE PLACEMENT RAD THERAPY FIELDS: CPT | Mod: 26,,, | Performed by: RADIOLOGY

## 2021-10-07 RX ADMIN — LEUPROLIDE ACETATE 45 MG: KIT at 09:10

## 2021-10-08 PROCEDURE — 77014 PR  CT GUIDANCE PLACEMENT RAD THERAPY FIELDS: ICD-10-PCS | Mod: 26,,, | Performed by: RADIOLOGY

## 2021-10-08 PROCEDURE — 77014 PR  CT GUIDANCE PLACEMENT RAD THERAPY FIELDS: CPT | Mod: 26,,, | Performed by: RADIOLOGY

## 2021-10-08 PROCEDURE — 77385 HC IMRT, SIMPLE: CPT | Performed by: RADIOLOGY

## 2021-10-08 PROCEDURE — 77014 HC CT GUIDANCE RADIATION THERAPY FLDS PLACEMENT: CPT | Mod: TC | Performed by: RADIOLOGY

## 2021-10-11 ENCOUNTER — DOCUMENTATION ONLY (OUTPATIENT)
Dept: RADIATION ONCOLOGY | Facility: CLINIC | Age: 66
End: 2021-10-11

## 2021-10-11 PROCEDURE — 77385 HC IMRT, SIMPLE: CPT | Performed by: RADIOLOGY

## 2021-10-11 PROCEDURE — 77014 HC CT GUIDANCE RADIATION THERAPY FLDS PLACEMENT: CPT | Mod: TC | Performed by: RADIOLOGY

## 2021-10-11 PROCEDURE — 77014 PR  CT GUIDANCE PLACEMENT RAD THERAPY FIELDS: CPT | Mod: 26,,, | Performed by: RADIOLOGY

## 2021-10-11 PROCEDURE — 77014 PR  CT GUIDANCE PLACEMENT RAD THERAPY FIELDS: ICD-10-PCS | Mod: 26,,, | Performed by: RADIOLOGY

## 2021-10-12 PROCEDURE — 77014 HC CT GUIDANCE RADIATION THERAPY FLDS PLACEMENT: CPT | Mod: TC | Performed by: RADIOLOGY

## 2021-10-12 PROCEDURE — 77014 PR  CT GUIDANCE PLACEMENT RAD THERAPY FIELDS: CPT | Mod: 26,,, | Performed by: RADIOLOGY

## 2021-10-12 PROCEDURE — 77385 HC IMRT, SIMPLE: CPT | Performed by: RADIOLOGY

## 2021-10-12 PROCEDURE — 77014 PR  CT GUIDANCE PLACEMENT RAD THERAPY FIELDS: ICD-10-PCS | Mod: 26,,, | Performed by: RADIOLOGY

## 2021-10-12 PROCEDURE — 77336 RADIATION PHYSICS CONSULT: CPT | Performed by: RADIOLOGY

## 2021-10-13 ENCOUNTER — CLINICAL SUPPORT (OUTPATIENT)
Dept: FAMILY MEDICINE | Facility: CLINIC | Age: 66
End: 2021-10-13
Payer: MEDICARE

## 2021-10-13 DIAGNOSIS — Z23 NEED FOR IMMUNIZATION AGAINST INFLUENZA: Primary | ICD-10-CM

## 2021-10-13 PROCEDURE — 77014 PR  CT GUIDANCE PLACEMENT RAD THERAPY FIELDS: ICD-10-PCS | Mod: 26,,, | Performed by: RADIOLOGY

## 2021-10-13 PROCEDURE — 77014 PR  CT GUIDANCE PLACEMENT RAD THERAPY FIELDS: CPT | Mod: 26,,, | Performed by: RADIOLOGY

## 2021-10-13 PROCEDURE — 99499 NO LOS: ICD-10-PCS | Mod: S$PBB,,, | Performed by: INTERNAL MEDICINE

## 2021-10-13 PROCEDURE — 77014 HC CT GUIDANCE RADIATION THERAPY FLDS PLACEMENT: CPT | Mod: TC | Performed by: RADIOLOGY

## 2021-10-13 PROCEDURE — G0008 ADMIN INFLUENZA VIRUS VAC: HCPCS | Mod: PBBFAC,PO

## 2021-10-13 PROCEDURE — 90694 VACC AIIV4 NO PRSRV 0.5ML IM: CPT | Mod: PBBFAC,PO

## 2021-10-13 PROCEDURE — 99499 UNLISTED E&M SERVICE: CPT | Mod: S$PBB,,, | Performed by: INTERNAL MEDICINE

## 2021-10-13 PROCEDURE — 77385 HC IMRT, SIMPLE: CPT | Performed by: RADIOLOGY

## 2021-10-14 PROCEDURE — 77014 HC CT GUIDANCE RADIATION THERAPY FLDS PLACEMENT: CPT | Mod: TC | Performed by: RADIOLOGY

## 2021-10-14 PROCEDURE — 77385 HC IMRT, SIMPLE: CPT | Performed by: RADIOLOGY

## 2021-10-14 PROCEDURE — 77014 PR  CT GUIDANCE PLACEMENT RAD THERAPY FIELDS: CPT | Mod: 26,,, | Performed by: RADIOLOGY

## 2021-10-14 PROCEDURE — 77014 PR  CT GUIDANCE PLACEMENT RAD THERAPY FIELDS: ICD-10-PCS | Mod: 26,,, | Performed by: RADIOLOGY

## 2021-10-15 PROCEDURE — 77014 HC CT GUIDANCE RADIATION THERAPY FLDS PLACEMENT: CPT | Mod: TC | Performed by: RADIOLOGY

## 2021-10-15 PROCEDURE — 77385 HC IMRT, SIMPLE: CPT | Performed by: RADIOLOGY

## 2021-10-15 PROCEDURE — 77014 PR  CT GUIDANCE PLACEMENT RAD THERAPY FIELDS: CPT | Mod: 26,,, | Performed by: RADIOLOGY

## 2021-10-15 PROCEDURE — 77014 PR  CT GUIDANCE PLACEMENT RAD THERAPY FIELDS: ICD-10-PCS | Mod: 26,,, | Performed by: RADIOLOGY

## 2021-10-18 ENCOUNTER — PATIENT MESSAGE (OUTPATIENT)
Dept: FAMILY MEDICINE | Facility: CLINIC | Age: 66
End: 2021-10-18
Payer: MEDICARE

## 2021-10-18 ENCOUNTER — DOCUMENTATION ONLY (OUTPATIENT)
Dept: RADIATION ONCOLOGY | Facility: CLINIC | Age: 66
End: 2021-10-18

## 2021-10-18 ENCOUNTER — PATIENT MESSAGE (OUTPATIENT)
Dept: FAMILY MEDICINE | Facility: CLINIC | Age: 66
End: 2021-10-18

## 2021-10-18 DIAGNOSIS — K64.4 EXTERNAL HEMORRHOID: Primary | ICD-10-CM

## 2021-10-18 PROCEDURE — 77014 HC CT GUIDANCE RADIATION THERAPY FLDS PLACEMENT: CPT | Mod: TC | Performed by: RADIOLOGY

## 2021-10-18 PROCEDURE — 77385 HC IMRT, SIMPLE: CPT | Performed by: RADIOLOGY

## 2021-10-18 PROCEDURE — 77014 PR  CT GUIDANCE PLACEMENT RAD THERAPY FIELDS: CPT | Mod: 26,,, | Performed by: RADIOLOGY

## 2021-10-18 PROCEDURE — 77014 PR  CT GUIDANCE PLACEMENT RAD THERAPY FIELDS: ICD-10-PCS | Mod: 26,,, | Performed by: RADIOLOGY

## 2021-10-18 RX ORDER — HYDROCORTISONE 25 MG/G
CREAM TOPICAL 2 TIMES DAILY
Qty: 28 G | Refills: 1 | Status: SHIPPED | OUTPATIENT
Start: 2021-10-18 | End: 2022-08-15

## 2021-10-19 PROCEDURE — 77014 PR  CT GUIDANCE PLACEMENT RAD THERAPY FIELDS: ICD-10-PCS | Mod: 26,,, | Performed by: RADIOLOGY

## 2021-10-19 PROCEDURE — 77014 PR  CT GUIDANCE PLACEMENT RAD THERAPY FIELDS: CPT | Mod: 26,,, | Performed by: RADIOLOGY

## 2021-10-19 PROCEDURE — 77014 HC CT GUIDANCE RADIATION THERAPY FLDS PLACEMENT: CPT | Mod: TC | Performed by: RADIOLOGY

## 2021-10-19 PROCEDURE — 77336 RADIATION PHYSICS CONSULT: CPT | Performed by: RADIOLOGY

## 2021-10-19 PROCEDURE — 77385 HC IMRT, SIMPLE: CPT | Performed by: RADIOLOGY

## 2021-10-20 PROCEDURE — 77014 PR  CT GUIDANCE PLACEMENT RAD THERAPY FIELDS: CPT | Mod: 26,,, | Performed by: RADIOLOGY

## 2021-10-20 PROCEDURE — 77385 HC IMRT, SIMPLE: CPT | Performed by: RADIOLOGY

## 2021-10-20 PROCEDURE — 77014 PR  CT GUIDANCE PLACEMENT RAD THERAPY FIELDS: ICD-10-PCS | Mod: 26,,, | Performed by: RADIOLOGY

## 2021-10-20 PROCEDURE — 77014 HC CT GUIDANCE RADIATION THERAPY FLDS PLACEMENT: CPT | Mod: TC | Performed by: RADIOLOGY

## 2021-10-21 PROCEDURE — 77014 PR  CT GUIDANCE PLACEMENT RAD THERAPY FIELDS: ICD-10-PCS | Mod: 26,,, | Performed by: RADIOLOGY

## 2021-10-21 PROCEDURE — 77385 HC IMRT, SIMPLE: CPT | Performed by: RADIOLOGY

## 2021-10-21 PROCEDURE — 77014 PR  CT GUIDANCE PLACEMENT RAD THERAPY FIELDS: CPT | Mod: 26,,, | Performed by: RADIOLOGY

## 2021-10-21 PROCEDURE — 77014 HC CT GUIDANCE RADIATION THERAPY FLDS PLACEMENT: CPT | Mod: TC | Performed by: RADIOLOGY

## 2021-10-22 PROCEDURE — 77385 HC IMRT, SIMPLE: CPT | Performed by: RADIOLOGY

## 2021-10-22 PROCEDURE — 77014 PR  CT GUIDANCE PLACEMENT RAD THERAPY FIELDS: CPT | Mod: 26,,, | Performed by: RADIOLOGY

## 2021-10-22 PROCEDURE — 77014 PR  CT GUIDANCE PLACEMENT RAD THERAPY FIELDS: ICD-10-PCS | Mod: 26,,, | Performed by: RADIOLOGY

## 2021-10-22 PROCEDURE — 77014 HC CT GUIDANCE RADIATION THERAPY FLDS PLACEMENT: CPT | Mod: TC | Performed by: RADIOLOGY

## 2021-10-25 ENCOUNTER — DOCUMENTATION ONLY (OUTPATIENT)
Dept: RADIATION ONCOLOGY | Facility: CLINIC | Age: 66
End: 2021-10-25
Payer: MEDICARE

## 2021-10-25 PROCEDURE — 77014 HC CT GUIDANCE RADIATION THERAPY FLDS PLACEMENT: CPT | Mod: TC | Performed by: RADIOLOGY

## 2021-10-25 PROCEDURE — 77014 PR  CT GUIDANCE PLACEMENT RAD THERAPY FIELDS: ICD-10-PCS | Mod: 26,,, | Performed by: RADIOLOGY

## 2021-10-25 PROCEDURE — 77385 HC IMRT, SIMPLE: CPT | Performed by: RADIOLOGY

## 2021-10-25 PROCEDURE — 77014 PR  CT GUIDANCE PLACEMENT RAD THERAPY FIELDS: CPT | Mod: 26,,, | Performed by: RADIOLOGY

## 2021-10-26 PROCEDURE — 77385 HC IMRT, SIMPLE: CPT | Performed by: RADIOLOGY

## 2021-10-26 PROCEDURE — 77014 HC CT GUIDANCE RADIATION THERAPY FLDS PLACEMENT: CPT | Mod: TC | Performed by: RADIOLOGY

## 2021-10-26 PROCEDURE — 77014 PR  CT GUIDANCE PLACEMENT RAD THERAPY FIELDS: ICD-10-PCS | Mod: 26,,, | Performed by: RADIOLOGY

## 2021-10-26 PROCEDURE — 77014 PR  CT GUIDANCE PLACEMENT RAD THERAPY FIELDS: CPT | Mod: 26,,, | Performed by: RADIOLOGY

## 2021-10-27 ENCOUNTER — DOCUMENTATION ONLY (OUTPATIENT)
Dept: RADIATION ONCOLOGY | Facility: CLINIC | Age: 66
End: 2021-10-27
Payer: MEDICARE

## 2021-10-27 PROCEDURE — 77336 RADIATION PHYSICS CONSULT: CPT | Performed by: RADIOLOGY

## 2021-10-27 PROCEDURE — 77014 PR  CT GUIDANCE PLACEMENT RAD THERAPY FIELDS: ICD-10-PCS | Mod: 26,,, | Performed by: RADIOLOGY

## 2021-10-27 PROCEDURE — 77014 HC CT GUIDANCE RADIATION THERAPY FLDS PLACEMENT: CPT | Mod: TC | Performed by: RADIOLOGY

## 2021-10-27 PROCEDURE — 77385 HC IMRT, SIMPLE: CPT | Performed by: RADIOLOGY

## 2021-10-27 PROCEDURE — 77014 PR  CT GUIDANCE PLACEMENT RAD THERAPY FIELDS: CPT | Mod: 26,,, | Performed by: RADIOLOGY

## 2021-10-29 ENCOUNTER — PATIENT MESSAGE (OUTPATIENT)
Dept: RADIATION ONCOLOGY | Facility: CLINIC | Age: 66
End: 2021-10-29
Payer: MEDICARE

## 2021-10-29 DIAGNOSIS — C61 PROSTATE CANCER: Primary | ICD-10-CM

## 2021-11-01 DIAGNOSIS — Z82.49 FH: CAD (CORONARY ARTERY DISEASE): ICD-10-CM

## 2021-11-01 DIAGNOSIS — I10 HTN (HYPERTENSION), BENIGN: ICD-10-CM

## 2021-11-01 RX ORDER — ALLOPURINOL 100 MG/1
100 TABLET ORAL 2 TIMES DAILY
Qty: 60 TABLET | Refills: 0 | Status: SHIPPED | OUTPATIENT
Start: 2021-11-01 | End: 2021-11-23

## 2021-11-08 ENCOUNTER — LAB VISIT (OUTPATIENT)
Dept: LAB | Facility: HOSPITAL | Age: 66
End: 2021-11-08
Attending: RADIOLOGY
Payer: MEDICARE

## 2021-11-08 ENCOUNTER — PATIENT MESSAGE (OUTPATIENT)
Dept: CARDIOLOGY | Facility: CLINIC | Age: 66
End: 2021-11-08
Payer: MEDICARE

## 2021-11-08 DIAGNOSIS — Z82.49 FH: CAD (CORONARY ARTERY DISEASE): ICD-10-CM

## 2021-11-08 DIAGNOSIS — C61 PROSTATE CANCER: ICD-10-CM

## 2021-11-08 DIAGNOSIS — I10 HTN (HYPERTENSION), BENIGN: ICD-10-CM

## 2021-11-08 DIAGNOSIS — R73.01 IMPAIRED FASTING GLUCOSE: ICD-10-CM

## 2021-11-08 DIAGNOSIS — I49.3 PVC (PREMATURE VENTRICULAR CONTRACTION): ICD-10-CM

## 2021-11-08 LAB
ALBUMIN SERPL BCP-MCNC: 3.9 G/DL (ref 3.5–5.2)
ALP SERPL-CCNC: 57 U/L (ref 55–135)
ALT SERPL W/O P-5'-P-CCNC: 14 U/L (ref 10–44)
ANION GAP SERPL CALC-SCNC: 7 MMOL/L (ref 8–16)
AST SERPL-CCNC: 17 U/L (ref 10–40)
BASOPHILS # BLD AUTO: 0.03 K/UL (ref 0–0.2)
BASOPHILS NFR BLD: 0.9 % (ref 0–1.9)
BILIRUB SERPL-MCNC: 0.5 MG/DL (ref 0.1–1)
BUN SERPL-MCNC: 17 MG/DL (ref 8–23)
CALCIUM SERPL-MCNC: 10.1 MG/DL (ref 8.7–10.5)
CHLORIDE SERPL-SCNC: 105 MMOL/L (ref 95–110)
CHOLEST SERPL-MCNC: 175 MG/DL (ref 120–199)
CHOLEST/HDLC SERPL: 2.5 {RATIO} (ref 2–5)
CO2 SERPL-SCNC: 29 MMOL/L (ref 23–29)
COMPLEXED PSA SERPL-MCNC: <0.01 NG/ML (ref 0–4)
CREAT SERPL-MCNC: 1 MG/DL (ref 0.5–1.4)
DIFFERENTIAL METHOD: ABNORMAL
EOSINOPHIL # BLD AUTO: 0.1 K/UL (ref 0–0.5)
EOSINOPHIL NFR BLD: 2.4 % (ref 0–8)
ERYTHROCYTE [DISTWIDTH] IN BLOOD BY AUTOMATED COUNT: 12.8 % (ref 11.5–14.5)
EST. GFR  (AFRICAN AMERICAN): >60 ML/MIN/1.73 M^2
EST. GFR  (NON AFRICAN AMERICAN): >60 ML/MIN/1.73 M^2
ESTIMATED AVG GLUCOSE: 100 MG/DL (ref 68–131)
GLUCOSE SERPL-MCNC: 105 MG/DL (ref 70–110)
HBA1C MFR BLD: 5.1 % (ref 4–5.6)
HCT VFR BLD AUTO: 35.7 % (ref 40–54)
HDLC SERPL-MCNC: 69 MG/DL (ref 40–75)
HDLC SERPL: 39.4 % (ref 20–50)
HGB BLD-MCNC: 12 G/DL (ref 14–18)
IMM GRANULOCYTES # BLD AUTO: 0.01 K/UL (ref 0–0.04)
IMM GRANULOCYTES NFR BLD AUTO: 0.3 % (ref 0–0.5)
LDLC SERPL CALC-MCNC: 95.2 MG/DL (ref 63–159)
LYMPHOCYTES # BLD AUTO: 0.4 K/UL (ref 1–4.8)
LYMPHOCYTES NFR BLD: 13.2 % (ref 18–48)
MCH RBC QN AUTO: 33.7 PG (ref 27–31)
MCHC RBC AUTO-ENTMCNC: 33.6 G/DL (ref 32–36)
MCV RBC AUTO: 100 FL (ref 82–98)
MONOCYTES # BLD AUTO: 0.4 K/UL (ref 0.3–1)
MONOCYTES NFR BLD: 12.3 % (ref 4–15)
NEUTROPHILS # BLD AUTO: 2.4 K/UL (ref 1.8–7.7)
NEUTROPHILS NFR BLD: 70.9 % (ref 38–73)
NONHDLC SERPL-MCNC: 106 MG/DL
NRBC BLD-RTO: 0 /100 WBC
PLATELET # BLD AUTO: 183 K/UL (ref 150–450)
PMV BLD AUTO: 10.5 FL (ref 9.2–12.9)
POTASSIUM SERPL-SCNC: 4.6 MMOL/L (ref 3.5–5.1)
PROT SERPL-MCNC: 6.8 G/DL (ref 6–8.4)
RBC # BLD AUTO: 3.56 M/UL (ref 4.6–6.2)
SODIUM SERPL-SCNC: 141 MMOL/L (ref 136–145)
TESTOST SERPL-MCNC: 14 NG/DL (ref 304–1227)
TRIGL SERPL-MCNC: 54 MG/DL (ref 30–150)
TSH SERPL DL<=0.005 MIU/L-ACNC: 1.22 UIU/ML (ref 0.4–4)
WBC # BLD AUTO: 3.34 K/UL (ref 3.9–12.7)

## 2021-11-08 PROCEDURE — 36415 COLL VENOUS BLD VENIPUNCTURE: CPT | Mod: PN | Performed by: RADIOLOGY

## 2021-11-08 PROCEDURE — 84443 ASSAY THYROID STIM HORMONE: CPT | Performed by: INTERNAL MEDICINE

## 2021-11-08 PROCEDURE — 80053 COMPREHEN METABOLIC PANEL: CPT | Performed by: RADIOLOGY

## 2021-11-08 PROCEDURE — 84403 ASSAY OF TOTAL TESTOSTERONE: CPT | Performed by: RADIOLOGY

## 2021-11-08 PROCEDURE — 85025 COMPLETE CBC W/AUTO DIFF WBC: CPT | Performed by: RADIOLOGY

## 2021-11-08 PROCEDURE — 83036 HEMOGLOBIN GLYCOSYLATED A1C: CPT | Performed by: INTERNAL MEDICINE

## 2021-11-08 PROCEDURE — 80061 LIPID PANEL: CPT | Performed by: INTERNAL MEDICINE

## 2021-11-08 PROCEDURE — 84153 ASSAY OF PSA TOTAL: CPT | Performed by: RADIOLOGY

## 2021-11-09 ENCOUNTER — PATIENT MESSAGE (OUTPATIENT)
Dept: CARDIOLOGY | Facility: CLINIC | Age: 66
End: 2021-11-09
Payer: MEDICARE

## 2021-11-15 ENCOUNTER — PATIENT MESSAGE (OUTPATIENT)
Dept: RADIATION ONCOLOGY | Facility: CLINIC | Age: 66
End: 2021-11-15
Payer: MEDICARE

## 2021-11-17 ENCOUNTER — OFFICE VISIT (OUTPATIENT)
Dept: CARDIOLOGY | Facility: CLINIC | Age: 66
End: 2021-11-17
Payer: MEDICARE

## 2021-11-17 ENCOUNTER — HOSPITAL ENCOUNTER (OUTPATIENT)
Dept: CARDIOLOGY | Facility: CLINIC | Age: 66
Discharge: HOME | End: 2021-11-17
Payer: MEDICARE

## 2021-11-17 VITALS
HEART RATE: 52 BPM | HEIGHT: 68 IN | SYSTOLIC BLOOD PRESSURE: 163 MMHG | BODY MASS INDEX: 24.35 KG/M2 | WEIGHT: 160.69 LBS | DIASTOLIC BLOOD PRESSURE: 77 MMHG

## 2021-11-17 DIAGNOSIS — I10 HTN (HYPERTENSION), BENIGN: ICD-10-CM

## 2021-11-17 DIAGNOSIS — R00.2 PALPITATIONS: ICD-10-CM

## 2021-11-17 DIAGNOSIS — Z82.49 FH: CAD (CORONARY ARTERY DISEASE): ICD-10-CM

## 2021-11-17 DIAGNOSIS — I49.3 PVC (PREMATURE VENTRICULAR CONTRACTION): ICD-10-CM

## 2021-11-17 DIAGNOSIS — N52.31 ERECTILE DYSFUNCTION FOLLOWING RADICAL PROSTATECTOMY: ICD-10-CM

## 2021-11-17 DIAGNOSIS — R79.89 LOW TESTOSTERONE: ICD-10-CM

## 2021-11-17 DIAGNOSIS — E78.5 DYSLIPIDEMIA: ICD-10-CM

## 2021-11-17 DIAGNOSIS — Z82.49 FH: CAD (CORONARY ARTERY DISEASE): Primary | ICD-10-CM

## 2021-11-17 PROCEDURE — 93010 EKG 12-LEAD: ICD-10-PCS | Mod: S$PBB,,, | Performed by: INTERNAL MEDICINE

## 2021-11-17 PROCEDURE — 99999 PR PBB SHADOW E&M-EST. PATIENT-LVL III: CPT | Mod: PBBFAC,,, | Performed by: INTERNAL MEDICINE

## 2021-11-17 PROCEDURE — 99215 PR OFFICE/OUTPT VISIT, EST, LEVL V, 40-54 MIN: ICD-10-PCS | Mod: S$PBB,,, | Performed by: INTERNAL MEDICINE

## 2021-11-17 PROCEDURE — 93010 ELECTROCARDIOGRAM REPORT: CPT | Mod: S$PBB,,, | Performed by: INTERNAL MEDICINE

## 2021-11-17 PROCEDURE — 99999 PR PBB SHADOW E&M-EST. PATIENT-LVL III: ICD-10-PCS | Mod: PBBFAC,,, | Performed by: INTERNAL MEDICINE

## 2021-11-17 PROCEDURE — 99213 OFFICE O/P EST LOW 20 MIN: CPT | Mod: PBBFAC | Performed by: INTERNAL MEDICINE

## 2021-11-17 PROCEDURE — 93005 ELECTROCARDIOGRAM TRACING: CPT | Mod: PBBFAC | Performed by: INTERNAL MEDICINE

## 2021-11-17 PROCEDURE — 99215 OFFICE O/P EST HI 40 MIN: CPT | Mod: S$PBB,,, | Performed by: INTERNAL MEDICINE

## 2021-11-17 RX ORDER — VERAPAMIL HYDROCHLORIDE 120 MG/1
TABLET, FILM COATED, EXTENDED RELEASE ORAL
Qty: 90 TABLET | Refills: 3 | Status: SHIPPED | OUTPATIENT
Start: 2021-11-17 | End: 2022-10-27

## 2021-11-17 RX ORDER — LOSARTAN POTASSIUM 100 MG/1
100 TABLET ORAL DAILY
Qty: 90 TABLET | Refills: 3 | Status: SHIPPED | OUTPATIENT
Start: 2021-11-17 | End: 2022-10-27

## 2021-11-19 ENCOUNTER — TELEPHONE (OUTPATIENT)
Dept: FAMILY MEDICINE | Facility: CLINIC | Age: 66
End: 2021-11-19
Payer: MEDICARE

## 2021-11-19 DIAGNOSIS — Z23 NEED FOR PNEUMOCOCCAL VACCINE: ICD-10-CM

## 2021-11-19 DIAGNOSIS — Z23 NEED FOR TD VACCINE: Primary | ICD-10-CM

## 2021-11-25 ENCOUNTER — HOSPITAL ENCOUNTER (EMERGENCY)
Facility: HOSPITAL | Age: 66
Discharge: HOME | End: 2021-11-25
Attending: EMERGENCY MEDICINE
Payer: MEDICARE

## 2021-11-25 VITALS
RESPIRATION RATE: 18 BRPM | SYSTOLIC BLOOD PRESSURE: 180 MMHG | HEIGHT: 68 IN | HEART RATE: 64 BPM | OXYGEN SATURATION: 99 % | BODY MASS INDEX: 22.73 KG/M2 | WEIGHT: 150 LBS | DIASTOLIC BLOOD PRESSURE: 87 MMHG | TEMPERATURE: 99 F

## 2021-11-25 DIAGNOSIS — S00.03XA HEMATOMA OF OCCIPITAL REGION OF SCALP: Primary | ICD-10-CM

## 2021-11-25 PROCEDURE — 99284 EMERGENCY DEPT VISIT MOD MDM: CPT | Mod: 25

## 2021-12-08 ENCOUNTER — PES CALL (OUTPATIENT)
Dept: ADMINISTRATIVE | Facility: CLINIC | Age: 66
End: 2021-12-08
Payer: MEDICARE

## 2021-12-10 ENCOUNTER — PATIENT MESSAGE (OUTPATIENT)
Dept: CARDIOLOGY | Facility: CLINIC | Age: 66
End: 2021-12-10
Payer: MEDICARE

## 2021-12-10 RX ORDER — VERAPAMIL HYDROCHLORIDE 240 MG/1
240 CAPSULE, EXTENDED RELEASE ORAL EVERY MORNING
Qty: 90 CAPSULE | Refills: 3 | Status: SHIPPED | OUTPATIENT
Start: 2021-12-10 | End: 2022-10-27

## 2021-12-15 ENCOUNTER — PATIENT MESSAGE (OUTPATIENT)
Dept: ADMINISTRATIVE | Facility: HOSPITAL | Age: 66
End: 2021-12-15
Payer: MEDICARE

## 2021-12-17 ENCOUNTER — NURSE TRIAGE (OUTPATIENT)
Dept: ADMINISTRATIVE | Facility: CLINIC | Age: 66
End: 2021-12-17
Payer: MEDICARE

## 2021-12-20 ENCOUNTER — PATIENT MESSAGE (OUTPATIENT)
Dept: RADIATION ONCOLOGY | Facility: CLINIC | Age: 66
End: 2021-12-20
Payer: MEDICARE

## 2021-12-22 ENCOUNTER — LAB VISIT (OUTPATIENT)
Dept: LAB | Facility: HOSPITAL | Age: 66
End: 2021-12-22
Attending: RADIOLOGY
Payer: MEDICARE

## 2021-12-22 DIAGNOSIS — C61 PROSTATE CANCER: ICD-10-CM

## 2021-12-22 DIAGNOSIS — M83.5 OTHER DRUG-INDUCED OSTEOMALACIA IN ADULTS: ICD-10-CM

## 2021-12-22 LAB
25(OH)D3+25(OH)D2 SERPL-MCNC: 36 NG/ML (ref 30–96)
ALBUMIN SERPL BCP-MCNC: 4 G/DL (ref 3.5–5.2)
ALP SERPL-CCNC: 61 U/L (ref 55–135)
ALT SERPL W/O P-5'-P-CCNC: 18 U/L (ref 10–44)
ANION GAP SERPL CALC-SCNC: 7 MMOL/L (ref 8–16)
AST SERPL-CCNC: 20 U/L (ref 10–40)
BASOPHILS # BLD AUTO: 0.04 K/UL (ref 0–0.2)
BASOPHILS NFR BLD: 1.2 % (ref 0–1.9)
BILIRUB SERPL-MCNC: 0.3 MG/DL (ref 0.1–1)
BUN SERPL-MCNC: 17 MG/DL (ref 8–23)
CALCIUM SERPL-MCNC: 9.7 MG/DL (ref 8.7–10.5)
CHLORIDE SERPL-SCNC: 104 MMOL/L (ref 95–110)
CO2 SERPL-SCNC: 29 MMOL/L (ref 23–29)
COMPLEXED PSA SERPL-MCNC: <0.01 NG/ML (ref 0–4)
CREAT SERPL-MCNC: 1.1 MG/DL (ref 0.5–1.4)
DIFFERENTIAL METHOD: ABNORMAL
EOSINOPHIL # BLD AUTO: 0.1 K/UL (ref 0–0.5)
EOSINOPHIL NFR BLD: 2.1 % (ref 0–8)
ERYTHROCYTE [DISTWIDTH] IN BLOOD BY AUTOMATED COUNT: 11.7 % (ref 11.5–14.5)
EST. GFR  (AFRICAN AMERICAN): >60 ML/MIN/1.73 M^2
EST. GFR  (NON AFRICAN AMERICAN): >60 ML/MIN/1.73 M^2
GLUCOSE SERPL-MCNC: 88 MG/DL (ref 70–110)
HCT VFR BLD AUTO: 37.3 % (ref 40–54)
HGB BLD-MCNC: 12.2 G/DL (ref 14–18)
IMM GRANULOCYTES # BLD AUTO: 0.02 K/UL (ref 0–0.04)
IMM GRANULOCYTES NFR BLD AUTO: 0.6 % (ref 0–0.5)
LYMPHOCYTES # BLD AUTO: 0.5 K/UL (ref 1–4.8)
LYMPHOCYTES NFR BLD: 13.8 % (ref 18–48)
MCH RBC QN AUTO: 33.2 PG (ref 27–31)
MCHC RBC AUTO-ENTMCNC: 32.7 G/DL (ref 32–36)
MCV RBC AUTO: 102 FL (ref 82–98)
MONOCYTES # BLD AUTO: 0.4 K/UL (ref 0.3–1)
MONOCYTES NFR BLD: 12.6 % (ref 4–15)
NEUTROPHILS # BLD AUTO: 2.3 K/UL (ref 1.8–7.7)
NEUTROPHILS NFR BLD: 69.7 % (ref 38–73)
NRBC BLD-RTO: 0 /100 WBC
PLATELET # BLD AUTO: 186 K/UL (ref 150–450)
PMV BLD AUTO: 10.4 FL (ref 9.2–12.9)
POTASSIUM SERPL-SCNC: 5 MMOL/L (ref 3.5–5.1)
PROT SERPL-MCNC: 6.9 G/DL (ref 6–8.4)
RBC # BLD AUTO: 3.67 M/UL (ref 4.6–6.2)
SODIUM SERPL-SCNC: 140 MMOL/L (ref 136–145)
TESTOST SERPL-MCNC: 13 NG/DL (ref 304–1227)
WBC # BLD AUTO: 3.34 K/UL (ref 3.9–12.7)

## 2021-12-22 PROCEDURE — 36415 COLL VENOUS BLD VENIPUNCTURE: CPT | Mod: PN | Performed by: RADIOLOGY

## 2021-12-22 PROCEDURE — 84403 ASSAY OF TOTAL TESTOSTERONE: CPT | Performed by: RADIOLOGY

## 2021-12-22 PROCEDURE — 82306 VITAMIN D 25 HYDROXY: CPT | Performed by: RADIOLOGY

## 2021-12-22 PROCEDURE — 85025 COMPLETE CBC W/AUTO DIFF WBC: CPT | Performed by: RADIOLOGY

## 2021-12-22 PROCEDURE — 84153 ASSAY OF PSA TOTAL: CPT | Performed by: RADIOLOGY

## 2021-12-22 PROCEDURE — 80053 COMPREHEN METABOLIC PANEL: CPT | Performed by: RADIOLOGY

## 2022-01-03 ENCOUNTER — OFFICE VISIT (OUTPATIENT)
Dept: RADIATION ONCOLOGY | Facility: CLINIC | Age: 67
End: 2022-01-03
Attending: RADIOLOGY
Payer: MEDICARE

## 2022-01-03 VITALS
SYSTOLIC BLOOD PRESSURE: 181 MMHG | DIASTOLIC BLOOD PRESSURE: 78 MMHG | BODY MASS INDEX: 24.4 KG/M2 | HEART RATE: 51 BPM | WEIGHT: 161 LBS | HEIGHT: 68 IN

## 2022-01-03 DIAGNOSIS — C61 PROSTATE CANCER: Primary | ICD-10-CM

## 2022-01-03 PROCEDURE — 99499 UNLISTED E&M SERVICE: CPT | Mod: S$PBB,,, | Performed by: RADIOLOGY

## 2022-01-03 PROCEDURE — 99499 NO LOS: ICD-10-PCS | Mod: S$PBB,,, | Performed by: RADIOLOGY

## 2022-01-03 PROCEDURE — 99214 OFFICE O/P EST MOD 30 MIN: CPT | Mod: PBBFAC | Performed by: RADIOLOGY

## 2022-01-03 PROCEDURE — 99999 PR PBB SHADOW E&M-EST. PATIENT-LVL IV: CPT | Mod: PBBFAC,,, | Performed by: RADIOLOGY

## 2022-01-03 PROCEDURE — 99999 PR PBB SHADOW E&M-EST. PATIENT-LVL IV: ICD-10-PCS | Mod: PBBFAC,,, | Performed by: RADIOLOGY

## 2022-01-03 NOTE — PROGRESS NOTES
Subjective:       Patient ID: Denis Bunn is a 66 y.o. male.    Chief Complaint: Prostate Cancer (3mo f/u;psa)    This patient returns for follow up visit.     Mr. Bunn has a history of metastatic prostate cancer.  He initially presented in  July of 2020 for evaluation of an elevated PSA of 5.3 ng/ml.  The patient subsequently underwent RALP with bilateral lymph node dissection on 9/2/20.  Pathology revealed Jose 7 (4+3) adenocarcinoma involving 20% of the prostate.  The Hollis pattern 4 accounted for 85% of the tumor.  There was extraprostatic extension with multifocal perineural and lymphovascular invasion.  There was no bladder neck invasion or seminal vesicle invasion.  The margins of resection and 1  Rt. and 1 Lt. pelvic nodes were negative for tumor involvement.  Postoperative PSA on 10/1/20 returned at 0.14 ng/ml.  Repeat PSA  2/9/21 and returned at 0.41 ng/ml.  Work up with Axumin scan revealed possible lesion in the Lt. pubic symphysis.  MRI confirmed a 7 mm lesion in the Lt. symphysis pubis, biopsy of which was positive for metastatic disease.  The patient began hormonal therapy with Lupron on 4/1/21.  He is also receiving apalutamide per [facility].  He was recommended for radiotherapy to the prostate bed, pubic symphysis and pelvic nodes which he completed on 10/27/21.  The metastatic focus in the pubic symphysis also received radiotherapy.  Today the patient states he feels fairly well.  Complains of fatigue.      Review of Systems   Constitutional: Positive for fatigue. Negative for activity change, appetite change, chills, diaphoresis and fever.   Gastrointestinal: Negative for abdominal pain, change in bowel habit, constipation, diarrhea, fecal incontinence and change in bowel habit.   Genitourinary: Negative for bladder incontinence, difficulty urinating, dysuria, flank pain and frequency.         Objective:      Physical Exam  Constitutional:       General: He is not in acute distress.      Appearance: Normal appearance.   Pulmonary:      Effort: Pulmonary effort is normal. No respiratory distress.   Abdominal:      General: Abdomen is flat. There is no distension.   Neurological:      Mental Status: He is alert and oriented to person, place, and time.   Psychiatric:         Mood and Affect: Mood normal.         Judgment: Judgment normal.       PSA - < 0.01 ng/ml  Assessment:       Problem List Items Addressed This Visit     Prostate cancer - Primary          Plan:       Doing well, recovered from the acute effects of radiotherapy.  Continues on hormonal therapy.  Plan follow up in April for Lupron injection

## 2022-01-05 ENCOUNTER — LAB VISIT (OUTPATIENT)
Dept: PRIMARY CARE CLINIC | Facility: OTHER | Age: 67
End: 2022-01-05
Attending: INTERNAL MEDICINE
Payer: MEDICARE

## 2022-01-05 ENCOUNTER — NURSE TRIAGE (OUTPATIENT)
Dept: ADMINISTRATIVE | Facility: CLINIC | Age: 67
End: 2022-01-05
Payer: MEDICARE

## 2022-01-05 DIAGNOSIS — Z20.822 ENCOUNTER FOR LABORATORY TESTING FOR COVID-19 VIRUS: ICD-10-CM

## 2022-01-05 PROCEDURE — U0003 INFECTIOUS AGENT DETECTION BY NUCLEIC ACID (DNA OR RNA); SEVERE ACUTE RESPIRATORY SYNDROME CORONAVIRUS 2 (SARS-COV-2) (CORONAVIRUS DISEASE [COVID-19]), AMPLIFIED PROBE TECHNIQUE, MAKING USE OF HIGH THROUGHPUT TECHNOLOGIES AS DESCRIBED BY CMS-2020-01-R: HCPCS | Performed by: INTERNAL MEDICINE

## 2022-01-05 NOTE — TELEPHONE ENCOUNTER
Pt asking questions known exposure , pt without covid  Symptoms, negative covid test today , home care advice given kota    Reason for Disposition   Information only question and nurse able to answer   [1] COVID-19 exposure AND [2] NO symptoms   [1] COVID-19 EXPOSURE AND [2] 15 or more days ago AND [3] NO symptoms    Additional Information   Negative: COVID-19 lab test positive   Negative: [1] Lives with someone known to have influenza (flu test positive) AND [2] flu-like symptoms (e.g., cough, runny nose, sore throat, SOB; with or without fever)    Protocols used: INFORMATION ONLY CALL - NO TRIAGE-A-OH, CORONAVIRUS (COVID-19) DIAGNOSED OR [id number], CORONAVIRUS (COVID-19) EXPOSURE-A-OH

## 2022-01-09 LAB
SARS-COV-2 RNA RESP QL NAA+PROBE: NORMAL
TEST PERFORMANCE INFO SPEC: NORMAL

## 2022-01-11 DIAGNOSIS — C61 PROSTATE CANCER: Primary | ICD-10-CM

## 2022-01-12 ENCOUNTER — TELEPHONE (OUTPATIENT)
Dept: INFUSION THERAPY | Facility: HOSPITAL | Age: 67
End: 2022-01-12
Payer: MEDICARE

## 2022-01-20 ENCOUNTER — PATIENT MESSAGE (OUTPATIENT)
Dept: RADIATION ONCOLOGY | Facility: CLINIC | Age: 67
End: 2022-01-20
Payer: MEDICARE

## 2022-01-20 ENCOUNTER — OFFICE VISIT (OUTPATIENT)
Dept: FAMILY MEDICINE | Facility: CLINIC | Age: 67
End: 2022-01-20
Payer: MEDICARE

## 2022-01-20 VITALS
TEMPERATURE: 99 F | WEIGHT: 164 LBS | SYSTOLIC BLOOD PRESSURE: 114 MMHG | OXYGEN SATURATION: 98 % | HEIGHT: 68 IN | BODY MASS INDEX: 24.86 KG/M2 | DIASTOLIC BLOOD PRESSURE: 64 MMHG | HEART RATE: 56 BPM

## 2022-01-20 DIAGNOSIS — I10 HTN (HYPERTENSION), BENIGN: ICD-10-CM

## 2022-01-20 DIAGNOSIS — G62.9 NEUROPATHY: ICD-10-CM

## 2022-01-20 DIAGNOSIS — C61 PROSTATE CANCER: Primary | ICD-10-CM

## 2022-01-20 PROCEDURE — 99214 PR OFFICE/OUTPT VISIT, EST, LEVL IV, 30-39 MIN: ICD-10-PCS | Mod: S$PBB,,, | Performed by: INTERNAL MEDICINE

## 2022-01-20 PROCEDURE — 99999 PR PBB SHADOW E&M-EST. PATIENT-LVL V: CPT | Mod: PBBFAC,,, | Performed by: INTERNAL MEDICINE

## 2022-01-20 PROCEDURE — 99999 PR PBB SHADOW E&M-EST. PATIENT-LVL V: ICD-10-PCS | Mod: PBBFAC,,, | Performed by: INTERNAL MEDICINE

## 2022-01-20 PROCEDURE — 99214 OFFICE O/P EST MOD 30 MIN: CPT | Mod: S$PBB,,, | Performed by: INTERNAL MEDICINE

## 2022-01-20 PROCEDURE — 99215 OFFICE O/P EST HI 40 MIN: CPT | Mod: PBBFAC,PN | Performed by: INTERNAL MEDICINE

## 2022-01-20 RX ORDER — GABAPENTIN 300 MG/1
300 CAPSULE ORAL NIGHTLY
Qty: 90 CAPSULE | Refills: 1 | Status: SHIPPED | OUTPATIENT
Start: 2022-01-20 | End: 2022-05-25

## 2022-01-20 NOTE — PROGRESS NOTES
"Subjective:       Patient ID: Denis Bunn is a 66 y.o. male.    Chief Complaint: Follow-up (fall)    Discuss leg symptoms    HPI: 65 y/o w/ metastatic prostate cancer on hormone suppressive therapy s/p pelvic XRT presents to discuss issue with legs. Two to four times per week will awaken with sharp electrical pains to bilateral legs from below knee to top of feet. Sensation is constant does not get this during the day or with any activity. He normally will take alprazolam and fall back to sleep no daytime numbness or parathesia admits less physical activity over last two years. No incontinence no LE swelling.     Review of Systems   Constitutional: Negative for activity change, appetite change, fatigue, fever and unexpected weight change.   HENT: Negative for ear pain, hearing loss, rhinorrhea, sore throat and trouble swallowing.    Eyes: Negative for discharge and visual disturbance.   Respiratory: Negative for chest tightness, shortness of breath and wheezing.    Cardiovascular: Negative for chest pain, palpitations and leg swelling.   Gastrointestinal: Negative for abdominal pain, blood in stool, constipation, diarrhea and vomiting.   Endocrine: Negative for cold intolerance, heat intolerance, polydipsia and polyuria.   Genitourinary: Negative for difficulty urinating, dysuria, hematuria and urgency.   Musculoskeletal: Negative for arthralgias, joint swelling, neck pain and neck stiffness.   Skin: Negative for rash.   Neurological: Negative for dizziness, syncope, weakness and headaches.   Psychiatric/Behavioral: Negative for confusion, dysphoric mood and suicidal ideas.       Objective:     Vitals:    01/20/22 1606   BP: 114/64   BP Location: Right arm   Patient Position: Sitting   BP Method: Medium (Manual)   Pulse: (!) 56   Temp: 98.5 °F (36.9 °C)   TempSrc: Oral   SpO2: 98%   Weight: 74.4 kg (164 lb 0.4 oz)   Height: 5' 8" (1.727 m)          Physical Exam  Constitutional:       Appearance: He is " well-developed and well-nourished.   HENT:      Head: Normocephalic and atraumatic.   Eyes:      General: No scleral icterus.     Conjunctiva/sclera: Conjunctivae normal.   Cardiovascular:      Rate and Rhythm: Normal rate and regular rhythm.      Heart sounds: No murmur heard.  No friction rub. No gallop.    Pulmonary:      Effort: Pulmonary effort is normal.      Breath sounds: Normal breath sounds. No wheezing or rales.   Abdominal:      Tenderness: There is no rebound.   Musculoskeletal:         General: No tenderness. Normal range of motion.      Cervical back: Normal range of motion.      Right lower leg: No edema.      Left lower leg: No edema.   Skin:     General: Skin is warm and dry.   Neurological:      Mental Status: He is alert and oriented to person, place, and time.      Cranial Nerves: No cranial nerve deficit.      Comments: Normal gait observed   Psychiatric:         Mood and Affect: Mood and affect normal.         Assessment and Plan   1. Prostate cancer  Continue testosterone suppression    2. HTN (hypertension), benign  At goal continue current medications    3. Neuropathy  Trial nightly gabapentin  - gabapentin (NEURONTIN) 300 MG capsule; Take 1 capsule (300 mg total) by mouth every evening.  Dispense: 90 capsule; Refill: 1

## 2022-01-24 ENCOUNTER — PATIENT MESSAGE (OUTPATIENT)
Dept: RADIATION ONCOLOGY | Facility: CLINIC | Age: 67
End: 2022-01-24
Payer: MEDICARE

## 2022-01-27 ENCOUNTER — PATIENT MESSAGE (OUTPATIENT)
Dept: FAMILY MEDICINE | Facility: CLINIC | Age: 67
End: 2022-01-27
Payer: MEDICARE

## 2022-01-27 DIAGNOSIS — G62.9 PERIPHERAL POLYNEUROPATHY: Primary | ICD-10-CM

## 2022-01-31 ENCOUNTER — PATIENT MESSAGE (OUTPATIENT)
Dept: FAMILY MEDICINE | Facility: CLINIC | Age: 67
End: 2022-01-31
Payer: MEDICARE

## 2022-01-31 DIAGNOSIS — E55.9 HYPOVITAMINOSIS D: Primary | ICD-10-CM

## 2022-02-02 DIAGNOSIS — I49.3 PVC (PREMATURE VENTRICULAR CONTRACTION): Primary | ICD-10-CM

## 2022-02-25 ENCOUNTER — PATIENT MESSAGE (OUTPATIENT)
Dept: FAMILY MEDICINE | Facility: CLINIC | Age: 67
End: 2022-02-25
Payer: MEDICARE

## 2022-02-25 ENCOUNTER — PATIENT MESSAGE (OUTPATIENT)
Dept: RADIATION ONCOLOGY | Facility: CLINIC | Age: 67
End: 2022-02-25
Payer: MEDICARE

## 2022-03-15 ENCOUNTER — PATIENT MESSAGE (OUTPATIENT)
Dept: RADIATION ONCOLOGY | Facility: CLINIC | Age: 67
End: 2022-03-15
Payer: MEDICARE

## 2022-03-18 ENCOUNTER — PATIENT MESSAGE (OUTPATIENT)
Dept: RADIATION ONCOLOGY | Facility: CLINIC | Age: 67
End: 2022-03-18
Payer: MEDICARE

## 2022-03-21 ENCOUNTER — PATIENT MESSAGE (OUTPATIENT)
Dept: FAMILY MEDICINE | Facility: CLINIC | Age: 67
End: 2022-03-21
Payer: MEDICARE

## 2022-03-21 DIAGNOSIS — J30.2 SEASONAL ALLERGIC RHINITIS, UNSPECIFIED TRIGGER: Primary | ICD-10-CM

## 2022-03-21 RX ORDER — FLUTICASONE PROPIONATE 50 MCG
1 SPRAY, SUSPENSION (ML) NASAL 2 TIMES DAILY
Qty: 16 G | Refills: 1 | Status: SHIPPED | OUTPATIENT
Start: 2022-03-21 | End: 2022-04-12

## 2022-03-22 ENCOUNTER — PATIENT MESSAGE (OUTPATIENT)
Dept: RADIATION ONCOLOGY | Facility: CLINIC | Age: 67
End: 2022-03-22
Payer: MEDICARE

## 2022-03-24 NOTE — PROGRESS NOTES
"Mr. Bunn is a patient of Dr. Melendez and was last seen in clinic 3/21/2021.      Subjective:   Patient ID:  Denis Bunn is a 66 y.o. male who presents for follow-up of PVC  .     HPI:    Mr. Bunn is a 66 y.o. male with PVC here for follow up.    Background:    65 yoM here for arrhythmia management.     2018: He presented early July 2018 with frequent palpitations. He was found to have frequent PVCs with RBBB morphology that were relatively narrow. He was wearing an event monitor at the time. He was admitted for observation. There his EF was noted to be normal. I advised that he stop his metoprolol and start verapamil. The event monitor was returned and I asked for a 48h holter. His PVC burden was 23%. He has had some relief on verapamil with a particularly symptomatic day 7/8/18. He denies syncope, near syncope, chest pain, shortness of breath. He feels his PVCs more at rest than with activity. PVC morphology is RBBB, V3 transition with rightward/inferior axis. The QRS duration is ~120 ms.     2/20: Verapamil up-titrated to 240 qAM, 120 q PM. Symptoms stable.     3/2021: Prostate CA with prostatectomy 9/2020. He has had some suggestion of bone lesions. Now awaiting bone biopsy results. No cardiac issues. Remains on verapamil 240 qAM and 120 qPM. He is awaiting final plan for his CA.   65 yoM PVCs here for routine follow up. PVCs under good control on verapamil. 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 The opportunity for a break from hormonal therapy is one of the purported advantages of treating oligometastases with XRT.  Patient was in agreement. He will defer any further " "LHRH agonist shots and discontinue the apalutamide."    His primary complaint is fatigue. Was taken off lupron and apalutamide recently and is hoping his energy picks up soon. Says his HRs at home are in 50s and 60s. No LH. Was prescribed gabapentin in Jan for restless legs and this did improve his sleep. However he has been having some insomnia recently in context of family loss. Did take PRN benzo middle of the night last night which likely explains his lower HR today. No CP, worsening PLATA, syncope. He wants to start exercising again but is working on getting the motivation for this. He does walk at home.     He is on verapamil 240/120.     I have personally reviewed the patient's EKG today, which shows sinus bradycardia at 42. RI interval is 152. QRS is 94. QT is 450. No PVCs on EKG or rhythm strip.     Relevant Cardiac Test Results:    2D Echo (7/1/2018):  CONCLUSIONS     1 - Normal left ventricular systolic function (EF 55-60%).     2 - Biatrial enlargement.     3 - Normal left ventricular diastolic function.     4 - Normal right ventricular systolic function .     5 - Mild mitral regurgitation.     Current Outpatient Medications   Medication Sig    allopurinoL (ZYLOPRIM) 100 MG tablet TAKE 1 TABLET (100 MG TOTAL) BY MOUTH 2 (TWO) TIMES DAILY.    ALPRAZolam (XANAX) 0.5 MG tablet Take 1 tablet (0.5 mg total) by mouth 2 (two) times daily as needed for Anxiety.    ascorbic acid, vitamin C, (VITAMIN C) 500 MG tablet Take 500 mg by mouth once daily. Hold for 1 week prior to surgery    aspirin 81 MG chewable tablet Take 1 tablet by mouth. Dr White does not require you to hold this prior to surgery-hold am of surgery only    coenzyme Q10 100 mg capsule Take 100 mg by mouth once daily. Hold for 1 week prior to surgery    ergocalciferol, vitamin D2, (VITAMIN D ORAL) Take by mouth. Hold for 1 week prior to surgery    ERLEADA 60 mg Tab     ferrous sulfate (SLOW RELEASE IRON) 142 mg (45 mg iron) TbSR Take by " mouth.    fluticasone propionate (FLONASE) 50 mcg/actuation nasal spray 1 spray (50 mcg total) by Each Nostril route 2 (two) times daily.    gabapentin (NEURONTIN) 300 MG capsule Take 1 capsule (300 mg total) by mouth every evening.    garlic 1,000 mg Cap Take by mouth. Hold for 1 week prior to surgery    hydrocortisone 2.5 % cream Apply topically 2 (two) times daily.    losartan (COZAAR) 100 MG tablet Take 1 tablet (100 mg total) by mouth once daily.    magnesium 250 mg Tab Take 250 mg by mouth once. Takes two 250 mg tablets daily   Hold for 1 week prior to surgery    melatonin 10 mg Tab Take 10 mg by mouth.    OM-3/E/LINOL/ALA/OLEIC/GLA/LIP (OMEGA 3-6-9 ORAL) Take by mouth once daily. Hold for 1 week prior to surgery    pravastatin (PRAVACHOL) 20 MG tablet TAKE 1 TABLET(20 MG) BY MOUTH EVERY DAY    verapamiL (CALAN-SR) 120 MG CR tablet TAKE 1 TABLET (120 MG TOTAL) BY MOUTH EVERY EVENING.    verapamiL (VERELAN) 240 MG C24P Take 1 capsule (240 mg total) by mouth every morning.    zinc 50 mg Tab Take by mouth. Hold for 1 week prior to surgery    ZIOPTAN, PF, 0.0015 % Dpet Place 1 drop into both eyes nightly.     Current Facility-Administered Medications   Medication    leuprolide (6 month) injection 45 mg    [START ON 4/13/2022] leuprolide (6 month) injection 45 mg    [START ON 4/13/2022] leuprolide (6 month) injection 45 mg       Review of Systems   Constitutional: Positive for malaise/fatigue.   Cardiovascular: Negative for chest pain, dyspnea on exertion, irregular heartbeat, leg swelling and palpitations.   Respiratory: Negative for shortness of breath.    Hematologic/Lymphatic: Negative for bleeding problem.   Skin: Negative for rash.   Musculoskeletal: Negative for myalgias.   Gastrointestinal: Negative for hematemesis, hematochezia and nausea.   Genitourinary: Negative for hematuria.   Neurological: Negative for light-headedness.   Psychiatric/Behavioral: Negative for altered mental status.    Allergic/Immunologic: Negative for persistent infections.       Objective:          BP (!) 152/76   Pulse (!) 44   Wt 74.2 kg (163 lb 9.3 oz)   BMI 24.87 kg/m²     Physical Exam  Vitals and nursing note reviewed.   Constitutional:       Appearance: Normal appearance. He is well-developed.   HENT:      Head: Normocephalic.      Nose: Nose normal.   Eyes:      Pupils: Pupils are equal, round, and reactive to light.   Cardiovascular:      Rate and Rhythm: Regular rhythm. Bradycardia present.   Pulmonary:      Effort: No respiratory distress.      Breath sounds: Normal breath sounds.   Musculoskeletal:         General: Normal range of motion.   Skin:     General: Skin is warm and dry.      Findings: No erythema.   Neurological:      Mental Status: He is alert and oriented to person, place, and time.   Psychiatric:         Speech: Speech normal.         Behavior: Behavior normal.           Lab Results   Component Value Date     12/22/2021    K 5.0 12/22/2021    MG 2.0 09/06/2020    BUN 17 12/22/2021    CREATININE 1.1 12/22/2021    ALT 18 12/22/2021    AST 20 12/22/2021    HGB 12.2 (L) 12/22/2021    HCT 37.3 (L) 12/22/2021    HCT 27 (L) 09/04/2020    TSH 1.220 11/08/2021    LDLCALC 95.2 11/08/2021       Recent Labs   Lab 09/05/20  0857   INR 1.0         Assessment:     1. PVC (premature ventricular contraction)    2. HTN (hypertension), benign    3. Sinus bradycardia      Plan:     In summary, Mr. Bunn is a 66 y.o. male with PVC here for follow up.  He is stable from a rhythm standpoint, with no documented or symptomatic PVCs on verapamil regimen. He has chronic bradycardia but is asymptomatic. Some fatigue related to his CA regimen and was recently taken off 2 meds to gain more energy. He is looking to start exercise regimen.    Continue current regimen  RTC 1 yr, sooner if needed    *A copy of this note has been sent to Dr. Melendez*    Follow up in about 1 year (around  4/7/2023).    ------------------------------------------------------------------    ALLIE Cortes, NP-C  Cardiac Electrophysiology

## 2022-04-07 ENCOUNTER — OFFICE VISIT (OUTPATIENT)
Dept: ELECTROPHYSIOLOGY | Facility: CLINIC | Age: 67
End: 2022-04-07
Payer: MEDICARE

## 2022-04-07 ENCOUNTER — HOSPITAL ENCOUNTER (OUTPATIENT)
Dept: CARDIOLOGY | Facility: CLINIC | Age: 67
Discharge: HOME | End: 2022-04-07
Payer: MEDICARE

## 2022-04-07 VITALS
HEART RATE: 44 BPM | SYSTOLIC BLOOD PRESSURE: 152 MMHG | WEIGHT: 163.56 LBS | DIASTOLIC BLOOD PRESSURE: 76 MMHG | BODY MASS INDEX: 24.87 KG/M2

## 2022-04-07 DIAGNOSIS — I10 HTN (HYPERTENSION), BENIGN: ICD-10-CM

## 2022-04-07 DIAGNOSIS — I49.3 PVC (PREMATURE VENTRICULAR CONTRACTION): Primary | ICD-10-CM

## 2022-04-07 DIAGNOSIS — I49.3 PVC (PREMATURE VENTRICULAR CONTRACTION): ICD-10-CM

## 2022-04-07 DIAGNOSIS — R00.1 SINUS BRADYCARDIA: ICD-10-CM

## 2022-04-07 PROBLEM — E66.3 OVERWEIGHT (BMI 25.0-29.9): Status: RESOLVED | Noted: 2018-07-11 | Resolved: 2022-04-07

## 2022-04-07 PROCEDURE — 99213 OFFICE O/P EST LOW 20 MIN: CPT | Mod: S$PBB,,, | Performed by: NURSE PRACTITIONER

## 2022-04-07 PROCEDURE — 99213 PR OFFICE/OUTPT VISIT, EST, LEVL III, 20-29 MIN: ICD-10-PCS | Mod: S$PBB,,, | Performed by: NURSE PRACTITIONER

## 2022-04-07 PROCEDURE — 99999 PR PBB SHADOW E&M-EST. PATIENT-LVL IV: ICD-10-PCS | Mod: PBBFAC,,, | Performed by: NURSE PRACTITIONER

## 2022-04-07 PROCEDURE — 99214 OFFICE O/P EST MOD 30 MIN: CPT | Mod: PBBFAC | Performed by: NURSE PRACTITIONER

## 2022-04-07 PROCEDURE — 93010 ELECTROCARDIOGRAM REPORT: CPT | Mod: S$PBB,,, | Performed by: INTERNAL MEDICINE

## 2022-04-07 PROCEDURE — 93005 ELECTROCARDIOGRAM TRACING: CPT | Mod: PBBFAC | Performed by: INTERNAL MEDICINE

## 2022-04-07 PROCEDURE — 93010 RHYTHM STRIP: ICD-10-PCS | Mod: S$PBB,,, | Performed by: INTERNAL MEDICINE

## 2022-04-07 PROCEDURE — 99999 PR PBB SHADOW E&M-EST. PATIENT-LVL IV: CPT | Mod: PBBFAC,,, | Performed by: NURSE PRACTITIONER

## 2022-04-07 RX ORDER — OMEGA-3 FATTY ACIDS 1000 MG
1000 CAPSULE ORAL
COMMUNITY

## 2022-04-07 RX ORDER — MELATONIN 1 MG/ML
LIQUID (ML) ORAL
COMMUNITY
End: 2022-07-28

## 2022-04-12 DIAGNOSIS — J30.2 SEASONAL ALLERGIC RHINITIS, UNSPECIFIED TRIGGER: ICD-10-CM

## 2022-04-12 DIAGNOSIS — H91.22 SUDDEN IDIOPATHIC HEARING LOSS OF LEFT EAR, UNSPECIFIED HEARING STATUS ON CONTRALATERAL SIDE: Primary | ICD-10-CM

## 2022-04-12 RX ORDER — FLUTICASONE PROPIONATE 50 MCG
SPRAY, SUSPENSION (ML) NASAL
Qty: 16 ML | Refills: 1 | Status: SHIPPED | OUTPATIENT
Start: 2022-04-12 | End: 2022-05-11

## 2022-04-12 NOTE — TELEPHONE ENCOUNTER
----- Message from Valeria Ornelas sent at 4/12/2022  1:16 PM CDT -----  Regarding: self  .Type: Patient Call Back    Who called: self     What is the request in detail:patient would like to have a ref to ENT he scheduled for Thursday - sudden hearing loss in r ear - ringing - please call     Can the clinic reply by [facility]?    Would the patient rather a call back or a response via [facility]? Call     Best call back number: .[phone]

## 2022-04-12 NOTE — TELEPHONE ENCOUNTER
No new care gaps identified.  Powered by [employer]. Reference number: [id number].   4/12/2022 10:35:48 AM CDT

## 2022-04-13 ENCOUNTER — CLINICAL SUPPORT (OUTPATIENT)
Dept: AUDIOLOGY | Facility: CLINIC | Age: 67
End: 2022-04-13
Payer: MEDICARE

## 2022-04-13 DIAGNOSIS — H90.3 SENSORINEURAL HEARING LOSS OF BOTH EARS: Primary | ICD-10-CM

## 2022-04-13 PROCEDURE — 92557 COMPREHENSIVE HEARING TEST: CPT | Mod: S$GLB,,, | Performed by: AUDIOLOGIST

## 2022-04-13 PROCEDURE — 92550 PR TYMPANOMETRY AND REFLEX THRESHOLD MEASUREMENTS: ICD-10-PCS | Mod: S$GLB,,, | Performed by: AUDIOLOGIST

## 2022-04-13 PROCEDURE — 92557 PR COMPREHENSIVE HEARING TEST: ICD-10-PCS | Mod: S$GLB,,, | Performed by: AUDIOLOGIST

## 2022-04-13 PROCEDURE — 92550 TYMPANOMETRY & REFLEX THRESH: CPT | Mod: S$GLB,,, | Performed by: AUDIOLOGIST

## 2022-04-13 NOTE — PROGRESS NOTES
Mr. Denis Bunn was seen in the clinic today for an audiological evaluation.  Mr. Bunn reported a sudden hearing loss accompanied by severe tinnitus of the right ear yesterday.  He stated that the tinnitus has resolved and the hearing of the right ear seems to be back to normal.    Audiological testing revealed normal hearing sloping to a mild to moderate sensorineural hearing loss for the right ear and normal hearing sloping to a mild to moderately-severe sensorineural hearing loss for the left ear.  A speech reception threshold was obtained at 35 dBHL for the right ear and at 35 dBHL for the left ear.  Speech discrimination was 100% for the right ear and 96% for the left ear.      Tympanometry testing revealed a Type A tympanogram for the right ear and a Type A tympanogram for the left ear.  Ipsilateral acoustic reflexes were present at 500 Hz, 1000 Hz, 2000 Hz, and 4000 Hz for the right ear and were absent for the left ear.    Recommendations:  1. Otologic evaluation  2. Annual audiological evaluation  3. Hearing protection when in noise   4. Hearing aid consultation    Please click on link to view Audiogram:  Document on 4/13/2022  7:58 AM by BARRY BranD: Audiogram

## 2022-04-14 ENCOUNTER — PATIENT OUTREACH (OUTPATIENT)
Dept: ADMINISTRATIVE | Facility: OTHER | Age: 67
End: 2022-04-14
Payer: MEDICARE

## 2022-04-14 ENCOUNTER — OFFICE VISIT (OUTPATIENT)
Dept: OTOLARYNGOLOGY | Facility: CLINIC | Age: 67
End: 2022-04-14
Payer: MEDICARE

## 2022-04-14 ENCOUNTER — LAB VISIT (OUTPATIENT)
Dept: LAB | Facility: HOSPITAL | Age: 67
End: 2022-04-14
Attending: OTOLARYNGOLOGY
Payer: MEDICARE

## 2022-04-14 VITALS
DIASTOLIC BLOOD PRESSURE: 76 MMHG | BODY MASS INDEX: 24.79 KG/M2 | SYSTOLIC BLOOD PRESSURE: 120 MMHG | HEIGHT: 68 IN | WEIGHT: 163.56 LBS

## 2022-04-14 DIAGNOSIS — H91.22 SUDDEN IDIOPATHIC HEARING LOSS OF LEFT EAR, UNSPECIFIED HEARING STATUS ON CONTRALATERAL SIDE: ICD-10-CM

## 2022-04-14 LAB
CREAT SERPL-MCNC: 1.1 MG/DL (ref 0.5–1.4)
EST. GFR  (AFRICAN AMERICAN): >60 ML/MIN/1.73 M^2
EST. GFR  (NON AFRICAN AMERICAN): >60 ML/MIN/1.73 M^2

## 2022-04-14 PROCEDURE — 36415 COLL VENOUS BLD VENIPUNCTURE: CPT | Performed by: OTOLARYNGOLOGY

## 2022-04-14 PROCEDURE — 99214 PR OFFICE/OUTPT VISIT, EST, LEVL IV, 30-39 MIN: ICD-10-PCS | Mod: 25,S$GLB,, | Performed by: OTOLARYNGOLOGY

## 2022-04-14 PROCEDURE — 69210 PR REMOVAL IMPACTED CERUMEN REQUIRING INSTRUMENTATION, UNILATERAL: ICD-10-PCS | Mod: S$GLB,,, | Performed by: OTOLARYNGOLOGY

## 2022-04-14 PROCEDURE — 69210 REMOVE IMPACTED EAR WAX UNI: CPT | Mod: S$GLB,,, | Performed by: OTOLARYNGOLOGY

## 2022-04-14 PROCEDURE — 82565 ASSAY OF CREATININE: CPT | Performed by: OTOLARYNGOLOGY

## 2022-04-14 PROCEDURE — 99214 OFFICE O/P EST MOD 30 MIN: CPT | Mod: 25,S$GLB,, | Performed by: OTOLARYNGOLOGY

## 2022-04-14 NOTE — PROGRESS NOTES
OTOLARYNGOLOGY CLINIC NOTE  Date:  04/14/2022     Chief complaint:  Chief Complaint   Patient presents with    Hearing Loss     Hearing loss w/ audio.       History of Present Illness  Denis Bunn is a 66 y.o. male  presenting today for a new evaluation and treatment of   Tuesday morning was sitting at desk had tinnitus and could not hear in right ear and lasted most of the day , starting to come back around 5-6 pm and the ringing was not extreme. Felt slight pressure buit not really extreme ringing.    Had a lesion on pelvic bone had radaitation  Getting treatment at [facility]    Review of medical records and prior documentation  Past medical records were reviewed with data pertinent to the chief complaint summarized in the HPI. Information obtained from review of medical records is attributed to respective sources in the HPI with reference to sources of information at their mention. Records reviewed included all recent notes from referring provider, primary care, and related subspecialty evaluations as available. This review of records was performed and additional data obtained to supplement history obtained from the patient and further inform medical decision making involved in formulating a plan of care accounting for all history and treatment relevant to the issues addressed.    Past Medical History  Past Medical History:   Diagnosis Date    Anxiety     Back pain     Cataract     Constipation - functional     Dyslipidemia     ED (erectile dysfunction)     History of gout     HTN (hypertension), benign 04/24/2013    Personal history of colonic polyps     Prostate cancer     Vision changes         Past Surgical History  Past Surgical History:   Procedure Laterality Date    BACK SURGERY      CATARACT EXTRACTION      left eye    COLONOSCOPY N/A 7/29/2019    Procedure: COLONOSCOPY;  Surgeon: Mingo Freeman MD;  Location: [facility];  Service: Endoscopy;  Laterality: N/A;  due July 2019     ELBOW SURGERY      scope/right    EYE SURGERY      Dr. Hope.retina x2 left    LAMINECTOMY      ROBOT-ASSISTED LAPAROSCOPIC PROSTATECTOMY N/A 9/2/2020    Procedure: ROBOTIC PROSTATECTOMY;  Surgeon: Edson White MD;  Location: [facility] OR [address] [place] [address];  Service: Urology;  Laterality: N/A;  3hrs gen with regional    WRIST SURGERY      right        Medications  Current Outpatient Medications on File Prior to Visit   Medication Sig Dispense Refill    allopurinoL (ZYLOPRIM) 100 MG tablet TAKE 1 TABLET (100 MG TOTAL) BY MOUTH 2 (TWO) TIMES DAILY. 180 tablet 3    ascorbic acid, vitamin C, (VITAMIN C) 500 MG tablet Take 500 mg by mouth once daily. Hold for 1 week prior to surgery      aspirin 81 MG chewable tablet Take 1 tablet by mouth. Dr White does not require you to hold this prior to surgery-hold am of surgery only      coenzyme Q10 100 mg capsule Take 100 mg by mouth once daily. Hold for 1 week prior to surgery      ergocalciferol, vitamin D2, (VITAMIN D ORAL) Take by mouth. Hold for 1 week prior to surgery      ferrous sulfate (SLOW RELEASE IRON) 142 mg (45 mg iron) TbSR Take by mouth.      fluticasone propionate (FLONASE) 50 mcg/actuation nasal spray SPRAY 1 SPRAY INTO EACH NOSTRIL TWICE A DAY 16 mL 1    gabapentin (NEURONTIN) 300 MG capsule Take 1 capsule (300 mg total) by mouth every evening. 90 capsule 1    garlic 1,000 mg Cap Take by mouth. Hold for 1 week prior to surgery      hydrocortisone 2.5 % cream Apply topically 2 (two) times daily. 28 g 1    losartan (COZAAR) 100 MG tablet Take 1 tablet (100 mg total) by mouth once daily. 90 tablet 3    magnesium 250 mg Tab Take 250 mg by mouth once. Takes two 250 mg tablets daily   Hold for 1 week prior to surgery      melatonin oral solution Take by mouth.      OM-3/E/LINOL/ALA/OLEIC/GLA/LIP (OMEGA 3-6-9 ORAL) Take by mouth once daily. Hold for 1 week prior to surgery      omega-3 fatty acids 1,000 mg Cap Take 1,000 mg by mouth.      pravastatin  (PRAVACHOL) 20 MG tablet TAKE 1 TABLET(20 MG) BY MOUTH EVERY DAY 90 tablet 3    verapamiL (CALAN-SR) 120 MG CR tablet TAKE 1 TABLET (120 MG TOTAL) BY MOUTH EVERY EVENING. 90 tablet 3    verapamiL (VERELAN) 240 MG C24P Take 1 capsule (240 mg total) by mouth every morning. 90 capsule 3    zinc 50 mg Tab Take by mouth. Hold for 1 week prior to surgery      ZIOPTAN, PF, 0.0015 % Dpet Place 1 drop into both eyes nightly.      ALPRAZolam (XANAX) 0.5 MG tablet Take 1 tablet (0.5 mg total) by mouth 2 (two) times daily as needed for Anxiety. 30 tablet 1    ERLEADA 60 mg Tab       melatonin 10 mg Tab Take 10 mg by mouth.       Current Facility-Administered Medications on File Prior to Visit   Medication Dose Route Frequency Provider Last Rate Last Admin    leuprolide (6 month) injection 45 mg  45 mg Intramuscular Q6 Months Farzad Frias Jr., MD   45 mg at 10/07/21 0934    leuprolide (6 month) injection 45 mg  45 mg Intramuscular Q6 Months Farzad Frias Jr., MD        leuprolide (6 month) injection 45 mg  45 mg Intramuscular Q6 Months Farzad Frias Jr., MD           Review of Systems  Review of Systems   Constitutional: Negative.    HENT: Positive for hearing loss.    Eyes: Negative.    Gastrointestinal: Negative.    Musculoskeletal: Positive for back pain.   Skin: Negative.    Neurological: Negative.     Answers for HPI/ROS submitted by the patient on 4/12/2022  Snoring?: Yes  Irregular heartbeat?: Yes  Seasonal Allergies?: Yes  sleep disturbance: Yes    Social History   reports that he has never smoked. He has never used smokeless tobacco. He reports current alcohol use of about 3.0 standard drinks of alcohol per week. He reports that he does not use drugs.     Family History  Family History   Problem Relation Age of Onset    Blindness Mother         from cva    Cataracts Mother     Cataracts Father     Cancer Father     No Known Problems Sister     No Known Problems Brother     No Known Problems  "Maternal Aunt     No Known Problems Maternal Uncle     No Known Problems Paternal Aunt     No Known Problems Paternal Uncle     No Known Problems Maternal Grandmother     No Known Problems Maternal Grandfather     No Known Problems Paternal Grandmother     No Known Problems Paternal Grandfather     Amblyopia Neg Hx     Diabetes Neg Hx     Glaucoma Neg Hx     Hypertension Neg Hx     Macular degeneration Neg Hx     Retinal detachment Neg Hx     Strabismus Neg Hx     Stroke Neg Hx     Thyroid disease Neg Hx         Physical Exam   Vitals:    04/14/22 1431   BP: 120/76    Body mass index is 24.87 kg/m².  Weight: 74.2 kg (163 lb 9.3 oz)   Height: 5' 8" (172.7 cm)     GENERAL: no acute distress.  HEAD: normocephalic.   EYES: lids and lashes normal. No scleral icterus  EARS: external ear without lesion, normal pinna shape and position. left External auditory canal with normal cerumen, tympanic membrane fully visible, no perforation , no retraction. No middle ear effusion. Ossicles intact. Right ear with cerumen impaction/flaking not thick impaction but unable to see tympanic membrane.  NOSE: external nose without significant bony abnormality  ORAL CAVITY/OROPHARYNX: tongue mobile.   NECK: trachea midline.   RESPIRATORY: no stridor, no stertor. Respirations nonlabored.  NEURO: alert, responds to questions appropriately.   PSYCH:mood appropriate    Procedure Note   Procedure performed: microscopic examination of ears with cerumen disimpaction    Indication for procedure: unilateral cerumen impaction     Description of procedure:  After verbal consent was obtained, the patient was positioned in semi recumbent position and speculum was placed in the right ear and microscope brought into the field.  The microscope was positioned and magnification adjusted for appropriate visualization. Otologic instruments including various size otologic suctions and curette were used to remove the cerumen from the right  " external auditory canals under visualization with the operating microscope. After cleaning, visualization was again performed and at various levels of higher magnification to optimize views of the ear canal, tympanic membrane, ossicles and middle ear space. Findings as indicated below. All portions of the procedure and examination by otomicroscopy were tolerated well without complication.     Findings:  Complete cerumen impaction removed entirely revealing normal external auditory canal; tympanic membrane without bulging, retraction, or perforation; no evidence of middle ear fluid or effusion.       AUDIOLOGY RESULTS  Audiometric evaluation including audiogram, tympanometry, acoustic reflexes, and speech discrimination which was performed  4-13-22 was personally reviewed and interpreted.  Notable findings on the audiogram were bilateral sensorineural hearing loss (SNHL).  Right ear worsened since prior testingTympanometry revealed Type A tympanogram on the left and Type A tympanogram on the right. Speech discrimination was 96%  on the left, and 100% on the right.   4-13-22 8-2021 audiogram    Report of the audiologist performing this audiometric testing was also reviewed   Imaging:  The patient does not have any pertinent and/or recent imaging of the head and neck.     Labs:  CBC  Recent Labs   Lab 10/05/21  0733 11/08/21  0724 12/22/21  0902   WBC 3.69 L  3.69 L 3.34 L 3.34 L   Hemoglobin 12.5 L  12.5 L 12.0 L 12.2 L   Hematocrit 37.2 L  37.2 L 35.7 L 37.3 L   MCV 97  97 100 H 102 H   Platelets 187  187 183 186     BMP  Recent Labs   Lab 09/05/20  0440 09/06/20  0340 05/24/21  0737 10/05/21  0733 11/08/21  0724 12/22/21  0902   Glucose 105 98   < > 108  108 105 88   Sodium 134 L 135 L   < > 140  140 141 140   Potassium 4.3 4.0   < > 4.5  4.5 4.6 5.0   Chloride 103 104   < > 103  103 105 104   CO2 24 23   < > 27  27 29 29   BUN 14 12   < > 14  14 17 17   Creatinine 1.6 H 1.2   < > 1.1  1.1 1.0 1.1    Calcium 8.8 8.5 L   < > 10.4  10.4 10.1 9.7   Phosphorus 2.8 2.6 L  --   --   --   --    Magnesium 2.0 2.0  --   --   --   --     < > = values in this interval not displayed.     MICHAEL  Recent Labs   Lab 09/05/20  0857   INR 1.0       Assessment  1. Sudden idiopathic hearing loss of left ear, unspecified hearing status on contralateral side  - Ambulatory referral/consult to ENT       Plan:  Discussed plan of care with patient in detail and all questions answered. Patient reported understanding of plan of care.   Asymmetric sensorineural hearing loss (SNHL): discussed with patient that  there is a low risk that asymmetry could be due to a benign tumor called an acoustic neuroma. The gold standard for diagnosis of this is MRI IAC with gadolinium.  We also discussed that if present, these tumors typically are slow growing but rarely can grow very quickly. Additional option for screening would be an auditory brainstem response (ABR) although this is not gold standard. The third option is for monitoring with audiogram in 1 year to document persistence and/or worsening of asymmetry . Questions about acoustic neuroma and other causes of asymmetric hearing loss discussed. I am concerned that he may have had a sudden loss- had asymmetry on prior testing where left ear worse and currently without asymmetry but I think may have had sudden loss . He has since noted improvement. We discussed pros and cons of steroid treatment , I think can avoid but offered . He prefers to hold off.     Counseled on need for hearing protection in noise.     Pt would like to get MRI for further workup. MRI IAC ordered- offered scheduled follow up to discuss results, prefers to message me in my chart blanco for results    Right cerumen impaction: Had some thin wax in the right ear that was cleaned to visualize tympanic membrane. He had type A tympanometry so I do not think this would be responsible for difference in his ears.     I spent a total  of 30 minutes on the day of the visit.  This includes face to face time and non-face to face time preparing to see the patient (eg, review of tests), obtaining and/or reviewing separately obtained history, documenting clinical information in the electronic or other health record, independently interpreting results and communicating results to the patient/family/caregiver, or care coordinator.    Please be aware that this note has been generated with the assistance of M[employer] voice-to-text.  Please excuse any spelling or grammatical errors.

## 2022-04-21 ENCOUNTER — PATIENT MESSAGE (OUTPATIENT)
Dept: RADIATION ONCOLOGY | Facility: CLINIC | Age: 67
End: 2022-04-21
Payer: MEDICARE

## 2022-04-26 ENCOUNTER — HOSPITAL ENCOUNTER (OUTPATIENT)
Dept: RADIOLOGY | Facility: HOSPITAL | Age: 67
Discharge: HOME | End: 2022-04-26
Attending: OTOLARYNGOLOGY
Payer: MEDICARE

## 2022-04-26 DIAGNOSIS — H91.22 SUDDEN IDIOPATHIC HEARING LOSS OF LEFT EAR, UNSPECIFIED HEARING STATUS ON CONTRALATERAL SIDE: ICD-10-CM

## 2022-04-26 PROCEDURE — 70553 MRI BRAIN STEM W/O & W/DYE: CPT | Mod: 26,,, | Performed by: RADIOLOGY

## 2022-04-26 PROCEDURE — 70553 MRI BRAIN STEM W/O & W/DYE: CPT | Mod: TC

## 2022-04-26 PROCEDURE — 25500020 PHARM REV CODE 255: Performed by: OTOLARYNGOLOGY

## 2022-04-26 PROCEDURE — 70553 MRI IAC/TEMPORAL BONES W W/O CONTRAST: ICD-10-PCS | Mod: 26,,, | Performed by: RADIOLOGY

## 2022-04-26 PROCEDURE — A9585 GADOBUTROL INJECTION: HCPCS | Performed by: OTOLARYNGOLOGY

## 2022-04-26 RX ORDER — GADOBUTROL 604.72 MG/ML
8 INJECTION INTRAVENOUS
Status: COMPLETED | OUTPATIENT
Start: 2022-04-26 | End: 2022-04-26

## 2022-04-26 RX ADMIN — GADOBUTROL 8 ML: 604.72 INJECTION INTRAVENOUS at 09:04

## 2022-04-27 ENCOUNTER — OFFICE VISIT (OUTPATIENT)
Dept: RADIATION ONCOLOGY | Facility: CLINIC | Age: 67
End: 2022-04-27
Payer: MEDICARE

## 2022-04-27 VITALS
BODY MASS INDEX: 25.23 KG/M2 | RESPIRATION RATE: 16 BRPM | SYSTOLIC BLOOD PRESSURE: 150 MMHG | HEIGHT: 68 IN | WEIGHT: 166.5 LBS | HEART RATE: 49 BPM | DIASTOLIC BLOOD PRESSURE: 80 MMHG

## 2022-04-27 DIAGNOSIS — C61 PROSTATE CANCER: Primary | ICD-10-CM

## 2022-04-27 PROCEDURE — 99212 OFFICE O/P EST SF 10 MIN: CPT | Mod: S$PBB,,, | Performed by: RADIOLOGY

## 2022-04-27 PROCEDURE — 99999 PR PBB SHADOW E&M-EST. PATIENT-LVL IV: CPT | Mod: PBBFAC,,, | Performed by: RADIOLOGY

## 2022-04-27 PROCEDURE — 99214 OFFICE O/P EST MOD 30 MIN: CPT | Mod: PBBFAC | Performed by: RADIOLOGY

## 2022-04-27 PROCEDURE — 99999 PR PBB SHADOW E&M-EST. PATIENT-LVL IV: ICD-10-PCS | Mod: PBBFAC,,, | Performed by: RADIOLOGY

## 2022-04-27 PROCEDURE — 99212 PR OFFICE/OUTPT VISIT, EST, LEVL II, 10-19 MIN: ICD-10-PCS | Mod: S$PBB,,, | Performed by: RADIOLOGY

## 2022-04-27 NOTE — PROGRESS NOTES
Subjective:       Patient ID: Denis Bunn is a 66 y.o. male.    Chief Complaint: Prostate Cancer (3mo f/u;psa;poss lupron)    This patient presents for follow up visit.     Mr. Bunn has a history of metastatic prostate cancer.  He initially presented in  July of 2020 for evaluation of an elevated PSA of 5.3 ng/ml.  The patient subsequently underwent RALP with bilateral lymph node dissection on 9/2/20.  Pathology revealed [place] 7 (4+3) adenocarcinoma involving 20% of the prostate.  The [place] pattern 4 accounted for 85% of the tumor.  There was extraprostatic extension with multifocal perineural and lymphovascular invasion.  There was no bladder neck invasion or seminal vesicle invasion.  The margins of resection and 1  Rt. and 1 Lt. pelvic nodes were negative for tumor involvement.  Postoperative PSA on 10/1/20 returned at 0.14 ng/ml.  Repeat PSA  2/9/21 and returned at 0.41 ng/ml.  Work up with Axumin scan revealed possible lesion in the Lt. pubic symphysis.  MRI confirmed a 7 mm lesion in the Lt. symphysis pubis, biopsy of which was positive for metastatic disease.  The patient began hormonal therapy with Lupron on 4/1/21.  He is also receiving apalutamide per [facility].  He was recommended for radiotherapy to the prostate bed, pubic symphysis and pelvic nodes which he completed on 10/27/21.  The metastatic focus in the pubic symphysis also received radiotherapy.  Recently the patient has elected to discontinue his hormonal therapy.  He has stopped his apalutamide.  His last Lupron injection was in October.  Today the patient states he feels well.  Notes decreased stamina.      Review of Systems   Constitutional: Negative for activity change, appetite change, chills and fatigue.   Respiratory: Negative for cough and shortness of breath.    Cardiovascular: Negative for chest pain and palpitations.   Gastrointestinal: Negative for abdominal pain, change in bowel habit, constipation, diarrhea and change in  bowel habit.   Genitourinary: Negative for bladder incontinence, difficulty urinating, dysuria, frequency and hematuria.         Objective:      Physical Exam  Constitutional:       General: He is not in acute distress.     Appearance: Normal appearance.   Pulmonary:      Effort: Pulmonary effort is normal. No respiratory distress.   Abdominal:      General: Abdomen is flat. There is no distension.   Neurological:      Mental Status: He is alert and oriented to person, place, and time.   Psychiatric:         Mood and Affect: Mood normal.         Judgment: Judgment normal.       PSA in January was < 0.01 ng/ml  Assessment:       Problem List Items Addressed This Visit     Prostate cancer - Primary          Plan:       Doing well, patient has elected to hold further hormonal therapy for now.  Will plan follow up in 3 months with PSA and testosterone.

## 2022-04-28 ENCOUNTER — PATIENT MESSAGE (OUTPATIENT)
Dept: OTOLARYNGOLOGY | Facility: CLINIC | Age: 67
End: 2022-04-28
Payer: MEDICARE

## 2022-04-28 ENCOUNTER — PATIENT MESSAGE (OUTPATIENT)
Dept: CARDIOLOGY | Facility: CLINIC | Age: 67
End: 2022-04-28
Payer: MEDICARE

## 2022-05-10 ENCOUNTER — PATIENT MESSAGE (OUTPATIENT)
Dept: RADIATION ONCOLOGY | Facility: CLINIC | Age: 67
End: 2022-05-10
Payer: MEDICARE

## 2022-05-11 DIAGNOSIS — J30.2 SEASONAL ALLERGIC RHINITIS, UNSPECIFIED TRIGGER: ICD-10-CM

## 2022-05-11 RX ORDER — FLUTICASONE PROPIONATE 50 MCG
SPRAY, SUSPENSION (ML) NASAL
Qty: 48 ML | Refills: 2 | Status: SHIPPED | OUTPATIENT
Start: 2022-05-11 | End: 2022-10-19

## 2022-05-11 NOTE — TELEPHONE ENCOUNTER
Refill Authorization Note   Denis Mercedesstanley  is requesting a refill authorization.  Brief Assessment and Rationale for Refill:  Approve     Medication Therapy Plan:       Medication Reconciliation Completed: No   Comments:     No Care Gaps recommended.     Note composed:11:36 AM 05/11/2022

## 2022-05-11 NOTE — TELEPHONE ENCOUNTER
No new care gaps identified.  [facility] Embedded Care Gaps. Reference number: [id number]. 5/11/2022   9:32:29 AM LARAT

## 2022-05-25 DIAGNOSIS — G62.9 NEUROPATHY: ICD-10-CM

## 2022-05-25 RX ORDER — GABAPENTIN 300 MG/1
CAPSULE ORAL
Qty: 90 CAPSULE | Refills: 1 | Status: SHIPPED | OUTPATIENT
Start: 2022-05-25 | End: 2022-11-22

## 2022-06-27 ENCOUNTER — PES CALL (OUTPATIENT)
Dept: ADMINISTRATIVE | Facility: CLINIC | Age: 67
End: 2022-06-27
Payer: MEDICARE

## 2022-07-12 ENCOUNTER — PES CALL (OUTPATIENT)
Dept: ADMINISTRATIVE | Facility: CLINIC | Age: 67
End: 2022-07-12
Payer: MEDICARE

## 2022-07-13 ENCOUNTER — PATIENT MESSAGE (OUTPATIENT)
Dept: RADIATION ONCOLOGY | Facility: CLINIC | Age: 67
End: 2022-07-13
Payer: MEDICARE

## 2022-07-13 DIAGNOSIS — C61 PROSTATE CANCER: Primary | ICD-10-CM

## 2022-07-13 DIAGNOSIS — E55.9 VITAMIN D DEFICIENCY, UNSPECIFIED: ICD-10-CM

## 2022-07-21 ENCOUNTER — LAB VISIT (OUTPATIENT)
Dept: LAB | Facility: HOSPITAL | Age: 67
End: 2022-07-21
Attending: RADIOLOGY
Payer: MEDICARE

## 2022-07-21 DIAGNOSIS — C61 PROSTATE CANCER: ICD-10-CM

## 2022-07-21 LAB
COMPLEXED PSA SERPL-MCNC: <0.01 NG/ML (ref 0–4)
TESTOST SERPL-MCNC: 315 NG/DL (ref 304–1227)

## 2022-07-21 PROCEDURE — 84403 ASSAY OF TOTAL TESTOSTERONE: CPT | Performed by: RADIOLOGY

## 2022-07-21 PROCEDURE — 36415 COLL VENOUS BLD VENIPUNCTURE: CPT | Performed by: RADIOLOGY

## 2022-07-21 PROCEDURE — 84153 ASSAY OF PSA TOTAL: CPT | Performed by: RADIOLOGY

## 2022-07-28 ENCOUNTER — OFFICE VISIT (OUTPATIENT)
Dept: RADIATION ONCOLOGY | Facility: CLINIC | Age: 67
End: 2022-07-28
Payer: MEDICARE

## 2022-07-28 VITALS
HEART RATE: 43 BPM | DIASTOLIC BLOOD PRESSURE: 78 MMHG | SYSTOLIC BLOOD PRESSURE: 148 MMHG | RESPIRATION RATE: 14 BRPM | HEIGHT: 68 IN | WEIGHT: 170.19 LBS | BODY MASS INDEX: 25.79 KG/M2

## 2022-07-28 DIAGNOSIS — C61 PROSTATE CANCER: Primary | ICD-10-CM

## 2022-07-28 PROCEDURE — 99212 OFFICE O/P EST SF 10 MIN: CPT | Mod: S$PBB,,, | Performed by: RADIOLOGY

## 2022-07-28 PROCEDURE — 99214 OFFICE O/P EST MOD 30 MIN: CPT | Mod: PBBFAC | Performed by: RADIOLOGY

## 2022-07-28 PROCEDURE — 99999 PR PBB SHADOW E&M-EST. PATIENT-LVL IV: CPT | Mod: PBBFAC,,, | Performed by: RADIOLOGY

## 2022-07-28 PROCEDURE — 99999 PR PBB SHADOW E&M-EST. PATIENT-LVL IV: ICD-10-PCS | Mod: PBBFAC,,, | Performed by: RADIOLOGY

## 2022-07-28 PROCEDURE — 99212 PR OFFICE/OUTPT VISIT, EST, LEVL II, 10-19 MIN: ICD-10-PCS | Mod: S$PBB,,, | Performed by: RADIOLOGY

## 2022-07-28 NOTE — PROGRESS NOTES
Subjective:       Patient ID: Denis Bunn is a 66 y.o. male.    Chief Complaint: Prostate Cancer (3mo f/u;labs)    This patient returns for follow up visit.     Mr. Bunn has a history of metastatic prostate cancer.  He initially presented in  July of 2020 for evaluation of an elevated PSA of 5.3 ng/ml.  The patient subsequently underwent RALP with bilateral lymph node dissection on 9/2/20.  Pathology revealed [place] 7 (4+3) adenocarcinoma involving 20% of the prostate.  The [place] pattern 4 accounted for 85% of the tumor.  There was extraprostatic extension with multifocal perineural and lymphovascular invasion.  There was no bladder neck invasion or seminal vesicle invasion.  The margins of resection and 1  Rt. and 1 Lt. pelvic nodes were negative for tumor involvement.  Postoperative PSA on 10/1/20 returned at 0.14 ng/ml.  Repeat PSA  2/9/21 and returned at 0.41 ng/ml.  Work up with Axumin scan revealed possible lesion in the Lt. pubic symphysis.  MRI confirmed a 7 mm lesion in the Lt. symphysis pubis, biopsy of which was positive for metastatic disease.  The patient began hormonal therapy with Lupron on 4/1/21.  He is also receiving apalutamide per [facility].  He was recommended for radiotherapy to the prostate bed, pubic symphysis and pelvic nodes which he completed on 10/27/21.  The metastatic focus in the pubic symphysis also received radiotherapy.  Recently the patient has elected to discontinue his hormonal therapy.  He has stopped his apalutamide.  His last Lupron injection was in October.  Today the patient states he feels well.  No complaints.         Review of Systems   Constitutional: Negative for activity change, appetite change, chills and fatigue.   Respiratory: Negative for cough and shortness of breath.    Gastrointestinal: Negative for abdominal pain, constipation and diarrhea.   Genitourinary: Negative for bladder incontinence, difficulty urinating, dysuria, frequency and hematuria.          Objective:      Physical Exam  Constitutional:       General: He is not in acute distress.     Appearance: Normal appearance.   Abdominal:      General: Abdomen is flat. There is no distension.   Neurological:      Mental Status: He is alert and oriented to person, place, and time.   Psychiatric:         Mood and Affect: Mood normal.         Judgment: Judgment normal.       PSA - < 0.01 ng/ml  testosterone - 315 ng/dl  Assessment:       Problem List Items Addressed This Visit     Prostate cancer - Primary          Plan:       Doing well, plan follow up in 3 months with PSA and testosterone

## 2022-08-03 ENCOUNTER — TELEPHONE (OUTPATIENT)
Dept: CARDIOLOGY | Facility: CLINIC | Age: 67
End: 2022-08-03
Payer: MEDICARE

## 2022-08-03 NOTE — TELEPHONE ENCOUNTER
----- Message from Sheila Aragon RN sent at 8/3/2022  8:50 AM CDT -----    ----- Message -----  From: Vera Peterson MA  Sent: 8/3/2022   8:17 AM CDT  To: Hafsa Villanueva    Helen please call the patient at [phone] he would like to talk to you about his recall. Thank you

## 2022-08-03 NOTE — TELEPHONE ENCOUNTER
Returned pt's call and informed him he is not due until Feb 2023.    From: Vera Peterson MA   Sent: 8/3/2022   8:17 AM CDT   To: Hafsa Villanueva     Helen please call the patient at [phone] he would like to talk to you about his recall. Thank you

## 2022-09-02 ENCOUNTER — PATIENT MESSAGE (OUTPATIENT)
Dept: FAMILY MEDICINE | Facility: CLINIC | Age: 67
End: 2022-09-02
Payer: MEDICARE

## 2022-09-02 ENCOUNTER — NURSE TRIAGE (OUTPATIENT)
Dept: ADMINISTRATIVE | Facility: CLINIC | Age: 67
End: 2022-09-02
Payer: MEDICARE

## 2022-09-02 ENCOUNTER — OFFICE VISIT (OUTPATIENT)
Dept: URGENT CARE | Facility: CLINIC | Age: 67
End: 2022-09-02
Payer: MEDICARE

## 2022-09-02 VITALS
HEART RATE: 60 BPM | RESPIRATION RATE: 16 BRPM | WEIGHT: 170 LBS | SYSTOLIC BLOOD PRESSURE: 170 MMHG | HEIGHT: 68 IN | OXYGEN SATURATION: 97 % | BODY MASS INDEX: 25.76 KG/M2 | TEMPERATURE: 100 F | DIASTOLIC BLOOD PRESSURE: 83 MMHG

## 2022-09-02 DIAGNOSIS — R50.9 FEVER, UNSPECIFIED FEVER CAUSE: Primary | ICD-10-CM

## 2022-09-02 DIAGNOSIS — U07.1 COVID-19 VIRUS DETECTED: ICD-10-CM

## 2022-09-02 DIAGNOSIS — U07.1 COVID: ICD-10-CM

## 2022-09-02 LAB
CTP QC/QA: YES
CTP QC/QA: YES
POC MOLECULAR INFLUENZA A AGN: NEGATIVE
POC MOLECULAR INFLUENZA B AGN: NEGATIVE
SARS-COV-2 RDRP RESP QL NAA+PROBE: POSITIVE

## 2022-09-02 PROCEDURE — U0002 COVID-19 LAB TEST NON-CDC: HCPCS | Mod: QW,CR,S$GLB, | Performed by: PHYSICIAN ASSISTANT

## 2022-09-02 PROCEDURE — U0002: ICD-10-PCS | Mod: QW,CR,S$GLB, | Performed by: PHYSICIAN ASSISTANT

## 2022-09-02 PROCEDURE — 99203 PR OFFICE/OUTPT VISIT, NEW, LEVL III, 30-44 MIN: ICD-10-PCS | Mod: CR,S$GLB,, | Performed by: PHYSICIAN ASSISTANT

## 2022-09-02 PROCEDURE — 99203 OFFICE O/P NEW LOW 30 MIN: CPT | Mod: CR,S$GLB,, | Performed by: PHYSICIAN ASSISTANT

## 2022-09-02 PROCEDURE — 87502 POCT INFLUENZA A/B MOLECULAR: ICD-10-PCS | Mod: QW,S$GLB,, | Performed by: PHYSICIAN ASSISTANT

## 2022-09-02 PROCEDURE — 87502 INFLUENZA DNA AMP PROBE: CPT | Mod: QW,S$GLB,, | Performed by: PHYSICIAN ASSISTANT

## 2022-09-02 NOTE — PROGRESS NOTES
"Subjective:       Patient ID: Denis Bunn is a 67 y.o. male.    Vitals:  height is 5' 8" (1.727 m) and weight is 77.1 kg (170 lb). His oral temperature is 100 °F (37.8 °C). His blood pressure is 170/83 (abnormal) and his pulse is 60. His respiration is 16 and oxygen saturation is 97%.     Chief Complaint: Fever    Patient stated his symptoms started yesterday.  He was running a fever of 100.1.  he took some tylenol around 6:30 this morning. He was not exposed to covid or flu.  He is vaccinated with the covid shots.     Fever   This is a new problem. The current episode started yesterday. The problem has been unchanged. The maximum temperature noted was 100 to 100.9 F (100.1). The temperature was taken using a tympanic thermometer. Pertinent negatives include no congestion, coughing, diarrhea, ear pain, headaches, nausea or vomiting. Associated symptoms comments: fatigue. He has tried acetaminophen for the symptoms.   Risk factors: no contaminated food, no contaminated water, no recent sickness and no sick contacts      Constitution: Positive for fever.   HENT:  Negative for ear pain and congestion.    Respiratory:  Negative for cough.    Gastrointestinal:  Negative for nausea, vomiting and diarrhea.   Neurological:  Negative for headaches.     Objective:      Physical Exam   Constitutional: He is oriented to person, place, and time. He appears well-developed. He is cooperative.  Non-toxic appearance. He does not appear ill. No distress.   HENT:   Head: Normocephalic and atraumatic.   Ears:   Right Ear: Hearing, tympanic membrane, external ear and ear canal normal.   Left Ear: Hearing, tympanic membrane, external ear and ear canal normal.   Nose: Nose normal. No mucosal edema, rhinorrhea, nasal deformity or congestion. No epistaxis. Right sinus exhibits no maxillary sinus tenderness and no frontal sinus tenderness. Left sinus exhibits no maxillary sinus tenderness and no frontal sinus tenderness. "   Mouth/Throat: Uvula is midline, oropharynx is clear and moist and mucous membranes are normal. No trismus in the jaw. Normal dentition. No uvula swelling. No oropharyngeal exudate, posterior oropharyngeal edema or posterior oropharyngeal erythema.   Eyes: Conjunctivae and lids are normal. No scleral icterus.   Neck: Trachea normal and phonation normal. Neck supple. No edema present. No erythema present. No neck rigidity present.   Cardiovascular: Normal rate, regular rhythm, normal heart sounds and normal pulses.   Pulmonary/Chest: Effort normal and breath sounds normal. No stridor. No respiratory distress. He has no decreased breath sounds. He has no wheezes. He has no rhonchi. He has no rales.   Abdominal: Normal appearance.   Musculoskeletal: Normal range of motion.         General: No deformity. Normal range of motion.   Neurological: He is alert and oriented to person, place, and time. He exhibits normal muscle tone. Coordination normal.   Skin: Skin is warm, dry, intact, not diaphoretic and not pale.   Psychiatric: His speech is normal and behavior is normal. Judgment and thought content normal.   Nursing note and vitals reviewed.      Results for orders placed or performed in visit on 09/02/22   POCT COVID-19 Rapid Screening   Result Value Ref Range    POC Rapid COVID Positive (A) Negative     Acceptable Yes    POCT Influenza A/B MOLECULAR   Result Value Ref Range    POC Molecular Influenza A Ag Negative Negative, Not Reported    POC Molecular Influenza B Ag Negative Negative, Not Reported     Acceptable Yes     No results found.     Assessment:       1. Fever, unspecified fever cause    2. COVID          Plan:         Fever, unspecified fever cause  -     POCT COVID-19 Rapid Screening  -     POCT Influenza A/B MOLECULAR    COVID  -     nirmatrelvir-ritonavir 300 mg (150 mg x 2)-100 mg copackaged tablets (EUA); Take 3 tablets by mouth 2 (two) times daily for 5 days. Each dose  contains 2 nirmatrelvir (pink tablets) and 1 ritonavir (white tablet). Take all 3 tablets together  Dispense: 30 tablet; Refill: 0       Follow up if symptoms worsen or fail to improve, for F/U with PCP or ED. There are no Patient Instructions on file for this visit.

## 2022-09-02 NOTE — TELEPHONE ENCOUNTER
Mr. Bunn is calling this am to report that he developed COVID-like symptoms last night.  He c/o scratchy throat, nasal congestion/nasal discharge, fatigue, body aches, fever of 100.1 this am.  He has taken Tylenol within the last 30 minutes.  He has no known exposure to COVID, and took a home test last night and again this am and they were both negative.  I advised that because he was considered to be high risk for COVID complications, that I message  Dr. Lilly, and he or his nurse would call the patient back this am to discuss/advise further.  I also gave home care advice to help with his symptoms; he verbalized understanding.      Reason for Disposition   HIGH RISK for severe COVID complications (e.g., weak immune system, age > 64 years, obesity with BMI > 25, pregnant, chronic lung disease or other chronic medical condition) (Exception: Already seen by PCP and no new or worsening symptoms.)    Additional Information   Negative: SEVERE difficulty breathing (e.g., struggling for each breath, speaks in single words)   Negative: Difficult to awaken or acting confused (e.g., disoriented, slurred speech)   Negative: Bluish (or gray) lips or face now   Negative: Shock suspected (e.g., cold/pale/clammy skin, too weak to stand, low BP, rapid pulse)   Negative: Sounds like a life-threatening emergency to the triager   Negative: Chest pain or pressure   Negative: Patient sounds very sick or weak to the triager   Negative: MILD difficulty breathing (e.g., minimal/no SOB at rest, SOB with walking, pulse <100)   Negative: Fever > 103 F (39.4 C)   Negative: [1] Fever > 101 F (38.3 C) AND [2] over 60 years of age   Negative: [1] Fever > 100.0 F (37.8 C) AND [2] bedridden (e.g., nursing home patient, CVA, chronic illness, recovering from surgery)    Protocols used: Coronavirus (COVID-19) Diagnosed or [id number]

## 2022-09-03 ENCOUNTER — NURSE TRIAGE (OUTPATIENT)
Dept: ADMINISTRATIVE | Facility: CLINIC | Age: 67
End: 2022-09-03
Payer: MEDICARE

## 2022-09-03 NOTE — TELEPHONE ENCOUNTER
"Patient diagnosed with COVID yesterday at [facility] and started Paxlovid. He states he took the second dose around 9 pm last night and woke up in the middle of the night with awful acid reflux. He proceeded to take the 9 am dose which brought on another wave of acid reflux symptoms. He states it feels like "burning in the esophagus." Pt says the medication side effects are worse than the symptoms from COVID right now, and he doesn't think he wants to take it. Pt would like to know if there is another option. Called on call provider, Dr. Durant. Recommended patient take OTC Pepcid, Prilosec, or Nexium. If that doesn't help, pt can stop taking medication. Provided patient education regarding basic home care for COVID symptoms and advised to call back for further questions or concerns.     Reason for Disposition   [1] Caller has URGENT medicine question about med that PCP or specialist prescribed AND [2] triager unable to answer question    Protocols used: Medication Question Call-A-AH    "

## 2022-09-04 ENCOUNTER — NURSE TRIAGE (OUTPATIENT)
Dept: ADMINISTRATIVE | Facility: CLINIC | Age: 67
End: 2022-09-04
Payer: MEDICARE

## 2022-09-04 ENCOUNTER — OFFICE VISIT (OUTPATIENT)
Dept: URGENT CARE | Facility: CLINIC | Age: 67
End: 2022-09-04
Payer: MEDICARE

## 2022-09-04 VITALS
TEMPERATURE: 98 F | HEART RATE: 81 BPM | SYSTOLIC BLOOD PRESSURE: 153 MMHG | HEIGHT: 68 IN | RESPIRATION RATE: 18 BRPM | BODY MASS INDEX: 25.76 KG/M2 | WEIGHT: 170 LBS | OXYGEN SATURATION: 97 % | DIASTOLIC BLOOD PRESSURE: 81 MMHG

## 2022-09-04 DIAGNOSIS — K21.00 GASTROESOPHAGEAL REFLUX DISEASE WITH ESOPHAGITIS WITHOUT HEMORRHAGE: ICD-10-CM

## 2022-09-04 DIAGNOSIS — R05.9 COUGH: ICD-10-CM

## 2022-09-04 DIAGNOSIS — J02.9 SORE THROAT: Primary | ICD-10-CM

## 2022-09-04 LAB
CTP QC/QA: YES
MOLECULAR STREP A: NEGATIVE

## 2022-09-04 PROCEDURE — 87651 POCT STREP A MOLECULAR: ICD-10-PCS | Mod: QW,S$GLB,, | Performed by: PHYSICIAN ASSISTANT

## 2022-09-04 PROCEDURE — 99214 OFFICE O/P EST MOD 30 MIN: CPT | Mod: S$GLB,,, | Performed by: PHYSICIAN ASSISTANT

## 2022-09-04 PROCEDURE — 99214 PR OFFICE/OUTPT VISIT, EST, LEVL IV, 30-39 MIN: ICD-10-PCS | Mod: S$GLB,,, | Performed by: PHYSICIAN ASSISTANT

## 2022-09-04 PROCEDURE — 87651 STREP A DNA AMP PROBE: CPT | Mod: QW,S$GLB,, | Performed by: PHYSICIAN ASSISTANT

## 2022-09-04 RX ORDER — SUCRALFATE 1 G/1
1 TABLET ORAL
Qty: 30 TABLET | Refills: 0 | Status: SHIPPED | OUTPATIENT
Start: 2022-09-04 | End: 2022-09-26

## 2022-09-04 RX ORDER — SUCRALFATE 1 G/1
1 TABLET ORAL
Qty: 30 TABLET | Refills: 0 | Status: SHIPPED | OUTPATIENT
Start: 2022-09-04 | End: 2022-09-04

## 2022-09-04 RX ORDER — DICYCLOMINE HYDROCHLORIDE 10 MG/5ML
10 SOLUTION ORAL
Status: COMPLETED | OUTPATIENT
Start: 2022-09-04 | End: 2022-09-04

## 2022-09-04 RX ORDER — LIDOCAINE HYDROCHLORIDE 20 MG/ML
SOLUTION OROPHARYNGEAL EVERY 6 HOURS
Qty: 100 ML | Refills: 1 | Status: SHIPPED | OUTPATIENT
Start: 2022-09-04 | End: 2022-09-04

## 2022-09-04 RX ORDER — PROMETHAZINE HYDROCHLORIDE AND DEXTROMETHORPHAN HYDROBROMIDE 6.25; 15 MG/5ML; MG/5ML
5 SYRUP ORAL EVERY 8 HOURS PRN
Qty: 118 ML | Refills: 1 | Status: SHIPPED | OUTPATIENT
Start: 2022-09-04 | End: 2022-09-11

## 2022-09-04 RX ORDER — FAMOTIDINE 20 MG/1
20 TABLET, FILM COATED ORAL 2 TIMES DAILY
Qty: 60 TABLET | Refills: 2 | Status: SHIPPED | OUTPATIENT
Start: 2022-09-04 | End: 2022-09-04

## 2022-09-04 RX ORDER — PROMETHAZINE HYDROCHLORIDE AND DEXTROMETHORPHAN HYDROBROMIDE 6.25; 15 MG/5ML; MG/5ML
5 SYRUP ORAL EVERY 8 HOURS PRN
Qty: 118 ML | Refills: 1 | Status: SHIPPED | OUTPATIENT
Start: 2022-09-04 | End: 2022-09-04

## 2022-09-04 RX ORDER — MAG HYDROX/ALUMINUM HYD/SIMETH 200-200-20
30 SUSPENSION, ORAL (FINAL DOSE FORM) ORAL
Status: COMPLETED | OUTPATIENT
Start: 2022-09-04 | End: 2022-09-04

## 2022-09-04 RX ORDER — FAMOTIDINE 20 MG/1
20 TABLET, FILM COATED ORAL 2 TIMES DAILY
Qty: 60 TABLET | Refills: 2 | Status: SHIPPED | OUTPATIENT
Start: 2022-09-04 | End: 2022-10-03

## 2022-09-04 RX ORDER — LIDOCAINE HYDROCHLORIDE 20 MG/ML
SOLUTION OROPHARYNGEAL EVERY 6 HOURS
Qty: 100 ML | Refills: 1 | Status: SHIPPED | OUTPATIENT
Start: 2022-09-04 | End: 2022-09-11

## 2022-09-04 RX ORDER — LIDOCAINE HYDROCHLORIDE 20 MG/ML
5 SOLUTION OROPHARYNGEAL
Status: COMPLETED | OUTPATIENT
Start: 2022-09-04 | End: 2022-09-04

## 2022-09-04 RX ADMIN — DICYCLOMINE HYDROCHLORIDE 10 MG: 10 SOLUTION ORAL at 09:09

## 2022-09-04 RX ADMIN — LIDOCAINE HYDROCHLORIDE 5 ML: 20 SOLUTION OROPHARYNGEAL at 09:09

## 2022-09-04 RX ADMIN — Medication 30 ML: at 09:09

## 2022-09-04 NOTE — TELEPHONE ENCOUNTER
"Covid + on 9/2.   Taking paxlovid.   Initially calling for advisement regarding sore throat, worsening & pain with swallowing.     Before beginning triage, pt states "just walked into [facility], Im just going to go ahead & see doctor. Thank you." Call ends.   Reason for Disposition   Caller hangs up    Protocols used: Difficult Call-A-AH    "

## 2022-09-04 NOTE — PROGRESS NOTES
"Subjective:       Patient ID: Denis Bunn is a 67 y.o. male.    Vitals:  height is 5' 8" (1.727 m) and weight is 77.1 kg (170 lb). His temperature is 97.8 °F (36.6 °C). His blood pressure is 153/81 (abnormal) and his pulse is 81. His respiration is 18 and oxygen saturation is 97%.     Chief Complaint: Sore Throat    Symptom started yesterday. Worse today.tested positive for covid on 09/02/22. Paxlovid and tylenol taken with no relief.  Patient complains of sore throat mostly in the lower throat in the upper chest area  Patient recently diagnosed with COVID 2 days ago on Paxlovid who comes in complaining of moderate to severe reflux at night since starting the Paxlovid.  He did start taking Prilosec over-the-counter with some mild relief.  Otherwise has no complaints.  Otherwise there is no hemoptysis no hematemesis    Sore Throat   This is a new problem. The current episode started yesterday. The problem has been gradually worsening. There has been no fever. Associated symptoms include swollen glands and trouble swallowing. He has had no exposure to strep or mono. He has tried acetaminophen (paxlovid) for the symptoms. The treatment provided no relief.   HENT:  Positive for sore throat and trouble swallowing.    Gastrointestinal:  Positive for heartburn.     Objective:      Physical Exam   Constitutional: He is oriented to person, place, and time. He appears well-developed.   HENT:   Head: Normocephalic and atraumatic.   Ears:   Right Ear: External ear normal.   Left Ear: External ear normal.   Nose: Nose normal.   Mouth/Throat: Mucous membranes are normal. No oropharyngeal exudate or posterior oropharyngeal erythema.   Eyes: Conjunctivae and lids are normal. Pupils are equal, round, and reactive to light. Extraocular movement intact   Neck: Trachea normal. Neck supple.   Cardiovascular: Normal rate, regular rhythm and normal heart sounds.   Pulmonary/Chest: Effort normal and breath sounds normal. No " respiratory distress.   Abdominal: Normal appearance and bowel sounds are normal. He exhibits no distension and no mass. Soft. There is no abdominal tenderness.   Musculoskeletal: Normal range of motion.         General: Normal range of motion.   Neurological: He is alert and oriented to person, place, and time. He has normal strength.   Skin: Skin is warm, dry, intact, not diaphoretic and not pale. Capillary refill takes less than 2 seconds. No lesion   Psychiatric: His speech is normal and behavior is normal. Judgment and thought content normal.   Nursing note and vitals reviewed.      Assessment:       1. Sore throat    2. Gastroesophageal reflux disease with esophagitis without hemorrhage          Plan:         Sore throat  -     POCT Strep A, Molecular  -     LIDOcaine HCl 2% (LIDOCAINE VISCOUS) 2 % Soln; by Mucous Membrane route every 6 (six) hours. for 7 days  Dispense: 100 mL; Refill: 1    Gastroesophageal reflux disease with esophagitis without hemorrhage  -     aluminum-magnesium hydroxide-simethicone 200-200-20 mg/5 mL suspension 30 mL  -     LIDOcaine HCl 2% oral solution 5 mL  -     dicyclomine 10 mg/5 mL syrup 10 mg  -     sucralfate (CARAFATE) 1 gram tablet; Take 1 tablet (1 g total) by mouth 4 (four) times daily before meals and nightly.  Dispense: 30 tablet; Refill: 0  -     famotidine (PEPCID) 20 MG tablet; Take 1 tablet (20 mg total) by mouth 2 (two) times daily.  Dispense: 60 tablet; Refill: 2  -     LIDOcaine HCl 2% (LIDOCAINE VISCOUS) 2 % Soln; by Mucous Membrane route every 6 (six) hours. for 7 days  Dispense: 100 mL; Refill: 1       Follow up if symptoms worsen or fail to improve, for F/U with PCP or ED.   Patient Instructions   Maalox over-the-counter   Follow up if symptoms worsen or fail to improve, for F/U with PCP or ED.   Patient Instructions   Maalox over-the-counter

## 2022-09-05 ENCOUNTER — NURSE TRIAGE (OUTPATIENT)
Dept: ADMINISTRATIVE | Facility: CLINIC | Age: 67
End: 2022-09-05
Payer: MEDICARE

## 2022-09-05 NOTE — TELEPHONE ENCOUNTER
Patient states he was diagnosed with Covid Friday and he is on day 4 of paxlovid. Patient states he is not feeling any better. Patient reports sore throat not relieved by medication. Patient also reports cough that started yesterday. Denies fever at this time. Denies difficulty breathing or SOB. Patient wanting to know how long is he going to be sick for. Advised patient of recommendation and care advice. Advised patient of dispo to see PCP within 3 days. Verbalized understanding. Patient voiced frustration of not being able to get in to see PCP till October. Recommend OAC. Patient hesitant to use based on symptoms and needing a physical exam by provider. Support provided regarding patient concerns. Advised to call back if symptoms become worse or with further questions.       Reason for Disposition   Taking an ACE Inhibitor medication  (e.g., benazepril/LOTENSIN, captopril/CAPOTEN, enalapril/VASOTEC, lisinopril/ZESTRIL)    Additional Information   Negative: SEVERE difficulty breathing (e.g., struggling for each breath, speaks in single words)   Negative: Bluish (or gray) lips or face now   Negative: [1] Rapid onset of cough AND [2] has hives   Negative: Coughing started suddenly after medicine, an allergic food or bee sting   Negative: [1] Difficulty breathing AND [2] exposure to flames, smoke, or fumes   Negative: [1] Stridor AND [2] difficulty breathing   Negative: Sounds like a life-threatening emergency to the triager   Negative: [1] MODERATE difficulty breathing (e.g., speaks in phrases, SOB even at rest, pulse 100-120) AND [2] still present when not coughing   Negative: Chest pain  (Exception: MILD central chest pain, present only when coughing)   Negative: Patient sounds very sick or weak to the triager   Negative: [1] MILD difficulty breathing (e.g., minimal/no SOB at rest, SOB with walking, pulse <100) AND [2] still present when not coughing   Negative: [1] Coughed up blood AND [2] > 1 tablespoon (15 ml)  (Exception: blood-tinged sputum)   Negative: Fever > 103 F (39.4 C)   Negative: [1] Fever > 101 F (38.3 C) AND [2] age > 60 years   Negative: [1] Fever > 100.0 F (37.8 C) AND [2] bedridden (e.g., nursing home patient, CVA, chronic illness, recovering from surgery)   Negative: [1] Fever > 100.0 F (37.8 C) AND [2] diabetes mellitus or weak immune system (e.g., HIV positive, cancer chemo, splenectomy, organ transplant, chronic steroids)   Negative: Wheezing is present   Negative: [1] Ankle swelling AND [2] swelling is increasing   Negative: SEVERE coughing spells (e.g., whooping sound after coughing, vomiting after coughing)   Negative: [1] Continuous (nonstop) coughing interferes with work or school AND [2] no improvement using cough treatment per protocol   Negative: Fever present > 3 days (72 hours)   Negative: [1] Fever returns after gone for over 24 hours AND [2] symptoms worse or not improved   Negative: [1] Using nasal washes and pain medicine > 24 hours AND [2] sinus pain (around cheekbone or eye) persists   Negative: Earache is present   Negative: Cough has been present for > 3 weeks   Negative: [1] Nasal discharge AND [2] present > 10 days   Negative: [1] Coughed up blood-tinged sputum AND [2] more than once   Negative: [1] Patient also has allergy symptoms (e.g., itchy eyes, clear nasal discharge, postnasal drip) AND [2] they are acting up    Protocols used: Cough - Acute Non-Productive-A-AH

## 2022-09-07 ENCOUNTER — NURSE TRIAGE (OUTPATIENT)
Dept: ADMINISTRATIVE | Facility: CLINIC | Age: 67
End: 2022-09-07
Payer: MEDICARE

## 2022-09-07 NOTE — TELEPHONE ENCOUNTER
Diagnosed with covid on Friday. Symptoms started on Thursday. No fever since Friday. Pt had questions about quarantine. Pt instructed per cdc guidelines. Pt verbalized understanding. No concerns at present time.   Reason for Disposition   Health Information question, no triage required and triager able to answer question    Protocols used: Information Only Call-A-AH

## 2022-09-19 ENCOUNTER — TELEPHONE (OUTPATIENT)
Dept: FAMILY MEDICINE | Facility: CLINIC | Age: 67
End: 2022-09-19
Payer: MEDICARE

## 2022-09-19 NOTE — TELEPHONE ENCOUNTER
Called pt back , asked pt if he still is having symptoms said no . Says today he finally feels back like himself . Asked if re-test and said no was told not to . Informed if he isn't asymptotic and its been two weeks he should be fine but wanted verification from Dr. Lilly first .

## 2022-09-19 NOTE — TELEPHONE ENCOUNTER
----- Message from Hoa Marquez sent at 9/19/2022  8:45 AM CDT -----  .Type: Patient Call Back    Who called:self    What is the request in detail: pt had covid two weeks ago and would like to know if it would be okay to get the flu shot. Pt just wants to make sure there would be no adverse reactions. Please call    Can the clinic reply by [facility]?    Would the patient rather a call back or a response via [facility]? call    Best call back number:.[phone] (home)

## 2022-09-20 NOTE — TELEPHONE ENCOUNTER
Rt pt call and informed Dr. Lilly hs advised its fine to receive flu shot . He requested him and his wife scheduled next Friday to receive it . Assisted with scheduling them both around 10:40 per request .

## 2022-09-26 DIAGNOSIS — K21.00 GASTROESOPHAGEAL REFLUX DISEASE WITH ESOPHAGITIS WITHOUT HEMORRHAGE: ICD-10-CM

## 2022-09-26 RX ORDER — SUCRALFATE 1 G/1
TABLET ORAL
Qty: 30 TABLET | Refills: 0 | Status: SHIPPED | OUTPATIENT
Start: 2022-09-26 | End: 2022-10-03

## 2022-09-30 ENCOUNTER — CLINICAL SUPPORT (OUTPATIENT)
Dept: FAMILY MEDICINE | Facility: CLINIC | Age: 67
End: 2022-09-30
Payer: MEDICARE

## 2022-09-30 DIAGNOSIS — Z23 ENCOUNTER FOR ADMINISTRATION OF VACCINE: Primary | ICD-10-CM

## 2022-09-30 PROCEDURE — G0008 ADMIN INFLUENZA VIRUS VAC: HCPCS | Mod: PBBFAC,PN

## 2022-09-30 PROCEDURE — 99499 UNLISTED E&M SERVICE: CPT | Mod: S$PBB,,, | Performed by: INTERNAL MEDICINE

## 2022-09-30 PROCEDURE — 99499 NO LOS: ICD-10-PCS | Mod: S$PBB,,, | Performed by: INTERNAL MEDICINE

## 2022-09-30 NOTE — PROGRESS NOTES
Patient given flu vaccine to right deltoid, no complaints or reactions noted. Vis given 8/6/21 to patient.

## 2022-10-03 ENCOUNTER — OFFICE VISIT (OUTPATIENT)
Dept: UROLOGY | Facility: CLINIC | Age: 67
End: 2022-10-03
Payer: MEDICARE

## 2022-10-03 VITALS
SYSTOLIC BLOOD PRESSURE: 126 MMHG | DIASTOLIC BLOOD PRESSURE: 69 MMHG | BODY MASS INDEX: 25.76 KG/M2 | WEIGHT: 170 LBS | HEART RATE: 55 BPM | HEIGHT: 68 IN

## 2022-10-03 DIAGNOSIS — C61 PROSTATE CANCER: ICD-10-CM

## 2022-10-03 DIAGNOSIS — N52.31 ERECTILE DYSFUNCTION FOLLOWING RADICAL PROSTATECTOMY: Primary | ICD-10-CM

## 2022-10-03 PROBLEM — Z12.11 COLON CANCER SCREENING: Status: RESOLVED | Noted: 2019-07-29 | Resolved: 2022-10-03

## 2022-10-03 PROCEDURE — 99999 PR PBB SHADOW E&M-EST. PATIENT-LVL IV: ICD-10-PCS | Mod: PBBFAC,,, | Performed by: UROLOGY

## 2022-10-03 PROCEDURE — 99214 OFFICE O/P EST MOD 30 MIN: CPT | Mod: PBBFAC | Performed by: UROLOGY

## 2022-10-03 PROCEDURE — 99999 PR PBB SHADOW E&M-EST. PATIENT-LVL IV: CPT | Mod: PBBFAC,,, | Performed by: UROLOGY

## 2022-10-03 PROCEDURE — 99214 PR OFFICE/OUTPT VISIT, EST, LEVL IV, 30-39 MIN: ICD-10-PCS | Mod: S$PBB,,, | Performed by: UROLOGY

## 2022-10-03 PROCEDURE — 99214 OFFICE O/P EST MOD 30 MIN: CPT | Mod: S$PBB,,, | Performed by: UROLOGY

## 2022-10-03 RX ORDER — PAPAVERINE HYDROCHLORIDE 30 MG/ML
INJECTION INTRAMUSCULAR; INTRAVENOUS
Qty: 5 ML | Refills: 0 | Status: SHIPPED | OUTPATIENT
Start: 2022-10-03 | End: 2022-10-19

## 2022-10-03 NOTE — PROGRESS NOTES
Mr. Bunn is a 67 y.o. male presenting with ED.    PRESENTING ILLNESS:    Denis Bunn is a 67 y.o. male with prostate cancer s/p RALP on 9/2/20 by Dr. White.  S/p XRT completed 10/27/21.      He c/o ED.  He's tried and failed Cialis.    He does not have JAMIE.  He is wearing0 pads/day.  No hematuria.  No dysuria.  Good FOS.      No bone pain or weight loss.    REVIEW OF SYSTEMS:    Denis Bunn denies headache, blurred vision, fever, nausea, vomiting, chills, abdominal pain, chest pain, sore throat, bleeding per rectum, cough, SOB, recent loss of consciousness, recent mental status changes, seizures, dizziness, or upper or lower extremity weakness.    LARRY  1. 1  2. 0  3. 0  4. 0  5. 0      PATIENT HISTORY:    Past Medical History:   Diagnosis Date    Anxiety     Back pain     Cataract     Constipation - functional     Dyslipidemia     ED (erectile dysfunction)     FH: CAD (coronary artery disease) 4/24/2013    But he had 0 CAC    History of gout     HTN (hypertension), benign 04/24/2013    Personal history of colonic polyps     Prostate cancer     Vision changes        Family History   Problem Relation Age of Onset    Blindness Mother         from cva    Cataracts Mother     Cataracts Father     Cancer Father     No Known Problems Sister     No Known Problems Brother     No Known Problems Maternal Aunt     No Known Problems Maternal Uncle     No Known Problems Paternal Aunt     No Known Problems Paternal Uncle     No Known Problems Maternal Grandmother     No Known Problems Maternal Grandfather     No Known Problems Paternal Grandmother     No Known Problems Paternal Grandfather     Amblyopia Neg Hx     Diabetes Neg Hx     Glaucoma Neg Hx     Hypertension Neg Hx     Macular degeneration Neg Hx     Retinal detachment Neg Hx     Strabismus Neg Hx     Stroke Neg Hx     Thyroid disease Neg Hx        Past Surgical History:   Procedure Laterality Date    BACK SURGERY      CATARACT EXTRACTION      left eye     COLONOSCOPY N/A 7/29/2019    Procedure: COLONOSCOPY;  Surgeon: Mingo Freeman MD;  Location: [facility];  Service: Endoscopy;  Laterality: N/A;  due July 2019    ELBOW SURGERY      scope/right    EYE SURGERY      Dr. 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   Substance and Sexual Activity    Alcohol use: Yes     Alcohol/week: 3.0 standard drinks     Types: 3 Glasses of wine per week     Comment: wine daily    Drug use: No    Sexual activity: Yes     Partners: Female     Social Determinants of Health     Financial Resource Strain: Low Risk     Difficulty of Paying Living Expenses: Not hard at all   Food Insecurity: No Food Insecurity    Worried About Running Out of Food in the Last Year: Never true    Ran Out of Food in the Last Year: Never true   Transportation Needs: No Transportation Needs    Lack of Transportation (Medical): No    Lack of Transportation (Non-Medical): No   Physical Activity: Insufficiently Active    Days of Exercise per Week: 3 days    Minutes of Exercise per Session: 30 min   Stress: Stress Concern Present    Feeling of Stress : To some extent   Social Connections: Unknown    Frequency of Communication with Friends and Family: More than three times a week    Frequency of Social Gatherings with Friends and Family: Twice a week    Active Member of Clubs or Organizations: No    Attends Club or Organization Meetings: Patient refused    Marital Status:    Housing Stability: Low Risk     Unable to Pay for Housing in the Last Year: No    Number of Places Lived  in the Last Year: 1    Unstable Housing in the Last Year: No       Review of patient's allergies indicates:   Allergen Reactions    Amoxicillin-pot clavulanate Nausea And Vomiting     Other reaction(s): Stomach upset  Other reaction(s): Stomach upset    Celecoxib Other (See Comments) and Nausea And Vomiting         Current Outpatient Medications:     allopurinoL (ZYLOPRIM) 100 MG tablet, TAKE 1 TABLET (100 MG TOTAL) BY MOUTH 2 (TWO) TIMES DAILY., Disp: 180 tablet, Rfl: 3    ALPRAZolam (XANAX) 0.5 MG tablet, Take 1 tablet (0.5 mg total) by mouth 2 (two) times daily as needed for Anxiety., Disp: 30 tablet, Rfl: 1    ascorbic acid, vitamin C, (VITAMIN C) 500 MG tablet, Take 500 mg by mouth once daily. Hold for 1 week prior to surgery, Disp: , Rfl:     coenzyme Q10 100 mg capsule, Take 100 mg by mouth once daily. Hold for 1 week prior to surgery, Disp: , Rfl:     ergocalciferol, vitamin D2, (VITAMIN D ORAL), Take by mouth. Hold for 1 week prior to surgery, Disp: , Rfl:     famotidine (PEPCID) 20 MG tablet, Take 1 tablet (20 mg total) by mouth 2 (two) times daily., Disp: 60 tablet, Rfl: 2    ferrous sulfate (SLOW RELEASE IRON) 142 mg (45 mg iron) TbSR, Take by mouth., Disp: , Rfl:     fluticasone propionate (FLONASE) 50 mcg/actuation nasal spray, USE 1 SPRAY INTO EACH NOSTRIL TWICE A DAY, Disp: 48 mL, Rfl: 2    gabapentin (NEURONTIN) 300 MG capsule, TAKE 1 CAPSULE BY MOUTH EVERY EVENING, Disp: 90 capsule, Rfl: 1    garlic 1,000 mg Cap, Take by mouth. Hold for 1 week prior to surgery, Disp: , Rfl:     hydrocortisone 2.5 % cream, APPLY TO AFFECTED AREA TWICE A DAY, Disp: 28.35 g, Rfl: 1    losartan (COZAAR) 100 MG tablet, Take 1 tablet (100 mg total) by mouth once daily., Disp: 90 tablet, Rfl: 3    magnesium 250 mg Tab, Take 250 mg by mouth once. Takes two 250 mg tablets daily  Hold for 1 week prior to surgery, Disp: , Rfl:     omega-3 fatty acids 1,000 mg Cap, Take 1,000 mg by mouth., Disp: , Rfl:     pravastatin  (PRAVACHOL) 20 MG tablet, TAKE 1 TABLET(20 MG) BY MOUTH EVERY DAY, Disp: 90 tablet, Rfl: 3    sucralfate (CARAFATE) 1 gram tablet, TAKE 1 TABLET BY MOUTH 4 TIMES DAILY BEFORE MEALS AND NIGHTLY., Disp: 30 tablet, Rfl: 0    verapamiL (CALAN-SR) 120 MG CR tablet, TAKE 1 TABLET (120 MG TOTAL) BY MOUTH EVERY EVENING., Disp: 90 tablet, Rfl: 3    verapamiL (VERELAN) 240 MG C24P, Take 1 capsule (240 mg total) by mouth every morning., Disp: 90 capsule, Rfl: 3    zinc 50 mg Tab, Take by mouth. Hold for 1 week prior to surgery, Disp: , Rfl:     ZIOPTJOSE PF, 0.0015 % Dpet, Place 1 drop into both eyes nightly., Disp: , Rfl:       PHYSICAL EXAMINATION:    The patient generally appears in good health, is appropriately interactive, and is in no apparent distress.     Eyes: anicteric sclerae, moist conjunctivae; no lid-lag; PERRLA     HENT: Atraumatic; oropharynx clear with moist mucous membranes and no mucosal ulcerations;normal hard and soft palate. No evidence of lymphadenopathy.    Neck: Trachea midline.  No thyromegaly.    Musculoskeletal: No abnormal gait.    Skin: No lesions.    Mental: Cooperative with normal affect.  Is oriented to time, place, and person.    Neuro: Grossly intact.    Chest: Normal inspiratory effort.   No accessory muscles.  No audible wheezes.  Respirations symmetric on inspiration and expiration.    Heart: Regular rhythm.      Abdomen:  Soft, non-tender. No masses or organomegaly. Bladder is not palpable. No evidence of flank discomfort. No evidence of inguinal hernia.    Genitourinary: The penis is circumcised with no evidence of plaques or induration. The urethral meatus is normal. The testes, epididymides, and cord structures are normal in size and contour bilaterally. The scrotum is normal in size and contour.    Extremities: No clubbing, cyanosis, or edema        LABS:      Lab Results   Component Value Date    PSA 3.8 05/29/2019    PSA 3.1 11/09/2018    PSA 3.6 06/11/2018    PSADIAG <0.01  07/21/2022    PSADIAG <0.01 12/22/2021    PSADIAG <0.01 11/08/2021        IMPRESSION:    Encounter Diagnoses   Name Primary?    Erectile dysfunction following radical prostatectomy Yes    Prostate cancer    HTN, stable  Hyperlipidemia, stable    PLAN:    1. Discussed options for his ED (affected by above comorbidities).  He'd like [facility]. Side effects discussed.  A new Rx was given.  Will set up for teaching.  2. RTC 6 months.    Copy to:

## 2022-10-18 ENCOUNTER — PATIENT MESSAGE (OUTPATIENT)
Dept: FAMILY MEDICINE | Facility: CLINIC | Age: 67
End: 2022-10-18
Payer: MEDICARE

## 2022-10-18 ENCOUNTER — OFFICE VISIT (OUTPATIENT)
Dept: UROLOGY | Facility: CLINIC | Age: 67
End: 2022-10-18
Payer: MEDICARE

## 2022-10-18 VITALS
WEIGHT: 173 LBS | DIASTOLIC BLOOD PRESSURE: 73 MMHG | HEART RATE: 53 BPM | SYSTOLIC BLOOD PRESSURE: 138 MMHG | BODY MASS INDEX: 26.22 KG/M2 | HEIGHT: 68 IN

## 2022-10-18 DIAGNOSIS — C61 PROSTATE CANCER: Primary | ICD-10-CM

## 2022-10-18 DIAGNOSIS — Z90.79 HISTORY OF ROBOT-ASSISTED LAPAROSCOPIC RADICAL PROSTATECTOMY: ICD-10-CM

## 2022-10-18 DIAGNOSIS — E78.2 MIXED HYPERLIPIDEMIA: ICD-10-CM

## 2022-10-18 DIAGNOSIS — Z13.6 ENCOUNTER FOR LIPID SCREENING FOR CARDIOVASCULAR DISEASE: ICD-10-CM

## 2022-10-18 DIAGNOSIS — I10 HYPERTENSION, ESSENTIAL: ICD-10-CM

## 2022-10-18 DIAGNOSIS — E79.0 HYPERURICEMIA: ICD-10-CM

## 2022-10-18 DIAGNOSIS — E55.9 HYPOVITAMINOSIS D: ICD-10-CM

## 2022-10-18 DIAGNOSIS — N52.31 ERECTILE DYSFUNCTION FOLLOWING RADICAL PROSTATECTOMY: ICD-10-CM

## 2022-10-18 DIAGNOSIS — Z13.220 ENCOUNTER FOR LIPID SCREENING FOR CARDIOVASCULAR DISEASE: ICD-10-CM

## 2022-10-18 DIAGNOSIS — Z92.3 HISTORY OF EXTERNAL BEAM RADIATION THERAPY: ICD-10-CM

## 2022-10-18 DIAGNOSIS — Z00.00 ROUTINE ADULT HEALTH MAINTENANCE: Primary | ICD-10-CM

## 2022-10-18 PROCEDURE — 99999 PR PBB SHADOW E&M-EST. PATIENT-LVL V: CPT | Mod: PBBFAC,,, | Performed by: NURSE PRACTITIONER

## 2022-10-18 PROCEDURE — 99215 OFFICE O/P EST HI 40 MIN: CPT | Mod: PBBFAC | Performed by: NURSE PRACTITIONER

## 2022-10-18 PROCEDURE — 99215 OFFICE O/P EST HI 40 MIN: CPT | Mod: S$PBB,,, | Performed by: NURSE PRACTITIONER

## 2022-10-18 PROCEDURE — 99215 PR OFFICE/OUTPT VISIT, EST, LEVL V, 40-54 MIN: ICD-10-PCS | Mod: S$PBB,,, | Performed by: NURSE PRACTITIONER

## 2022-10-18 PROCEDURE — 99999 PR PBB SHADOW E&M-EST. PATIENT-LVL V: ICD-10-PCS | Mod: PBBFAC,,, | Performed by: NURSE PRACTITIONER

## 2022-10-18 NOTE — PATIENT INSTRUCTIONS
1. Wipe off top of medication vial with an alcohol prep.   2. With the vial held firmly on a flat surface, insert the syringe into the vial.  3. Now carefully  the vial with the needle in place and turn upside down.  4. You can then push in syringe (like you are giving a shot) to get all the air out of the syringe.  5. You can then pull the plunger of the syringe back down while keeping the needle inserted in the vial to get your desired results.   6. If having difficulty seeing the medication, rapidly pull back the syringe and then you will see the medication fill up.    You may never get all the tiny air bubbles out. It is ok.     He was instructed to keep the dosage at 20 units. If noticing a decrease in reaction he can increase the dosage by 5 units. He may also refill the Rx.   Refills were sent to [facility].

## 2022-10-19 RX ORDER — PAPAVERINE HYDROCHLORIDE 30 MG/ML
INJECTION INTRAMUSCULAR; INTRAVENOUS
Qty: 10 ML | Refills: 12 | Status: SHIPPED | OUTPATIENT
Start: 2022-10-19 | End: 2023-09-25 | Stop reason: SDUPTHER

## 2022-10-21 ENCOUNTER — PATIENT MESSAGE (OUTPATIENT)
Dept: FAMILY MEDICINE | Facility: CLINIC | Age: 67
End: 2022-10-21
Payer: MEDICARE

## 2022-10-27 ENCOUNTER — LAB VISIT (OUTPATIENT)
Dept: LAB | Facility: HOSPITAL | Age: 67
End: 2022-10-27
Attending: RADIOLOGY
Payer: MEDICARE

## 2022-10-27 ENCOUNTER — PATIENT MESSAGE (OUTPATIENT)
Dept: FAMILY MEDICINE | Facility: CLINIC | Age: 67
End: 2022-10-27
Payer: MEDICARE

## 2022-10-27 DIAGNOSIS — Z13.6 ENCOUNTER FOR LIPID SCREENING FOR CARDIOVASCULAR DISEASE: ICD-10-CM

## 2022-10-27 DIAGNOSIS — E55.9 HYPOVITAMINOSIS D: ICD-10-CM

## 2022-10-27 DIAGNOSIS — I10 HYPERTENSION, ESSENTIAL: ICD-10-CM

## 2022-10-27 DIAGNOSIS — E78.2 MIXED HYPERLIPIDEMIA: ICD-10-CM

## 2022-10-27 DIAGNOSIS — Z13.220 ENCOUNTER FOR LIPID SCREENING FOR CARDIOVASCULAR DISEASE: ICD-10-CM

## 2022-10-27 DIAGNOSIS — C61 PROSTATE CANCER: ICD-10-CM

## 2022-10-27 DIAGNOSIS — Z00.00 ROUTINE ADULT HEALTH MAINTENANCE: ICD-10-CM

## 2022-10-27 DIAGNOSIS — E79.0 HYPERURICEMIA: ICD-10-CM

## 2022-10-27 LAB
25(OH)D3+25(OH)D2 SERPL-MCNC: 49 NG/ML (ref 30–96)
ALBUMIN SERPL BCP-MCNC: 4.2 G/DL (ref 3.5–5.2)
ALP SERPL-CCNC: 81 U/L (ref 55–135)
ALT SERPL W/O P-5'-P-CCNC: 39 U/L (ref 10–44)
ANION GAP SERPL CALC-SCNC: 6 MMOL/L (ref 8–16)
AST SERPL-CCNC: 30 U/L (ref 10–40)
BASOPHILS # BLD AUTO: 0.04 K/UL (ref 0–0.2)
BASOPHILS NFR BLD: 1.1 % (ref 0–1.9)
BILIRUB SERPL-MCNC: 0.7 MG/DL (ref 0.1–1)
BUN SERPL-MCNC: 14 MG/DL (ref 8–23)
CALCIUM SERPL-MCNC: 10 MG/DL (ref 8.7–10.5)
CHLORIDE SERPL-SCNC: 106 MMOL/L (ref 95–110)
CHOLEST SERPL-MCNC: 153 MG/DL (ref 120–199)
CHOLEST/HDLC SERPL: 2 {RATIO} (ref 2–5)
CO2 SERPL-SCNC: 29 MMOL/L (ref 23–29)
COMPLEXED PSA SERPL-MCNC: <0.01 NG/ML (ref 0–4)
CREAT SERPL-MCNC: 1.1 MG/DL (ref 0.5–1.4)
DIFFERENTIAL METHOD: ABNORMAL
EOSINOPHIL # BLD AUTO: 0.2 K/UL (ref 0–0.5)
EOSINOPHIL NFR BLD: 6 % (ref 0–8)
ERYTHROCYTE [DISTWIDTH] IN BLOOD BY AUTOMATED COUNT: 13 % (ref 11.5–14.5)
EST. GFR  (NO RACE VARIABLE): >60 ML/MIN/1.73 M^2
GLUCOSE SERPL-MCNC: 105 MG/DL (ref 70–110)
HCT VFR BLD AUTO: 39.8 % (ref 40–54)
HDLC SERPL-MCNC: 75 MG/DL (ref 40–75)
HDLC SERPL: 49 % (ref 20–50)
HGB BLD-MCNC: 13.4 G/DL (ref 14–18)
IMM GRANULOCYTES # BLD AUTO: 0.01 K/UL (ref 0–0.04)
IMM GRANULOCYTES NFR BLD AUTO: 0.3 % (ref 0–0.5)
LDLC SERPL CALC-MCNC: 66.6 MG/DL (ref 63–159)
LYMPHOCYTES # BLD AUTO: 0.7 K/UL (ref 1–4.8)
LYMPHOCYTES NFR BLD: 20.1 % (ref 18–48)
MCH RBC QN AUTO: 33.2 PG (ref 27–31)
MCHC RBC AUTO-ENTMCNC: 33.7 G/DL (ref 32–36)
MCV RBC AUTO: 99 FL (ref 82–98)
MONOCYTES # BLD AUTO: 0.4 K/UL (ref 0.3–1)
MONOCYTES NFR BLD: 10.7 % (ref 4–15)
NEUTROPHILS # BLD AUTO: 2.3 K/UL (ref 1.8–7.7)
NEUTROPHILS NFR BLD: 61.8 % (ref 38–73)
NONHDLC SERPL-MCNC: 78 MG/DL
NRBC BLD-RTO: 0 /100 WBC
PLATELET # BLD AUTO: 182 K/UL (ref 150–450)
PMV BLD AUTO: 9.1 FL (ref 9.2–12.9)
POTASSIUM SERPL-SCNC: 4.7 MMOL/L (ref 3.5–5.1)
PROT SERPL-MCNC: 7.2 G/DL (ref 6–8.4)
RBC # BLD AUTO: 4.04 M/UL (ref 4.6–6.2)
SODIUM SERPL-SCNC: 141 MMOL/L (ref 136–145)
TESTOST SERPL-MCNC: 418 NG/DL (ref 304–1227)
TRIGL SERPL-MCNC: 57 MG/DL (ref 30–150)
TSH SERPL DL<=0.005 MIU/L-ACNC: 1.42 UIU/ML (ref 0.4–4)
URATE SERPL-MCNC: 6.6 MG/DL (ref 3.4–7)
WBC # BLD AUTO: 3.64 K/UL (ref 3.9–12.7)

## 2022-10-27 PROCEDURE — 84443 ASSAY THYROID STIM HORMONE: CPT | Performed by: INTERNAL MEDICINE

## 2022-10-27 PROCEDURE — 82306 VITAMIN D 25 HYDROXY: CPT | Performed by: INTERNAL MEDICINE

## 2022-10-27 PROCEDURE — 84403 ASSAY OF TOTAL TESTOSTERONE: CPT | Performed by: RADIOLOGY

## 2022-10-27 PROCEDURE — 80061 LIPID PANEL: CPT | Performed by: INTERNAL MEDICINE

## 2022-10-27 PROCEDURE — 85025 COMPLETE CBC W/AUTO DIFF WBC: CPT | Performed by: INTERNAL MEDICINE

## 2022-10-27 PROCEDURE — 80053 COMPREHEN METABOLIC PANEL: CPT | Performed by: INTERNAL MEDICINE

## 2022-10-27 PROCEDURE — 36415 COLL VENOUS BLD VENIPUNCTURE: CPT | Performed by: RADIOLOGY

## 2022-10-27 PROCEDURE — 84550 ASSAY OF BLOOD/URIC ACID: CPT | Performed by: INTERNAL MEDICINE

## 2022-10-27 PROCEDURE — 84153 ASSAY OF PSA TOTAL: CPT | Performed by: RADIOLOGY

## 2022-11-08 ENCOUNTER — OFFICE VISIT (OUTPATIENT)
Dept: FAMILY MEDICINE | Facility: CLINIC | Age: 67
End: 2022-11-08
Payer: MEDICARE

## 2022-11-08 VITALS
BODY MASS INDEX: 26.01 KG/M2 | HEART RATE: 56 BPM | OXYGEN SATURATION: 98 % | TEMPERATURE: 98 F | DIASTOLIC BLOOD PRESSURE: 72 MMHG | SYSTOLIC BLOOD PRESSURE: 136 MMHG | WEIGHT: 171.06 LBS | RESPIRATION RATE: 18 BRPM

## 2022-11-08 DIAGNOSIS — G47.9 SLEEP TROUBLE: ICD-10-CM

## 2022-11-08 DIAGNOSIS — G62.9 PERIPHERAL POLYNEUROPATHY: ICD-10-CM

## 2022-11-08 DIAGNOSIS — F41.9 ANXIETY: ICD-10-CM

## 2022-11-08 DIAGNOSIS — I10 HYPERTENSION, ESSENTIAL: Primary | ICD-10-CM

## 2022-11-08 DIAGNOSIS — Z23 NEED FOR SHINGLES VACCINE: ICD-10-CM

## 2022-11-08 PROCEDURE — 99214 OFFICE O/P EST MOD 30 MIN: CPT | Mod: S$PBB,,, | Performed by: INTERNAL MEDICINE

## 2022-11-08 PROCEDURE — 99214 OFFICE O/P EST MOD 30 MIN: CPT | Mod: PBBFAC,PN | Performed by: INTERNAL MEDICINE

## 2022-11-08 PROCEDURE — 99214 PR OFFICE/OUTPT VISIT, EST, LEVL IV, 30-39 MIN: ICD-10-PCS | Mod: S$PBB,,, | Performed by: INTERNAL MEDICINE

## 2022-11-08 PROCEDURE — 99999 PR PBB SHADOW E&M-EST. PATIENT-LVL IV: CPT | Mod: PBBFAC,,, | Performed by: INTERNAL MEDICINE

## 2022-11-08 PROCEDURE — 99999 PR PBB SHADOW E&M-EST. PATIENT-LVL IV: ICD-10-PCS | Mod: PBBFAC,,, | Performed by: INTERNAL MEDICINE

## 2022-11-08 RX ORDER — ZOSTER VACCINE RECOMBINANT, ADJUVANTED 50 MCG/0.5
0.5 KIT INTRAMUSCULAR ONCE
Qty: 1 EACH | Refills: 0 | Status: SHIPPED | OUTPATIENT
Start: 2022-11-08 | End: 2022-11-08

## 2022-11-08 RX ORDER — ALPRAZOLAM 0.5 MG/1
0.5 TABLET ORAL NIGHTLY PRN
Qty: 30 TABLET | Refills: 1 | Status: SHIPPED | OUTPATIENT
Start: 2022-11-08 | End: 2023-11-28

## 2022-11-08 NOTE — PROGRESS NOTES
"Subjective:       Patient ID: Denis Bunn is a 67 y.o. male.    Chief Complaint: Annual Exam    F/u chronic conditions    HPI: 66 y/o w/ HTN prostate cancer s/p TURP with XRT no longer on hormone suppressive therapy presents alone for follow up. Doing "okay" does use xanax up to one time per month (0.25mg) to help fall back asleep no LE swelling he has returned to swimming thre times per week for exercise. Does feel more tired on days he swims no daytime somulence or napping. No LE swelling. Leg pains have resolved with nightly gabapentin no orthostatic symptoms     Review of Systems   Constitutional:  Negative for activity change, fever and unexpected weight change.   HENT:  Negative for congestion, hearing loss, rhinorrhea, sore throat and trouble swallowing.    Eyes:  Negative for photophobia, discharge, redness and visual disturbance.   Respiratory:  Negative for cough, chest tightness, shortness of breath and wheezing.    Cardiovascular:  Negative for chest pain, palpitations and leg swelling.   Gastrointestinal:  Negative for abdominal pain, blood in stool, constipation, diarrhea, nausea and vomiting.   Endocrine: Negative for cold intolerance, heat intolerance, polydipsia and polyuria.   Genitourinary:  Negative for decreased urine volume, difficulty urinating, dysuria, hematuria and urgency.   Musculoskeletal:  Negative for arthralgias, back pain, joint swelling and neck pain.   Skin:  Negative for rash.   Neurological:  Negative for dizziness, syncope, weakness and headaches.   Psychiatric/Behavioral:  Negative for confusion, dysphoric mood, sleep disturbance and suicidal ideas.      Objective:     Vitals:    11/08/22 0907 11/08/22 0949   BP: (!) 140/74 136/72   BP Location: Left arm    Patient Position: Sitting    BP Method: Large (Manual)    Pulse: (!) 45 (!) 56   Resp: 18    Temp: 97.8 °F (36.6 °C)    TempSrc: Oral    SpO2: 98%    Weight: 77.6 kg (171 lb 1.2 oz)           Physical " Exam  Constitutional:       Appearance: He is well-developed.   HENT:      Head: Normocephalic and atraumatic.   Eyes:      General: No scleral icterus.     Conjunctiva/sclera: Conjunctivae normal.   Cardiovascular:      Rate and Rhythm: Regular rhythm. Bradycardia present.      Heart sounds: No murmur heard.    No friction rub. No gallop.   Pulmonary:      Effort: Pulmonary effort is normal.      Breath sounds: Normal breath sounds. No wheezing or rales.   Abdominal:      Palpations: Abdomen is soft.      Tenderness: There is no abdominal tenderness. There is no guarding or rebound.   Musculoskeletal:         General: No tenderness. Normal range of motion.      Cervical back: Normal range of motion.      Right lower leg: No edema.      Left lower leg: No edema.   Skin:     General: Skin is warm and dry.   Neurological:      Mental Status: He is alert and oriented to person, place, and time.      Cranial Nerves: No cranial nerve deficit.       Assessment and Plan   1. Hypertension, essential  At goal no orthostatic symptoms with verapamil continue current dose    2. Peripheral polyneuropathy  Nightly gabapentin    3. Anxiety  Prn alprazolam  - ALPRAZolam (XANAX) 0.5 MG tablet; Take 1 tablet (0.5 mg total) by mouth nightly as needed for Anxiety.  Dispense: 30 tablet; Refill: 1    4. Sleep trouble  As above  - ALPRAZolam (XANAX) 0.5 MG tablet; Take 1 tablet (0.5 mg total) by mouth nightly as needed for Anxiety.  Dispense: 30 tablet; Refill: 1    5. Need for shingles vaccine  Shingrix at local pharmacy  - varicella-zoster [id number], PF, (SHINGRIX, PF,) 50 mcg/0.5 mL injection; Inject 0.5 mLs into the muscle once. for 1 dose  Dispense: 1 each; Refill: 0

## 2022-11-10 ENCOUNTER — OFFICE VISIT (OUTPATIENT)
Dept: RADIATION ONCOLOGY | Facility: CLINIC | Age: 67
End: 2022-11-10
Payer: MEDICARE

## 2022-11-10 VITALS
HEIGHT: 68 IN | RESPIRATION RATE: 16 BRPM | BODY MASS INDEX: 25.84 KG/M2 | HEART RATE: 52 BPM | DIASTOLIC BLOOD PRESSURE: 75 MMHG | WEIGHT: 170.5 LBS | SYSTOLIC BLOOD PRESSURE: 143 MMHG

## 2022-11-10 DIAGNOSIS — C61 PROSTATE CANCER: Primary | ICD-10-CM

## 2022-11-10 PROCEDURE — 99214 OFFICE O/P EST MOD 30 MIN: CPT | Mod: PBBFAC | Performed by: RADIOLOGY

## 2022-11-10 PROCEDURE — 99999 PR PBB SHADOW E&M-EST. PATIENT-LVL IV: CPT | Mod: PBBFAC,,, | Performed by: RADIOLOGY

## 2022-11-10 PROCEDURE — 99999 PR PBB SHADOW E&M-EST. PATIENT-LVL IV: ICD-10-PCS | Mod: PBBFAC,,, | Performed by: RADIOLOGY

## 2022-11-10 PROCEDURE — 99212 PR OFFICE/OUTPT VISIT, EST, LEVL II, 10-19 MIN: ICD-10-PCS | Mod: S$PBB,,, | Performed by: RADIOLOGY

## 2022-11-10 PROCEDURE — 99212 OFFICE O/P EST SF 10 MIN: CPT | Mod: S$PBB,,, | Performed by: RADIOLOGY

## 2022-11-10 NOTE — PROGRESS NOTES
Subjective:       Patient ID: Denis Bunn is a 67 y.o. male.    Chief Complaint: Prostate Cancer (3mo f/u w/labs)    This patient returns for follow up visit.     Mr. Bunn has a history of metastatic prostate cancer.  He initially presented in  July of 2020 for evaluation of an elevated PSA of 5.3 ng/ml.  The patient subsequently underwent RALP with bilateral lymph node dissection on 9/2/20.  Pathology revealed Jose 7 (4+3) adenocarcinoma involving 20% of the prostate.  The Jose pattern 4 accounted for 85% of the tumor.  There was extraprostatic extension with multifocal perineural and lymphovascular invasion.  There was no bladder neck invasion or seminal vesicle invasion.  The margins of resection and 1  Rt. and 1 Lt. pelvic nodes were negative for tumor involvement.  Postoperative PSA on 10/1/20 returned at 0.14 ng/ml.  Repeat PSA  2/9/21 and returned at 0.41 ng/ml.  Work up with Axumin scan revealed possible lesion in the Lt. pubic symphysis.  MRI confirmed a 7 mm lesion in the Lt. symphysis pubis, biopsy of which was positive for metastatic disease.  The patient began hormonal therapy with Lupron on 4/1/21.  He is also receiving apalutamide per [facility].  He was recommended for radiotherapy to the prostate bed, pubic symphysis and pelvic nodes which he completed on 10/27/21.  The metastatic focus in the pubic symphysis also received radiotherapy.  Recently the patient has elected to discontinue his hormonal therapy.  He has stopped his apalutamide.  His last Lupron injection was in October of 2021.  Today the patient states he feels well.  No complaints.         Review of Systems   Constitutional:  Negative for activity change, appetite change, chills and fatigue.   Respiratory:  Negative for cough and shortness of breath.    Gastrointestinal:  Negative for abdominal pain, constipation, diarrhea and fecal incontinence.   Genitourinary:  Negative for bladder incontinence, difficulty urinating,  dysuria, frequency and hematuria.       Objective:      Physical Exam  Constitutional:       General: He is not in acute distress.     Appearance: Normal appearance.   Pulmonary:      Effort: Pulmonary effort is normal. No respiratory distress.   Abdominal:      General: Abdomen is flat. There is no distension.   Neurological:      Mental Status: He is alert and oriented to person, place, and time.   Psychiatric:         Mood and Affect: Mood normal.         Judgment: Judgment normal.       PSA < 0.01 ng/ml  testosterone 418 ng/dl  Assessment:       Problem List Items Addressed This Visit       Prostate cancer - Primary         Plan:       Doing well no evidence of disease progression.  Plan follow up in 4 months with PSA and testosterone.

## 2022-11-21 DIAGNOSIS — G62.9 NEUROPATHY: ICD-10-CM

## 2022-11-22 RX ORDER — GABAPENTIN 300 MG/1
CAPSULE ORAL
Qty: 90 CAPSULE | Refills: 1 | Status: SHIPPED | OUTPATIENT
Start: 2022-11-22 | End: 2023-05-25

## 2022-11-22 NOTE — TELEPHONE ENCOUNTER
No new care gaps identified.  [facility] Embedded Care Gaps. Reference number: [id number]. 11/21/2022   6:04:57 PM CST

## 2023-02-01 ENCOUNTER — PATIENT MESSAGE (OUTPATIENT)
Dept: CARDIOLOGY | Facility: CLINIC | Age: 68
End: 2023-02-01
Payer: MEDICARE

## 2023-02-01 ENCOUNTER — PATIENT MESSAGE (OUTPATIENT)
Dept: RADIATION ONCOLOGY | Facility: CLINIC | Age: 68
End: 2023-02-01
Payer: MEDICARE

## 2023-02-02 ENCOUNTER — TELEPHONE (OUTPATIENT)
Dept: CARDIOLOGY | Facility: CLINIC | Age: 68
End: 2023-02-02
Payer: MEDICARE

## 2023-02-02 DIAGNOSIS — I49.3 PVC (PREMATURE VENTRICULAR CONTRACTION): ICD-10-CM

## 2023-02-02 DIAGNOSIS — I10 HTN (HYPERTENSION), BENIGN: Primary | ICD-10-CM

## 2023-02-13 ENCOUNTER — PATIENT MESSAGE (OUTPATIENT)
Dept: CARDIOLOGY | Facility: CLINIC | Age: 68
End: 2023-02-13
Payer: MEDICARE

## 2023-02-17 DIAGNOSIS — I10 HTN (HYPERTENSION), BENIGN: Primary | ICD-10-CM

## 2023-02-27 ENCOUNTER — LAB VISIT (OUTPATIENT)
Dept: LAB | Facility: HOSPITAL | Age: 68
End: 2023-02-27
Attending: INTERNAL MEDICINE
Payer: MEDICARE

## 2023-02-27 DIAGNOSIS — C61 PROSTATE CANCER: ICD-10-CM

## 2023-02-27 DIAGNOSIS — R00.2 PALPITATIONS: ICD-10-CM

## 2023-02-27 DIAGNOSIS — E78.5 DYSLIPIDEMIA: ICD-10-CM

## 2023-02-27 DIAGNOSIS — N52.31 ERECTILE DYSFUNCTION FOLLOWING RADICAL PROSTATECTOMY: ICD-10-CM

## 2023-02-27 DIAGNOSIS — C61 PROSTATE CANCER: Primary | ICD-10-CM

## 2023-02-27 DIAGNOSIS — I10 HTN (HYPERTENSION), BENIGN: ICD-10-CM

## 2023-02-27 DIAGNOSIS — I49.3 PVC (PREMATURE VENTRICULAR CONTRACTION): ICD-10-CM

## 2023-02-27 LAB
ALBUMIN SERPL BCP-MCNC: 4.1 G/DL (ref 3.5–5.2)
ALP SERPL-CCNC: 70 U/L (ref 55–135)
ALT SERPL W/O P-5'-P-CCNC: 42 U/L (ref 10–44)
ANION GAP SERPL CALC-SCNC: 10 MMOL/L (ref 8–16)
AST SERPL-CCNC: 37 U/L (ref 10–40)
BASOPHILS # BLD AUTO: 0.04 K/UL (ref 0–0.2)
BASOPHILS NFR BLD: 1.1 % (ref 0–1.9)
BILIRUB SERPL-MCNC: 0.7 MG/DL (ref 0.1–1)
BUN SERPL-MCNC: 14 MG/DL (ref 8–23)
CALCIUM SERPL-MCNC: 9.7 MG/DL (ref 8.7–10.5)
CHLORIDE SERPL-SCNC: 103 MMOL/L (ref 95–110)
CHOLEST SERPL-MCNC: 151 MG/DL (ref 120–199)
CHOLEST/HDLC SERPL: 2.1 {RATIO} (ref 2–5)
CO2 SERPL-SCNC: 27 MMOL/L (ref 23–29)
COMPLEXED PSA SERPL-MCNC: 0.03 NG/ML (ref 0–4)
CREAT SERPL-MCNC: 1.1 MG/DL (ref 0.5–1.4)
DIFFERENTIAL METHOD: ABNORMAL
EOSINOPHIL # BLD AUTO: 0.1 K/UL (ref 0–0.5)
EOSINOPHIL NFR BLD: 2.2 % (ref 0–8)
ERYTHROCYTE [DISTWIDTH] IN BLOOD BY AUTOMATED COUNT: 12.8 % (ref 11.5–14.5)
EST. GFR  (NO RACE VARIABLE): >60 ML/MIN/1.73 M^2
GLUCOSE SERPL-MCNC: 105 MG/DL (ref 70–110)
HCT VFR BLD AUTO: 38.6 % (ref 40–54)
HDLC SERPL-MCNC: 72 MG/DL (ref 40–75)
HDLC SERPL: 47.7 % (ref 20–50)
HGB BLD-MCNC: 13 G/DL (ref 14–18)
IMM GRANULOCYTES # BLD AUTO: 0.01 K/UL (ref 0–0.04)
IMM GRANULOCYTES NFR BLD AUTO: 0.3 % (ref 0–0.5)
LDLC SERPL CALC-MCNC: 67.6 MG/DL (ref 63–159)
LYMPHOCYTES # BLD AUTO: 0.5 K/UL (ref 1–4.8)
LYMPHOCYTES NFR BLD: 14.1 % (ref 18–48)
MCH RBC QN AUTO: 32.7 PG (ref 27–31)
MCHC RBC AUTO-ENTMCNC: 33.7 G/DL (ref 32–36)
MCV RBC AUTO: 97 FL (ref 82–98)
MONOCYTES # BLD AUTO: 0.3 K/UL (ref 0.3–1)
MONOCYTES NFR BLD: 9 % (ref 4–15)
NEUTROPHILS # BLD AUTO: 2.7 K/UL (ref 1.8–7.7)
NEUTROPHILS NFR BLD: 73.3 % (ref 38–73)
NONHDLC SERPL-MCNC: 79 MG/DL
NRBC BLD-RTO: 0 /100 WBC
PLATELET # BLD AUTO: 194 K/UL (ref 150–450)
PMV BLD AUTO: 9.4 FL (ref 9.2–12.9)
POTASSIUM SERPL-SCNC: 4.8 MMOL/L (ref 3.5–5.1)
PROT SERPL-MCNC: 7.1 G/DL (ref 6–8.4)
RBC # BLD AUTO: 3.97 M/UL (ref 4.6–6.2)
SODIUM SERPL-SCNC: 140 MMOL/L (ref 136–145)
TESTOST SERPL-MCNC: 548 NG/DL (ref 304–1227)
TRIGL SERPL-MCNC: 57 MG/DL (ref 30–150)
TSH SERPL DL<=0.005 MIU/L-ACNC: 1.26 UIU/ML (ref 0.4–4)
WBC # BLD AUTO: 3.68 K/UL (ref 3.9–12.7)

## 2023-02-27 PROCEDURE — 36415 COLL VENOUS BLD VENIPUNCTURE: CPT | Performed by: RADIOLOGY

## 2023-02-27 PROCEDURE — 85025 COMPLETE CBC W/AUTO DIFF WBC: CPT | Performed by: INTERNAL MEDICINE

## 2023-02-27 PROCEDURE — 84153 ASSAY OF PSA TOTAL: CPT | Performed by: RADIOLOGY

## 2023-02-27 PROCEDURE — 84403 ASSAY OF TOTAL TESTOSTERONE: CPT | Performed by: RADIOLOGY

## 2023-02-27 PROCEDURE — 80053 COMPREHEN METABOLIC PANEL: CPT | Performed by: INTERNAL MEDICINE

## 2023-02-27 PROCEDURE — 84443 ASSAY THYROID STIM HORMONE: CPT | Performed by: INTERNAL MEDICINE

## 2023-02-27 PROCEDURE — 80061 LIPID PANEL: CPT | Performed by: INTERNAL MEDICINE

## 2023-03-01 PROBLEM — I70.0 AORTIC ATHEROSCLEROSIS: Status: ACTIVE | Noted: 2023-03-01

## 2023-03-01 NOTE — PROGRESS NOTES
Subjective:   Patient ID:  Denis Bunn is a 67 y.o. male who presents for follow-up of CAD risk    HPI: The patient is here for CAD risk factors but CAC 0 ; also CP and palpitations in past.   The patient has no chest pain, SOB, TIA, palpitations, syncope or pre-syncope.Patient does not exercise a lot now but swims 3 per week.Home  yesterday        Review of Systems   Constitutional: Negative for chills, decreased appetite, diaphoresis, fever, malaise/fatigue, night sweats, weight gain and weight loss.   HENT:  Negative for congestion, hoarse voice, nosebleeds, sore throat and tinnitus.    Eyes:  Negative for blurred vision, double vision, vision loss in left eye, vision loss in right eye, visual disturbance and visual halos.   Cardiovascular:  Negative for chest pain, claudication, cyanosis, dyspnea on exertion, irregular heartbeat, leg swelling, near-syncope, orthopnea, palpitations, paroxysmal nocturnal dyspnea and syncope.   Respiratory:  Negative for cough, hemoptysis, shortness of breath, sleep disturbances due to breathing, snoring, sputum production and wheezing.    Endocrine: Negative for cold intolerance, heat intolerance, polydipsia, polyphagia and polyuria.   Hematologic/Lymphatic: Negative for adenopathy and bleeding problem. Does not bruise/bleed easily.   Skin:  Negative for color change, dry skin, flushing, itching, nail changes, poor wound healing, rash, skin cancer, suspicious lesions and unusual hair distribution.   Musculoskeletal:  Negative for arthritis, back pain, falls, gout, joint pain, joint swelling, muscle cramps, muscle weakness, myalgias and stiffness.   Gastrointestinal:  Negative for abdominal pain, anorexia, change in bowel habit, constipation, diarrhea, dysphagia, heartburn, hematemesis, hematochezia, melena and vomiting.   Genitourinary:  Negative for decreased libido, dysuria, hematuria, hesitancy and urgency.   Neurological:  Negative for excessive daytime sleepiness,  "dizziness, focal weakness, headaches, light-headedness, loss of balance, numbness, paresthesias, seizures, sensory change, tremors, vertigo and weakness.   Psychiatric/Behavioral:  Negative for altered mental status, depression, hallucinations, memory loss, substance abuse and suicidal ideas. The patient does not have insomnia and is not nervous/anxious.    Allergic/Immunologic: Negative for environmental allergies and hives.     Objective: BP (!) 145/77 (BP Location: Left arm, Patient Position: Sitting)   Pulse (!) 43   Ht 5' 8" (1.727 m)   Wt 76.6 kg (168 lb 14 oz)   BMI 25.68 kg/m²      Physical Exam  Constitutional:       General: He is not in acute distress.     Appearance: He is well-developed. He is not diaphoretic.   HENT:      Head: Normocephalic.   Eyes:      Pupils: Pupils are equal, round, and reactive to light.   Neck:      Thyroid: No thyromegaly.   Cardiovascular:      Rate and Rhythm: Normal rate and regular rhythm.      Pulses: Intact distal pulses.           Carotid pulses are 3+ on the right side and 3+ on the left side.       Radial pulses are 3+ on the right side and 3+ on the left side.        Femoral pulses are 3+ on the right side and 3+ on the left side.       Popliteal pulses are 3+ on the right side and 3+ on the left side.        Dorsalis pedis pulses are 3+ on the right side and 3+ on the left side.        Posterior tibial pulses are 3+ on the right side and 3+ on the left side.      Heart sounds: Normal heart sounds. No murmur heard.    No friction rub. No gallop.   Pulmonary:      Effort: Pulmonary effort is normal. No respiratory distress.      Breath sounds: Normal breath sounds. No wheezing or rales.   Chest:      Chest wall: No tenderness.   Abdominal:      General: There is no distension.      Palpations: Abdomen is soft. There is no mass.      Tenderness: There is no abdominal tenderness.   Musculoskeletal:         General: Normal range of motion.      Cervical back: Normal " range of motion.   Lymphadenopathy:      Cervical: No cervical adenopathy.   Skin:     General: Skin is warm.      Nails: There is no clubbing.   Neurological:      Mental Status: He is alert and oriented to person, place, and time.   Psychiatric:         Speech: Speech normal.         Behavior: Behavior normal.         Thought Content: Thought content normal.         Judgment: Judgment normal.       Assessment:     1. Aortic atherosclerosis    2. HTN (hypertension), benign    3. Dyslipidemia    4. Palpitations    5. Impaired fasting glucose    6. PVC (premature ventricular contraction)    7. Atypical chest pain    8. Prostate cancer    9. Erectile dysfunction following radical prostatectomy    10. Low testosterone        Plan:   Discussed diet , achieving and maintaining ideal body weight, and exercise.   We reviewed meds in detail.  Goal BP< 130/80.  Reassured-Discussed goals, options, plan.  If both pulse and BP low at home we could reduce Verapamil from 360 to 240        Denis was seen today for hypertension.    Diagnoses and all orders for this visit:    Aortic atherosclerosis    HTN (hypertension), benign    Dyslipidemia    Palpitations    Impaired fasting glucose    PVC (premature ventricular contraction)    Atypical chest pain    Prostate cancer    Erectile dysfunction following radical prostatectomy    Low testosterone            No follow-ups on file.

## 2023-03-06 ENCOUNTER — HOSPITAL ENCOUNTER (OUTPATIENT)
Dept: CARDIOLOGY | Facility: HOSPITAL | Age: 68
Discharge: HOME | End: 2023-03-06
Attending: INTERNAL MEDICINE
Payer: MEDICARE

## 2023-03-06 ENCOUNTER — HOSPITAL ENCOUNTER (OUTPATIENT)
Dept: CARDIOLOGY | Facility: CLINIC | Age: 68
Discharge: HOME | End: 2023-03-06
Payer: MEDICARE

## 2023-03-06 ENCOUNTER — OFFICE VISIT (OUTPATIENT)
Dept: CARDIOLOGY | Facility: CLINIC | Age: 68
End: 2023-03-06
Payer: MEDICARE

## 2023-03-06 VITALS
WEIGHT: 168.88 LBS | BODY MASS INDEX: 25.76 KG/M2 | HEART RATE: 43 BPM | BODY MASS INDEX: 25.59 KG/M2 | DIASTOLIC BLOOD PRESSURE: 70 MMHG | WEIGHT: 170 LBS | HEART RATE: 45 BPM | SYSTOLIC BLOOD PRESSURE: 140 MMHG | HEIGHT: 68 IN | HEIGHT: 68 IN | SYSTOLIC BLOOD PRESSURE: 145 MMHG | DIASTOLIC BLOOD PRESSURE: 77 MMHG

## 2023-03-06 DIAGNOSIS — R79.89 LOW TESTOSTERONE: ICD-10-CM

## 2023-03-06 DIAGNOSIS — R07.89 ATYPICAL CHEST PAIN: ICD-10-CM

## 2023-03-06 DIAGNOSIS — N52.31 ERECTILE DYSFUNCTION FOLLOWING RADICAL PROSTATECTOMY: ICD-10-CM

## 2023-03-06 DIAGNOSIS — R73.01 IMPAIRED FASTING GLUCOSE: ICD-10-CM

## 2023-03-06 DIAGNOSIS — Z12.5 SPECIAL SCREENING, PROSTATE CANCER: ICD-10-CM

## 2023-03-06 DIAGNOSIS — C61 PROSTATE CANCER: ICD-10-CM

## 2023-03-06 DIAGNOSIS — R00.2 PALPITATIONS: ICD-10-CM

## 2023-03-06 DIAGNOSIS — I70.0 AORTIC ATHEROSCLEROSIS: Primary | ICD-10-CM

## 2023-03-06 DIAGNOSIS — I10 HTN (HYPERTENSION), BENIGN: ICD-10-CM

## 2023-03-06 DIAGNOSIS — I49.3 PVC (PREMATURE VENTRICULAR CONTRACTION): ICD-10-CM

## 2023-03-06 DIAGNOSIS — E78.5 DYSLIPIDEMIA: ICD-10-CM

## 2023-03-06 LAB
ASCENDING AORTA: 3.47 CM
AV INDEX (PROSTH): 0.86
AV MEAN GRADIENT: 3 MMHG
AV PEAK GRADIENT: 6 MMHG
AV VALVE AREA: 2.91 CM2
AV VELOCITY RATIO: 0.78
BSA FOR ECHO PROCEDURE: 1.92 M2
CV ECHO LV RWT: 0.29 CM
DOP CALC AO PEAK VEL: 1.26 M/S
DOP CALC AO VTI: 27.13 CM
DOP CALC LVOT AREA: 3.4 CM2
DOP CALC LVOT DIAMETER: 2.08 CM
DOP CALC LVOT PEAK VEL: 0.98 M/S
DOP CALC LVOT STROKE VOLUME: 78.83 CM3
DOP CALCLVOT PEAK VEL VTI: 23.21 CM
E WAVE DECELERATION TIME: 186.86 MSEC
E/A RATIO: 1.51
E/E' RATIO: 6.89 M/S
ECHO LV POSTERIOR WALL: 0.79 CM (ref 0.6–1.1)
EJECTION FRACTION: 60 %
FRACTIONAL SHORTENING: 35 % (ref 28–44)
INTERVENTRICULAR SEPTUM: 0.97 CM (ref 0.6–1.1)
IVRT: 60.89 MSEC
LA MAJOR: 5.59 CM
LA MINOR: 5.03 CM
LA WIDTH: 4.81 CM
LEFT ATRIUM SIZE: 4.37 CM
LEFT ATRIUM VOLUME INDEX MOD: 49.7 ML/M2
LEFT ATRIUM VOLUME INDEX: 49.5 ML/M2
LEFT ATRIUM VOLUME MOD: 95 CM3
LEFT ATRIUM VOLUME: 94.61 CM3
LEFT INTERNAL DIMENSION IN SYSTOLE: 3.53 CM (ref 2.1–4)
LEFT VENTRICLE DIASTOLIC VOLUME INDEX: 76.38 ML/M2
LEFT VENTRICLE DIASTOLIC VOLUME: 145.88 ML
LEFT VENTRICLE MASS INDEX: 94 G/M2
LEFT VENTRICLE SYSTOLIC VOLUME INDEX: 27.2 ML/M2
LEFT VENTRICLE SYSTOLIC VOLUME: 51.86 ML
LEFT VENTRICULAR INTERNAL DIMENSION IN DIASTOLE: 5.47 CM (ref 3.5–6)
LEFT VENTRICULAR MASS: 178.87 G
LV LATERAL E/E' RATIO: 5.64 M/S
LV SEPTAL E/E' RATIO: 8.86 M/S
MV A" WAVE DURATION": 23.98 MSEC
MV PEAK A VEL: 0.41 M/S
MV PEAK E VEL: 0.62 M/S
MV STENOSIS PRESSURE HALF TIME: 54.19 MS
MV VALVE AREA P 1/2 METHOD: 4.06 CM2
PISA TR MAX VEL: 2.63 M/S
PULM VEIN S/D RATIO: 1.11
PV PEAK D VEL: 0.47 M/S
PV PEAK S VEL: 0.52 M/S
RA MAJOR: 4.91 CM
RA PRESSURE: 3 MMHG
RA WIDTH: 3.8 CM
RIGHT ATRIAL AREA: 16.7 CM2
RIGHT VENTRICULAR END-DIASTOLIC DIMENSION: 3.6 CM
RV TISSUE DOPPLER FREE WALL SYSTOLIC VELOCITY 1 (APICAL 4 CHAMBER VIEW): 12.74 CM/S
SINUS: 3.45 CM
STJ: 3.11 CM
TDI LATERAL: 0.11 M/S
TDI SEPTAL: 0.07 M/S
TDI: 0.09 M/S
TR MAX PG: 28 MMHG
TRICUSPID ANNULAR PLANE SYSTOLIC EXCURSION: 2.37 CM
TV REST PULMONARY ARTERY PRESSURE: 31 MMHG

## 2023-03-06 PROCEDURE — 93306 ECHO (CUPID ONLY): ICD-10-PCS | Mod: 26,,, | Performed by: INTERNAL MEDICINE

## 2023-03-06 PROCEDURE — 93306 TTE W/DOPPLER COMPLETE: CPT

## 2023-03-06 PROCEDURE — 99999 PR PBB SHADOW E&M-EST. PATIENT-LVL V: CPT | Mod: PBBFAC,,, | Performed by: INTERNAL MEDICINE

## 2023-03-06 PROCEDURE — 93005 ELECTROCARDIOGRAM TRACING: CPT | Mod: PBBFAC | Performed by: INTERNAL MEDICINE

## 2023-03-06 PROCEDURE — 99999 PR PBB SHADOW E&M-EST. PATIENT-LVL V: ICD-10-PCS | Mod: PBBFAC,,, | Performed by: INTERNAL MEDICINE

## 2023-03-06 PROCEDURE — 93010 EKG 12-LEAD: ICD-10-PCS | Mod: S$PBB,,, | Performed by: INTERNAL MEDICINE

## 2023-03-06 PROCEDURE — 99215 PR OFFICE/OUTPT VISIT, EST, LEVL V, 40-54 MIN: ICD-10-PCS | Mod: S$PBB,,, | Performed by: INTERNAL MEDICINE

## 2023-03-06 PROCEDURE — 99215 OFFICE O/P EST HI 40 MIN: CPT | Mod: PBBFAC,25 | Performed by: INTERNAL MEDICINE

## 2023-03-06 PROCEDURE — 99215 OFFICE O/P EST HI 40 MIN: CPT | Mod: S$PBB,,, | Performed by: INTERNAL MEDICINE

## 2023-03-06 PROCEDURE — 93010 ELECTROCARDIOGRAM REPORT: CPT | Mod: S$PBB,,, | Performed by: INTERNAL MEDICINE

## 2023-03-06 PROCEDURE — 93306 TTE W/DOPPLER COMPLETE: CPT | Mod: 26,,, | Performed by: INTERNAL MEDICINE

## 2023-03-06 NOTE — PATIENT INSTRUCTIONS
Discussed diet , achieving and maintaining ideal body weight, and exercise.   We reviewed meds in detail.  Goal BP< 130/80.  Reassured-Discussed goals, options, plan.  f both pulse and BP low at home we could reduce Verapamil from 360 to 240

## 2023-03-08 ENCOUNTER — OFFICE VISIT (OUTPATIENT)
Dept: URGENT CARE | Facility: CLINIC | Age: 68
End: 2023-03-08
Payer: MEDICARE

## 2023-03-08 VITALS
BODY MASS INDEX: 25.46 KG/M2 | WEIGHT: 168 LBS | RESPIRATION RATE: 16 BRPM | SYSTOLIC BLOOD PRESSURE: 164 MMHG | HEIGHT: 68 IN | OXYGEN SATURATION: 96 % | TEMPERATURE: 99 F | DIASTOLIC BLOOD PRESSURE: 93 MMHG | HEART RATE: 56 BPM

## 2023-03-08 DIAGNOSIS — Z20.822 EXPOSURE TO COVID-19 VIRUS: Primary | ICD-10-CM

## 2023-03-08 DIAGNOSIS — R09.81 NASAL CONGESTION: ICD-10-CM

## 2023-03-08 LAB
CTP QC/QA: YES
SARS-COV-2 AG RESP QL IA.RAPID: NEGATIVE

## 2023-03-08 PROCEDURE — 87811 SARS CORONAVIRUS 2 ANTIGEN POCT, MANUAL READ: ICD-10-PCS | Mod: QW,CR,S$GLB, | Performed by: NURSE PRACTITIONER

## 2023-03-08 PROCEDURE — 99213 PR OFFICE/OUTPT VISIT, EST, LEVL III, 20-29 MIN: ICD-10-PCS | Mod: S$GLB,,, | Performed by: NURSE PRACTITIONER

## 2023-03-08 PROCEDURE — 99213 OFFICE O/P EST LOW 20 MIN: CPT | Mod: S$GLB,,, | Performed by: NURSE PRACTITIONER

## 2023-03-08 PROCEDURE — 87811 SARS-COV-2 COVID19 W/OPTIC: CPT | Mod: QW,CR,S$GLB, | Performed by: NURSE PRACTITIONER

## 2023-03-08 RX ORDER — AZELASTINE 1 MG/ML
1 SPRAY, METERED NASAL 2 TIMES DAILY
Qty: 30 ML | Refills: 0 | Status: SHIPPED | OUTPATIENT
Start: 2023-03-08 | End: 2023-03-18

## 2023-03-08 RX ORDER — FLUTICASONE PROPIONATE 50 MCG
1 SPRAY, SUSPENSION (ML) NASAL 2 TIMES DAILY
Qty: 9.9 ML | Refills: 0 | Status: SHIPPED | OUTPATIENT
Start: 2023-03-08 | End: 2023-09-25

## 2023-03-08 NOTE — PROGRESS NOTES
"Subjective:       Patient ID: Denis Bunn is a 67 y.o. male.    Vitals:  height is 5' 8" (1.727 m) and weight is 76.2 kg (168 lb). His temperature is 98.8 °F (37.1 °C). His blood pressure is 164/93 (abnormal) and his pulse is 56 (abnormal). His respiration is 16 and oxygen saturation is 96%.     Chief Complaint: COVID-19 Concerns (Entered by patient)    67-year-old male presents to clinic for evaluation of scratchy throat, headache, fatigue that started 2 days ago.  Patient states that he did have an exposure to COVID through a friend at dinner.  He reports a negative home COVID test.  He reports taking over-the-counter cough syrup with some relief.  He denies fever.  He is awake and alert, answers questions appropriately, no acute distress noted on today's visit.    Constitution: Negative for activity change, appetite change, chills, sweating, fatigue and fever.   HENT:  Positive for congestion. Sore throat: scratchy.   Cardiovascular:  Negative for chest pain.   Respiratory:  Positive for cough.    Gastrointestinal:  Negative for abdominal pain, nausea and vomiting.   Skin:  Negative for erythema.   Neurological:  Negative for dizziness.     Objective:      Physical Exam   Constitutional: He is oriented to person, place, and time. He appears well-developed.  Non-toxic appearance. He does not appear ill. No distress.   HENT:   Head: Normocephalic and atraumatic. Head is without abrasion, without contusion and without laceration.   Ears:   Right Ear: Tympanic membrane and external ear normal.   Left Ear: Tympanic membrane and external ear normal.   Nose: Nose normal.   Mouth/Throat: Oropharynx is clear and moist and mucous membranes are normal. No posterior oropharyngeal erythema.   Eyes: Conjunctivae, EOM and lids are normal. Right eye exhibits no discharge. Left eye exhibits no discharge.   Neck: Trachea normal and phonation normal.   Cardiovascular: Normal rate.   Pulmonary/Chest: Effort normal and breath " sounds normal. No stridor. No respiratory distress. He has no wheezes.   Abdominal: Normal appearance.   Musculoskeletal: Normal range of motion.         General: Normal range of motion.   Neurological: He is alert and oriented to person, place, and time.   Skin: Skin is warm, dry, intact, not diaphoretic and not pale. No abrasion, No burn, No erythema and No ecchymosis   Psychiatric: His speech is normal and behavior is normal. Mood, judgment and thought content normal.   Nursing note and vitals reviewed.      Results for orders placed or performed in visit on 03/08/23   SARS Coronavirus 2 Antigen, POCT Manual Read   Result Value Ref Range    SARS Coronavirus 2 Antigen Negative Negative     Acceptable Yes        Assessment:       1. Exposure to COVID-19 virus    2. Nasal congestion          Plan:         Exposure to COVID-19 virus  -     SARS Coronavirus 2 Antigen, POCT Manual Read    Nasal congestion  -     fluticasone propionate (FLONASE) 50 mcg/actuation nasal spray; 1 spray (50 mcg total) by Each Nostril route 2 (two) times daily.  Dispense: 9.9 mL; Refill: 0  -     azelastine (ASTELIN) 137 mcg (0.1 %) nasal spray; 1 spray (137 mcg total) by Nasal route 2 (two) times daily. for 10 days  Dispense: 30 mL; Refill: 0    - negative COVID on today's visit, given possible exposure, recommend retesting in the next day or 2.  Discussed symptomatic care.  Follow-up with PCP.  Patient verbalized understanding and is in agreement with plan.    Patient Instructions   - Follow up with your PCP or specialty clinic as directed in the next 1-2 weeks if not improved or as needed.  You can call [phone] to schedule an appointment with the appropriate provider.    - Go to the ER or seek medical attention immediately if you develop new or worsening symptoms.    - You must understand that you have received an Urgent Care treatment only and that you may be released before all of your medical problems are known or  treated.   - You, the patient, will arrange for follow up care as instructed.   - If your condition worsens or fails to improve we recommend that you receive another evaluation at the ER immediately or contact your PCP to discuss your concerns or return here.     URI    - Rest.    - Drink plenty of fluids.      - Viral upper respiratory infections typically run their course in 10-14 days.      - Tylenol or Ibuprofen as directed as needed for fever/pain. Avoid tylenol if you have a history of liver disease. Do not take ibuprofen if you have a history of GI bleeding, kidney disease, or if you take blood thinners.   - Take ibuprofen 600-800 mg every 6-8 hours for pain and inflammation.  You can also take Tylenol/acetaminophen 650-1000 mg every 6-8 hours for added pain relief.     - You can take over-the-counter claritin, zyrtec, allegra, or xyzal as directed. These are antihistamines that can help with runny nose, nasal congestion, sneezing, and helps to dry up post-nasal drip, which usually causes sore throat and cough.              - If you do NOT have high blood pressure, you may use a decongestant form (D)  of this medication or if you do not take the D form, you can take sudafed (pseudoephedrine) over the counter, which is a decongestant.     - You can use Flonase (fluticasone) nasal spray as directed for sinus congestion and postnasal drip. This is a steroid nasal spray that works locally over time to decrease the inflammation in your nose/sinuses and help with allergic symptoms. This is not an quick- relief spray like afrin, but it works well if used daily.  Discontinue if you develop nose bleed  - use nasal saline prior to Flonase.     - Use Ocean Spray Nasal Saline 1-3 puffs each nostril every 2-3 hours then blow out onto tissue. This is to irrigate the nasal passage way to clear the sinus openings. Use until sinus problem resolved.     - you can take plain Mucinex (guaifenesin) 1200 mg twice a day to help  loosen mucous     -warm salt water gargles can help with sore throat     - warm tea with honey can help with cough. Honey is a natural cough suppressant.     - Follow up with your PCP or specialty clinic as directed in the next 1-2 weeks if not improved or as needed.  You can call [phone] to schedule an appointment with the appropriate provider.       - Go to the ER if you develop new or worsening symptoms.

## 2023-03-08 NOTE — PATIENT INSTRUCTIONS
- Follow up with your PCP or specialty clinic as directed in the next 1-2 weeks if not improved or as needed.  You can call [phone] to schedule an appointment with the appropriate provider.    - Go to the ER or seek medical attention immediately if you develop new or worsening symptoms.    - You must understand that you have received an Urgent Care treatment only and that you may be released before all of your medical problems are known or treated.   - You, the patient, will arrange for follow up care as instructed.   - If your condition worsens or fails to improve we recommend that you receive another evaluation at the ER immediately or contact your PCP to discuss your concerns or return here.     URI    - Rest.    - Drink plenty of fluids.      - Viral upper respiratory infections typically run their course in 10-14 days.      - Tylenol or Ibuprofen as directed as needed for fever/pain. Avoid tylenol if you have a history of liver disease. Do not take ibuprofen if you have a history of GI bleeding, kidney disease, or if you take blood thinners.   - Take ibuprofen 600-800 mg every 6-8 hours for pain and inflammation.  You can also take Tylenol/acetaminophen 650-1000 mg every 6-8 hours for added pain relief.     - You can take over-the-counter claritin, zyrtec, allegra, or xyzal as directed. These are antihistamines that can help with runny nose, nasal congestion, sneezing, and helps to dry up post-nasal drip, which usually causes sore throat and cough.              - If you do NOT have high blood pressure, you may use a decongestant form (D)  of this medication or if you do not take the D form, you can take sudafed (pseudoephedrine) over the counter, which is a decongestant.     - You can use Flonase (fluticasone) nasal spray as directed for sinus congestion and postnasal drip. This is a steroid nasal spray that works locally over time to decrease the inflammation in your nose/sinuses and help with allergic  symptoms. This is not an quick- relief spray like afrin, but it works well if used daily.  Discontinue if you develop nose bleed  - use nasal saline prior to Flonase.     - Use Ocean Spray Nasal Saline 1-3 puffs each nostril every 2-3 hours then blow out onto tissue. This is to irrigate the nasal passage way to clear the sinus openings. Use until sinus problem resolved.     - you can take plain Mucinex (guaifenesin) 1200 mg twice a day to help loosen mucous     -warm salt water gargles can help with sore throat     - warm tea with honey can help with cough. Honey is a natural cough suppressant.     - Follow up with your PCP or specialty clinic as directed in the next 1-2 weeks if not improved or as needed.  You can call [phone] to schedule an appointment with the appropriate provider.       - Go to the ER if you develop new or worsening symptoms.

## 2023-03-09 ENCOUNTER — TELEPHONE (OUTPATIENT)
Dept: HEMATOLOGY/ONCOLOGY | Facility: CLINIC | Age: 68
End: 2023-03-09
Payer: MEDICARE

## 2023-03-09 NOTE — TELEPHONE ENCOUNTER
----- Message from Paz Blanco RN sent at 3/6/2023  3:44 PM CST -----  Regarding: FW: Cons  Good afternoon Yareli  I reached out to Mr Barrios in [facility] after reading this message from Tayler :/.  So, he now knows that our Nurse Navigator will be contacting him in the near future to assure all the necessary paperwork is received by Dr Orourke in order to be better prepared for his first visit with Dr Orourke.   Thank you, in advance, for your understanding of my mis-step.   FLORENTINO Mullen  ----- Message -----  From: Tayler Wood LPN  Sent: 3/6/2023   2:25 PM CST  To: Paz Blanco RN  Subject: Cons                                             Bernardo Mullen,     Pt needs to see Dr. Orourke for his rising PSA, can you please have someone to contact him to coordinate an appointment. Please see Dr. Frias last note for pt's history.     Thanks,   Tayler

## 2023-03-09 NOTE — TELEPHONE ENCOUNTER
Oncology nurse navigator contacted patient for scheduling medical oncology referral from Dr. Frias.  Patient availability is limited within the next few weeks. First date agreed upon is 3/28. Date, time, and location discussed with patient. All questions/concerns addressed. Patient verbalized understanding. Contact information provided for further assistance.

## 2023-03-09 NOTE — TELEPHONE ENCOUNTER
----- Message from Taj Velasquez sent at 3/9/2023  8:33 AM CST -----  Regarding: Consult/Advisory  Name Of Caller: Self      Contact Preference:[phone]    Call is regarding: Pt  is calling because he was suppose to be schedule with Dr. Orourke due to Dr. Frias being out until May. Pt states that someone called yesterday to schedule but due to problems with the system couldn't be scheduled.

## 2023-03-12 ENCOUNTER — NURSE TRIAGE (OUTPATIENT)
Dept: ADMINISTRATIVE | Facility: CLINIC | Age: 68
End: 2023-03-12
Payer: MEDICARE

## 2023-03-12 NOTE — TELEPHONE ENCOUNTER
Pt calling needing assistance with medication sent from [facility] visit. Provided with number to call. verbalized understanding. Denies any further questions or concerns at this time, advised to call back if they have any that come up. Advised pt to call back with any other concerns or worsening symptoms. Verbalized understanding and will route message to provider.     Reason for Disposition   Caller has medicine question only, adult not sick, AND triager answers question    Protocols used: Medication Question Call-A-AH

## 2023-03-12 NOTE — TELEPHONE ENCOUNTER
[facility] outgoing call -     Pt c/o bad cold, HA, running nose, last week had scratch throat on Monday,  covid test neg dx virus, now covid positive, Pt has not seen a doctor yet for covid. Covid protocol followed and pt advised to tx at home and see a doctor within 24 hours or use a telemed doc or if you cannot then go to an uc/er. Education provided, see care advice. Pt invited to call [facility] at [phone] if he has any new or worsening symptoms or questions. Alert and oriented, Pt agrees with above. [facility] accepted and instructions given. Encounter routed to PCP/staff as high priority.   Reason for Disposition   [1] HIGH RISK for severe COVID complications (e.g., weak immune system, age > 64 years, obesity with BMI 30 or higher, pregnant, chronic lung disease or other chronic medical condition) AND [2] COVID symptoms (e.g., cough, fever)  (Exceptions: Already seen by PCP and no new or worsening symptoms.)    Additional Information   Negative: SEVERE difficulty breathing (e.g., struggling for each breath, speaks in single words)   Negative: Difficult to awaken or acting confused (e.g., disoriented, slurred speech)   Negative: Bluish (or gray) lips or face now   Negative: Shock suspected (e.g., cold/pale/clammy skin, too weak to stand, low BP, rapid pulse)   Negative: Sounds like a life-threatening emergency to the triager   Negative: SEVERE or constant chest pain or pressure  (Exception: Mild central chest pain, present only when coughing.)   Negative: MODERATE difficulty breathing (e.g., speaks in phrases, SOB even at rest, pulse 100-120)   Negative: [1] Headache AND [2] stiff neck (can't touch chin to chest)   Negative: Oxygen level (e.g., pulse oximetry) 90 percent or lower   Negative: Chest pain or pressure  (Exception: MILD central chest pain, present only when coughing)   Negative: Patient sounds very sick or weak to the triager   Negative: MILD difficulty breathing (e.g., minimal/no SOB at rest, SOB with  walking, pulse <100)   Negative: Fever > 103 F (39.4 C)   Negative: [1] Fever > 101 F (38.3 C) AND [2] age > 60 years   Negative: [1] Fever > 100.0 F (37.8 C) AND [2] bedridden (e.g., CVA, chronic illness, recovering from surgery)   Negative: Oxygen level (e.g., pulse oximetry) 91 to 94 percent    Protocols used: Coronavirus (COVID-19) Diagnosed or [id number]

## 2023-03-12 NOTE — TELEPHONE ENCOUNTER
Pt states he used [facility] and gave them the incorrect pharmacy phone number.  Asking if we can see the RX and can call into correct pharmacy.  Pt advised to call [facility], have that physician send to correct pharmacy, pt adds he does have the correct pharmacy contact info. All questions answered.  Advised to call back for anything further.  Pt verbally understood.     Reason for Disposition   General information question, no triage required and triager able to answer question    Protocols used: Information Only Call - No Triage-A-

## 2023-03-12 NOTE — TELEPHONE ENCOUNTER
Pt calling for assistance with medication sent to wrong pharmacy from [facility]. States that provider [facility] that does not exist. Pt states that he did a second telemedicine visit and provider would not call in medication. Pt given [phone] for rx issues. States that he already called that number.Pt on line now requesting to speak with on-call provider. Dr. Jany Henriquez called; will review chart. Secure chat sent with pt information for f/u. Pharmacy updated to [id number] per pt request. Pt is asking questions about quarantine instructions; information clarified.   Also offered pt to go into UC as an option to be seen. Pt states that he's not going to UC.Encounter routed to provider.      Received secure chat from Dr. Henriquez that Paxlovid as sent. Made aware that pt does not want Paxlovid. States that Paxovid messed up his stomach. Notified that pt is requesting that Cefdinir 300 mg every 12 hours is the med requested.      Reason for Disposition   [1] Follow-up call to recent contact AND [2] information only call, no triage required    Protocols used: Information Only Call - No Triage-A-

## 2023-03-13 ENCOUNTER — PATIENT MESSAGE (OUTPATIENT)
Dept: FAMILY MEDICINE | Facility: CLINIC | Age: 68
End: 2023-03-13
Payer: MEDICARE

## 2023-03-13 RX ORDER — CEFDINIR 300 MG/1
300 CAPSULE ORAL 2 TIMES DAILY
Qty: 20 CAPSULE | Refills: 0 | Status: SHIPPED | OUTPATIENT
Start: 2023-03-13 | End: 2023-03-23

## 2023-03-13 NOTE — TELEPHONE ENCOUNTER
I saw patient on [facility]anco and he entered the wrong pharmacy. I cannot find the pharmacy he wants and was unable to route the script to the pharmacy he is calling about. I have tried again and still cannot find the pharmacy he wants. I do not know what he would like me to do. He can see his PCP or go to a local . He selected the wrong pharmacy in [facility]. I cannot send in scripts through his [facility] chart as he is not my patient. Thanks

## 2023-03-15 ENCOUNTER — PATIENT MESSAGE (OUTPATIENT)
Dept: FAMILY MEDICINE | Facility: CLINIC | Age: 68
End: 2023-03-15
Payer: MEDICARE

## 2023-03-23 ENCOUNTER — PATIENT MESSAGE (OUTPATIENT)
Dept: ADMINISTRATIVE | Facility: HOSPITAL | Age: 68
End: 2023-03-23
Payer: MEDICARE

## 2023-03-24 ENCOUNTER — PATIENT OUTREACH (OUTPATIENT)
Dept: ADMINISTRATIVE | Facility: HOSPITAL | Age: 68
End: 2023-03-24
Payer: MEDICARE

## 2023-03-24 ENCOUNTER — TELEPHONE (OUTPATIENT)
Dept: FAMILY MEDICINE | Facility: CLINIC | Age: 68
End: 2023-03-24
Payer: MEDICARE

## 2023-03-24 VITALS — DIASTOLIC BLOOD PRESSURE: 72 MMHG | SYSTOLIC BLOOD PRESSURE: 120 MMHG

## 2023-03-28 ENCOUNTER — LAB VISIT (OUTPATIENT)
Dept: LAB | Facility: HOSPITAL | Age: 68
End: 2023-03-28
Payer: MEDICARE

## 2023-03-28 ENCOUNTER — OFFICE VISIT (OUTPATIENT)
Dept: HEMATOLOGY/ONCOLOGY | Facility: CLINIC | Age: 68
End: 2023-03-28
Payer: MEDICARE

## 2023-03-28 VITALS
TEMPERATURE: 98 F | HEART RATE: 57 BPM | BODY MASS INDEX: 25.55 KG/M2 | DIASTOLIC BLOOD PRESSURE: 73 MMHG | HEIGHT: 68 IN | RESPIRATION RATE: 17 BRPM | OXYGEN SATURATION: 98 % | SYSTOLIC BLOOD PRESSURE: 148 MMHG | WEIGHT: 168.56 LBS

## 2023-03-28 DIAGNOSIS — C61 PROSTATE CANCER: ICD-10-CM

## 2023-03-28 DIAGNOSIS — C61 PROSTATE CANCER: Primary | ICD-10-CM

## 2023-03-28 LAB
ALBUMIN SERPL BCP-MCNC: 4.1 G/DL (ref 3.5–5.2)
ALP SERPL-CCNC: 69 U/L (ref 55–135)
ALT SERPL W/O P-5'-P-CCNC: 39 U/L (ref 10–44)
ANION GAP SERPL CALC-SCNC: 7 MMOL/L (ref 8–16)
AST SERPL-CCNC: 28 U/L (ref 10–40)
BASOPHILS # BLD AUTO: 0.04 K/UL (ref 0–0.2)
BASOPHILS NFR BLD: 0.9 % (ref 0–1.9)
BILIRUB SERPL-MCNC: 0.6 MG/DL (ref 0.1–1)
BUN SERPL-MCNC: 17 MG/DL (ref 8–23)
CALCIUM SERPL-MCNC: 10.1 MG/DL (ref 8.7–10.5)
CHLORIDE SERPL-SCNC: 104 MMOL/L (ref 95–110)
CO2 SERPL-SCNC: 29 MMOL/L (ref 23–29)
COMPLEXED PSA SERPL-MCNC: 0.06 NG/ML (ref 0–4)
CREAT SERPL-MCNC: 1 MG/DL (ref 0.5–1.4)
DIFFERENTIAL METHOD: ABNORMAL
EOSINOPHIL # BLD AUTO: 0.2 K/UL (ref 0–0.5)
EOSINOPHIL NFR BLD: 4.1 % (ref 0–8)
ERYTHROCYTE [DISTWIDTH] IN BLOOD BY AUTOMATED COUNT: 12.6 % (ref 11.5–14.5)
EST. GFR  (NO RACE VARIABLE): >60 ML/MIN/1.73 M^2
GLUCOSE SERPL-MCNC: 96 MG/DL (ref 70–110)
HCT VFR BLD AUTO: 38.6 % (ref 40–54)
HGB BLD-MCNC: 12.8 G/DL (ref 14–18)
IMM GRANULOCYTES # BLD AUTO: 0.01 K/UL (ref 0–0.04)
IMM GRANULOCYTES NFR BLD AUTO: 0.2 % (ref 0–0.5)
LYMPHOCYTES # BLD AUTO: 0.9 K/UL (ref 1–4.8)
LYMPHOCYTES NFR BLD: 20.1 % (ref 18–48)
MCH RBC QN AUTO: 32.9 PG (ref 27–31)
MCHC RBC AUTO-ENTMCNC: 33.2 G/DL (ref 32–36)
MCV RBC AUTO: 99 FL (ref 82–98)
MONOCYTES # BLD AUTO: 0.5 K/UL (ref 0.3–1)
MONOCYTES NFR BLD: 11.3 % (ref 4–15)
NEUTROPHILS # BLD AUTO: 3 K/UL (ref 1.8–7.7)
NEUTROPHILS NFR BLD: 63.4 % (ref 38–73)
NRBC BLD-RTO: 0 /100 WBC
PLATELET # BLD AUTO: 205 K/UL (ref 150–450)
PMV BLD AUTO: 9.7 FL (ref 9.2–12.9)
POTASSIUM SERPL-SCNC: 5.4 MMOL/L (ref 3.5–5.1)
PROT SERPL-MCNC: 7.1 G/DL (ref 6–8.4)
RBC # BLD AUTO: 3.89 M/UL (ref 4.6–6.2)
SODIUM SERPL-SCNC: 140 MMOL/L (ref 136–145)
TESTOST SERPL-MCNC: 633 NG/DL (ref 304–1227)
WBC # BLD AUTO: 4.67 K/UL (ref 3.9–12.7)

## 2023-03-28 PROCEDURE — 84403 ASSAY OF TOTAL TESTOSTERONE: CPT | Performed by: STUDENT IN AN ORGANIZED HEALTH CARE EDUCATION/TRAINING PROGRAM

## 2023-03-28 PROCEDURE — 99999 PR PBB SHADOW E&M-EST. PATIENT-LVL V: CPT | Mod: PBBFAC,,, | Performed by: INTERNAL MEDICINE

## 2023-03-28 PROCEDURE — 80053 COMPREHEN METABOLIC PANEL: CPT | Performed by: STUDENT IN AN ORGANIZED HEALTH CARE EDUCATION/TRAINING PROGRAM

## 2023-03-28 PROCEDURE — 85025 COMPLETE CBC W/AUTO DIFF WBC: CPT | Performed by: STUDENT IN AN ORGANIZED HEALTH CARE EDUCATION/TRAINING PROGRAM

## 2023-03-28 PROCEDURE — 36415 COLL VENOUS BLD VENIPUNCTURE: CPT | Performed by: STUDENT IN AN ORGANIZED HEALTH CARE EDUCATION/TRAINING PROGRAM

## 2023-03-28 PROCEDURE — 99205 OFFICE O/P NEW HI 60 MIN: CPT | Mod: S$PBB,GC,, | Performed by: INTERNAL MEDICINE

## 2023-03-28 PROCEDURE — 99215 OFFICE O/P EST HI 40 MIN: CPT | Mod: PBBFAC | Performed by: INTERNAL MEDICINE

## 2023-03-28 PROCEDURE — 99205 PR OFFICE/OUTPT VISIT, NEW, LEVL V, 60-74 MIN: ICD-10-PCS | Mod: S$PBB,GC,, | Performed by: INTERNAL MEDICINE

## 2023-03-28 PROCEDURE — 84153 ASSAY OF PSA TOTAL: CPT | Performed by: STUDENT IN AN ORGANIZED HEALTH CARE EDUCATION/TRAINING PROGRAM

## 2023-03-28 PROCEDURE — 99999 PR PBB SHADOW E&M-EST. PATIENT-LVL V: ICD-10-PCS | Mod: PBBFAC,,, | Performed by: INTERNAL MEDICINE
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
## 2023-03-28 NOTE — PROGRESS NOTES
Subjective:       Patient ID: Denis Bunn is a 67 y.o. male.    Chief Complaint: Prostate Cancer    HPI    Diagnosis: Metastatic Prostate Cancer  Previously seen by Rad Onc, Dr. Frias and prior consult at [facility]    Oncology History:  - July 2020 presented for evaluation of an elevated PSA of 5.3 ng/ml.    - 9/2/2020 underwent RALP with bilateral lymph node dissection   Pathology revealed Jose 7 (4+3) adenocarcinoma involving 20% of the prostate.  The [place] pattern 4 accounted for 85% of the tumor.  There was extraprostatic extension with multifocal perineural and lymphovascular invasion.  There was no bladder neck invasion or seminal vesicle invasion.  The margins of resection and 1  Rt. and 1 Lt. pelvic nodes were negative for tumor involvement.    - Postoperative PSA on 10/1/20 returned at 0.14 ng/ml.  Repeat PSA  2/9/21 and returned at 0.41 ng/ml.    - 2/26/2021 PET fluciclovine showed no evidence of recurrent or metastatic disease  - 3/2/2021 MRI of the prostate showed 7 mm bone lesion in the left symphysis pubis  - he was started on Casodex  - 3/3/2021 went to [facility] and casodex stopped to pursue biopsy  - 3/15/2021 IR-directed biopsy on the left symphysis pubis revealed prostate adenocarcinoma  - 3/24/21 restarted casodex and received Eligard on 3/31/2021   - 4/21/2021 started apalutamide per [facility]-- 1 week later developed a rash that responded to medrol dose pack but recurred   - He was recommended for radiotherapy to the prostate bed, pubic symphysis and pelvic nodes which he completed on 10/27/21.  The metastatic focus in the pubic symphysis also received radiotherapy.    - patient subsequently elected to discontinue his hormonal therapy.His last Lupron injection was in October of 2021.    - 7/13/2021 PET showed no evidence of fluciclovine avid recurrent or metastatic prostate carcinoma.   - 7/12/2021 MRI of the pelvis showed stable postsurgical findings without evidence of local recurrence or  pelvic adenopathy. There was a sclerotic focus in the left pubic symphysis unchanged since the prior examination.   - PSA < .01 ng/ml  - He continued on ADT + apalutamide andradiation to the primary tumor and pelvic bone from 08/19/2021-10/27/2021 with Dr. Frias.    FH:  Father prostate cancer at 79  Paternal grandfather- lung cancer (smoker)    SH:    Social EtOH    Review of Systems   Constitutional:  Positive for fatigue. Negative for activity change, appetite change, fever and unexpected weight change.   HENT:  Negative for sore throat and trouble swallowing.    Eyes:  Negative for photophobia and visual disturbance.   Respiratory:  Negative for cough, chest tightness, shortness of breath and wheezing.    Cardiovascular:  Negative for chest pain, palpitations and leg swelling.   Gastrointestinal:  Negative for abdominal pain, constipation, diarrhea, nausea and vomiting.   Genitourinary:  Negative for dysuria and flank pain.   Musculoskeletal:  Negative for arthralgias, back pain and myalgias.   Integumentary:  Negative for color change and pallor.   Neurological:  Negative for dizziness, weakness and headaches.   Psychiatric/Behavioral:  Negative for confusion and decreased concentration.        Objective:      Physical Exam  Constitutional:       General: He is not in acute distress.     Appearance: He is well-developed.   HENT:      Head: Normocephalic and atraumatic.      Right Ear: External ear normal.      Left Ear: External ear normal.      Nose: Nose normal.      Mouth/Throat:      Mouth: Mucous membranes are moist.      Pharynx: Oropharynx is clear. No oropharyngeal exudate.   Eyes:      General: No scleral icterus.     Conjunctiva/sclera: Conjunctivae normal.   Cardiovascular:      Rate and Rhythm: Normal rate and regular rhythm.      Heart sounds: Normal heart sounds.   Pulmonary:      Effort: Pulmonary effort is normal.      Breath sounds: Normal breath sounds. No wheezing or rales.    Abdominal:      General: Bowel sounds are normal. There is no distension.      Palpations: Abdomen is soft.      Tenderness: There is no abdominal tenderness.   Musculoskeletal:         General: Normal range of motion.      Cervical back: Normal range of motion and neck supple.      Right lower leg: No edema.      Left lower leg: No edema.   Skin:     General: Skin is warm and dry.   Neurological:      Mental Status: He is alert and oriented to person, place, and time.   Psychiatric:         Behavior: Behavior normal.       Assessment:       Problem List Items Addressed This Visit          Oncology    Prostate cancer - Primary    Relevant Orders    CBC W/ AUTO DIFFERENTIAL (Completed)    COMPREHENSIVE METABOLIC PANEL (Completed)    TESTOSTERONE (Completed)    PROSTATE SPECIFIC ANTIGEN, DIAGNOSTIC (Completed)    NM PET CT F 18 PYL PSMA, Midthigh to [facility]       Plan:       Prostate cancer      67 year old male initially diagnosed withwith high risk prostate cancer who was initially treated with prostatectomy (post op PSA .14) who was found to have metastatic disease on MRI imaging in 2021 that was later confirmed with biopsy. Patient treated with ADT and apalutamide and had side effects of fatigue, rash, muscle weakness, and weight loss. Has been off apalutamide since 2021, last lupron shot on Oct 2021.    - Will get cbc, cmp, testosterone, and PSA today  - Repeat imaging with NM PET CT  - Will discuss resuming ADT + zytiga after reviewing restaging scans and labs  - Patient may benefit from xgeva as well depending on skeletal involvement  - Genetics referral given young age of diagnosis    All questions answered    > 60 minutes total on encounter  I have personally taken the history and examined this patient and agree with the fellow's note as stated above.    Route Chart for Scheduling    Med Onc Chart Routing  Urgent    Follow up with physician . PSMA PET; rtc approx 1 week post; genetics referral   Follow  up with [facility]    Infusion scheduling note    Injection scheduling note    Labs    Imaging    Pharmacy appointment    Other referrals

## 2023-03-30 ENCOUNTER — PATIENT MESSAGE (OUTPATIENT)
Dept: HEMATOLOGY/ONCOLOGY | Facility: CLINIC | Age: 68
End: 2023-03-30
Payer: MEDICARE

## 2023-04-05 ENCOUNTER — TELEPHONE (OUTPATIENT)
Dept: HEMATOLOGY/ONCOLOGY | Facility: CLINIC | Age: 68
End: 2023-04-05
Payer: MEDICARE

## 2023-04-05 NOTE — TELEPHONE ENCOUNTER
Called and scheduled consult for 4/25 at 10am. He voiced thanks and understanding, no questions at this time.    ----- Message from Karlie Coy PA-C sent at 4/3/2023  9:19 AM CDT -----  Bernardo Carrasquillo,     Please schedule for next available with one of us.     Thanks!  Karlie  ----- Message -----  From: Lor Villaseñor  Sent: 4/1/2023   1:42 PM CDT  To: Reese Vanegas DNP, Karlie Coy PA-C    Good afternoon,    A genetics referral has been placed by Dr. Orourke for patient to be scheduled whenever you all can.    Thank you,  Lor

## 2023-04-06 ENCOUNTER — OFFICE VISIT (OUTPATIENT)
Dept: OTOLARYNGOLOGY | Facility: CLINIC | Age: 68
End: 2023-04-06
Payer: MEDICARE

## 2023-04-06 VITALS — BODY MASS INDEX: 25.94 KG/M2 | WEIGHT: 171.19 LBS | HEIGHT: 68 IN

## 2023-04-06 DIAGNOSIS — J34.3 HYPERTROPHY OF INFERIOR NASAL TURBINATE: ICD-10-CM

## 2023-04-06 DIAGNOSIS — R06.83 SNORING: ICD-10-CM

## 2023-04-06 DIAGNOSIS — R13.10 DYSPHAGIA, UNSPECIFIED TYPE: ICD-10-CM

## 2023-04-06 DIAGNOSIS — J30.89 CHRONIC NONSEASONAL ALLERGIC RHINITIS DUE TO POLLEN: ICD-10-CM

## 2023-04-06 DIAGNOSIS — R05.8 OTHER COUGH: Primary | ICD-10-CM

## 2023-04-06 PROCEDURE — 31575 PR LARYNGOSCOPY, FLEXIBLE; DIAGNOSTIC: ICD-10-PCS | Mod: S$GLB,,, | Performed by: OTOLARYNGOLOGY

## 2023-04-06 PROCEDURE — 99214 OFFICE O/P EST MOD 30 MIN: CPT | Mod: 25,S$GLB,, | Performed by: OTOLARYNGOLOGY

## 2023-04-06 PROCEDURE — 99214 PR OFFICE/OUTPT VISIT, EST, LEVL IV, 30-39 MIN: ICD-10-PCS | Mod: 25,S$GLB,, | Performed by: OTOLARYNGOLOGY

## 2023-04-06 PROCEDURE — 31575 DIAGNOSTIC LARYNGOSCOPY: CPT | Mod: S$GLB,,, | Performed by: OTOLARYNGOLOGY

## 2023-04-06 RX ORDER — FLUTICASONE PROPIONATE 50 MCG
2 SPRAY, SUSPENSION (ML) NASAL 2 TIMES DAILY
Qty: 18.2 ML | Refills: 3 | Status: SHIPPED | OUTPATIENT
Start: 2023-04-06 | End: 2023-09-25

## 2023-04-06 RX ORDER — AZELASTINE 1 MG/ML
1 SPRAY, METERED NASAL 2 TIMES DAILY
Qty: 30 ML | Refills: 3 | Status: SHIPPED | OUTPATIENT
Start: 2023-04-06 | End: 2023-09-25

## 2023-04-06 NOTE — PATIENT INSTRUCTIONS
"Information and instructions from your visit with me today:    Start using the following medication nasal sprays:   Fluticasone spray:    This medication is a steroid spray. It stays within the nose and does not have absorption into the body that leads to side effects that one has with oral steroid medication. Fluticasone nasal spray is the same as the Flonase brand nasal spray. Discuss with your pharmacist if the price is lower over the counter or with a prescription ( this varies depending on insurance). The medication that is over the counter is the same as the prescription medication. Use this medication as instructed on the prescription, 1-2 sprays on each side of your nose twice daily.     Azelastine  spray:  This medication is an antihistamine used to treat nasal symptoms of allergy, which works specifically in the nose unlike antihistamine pills which have more of an effect on the whole body. Use this medication as instructed on the prescription, 1 spray on each side of your nose twice daily.     Additional instructions for medication sprays  Place the tip of the medication bottle in your nose and aim slightly up and out on each side to get medication high and deep into your nose and sinuses, and not have it all deposit in the very front of your nose. Aim the tip of the nozzle towards the outer corner of your eye . You can imagine aiming towards the back of your eyeball on each side for this, as opposed to straight back to the center of your nose and head.     You need to use this medication every day regardless of symptoms, as it takes time ( a few weeks) to work and get the benefits. It does not work on an "as needed" basis like taking a decongestant. If your symptoms only occur in a particular season, then the medication can be used seasonally instead of year long. For seasonal symptoms, you should start using the spray twice daily a month before when you normally have symptoms ( for example, if symptoms " start in August, should start at the end of June).     Start nasal irrigations with saline solution- you can either use a rinse or a mist spray:      NASAL SALINE SPRAY ( simply saline and arm and hammer are examples) There are several different brands found in the cold and flu aisle of the pharmacy. You can use any brand of saline spray - this will deliver the saline by a gentle mist ( if you have difficulty or discomfort with nasal rinse/ a lot of fluid in the nose, this will be more comfortable).       Always rinse your nose with saline prior to using medication sprays and wait a couple of hours before using again. You can use the saline throughout the day to help with stuffy nose or dry nose.    Do not use nasal decongestant sprays such as Afrin or similar products long term ( over 3 days) .  This can cause long term physical nasal addiction. Afrin should only be used if having nose bleeds, severe nasal congestion , or severe ear pain/fullness and should not be used for more than 2-3 days in a row . It is a not a medication that should be used for a long period of time.     It was nice meeting you today, and I look forward to helping you feel better soon. Please don't hesitate to call if you have any other questions or concerns, or if I can be of any assistance in the meantime.      Enedina Griggs MD    [facility]     Phone  [phone]    Fax      [phone]        Enedina Griggs MD  Otorhinolaryngology

## 2023-04-06 NOTE — PROGRESS NOTES
OTOLARYNGOLOGY CLINIC NOTE  Date:  04/06/2023     Chief complaint:  Chief Complaint   Patient presents with    Snoring     Started snoring about 3-4 ago       History of Present Illness  Denis Bunn is a 67 y.o. male  presenting today for a new evaluation and treatment of snoring.  Not a lot of weight gain recently  No congestion   Started snoring about 3-4 months ago  Has prostate cancer and got off meds and gained about 8 pounds  Has been having more frequent choking during this time period - does not happen a lot but more common than usual, mostly liquids  No tremors  No np reflux  No voice changes  No fam history of parkinson    I saw him in 2022 for sudden hearing loss.     Past Medical History  Past Medical History:   Diagnosis Date    Anxiety     Back pain     Cataract     Constipation - functional     Dyslipidemia     ED (erectile dysfunction)     FH: CAD (coronary artery disease) 4/24/2013    But he had 0 CAC    History of gout     HTN (hypertension), benign 04/24/2013    Personal history of colonic polyps     Prostate cancer     Vision changes         Past Surgical History  Past Surgical History:   Procedure Laterality Date    BACK SURGERY      CATARACT EXTRACTION      left eye    COLONOSCOPY N/A 7/29/2019    Procedure: COLONOSCOPY;  Surgeon: Mingo Freeman MD;  Location: [facility];  Service: Endoscopy;  Laterality: N/A;  due July 2019    ELBOW SURGERY      scope/right    EYE SURGERY      Dr. Hope.retina x2 left    LAMINECTOMY      ROBOT-ASSISTED LAPAROSCOPIC PROSTATECTOMY N/A 9/2/2020    Procedure: ROBOTIC PROSTATECTOMY;  Surgeon: Edson White MD;  Location: [address];  Service: Urology;  Laterality: N/A;  3hrs gen with regional    WRIST SURGERY      right        Medications  Current Outpatient Medications on File Prior to Visit   Medication Sig Dispense Refill    allopurinoL (ZYLOPRIM) 100 MG tablet TAKE 1 TABLET BY MOUTH TWICE A  tablet 3    ascorbic acid, vitamin C,  (VITAMIN C) 500 MG tablet Take 500 mg by mouth once daily. Hold for 1 week prior to surgery      coenzyme Q10 100 mg capsule Take 100 mg by mouth once daily. Hold for 1 week prior to surgery      ergocalciferol, vitamin D2, (VITAMIN D ORAL) Take by mouth. Hold for 1 week prior to surgery      ferrous sulfate (SLOW RELEASE IRON) 142 mg (45 mg iron) TbSR Take by mouth.      gabapentin (NEURONTIN) 300 MG capsule TAKE 1 CAPSULE BY MOUTH EVERY DAY IN THE EVENING 90 capsule 1    garlic 1,000 mg Cap Take by mouth. Hold for 1 week prior to surgery      hydrocortisone 2.5 % cream APPLY TO AFFECTED AREA TWICE A DAY 28.35 g 1    losartan (COZAAR) 100 MG tablet TAKE 1 TABLET BY MOUTH EVERY DAY 90 tablet 3    magnesium 250 mg Tab Take 250 mg by mouth once. Takes two 250 mg tablets daily   Hold for 1 week prior to surgery      omega-3 fatty acids 1,000 mg Cap Take 1,000 mg by mouth.      papaverine 30 mg/mL injection Add: Phentolamine 1 mg  Add: PGE1 10 mcg    Sig:  Inject 20 units as directed; titrate up by 5 units until desired results 10 mL 12    pravastatin (PRAVACHOL) 20 MG tablet TAKE 1 TABLET(20 MG) BY MOUTH EVERY DAY 90 tablet 3    verapamiL (CALAN-SR) 120 MG CR tablet Take 1 tablet (120 mg total) by mouth nightly. in addition to 240mg in the morning 90 tablet 3    verapamiL (VERELAN) 240 MG C24P Take 1 capsule (240 mg total) by mouth every morning. in addition to 120 mg at night 90 capsule 3    zinc 50 mg Tab Take by mouth. Hold for 1 week prior to surgery      SHERWIN YOUNGER, 0.0015 % Dpet Place 1 drop into both eyes nightly.      ALPRAZolam (XANAX) 0.5 MG tablet Take 1 tablet (0.5 mg total) by mouth nightly as needed for Anxiety. 30 tablet 1    azelastine (ASTELIN) 137 mcg (0.1 %) nasal spray 1 spray (137 mcg total) by Nasal route 2 (two) times daily. for 10 days 30 mL 0    fluticasone propionate (FLONASE) 50 mcg/actuation nasal spray 1 spray (50 mcg total) by Each Nostril route 2 (two) times daily. (Patient not taking:  "Reported on 4/6/2023) 9.9 mL 0     No current facility-administered medications on file prior to visit.       Review of Systems  Review of Systems   Constitutional:  Positive for malaise/fatigue.   Eyes: Negative.    Gastrointestinal: Negative.    Musculoskeletal:  Positive for back pain.   Skin: Negative.    Neurological: Negative.    Psychiatric/Behavioral:  Positive for depression.       Social History   reports that he has never smoked. He has never used smokeless tobacco. He reports current alcohol use of about 3.0 standard drinks per week. He reports that he does not use drugs.     Family History  Family History   Problem Relation Age of Onset    Blindness Mother         from cva    Cataracts Mother     Cataracts Father     Cancer Father     No Known Problems Sister     No Known Problems Brother     No Known Problems Maternal Aunt     No Known Problems Maternal Uncle     No Known Problems Paternal Aunt     No Known Problems Paternal Uncle     No Known Problems Maternal Grandmother     No Known Problems Maternal Grandfather     No Known Problems Paternal Grandmother     No Known Problems Paternal Grandfather     Amblyopia Neg Hx     Diabetes Neg Hx     Glaucoma Neg Hx     Hypertension Neg Hx     Macular degeneration Neg Hx     Retinal detachment Neg Hx     Strabismus Neg Hx     Stroke Neg Hx     Thyroid disease Neg Hx         Physical Exam   There were no vitals filed for this visit. Body mass index is 26.03 kg/m².  Weight: 77.7 kg (171 lb 3 oz)   Height: 5' 8" (172.7 cm)     GENERAL: no acute distress.  HEAD: normocephalic.   EYES: lids and lashes normal. No scleral icterus  EARS: external ear without lesion, normal pinna shape and position.  External auditory canal with normal cerumen, tympanic membrane fully visible, no perforation , no retraction. No middle ear effusion. Ossicles intact.   NOSE: external nose without significant bony abnormality  ORAL CAVITY/OROPHARYNX: tongue midline and mobile. Symmetric " palate rise. Uvula midline. Tongue strength ok   NECK: trachea midline.   LYMPH NODES:No cervical lymphadenopathy.  RESPIRATORY: no stridor, no stertor. Voice normal. Respirations nonlabored.  NEURO: alert, responds to questions appropriately.   PSYCH:mood appropriate    PROCEDURE NOTE  NAME OF PROCEDURE: Flexible Laryngoscopy, diagnostic  INDICATIONS: gag reflex precludes mirror exam,  dysphagia / cough with liquids  FINDINGS: good closure of nasopharynx with k sound  No mass or lesion   Turbinate hypertrophy    Consent: After procedure was explained in detail and all questions answered, verbal consent was obtained for performing flexible laryngoscopy.  Anesthesia: topical 4% lidocaine and neosynephrine  Procedure: With patient in seated position, the scope was inserted into the bilateral nasal passageway and advanced atraumatically into the nasopharynx to examine the following structures:  Nasal cavity: Turbinates with moderate hypertrophy, decent/ok response to decongestant spray. no middle meatal edema. No purulent drainage.   Nasopharynx: no mass or lesion noted in nasopharynx.   Oropharynx: base of tongue without  mass or ulceration. Lingual tonsils normal in appearance  Hypopharynx: posterior pharyngeal wall without mass or lesion. No pooling of secretions. Pyriform sinuses visible without mass or lesion  Larynx: epiglottis normal without lesion. False vocal folds without edema/erythema/lesion. True vocal folds mobile and without lesion. no interarytenoid edema no erythema . Postcricoid region without edema no lesion   Subglottis: visualized portion of subglottis normal in appearance    After examination performed, the scope was removed atraumatically . The patient tolerated the procedure well. Photodocumentation obtained with representative images below, all images and/or videos uploaded in media section of epic.                        Imaging:  The patient does not have any  recent imaging of the head and  neck.     Labs:  CBC  Recent Labs   Lab 10/27/22  0738 02/27/23  0722 03/28/23  1103   WBC 3.64 L 3.68 L 4.67   Hemoglobin 13.4 L 13.0 L 12.8 L   Hematocrit 39.8 L 38.6 L 38.6 L   MCV 99 H 97 99 H   Platelets 182 194 205     BMP  Recent Labs   Lab 09/05/20  0440 09/06/20  0340 05/24/21  0737 10/27/22  0738 02/27/23  0722 03/28/23  1103   Glucose 105 98   < > 105 105 96   Sodium 134 L 135 L   < > 141 140 140   Potassium 4.3 4.0   < > 4.7 4.8 5.4 H   Chloride 103 104   < > 106 103 104   CO2 24 23   < > 29 27 29   BUN 14 12   < > 14 14 17   Creatinine 1.6 H 1.2   < > 1.1 1.1 1.0   Calcium 8.8 8.5 L   < > 10.0 9.7 10.1   Phosphorus 2.8 2.6 L  --   --   --   --    Magnesium 2.0 2.0  --   --   --   --     < > = values in this interval not displayed.     COAGS  Recent Labs   Lab 09/05/20  0857   INR 1.0       Assessment  1. Other cough    2. Dysphagia, unspecified type    3. Hypertrophy of inferior nasal turbinate    4. Chronic nonseasonal allergic rhinitis due to pollen    5. Snoring       Plan:  Discussed plan of care with patient in detail and all questions answered. Patient reported understanding of plan of care.   Allergic rhinitis(AR):  discussed about pathophysiology of allergic disease and sinus disease. We discussed about treatment options for this including but not limited to medications and potential surgeries as well as when surgery is indicated.  - I recommend dual nasal spray therapy as combo of steroid and antihistamine nasal spray has been shown in evidence based studies to be better than either alone.  -Discussed medication administration technique ( point toward outer corner of eye and not towards nasal septum) and use nasal saline prior to medication sprays.   -We discussed importance of saline use prior to medication sprays to help sprays work better and to clean the nose.   -Counseled on risk of bleeding, risk of increased intraocular pressure/cataract with long term use.   -Discussed how to increase  and decrease nasal spray regimen based on symptoms and that sprays can be used on prn basis but work best when used daily and take about 2-3 weeks to get to max level . If patient using sprays on a prn basis and symptoms not controlled should go back to daily /bid use  -discussed as well as gave patient physical documentation with photos  about the saline and other medication sprays ( paperwork summarized discussion topics)    Dysphagia: coughing with liquids concerning for possible neuro issue which I discussed with him, recommend swallow eval, he prefers to hold on this for now as well.     Snoring: Recommend sleep study , he would like to hold on that for now and try nasal spray regimen. Will see him back after trial of nasal sprays and discuss about sleep study again. Discussed pathophys of snoring vs raven and issues with untreated raven including but not limited to heart disease, kidney problem, stroke, death     F/u 2 months with possible scope  I spent a total of 30 minutes on the day of the visit.  This includes face to face time and non-face to face time preparing to see the patient (eg, review of tests), obtaining and/or reviewing separately obtained history, documenting clinical information in the electronic or other health record, independently interpreting results and communicating results to the patient/family/caregiver, or care coordinator.    Please be aware that this note has been generated with the assistance of MModal voice-to-text.  Please excuse any spelling or grammatical errors.

## 2023-04-11 NOTE — PROGRESS NOTES
Mr. Bunn is a patient of Dr. Melendez and was last seen in clinic 4/7/2022.      Subjective:   Patient ID:  Denis Bunn is a 67 y.o. male who presents for follow up of PVC  .     HPI:    Mr. Bunn is a 67 y.o. male with PVC here for follow up.    Background:    65 yoM here for arrhythmia management.     2018: He presented early July 2018 with frequent palpitations. He was found to have frequent PVCs with RBBB morphology that were relatively narrow. He was wearing an event monitor at the time. He was admitted for observation. There his EF was noted to be normal. I advised that he stop his metoprolol and start verapamil. The event monitor was returned and I asked for a 48h holter. His PVC burden was 23%. He has had some relief on verapamil with a particularly symptomatic day 7/8/18. He denies syncope, near syncope, chest pain, shortness of breath. He feels his PVCs more at rest than with activity. PVC morphology is RBBB, V3 transition with rightward/inferior axis. The QRS duration is ~120 ms.     2/20: Verapamil up-titrated to 240 qAM, 120 q PM. Symptoms stable.     3/2021: Prostate CA with prostatectomy 9/2020. He has had some suggestion of bone lesions. Now awaiting bone biopsy results. No cardiac issues. Remains on verapamil 240 qAM and 120 qPM. He is awaiting final plan for his CA.   65 yoM PVCs here for routine follow up. PVCs under good control on verapamil. RTC 1y    4/7/2022: He is stable from a rhythm standpoint, with no documented or symptomatic PVCs on verapamil regimen. He has chronic bradycardia but is asymptomatic. Some fatigue related to his CA regimen and was recently taken off 2 meds to gain more energy. He is looking to start exercise regimen.    Update (04/14/2023):    Today his primary physical complaint is related to sleep - frequent nocturnal wakening. Also recently started snoring. Saw ENT for this but could not tolerate flonase spray. Covid in March - fatigue lasted about 3 weeks -  has had covid several times. Echo in March showed normal heart fucntion. He swims for about 30 min several times a week. No worsening PLATA, CP, LH, syncope.  On verapamil 240 AM and 120 PM.    I have personally reviewed the patient's EKG today, which shows sinus bradycardia at 49bpm. NM interval is 132. QRS is 88. QTc is 420.    Relevant Cardiac Test Results:    2D Echo (3/6/2023):  The left ventricle is normal in size with normal systolic function.  The estimated ejection fraction is 60%.  Normal left ventricular diastolic function.  Normal right ventricular size with normal right ventricular systolic function.  The estimated PA systolic pressure is 31 mmHg.  Normal central venous pressure (3 mmHg).  Moderate left atrial enlargement.    Current Outpatient Medications   Medication Sig    allopurinoL (ZYLOPRIM) 100 MG tablet TAKE 1 TABLET BY MOUTH TWICE A DAY    ALPRAZolam (XANAX) 0.5 MG tablet Take 1 tablet (0.5 mg total) by mouth nightly as needed for Anxiety.    ascorbic acid, vitamin C, (VITAMIN C) 500 MG tablet Take 500 mg by mouth once daily. Hold for 1 week prior to surgery    azelastine (ASTELIN) 137 mcg (0.1 %) nasal spray 1 spray (137 mcg total) by Nasal route 2 (two) times daily.    coenzyme Q10 100 mg capsule Take 100 mg by mouth once daily. Hold for 1 week prior to surgery    ergocalciferol, vitamin D2, (VITAMIN D ORAL) Take by mouth. Hold for 1 week prior to surgery    ferrous sulfate (SLOW RELEASE IRON) 142 mg (45 mg iron) TbSR Take by mouth.    fluticasone propionate (FLONASE) 50 mcg/actuation nasal spray 2 sprays (100 mcg total) by Each Nostril route 2 (two) times daily.    gabapentin (NEURONTIN) 300 MG capsule TAKE 1 CAPSULE BY MOUTH EVERY DAY IN THE EVENING    garlic 1,000 mg Cap Take by mouth. Hold for 1 week prior to surgery    hydrocortisone 2.5 % cream APPLY TO AFFECTED AREA TWICE A DAY    losartan (COZAAR) 100 MG tablet TAKE 1 TABLET BY MOUTH EVERY DAY    magnesium 250 mg Tab Take 250 mg by  "mouth once. Takes two 250 mg tablets daily   Hold for 1 week prior to surgery    omega-3 fatty acids 1,000 mg Cap Take 1,000 mg by mouth.    papaverine 30 mg/mL injection Add: Phentolamine 1 mg  Add: PGE1 10 mcg    Sig:  Inject 20 units as directed; titrate up by 5 units until desired results    pravastatin (PRAVACHOL) 20 MG tablet TAKE 1 TABLET(20 MG) BY MOUTH EVERY DAY    verapamiL (CALAN-SR) 120 MG CR tablet Take 1 tablet (120 mg total) by mouth nightly. in addition to 240mg in the morning    verapamiL (VERELAN) 240 MG C24P Take 1 capsule (240 mg total) by mouth every morning. in addition to 120 mg at night    zinc 50 mg Tab Take by mouth. Hold for 1 week prior to surgery    ZIOPTAN, PF, 0.0015 % Dpet Place 1 drop into both eyes nightly.    fluticasone propionate (FLONASE) 50 mcg/actuation nasal spray 1 spray (50 mcg total) by Each Nostril route 2 (two) times daily. (Patient not taking: Reported on 4/6/2023)     No current facility-administered medications for this visit.     Review of Systems   Constitutional: Positive for malaise/fatigue.   Cardiovascular:  Negative for chest pain, dyspnea on exertion, irregular heartbeat, leg swelling and palpitations.   Respiratory:  Positive for snoring. Negative for shortness of breath.    Hematologic/Lymphatic: Negative for bleeding problem.   Skin:  Negative for rash.   Musculoskeletal:  Negative for myalgias.   Gastrointestinal:  Negative for hematemesis, hematochezia and nausea.   Genitourinary:  Positive for nocturia.   Neurological:  Negative for light-headedness.   Psychiatric/Behavioral:  Negative for altered mental status. The patient has insomnia.    Allergic/Immunologic: Negative for persistent infections.     Objective:        /80   Pulse (!) 49   Ht 5' 8" (1.727 m)   Wt 77.7 kg (171 lb 4.8 oz)   BMI 26.05 kg/m²     Physical Exam  Vitals and nursing note reviewed.   Constitutional:       Appearance: Normal appearance. He is well-developed.   HENT:      " Head: Normocephalic.      Nose: Nose normal.   Eyes:      Pupils: Pupils are equal, round, and reactive to light.   Cardiovascular:      Rate and Rhythm: Regular rhythm. Bradycardia present.   Pulmonary:      Effort: No respiratory distress.      Breath sounds: Normal breath sounds.   Musculoskeletal:         General: Normal range of motion.   Skin:     General: Skin is warm and dry.      Findings: No erythema.   Neurological:      Mental Status: He is alert and oriented to person, place, and time.   Psychiatric:         Speech: Speech normal.         Behavior: Behavior normal.       Lab Results   Component Value Date     03/28/2023    K 5.4 (H) 03/28/2023    MG 2.0 09/06/2020    BUN 17 03/28/2023    CREATININE 1.0 03/28/2023    ALT 39 03/28/2023    AST 28 03/28/2023    HGB 12.8 (L) 03/28/2023    HCT 38.6 (L) 03/28/2023    HCT 27 (L) 09/04/2020    TSH 1.258 02/27/2023    LDLCALC 67.6 02/27/2023       Recent Labs   Lab 09/05/20  0857   INR 1.0       Assessment:     1. PVC (premature ventricular contraction)    2. HTN (hypertension), benign    3. Palpitations    4. Snoring      Plan:     In summary, Mr. Bunn is a 67 y.o. male with PVC here for follow up.  Doing well from a rhythm standpoint, with no PVCs on ECG/rhythm strip. Echo last month showed preserved LV function. On verapamil 240/120. Jt but asymptomatic. He has recently started snoring - will refer to sleep medicine for DAYLIN evaluation. He might be restarting treatment for prostate CA as his PSA is up - PET scan next week. Advised him to contact clinic should his symptoms change. Otherwise RTC 1 yr.    Sleep study  PET scan next week  Continue current regimen  RTC 1 yr, sooner if needed    *A copy of this note has been sent to Dr. Melendez*    Follow up in about 1 year (around 4/14/2024).    ------------------------------------------------------------------    ALLIE Cortes, NP-C  Cardiac Electrophysiology

## 2023-04-14 ENCOUNTER — OFFICE VISIT (OUTPATIENT)
Dept: ELECTROPHYSIOLOGY | Facility: CLINIC | Age: 68
End: 2023-04-14
Payer: MEDICARE

## 2023-04-14 ENCOUNTER — PATIENT MESSAGE (OUTPATIENT)
Dept: ELECTROPHYSIOLOGY | Facility: CLINIC | Age: 68
End: 2023-04-14

## 2023-04-14 VITALS
BODY MASS INDEX: 25.96 KG/M2 | WEIGHT: 171.31 LBS | HEART RATE: 49 BPM | SYSTOLIC BLOOD PRESSURE: 135 MMHG | HEIGHT: 68 IN | DIASTOLIC BLOOD PRESSURE: 80 MMHG

## 2023-04-14 DIAGNOSIS — R00.2 PALPITATIONS: ICD-10-CM

## 2023-04-14 DIAGNOSIS — I49.3 PVC (PREMATURE VENTRICULAR CONTRACTION): Primary | ICD-10-CM

## 2023-04-14 DIAGNOSIS — I10 HTN (HYPERTENSION), BENIGN: ICD-10-CM

## 2023-04-14 DIAGNOSIS — R06.83 SNORING: ICD-10-CM

## 2023-04-14 PROCEDURE — 99999 PR PBB SHADOW E&M-EST. PATIENT-LVL IV: ICD-10-PCS | Mod: PBBFAC,,, | Performed by: NURSE PRACTITIONER

## 2023-04-14 PROCEDURE — 93010 RHYTHM STRIP: ICD-10-PCS | Mod: S$PBB,,, | Performed by: INTERNAL MEDICINE

## 2023-04-14 PROCEDURE — 99214 OFFICE O/P EST MOD 30 MIN: CPT | Mod: S$PBB,,, | Performed by: NURSE PRACTITIONER

## 2023-04-14 PROCEDURE — 99214 PR OFFICE/OUTPT VISIT, EST, LEVL IV, 30-39 MIN: ICD-10-PCS | Mod: S$PBB,,, | Performed by: NURSE PRACTITIONER

## 2023-04-14 PROCEDURE — 99999 PR PBB SHADOW E&M-EST. PATIENT-LVL IV: CPT | Mod: PBBFAC,,, | Performed by: NURSE PRACTITIONER

## 2023-04-14 PROCEDURE — 93010 ELECTROCARDIOGRAM REPORT: CPT | Mod: S$PBB,,, | Performed by: INTERNAL MEDICINE

## 2023-04-14 PROCEDURE — 99214 OFFICE O/P EST MOD 30 MIN: CPT | Mod: PBBFAC | Performed by: NURSE PRACTITIONER

## 2023-04-14 PROCEDURE — 93005 ELECTROCARDIOGRAM TRACING: CPT | Mod: PBBFAC | Performed by: INTERNAL MEDICINE

## 2023-04-18 ENCOUNTER — HOSPITAL ENCOUNTER (OUTPATIENT)
Dept: RADIOLOGY | Facility: HOSPITAL | Age: 68
Discharge: HOME | End: 2023-04-18
Attending: INTERNAL MEDICINE
Payer: MEDICARE

## 2023-04-18 DIAGNOSIS — C61 PROSTATE CANCER: ICD-10-CM

## 2023-04-18 PROCEDURE — 78815 PET IMAGE W/CT SKULL-THIGH: CPT | Mod: TC

## 2023-04-18 PROCEDURE — 78815 PET IMAGE W/CT SKULL-THIGH: CPT | Mod: 26,PS,, | Performed by: STUDENT IN AN ORGANIZED HEALTH CARE EDUCATION/TRAINING PROGRAM

## 2023-04-18 PROCEDURE — 78815 NM PET CT F 18 PYL PSMA, MIDTHIGH TO VERTEX: ICD-10-PCS | Mod: 26,PS,, | Performed by: STUDENT IN AN ORGANIZED HEALTH CARE EDUCATION/TRAINING PROGRAM

## 2023-04-21 ENCOUNTER — PATIENT MESSAGE (OUTPATIENT)
Dept: HEMATOLOGY/ONCOLOGY | Facility: CLINIC | Age: 68
End: 2023-04-21
Payer: MEDICARE

## 2023-04-24 ENCOUNTER — PES CALL (OUTPATIENT)
Dept: ADMINISTRATIVE | Facility: CLINIC | Age: 68
End: 2023-04-24
Payer: MEDICARE

## 2023-04-25 ENCOUNTER — LAB VISIT (OUTPATIENT)
Dept: LAB | Facility: HOSPITAL | Age: 68
End: 2023-04-25
Attending: INTERNAL MEDICINE
Payer: MEDICARE

## 2023-04-25 ENCOUNTER — OFFICE VISIT (OUTPATIENT)
Dept: HEMATOLOGY/ONCOLOGY | Facility: CLINIC | Age: 68
End: 2023-04-25
Payer: MEDICARE

## 2023-04-25 VITALS
TEMPERATURE: 98 F | SYSTOLIC BLOOD PRESSURE: 138 MMHG | RESPIRATION RATE: 17 BRPM | DIASTOLIC BLOOD PRESSURE: 67 MMHG | HEART RATE: 46 BPM | BODY MASS INDEX: 25.52 KG/M2 | HEIGHT: 68 IN | WEIGHT: 168.38 LBS | OXYGEN SATURATION: 99 %

## 2023-04-25 DIAGNOSIS — C61 PROSTATE CANCER: ICD-10-CM

## 2023-04-25 DIAGNOSIS — C61 PROSTATE CANCER: Primary | ICD-10-CM

## 2023-04-25 DIAGNOSIS — Z80.42 FAMILY HISTORY OF MALIGNANT NEOPLASM OF PROSTATE: Primary | ICD-10-CM

## 2023-04-25 DIAGNOSIS — Z80.42 FAMILY HISTORY OF MALIGNANT NEOPLASM OF PROSTATE: ICD-10-CM

## 2023-04-25 DIAGNOSIS — G47.9 SLEEP DISORDER: ICD-10-CM

## 2023-04-25 PROCEDURE — 99214 OFFICE O/P EST MOD 30 MIN: CPT | Mod: S$PBB,,, | Performed by: INTERNAL MEDICINE

## 2023-04-25 PROCEDURE — 96040 PR GENETIC COUNSELING, EACH 30 MIN: CPT | Mod: ,,,

## 2023-04-25 PROCEDURE — 99999 PR PBB SHADOW E&M-EST. PATIENT-LVL V: CPT | Mod: PBBFAC,,, | Performed by: INTERNAL MEDICINE

## 2023-04-25 PROCEDURE — 99999 PR PBB SHADOW E&M-EST. PATIENT-LVL II: CPT | Mod: PBBFAC,,,

## 2023-04-25 PROCEDURE — 99499 NO LOS: ICD-10-PCS | Mod: S$PBB,,,

## 2023-04-25 PROCEDURE — 99999 PR PBB SHADOW E&M-EST. PATIENT-LVL II: ICD-10-PCS | Mod: PBBFAC,,,

## 2023-04-25 PROCEDURE — 99499 UNLISTED E&M SERVICE: CPT | Mod: S$PBB,,,

## 2023-04-25 PROCEDURE — 96040 PR GENETIC COUNSELING, EACH 30 MIN: ICD-10-PCS | Mod: ,,,

## 2023-04-25 PROCEDURE — 99999 PR PBB SHADOW E&M-EST. PATIENT-LVL V: ICD-10-PCS | Mod: PBBFAC,,, | Performed by: INTERNAL MEDICINE

## 2023-04-25 PROCEDURE — 99214 PR OFFICE/OUTPT VISIT, EST, LEVL IV, 30-39 MIN: ICD-10-PCS | Mod: S$PBB,,, | Performed by: INTERNAL MEDICINE

## 2023-04-25 PROCEDURE — 99215 OFFICE O/P EST HI 40 MIN: CPT | Mod: PBBFAC,27 | Performed by: INTERNAL MEDICINE

## 2023-04-25 PROCEDURE — 36415 COLL VENOUS BLD VENIPUNCTURE: CPT | Performed by: INTERNAL MEDICINE

## 2023-04-25 PROCEDURE — 99212 OFFICE O/P EST SF 10 MIN: CPT | Mod: PBBFAC | Performed by: GENETIC COUNSELOR, MS

## 2023-04-25 NOTE — PROGRESS NOTES
"Cancer Genetics  Hereditary and High-Risk Clinic  Department of Hematology and Oncology  [facility]    [facility]    Date of Service:  23  Visit Provider:  Marlena Gatica, [facility] [place], [facility]    Patient ID  Name: Denis Bunn    : 1955    MRN: [id number]      Referring Provider  Susan Orourke MD  [address] [place] [address]    IMPRESSION      Mr. Bunn is a 66yo male with metastatic prostate cancer and a family history of prostate cancer (father), for which he meets NCCN criteria for genetic testing. Additionally, he has Ashkenazi Hinduism ancestry on the paternal side, increasing the likelihood of a mutation. A sample was submitted on 23 to [facility] for CancerNext +RNAinsight panel testing, along with informed consent and insurance information. He was accompanied to clinic by his wife.    FOCUSED PERSONAL HISTORY       Chief Complaint: Genetic Evaluation (For personal and family history of prostate cancer)    History of Present Illness (HPI):  Denis Bunn ("Al"), 67 y.o., assigned male sex at birth, is established with the [facility] but new to me.  He was referred by Medical Oncology for hereditary cancer risk assessment given his diagnosis of metastatic prostate cancer and family history of prostate cancer.    Biopsy on 20: Jose 6 (3+3) prostatic adenocarcinoma  Prostatectomy with pelvic lymphadenectomy on 20: pT3a pN0 Jose 7 (4+3) acinar adenocarcinoma  MRI Prostate on 3/2/21 at [facility]: Likely 7 mm bone metastasis in the left symphysis pubis.  Biopsy on the left symphysis pubis on 3/15/21 at [facility]: metastatic prostate adenocarcinoma    He was started on apalutamide in 2021,  but discontinued due to a rash. He then underwent radiation therapy August-2021 and discontinued hormonal therapy (Lupron) in 2021.    Follow-up imaging has shown no evidence of recurrent prostate cancer, " and his PSA has been WNL. He is following up with Dr. Orourke later today.    Tobacco Use  Tobacco Use: Low Risk     Smoking Tobacco Use: Never    Smokeless Tobacco Use: Never    Passive Exposure: Not on file     Review of Systems   Patient's Distress Score today was 5/10 (with 10 being the worst).  Patient attributes this to stress regarding results follow-up.  Patient denies experiencing suicidal or homicidal ideations (SI/HI).  Offered patient a referral to [facility], and patient declined.      FAMILY HISTORY         A family history of birth defects, intellectual disability, SIDS, sudden early death, multiple miscarriages and consanguinity were denied. Please refer to above pedigree for further details. A larger copy has been scanned in the Media tab.     DISCUSSION     Approximately 5-10% of breast cancers are due to hereditary causes. The majority of hereditary breast cancers (>50%) are due to mutations in BRCA1 or BRCA2.  Around 12-30% are due to mutations in other highly penetrant genes, such as PALB2, PTEN and TP53, or in moderately penetrant genes, such as JANE, BARD1, and CHEK2.  The remaining percentage are caused by unknown/unidentified genes. Mr. Bunn meets NCCN criteria for genetic testing based on his diagnosis of metastatic prostate cancer, family history of prostate cancer and Ashkenazi Jehovah's witness ancestry (on his paternal grandmother's side). Therefore, he was offered phenotype-driven and broad panel testing. Mr. Bunn opted for the CancerNext panel through [facility] of the following 36 genes associated with hereditary breast, gastrointestinal, gynecologic, pancreatic, prostate, skin and other cancers:    APC, JANE, AXIN2, BARD1, BMPR1A, BRCA1, BRCA2, BRIP1, CDH1, CDK4, CDKN2A, CHEK2, DICER1, EPCAM, GREM1, HOXB13, MLH1, MSH2, MSH3, MSH6, MUTYH, NBN, NF1, NTHL1, PALB2, PMS2, POLD1, POLE, PTEN, RAD51C, RAD51D, RECQL, SMAD4, SMARCA4, STK11, TP53    We reviewed that mutations  in the highly penetrant genes put an individual at a significantly increased risk of certain types of cancers.  There are established screening and surgery guidelines for these syndromes. Mutations in the moderately penetrant genes increase the risk of certain types of cancers, but less is understood regarding their role in cancer risk. There may not be standard screening or management guidelines for individuals who have mutations in these genes. We discussed the autosomal dominant inheritance of most of these conditions, and that if Mr. Bunn tests positive for a mutation in one of these genes, then there would be a 50% chance his first degree relatives (although there are none living) could also have inherited the same mutation. Individuals in his family could consider undergoing predictive genetic testing at an appropriate age to determine their mutation status and their lifetime risk of cancer.     Furthermore, we discussed the psychosocial implications of a positive result, including anxiety and fear. Mr. Bunn did not express concern and stated that he would like the benefit of knowing whether he could be eligible for additional therapies (ie PARP inhibitors) in the future.    The potential outcomes of testing, including the high VUS (variant of unknown significance) rate seen in panel testing, were reviewed and implications were discussed. There is also a possibility for the patient to incur out-of-pocket costs related to this testing. Issues regarding insurance discrimination were discussed. [employer] protects against employment and health insurance discrimination, but it does not apply to life insurance, long term care or disability policies. It also does not apply to  personnel or employers with less than 15 employees. Mr. Bunn appeared to have a good understanding of the information as he asked appropriate questions.  A sample was submitted on 4/25/23 to [facility].  Mr. Bunn's  "results should be available in approximately 3 weeks.  In the meantime, he is welcome to contact me if he has any questions, concerns, or updates to his family history.     Mr. Bunn received comprehensive counseling regarding panel testing and has elected to proceed with this testing.     ASSESSMENT / PLAN      Denis Bunn ("Al"), 67 y.o., presented today for hereditary cancer risk assessment given his diagnosis of metastatic prostate cancer and family history of prostate cancer. Pre-test genetic counseling was provided, and a sample was submitted to [facility].  I will contact Mr. Bunn once his results are available.        ICD-10-CM ICD-9-CM   1. Family history of malignant neoplasm of prostate  Z80.42 V16.42   2. Prostate cancer  C61 185     1. Prostate cancer  - Ambulatory referral/consult to Genetics  - Genetic Misc Sendout Test, Blood; Future    2. Family history of malignant neoplasm of prostate  - Genetic Misc Sendout Test, Blood; Future       Genetic Test Information  Testing lab: [facility]   Test panel: CancerNext +RNAinsight  (Core:  BRCA1/BRCA2)   ICD-10 code(s): C61, Z80.42   Verbal informed consent: Obtained   Written informed consent: Obtained   Specimen type: Blood  (Patient denies blood disorders that would necessitate a skin fibroblast specimen)   Specimen collection by: [facility]   Specimen collection date: 04/25/2023   Results expected by: Approximately 2-3 weeks after the genetic testing lab receives the specimen   Results disclosure plan: Post-test visit if positive or complex result; otherwise, results will be communicated through phone call       Follow-up:  Follow up if genetic test results are positive for a pathogenic mutation.    Questions were encouraged and answered to the patient's satisfaction, and he verbalized understanding of the information and agreement with the plan.         Approximately 35 minutes were spent face-to-face with the patient.  " Approximately 60 minutes in total were spent on this encounter, which includes face-to-face time and non-face-to-face time preparing to see the patient (e.g., review of tests), obtaining and/or reviewing separately obtained history, documenting clinical information in the electronic or other health record, independently interpreting results (not separately reported) and communicating results to the patient/family/caregiver, or care coordination (not separately reported).     This assessment is based on the history and reports provided, as well as the current scientific knowledge regarding cancer genetics.         Marlena Gatica, [facility] [place], [facility]  Senior Genetic Counselor, Hereditary and High-Risk Clinic  Department of Hematology and Oncology  [facility]    [facility]        CC:  Dr. Susan Orourke

## 2023-04-25 NOTE — PROGRESS NOTES
Subjective     Patient ID: Denis Bunn is a 67 y.o. male.    Chief Complaint: Prostate Cancer    HPI    Diagnosis: Metastatic Prostate Cancer  Previously seen by Rad Onc, Dr. Frias and prior consult at [facility]    Presents for follow up after PET PSMA and genetics testing  We reviewed PSMA PET as below    - 4/18/2023 PSMA PET:  FINDINGS:  In the head and neck, there is physiologic uptake in the lacrimal and salivary glands, and there are no tracer avid lesions suspicious for malignancy.  In the chest, there are no tracer avid lesions suspicious for malignancy.  In the abdomen and pelvis, there is physiologic distribution in the liver, spleen, bowel, and genitourinary tract, and there are no anatomic or tracer avid lesions suspicious for malignancy.  Specifically, there are no pathologically enlarged or tracer avid pelvic or retroperitoneal lymph nodes.  In the bones, there is physiologic uptake in the bone marrow, and there are no tracer avid lesions suspicious for malignancy.  Additional CT findings:  Probable right maxillary sinus retention cyst.  Bilateral subsegmental dependent atelectasis the lungs.  Small splenule.  Subcentimeter hyperattenuating left renal focus, possibly represents hemorrhagic cyst.  Postsurgical changes of prostatectomy.  Posterior instrumented fusion of lumbosacral spine.  Impression:  Postsurgical changes of prostatectomy in patient with prostate cancer.  No focal abnormal radiotracer uptake to suggest local recurrence or metastatic disease     Oncology History:  - July 2020 presented for evaluation of an elevated PSA of 5.3 ng/ml.    - 9/2/2020 underwent RALP with bilateral lymph node dissection   Pathology revealed [place] 7 (4+3) adenocarcinoma involving 20% of the prostate.  The [place] pattern 4 accounted for 85% of the tumor.  There was extraprostatic extension with multifocal perineural and lymphovascular invasion.  There was no bladder neck invasion or seminal vesicle  invasion.  The margins of resection and 1  Rt. and 1 Lt. pelvic nodes were negative for tumor involvement.    - Postoperative PSA on 10/1/20 returned at 0.14 ng/ml.  Repeat PSA  2/9/21 and returned at 0.41 ng/ml.    - 2/26/2021 PET fluciclovine showed no evidence of recurrent or metastatic disease  - 3/2/2021 MRI of the prostate showed 7 mm bone lesion in the left symphysis pubis  - he was started on Casodex  - 3/3/2021 went to [facility] and casodex stopped to pursue biopsy  - 3/15/2021 IR-directed biopsy on the left symphysis pubis revealed prostate adenocarcinoma  - 3/24/21 restarted casodex and received Eligard on 3/31/2021   - 4/21/2021 started apalutamide per [facility]-- 1 week later developed a rash that responded to medrol dose pack but recurred   - He was recommended for radiotherapy to the prostate bed, pubic symphysis and pelvic nodes which he completed on 10/27/21.  The metastatic focus in the pubic symphysis also received radiotherapy.    - patient subsequently elected to discontinue his hormonal therapy.His last Lupron injection was in October of 2021.    - 7/13/2021 PET showed no evidence of fluciclovine avid recurrent or metastatic prostate carcinoma.   - 7/12/2021 MRI of the pelvis showed stable postsurgical findings without evidence of local recurrence or pelvic adenopathy. There was a sclerotic focus in the left pubic symphysis unchanged since the prior examination.   - PSA < .01 ng/ml  - He continued on ADT + apalutamide andradiation to the primary tumor and pelvic bone from 08/19/2021-10/27/2021 with Dr. Frias.       - 4/18/2023 PET PSMA:  FINDINGS:  In the head and neck, there is physiologic uptake in the lacrimal and salivary glands, and there are no tracer avid lesions suspicious for malignancy.  In the chest, there are no tracer avid lesions suspicious for malignancy.  In the abdomen and pelvis, there is physiologic distribution in the liver, spleen, bowel, and genitourinary tract, and there are  no anatomic or tracer avid lesions suspicious for malignancy.  Specifically, there are no pathologically enlarged or tracer avid pelvic or retroperitoneal lymph nodes.  In the bones, there is physiologic uptake in the bone marrow, and there are no tracer avid lesions suspicious for malignancy.  Additional CT findings:  Probable right maxillary sinus retention cyst.  Bilateral subsegmental dependent atelectasis the lungs.  Small splenule.  Subcentimeter hyperattenuating left renal focus, possibly represents hemorrhagic cyst.  Postsurgical changes of prostatectomy.  Posterior instrumented fusion of lumbosacral spine.  Impression:  Postsurgical changes of prostatectomy in patient with prostate cancer.  No focal abnormal radiotracer uptake to suggest local recurrence or metastatic disease.    FH:  Father prostate cancer at 79  Paternal grandfather- lung cancer (smoker)     SH:    Social EtOH    Review of Systems   Constitutional:  Positive for fatigue. Negative for activity change, appetite change, fever and unexpected weight change.   HENT:  Negative for sore throat and trouble swallowing.    Eyes:  Negative for photophobia and visual disturbance.   Respiratory:  Negative for cough, chest tightness, shortness of breath and wheezing.    Cardiovascular:  Negative for chest pain, palpitations and leg swelling.   Gastrointestinal:  Negative for abdominal pain, constipation, diarrhea, nausea and vomiting.   Genitourinary:  Negative for dysuria and flank pain.   Musculoskeletal:  Negative for arthralgias, back pain and myalgias.   Integumentary:  Negative for color change and pallor.   Neurological:  Negative for dizziness, weakness and headaches.   Psychiatric/Behavioral:  Negative for confusion and decreased concentration.         Objective     Physical Exam  Vitals and nursing note reviewed.     Here for discussion     Assessment and Plan     Problem List Items Addressed This Visit       Sleep disorder    Prostate  cancer - Primary     This is a 67 year old male initially diagnosed withwith high risk prostate cancer who was initially treated with prostatectomy (post op PSA .14) who was found to have metastatic disease on MRI imaging in 2021 that was later confirmed with biopsy. Patient treated with ADT and apalutamide and had side effects of fatigue, rash, muscle weakness, and weight loss. Has been off apalutamide since 2021, last lupron shot on Oct 2021.  PSMA PET without areas of recurrence; he is aware that microscopic disease is the possibility  His testosterone has normalized    As PSMA is normal and to minimize side effects- we have agreed to closely monitor PSA  RTC 4 weeks (8 weeks from last value)- if continued evidence of doubling time may reinitiate treatment at that point    Sleep disorder- snoring- he will call to follow up on the sleep study  Some issues from waking up 1 x mid morning for nocturia  We reviewed sleep adjustments with time and also weighted blanket    Route Chart for Scheduling    Med Onc Chart Routing      Follow up with physician 4 weeks. cbc, cmp, psa and EP   Follow up with [facility]    Infusion scheduling note    Injection scheduling note    Labs    Imaging    Pharmacy appointment    Other referrals

## 2023-05-02 ENCOUNTER — PATIENT MESSAGE (OUTPATIENT)
Dept: HEMATOLOGY/ONCOLOGY | Facility: CLINIC | Age: 68
End: 2023-05-02
Payer: MEDICARE

## 2023-05-02 ENCOUNTER — PATIENT MESSAGE (OUTPATIENT)
Dept: OTOLARYNGOLOGY | Facility: CLINIC | Age: 68
End: 2023-05-02
Payer: MEDICARE

## 2023-05-04 LAB
GENETIC COUNSELING?: YES
GENSO SPECIMEN TYPE: NORMAL
MISCELLANEOUS GENETIC TEST NAME: NORMAL
PARTENTAL OR SIBLING TESTING?: NO
REFERENCE LAB: NORMAL
TEST RESULT: NORMAL

## 2023-05-09 ENCOUNTER — TELEPHONE (OUTPATIENT)
Dept: HEMATOLOGY/ONCOLOGY | Facility: CLINIC | Age: 68
End: 2023-05-09
Payer: MEDICARE

## 2023-05-09 NOTE — TELEPHONE ENCOUNTER
Impression    Mr. Bunn's panel genetic testing was negative for actionable mutations in 36 genes associated with hereditary breast, gastrointestinal, gynecologic, pancreatic, prostate and skin cancers. Results were disclosed over the phone on 5/9/23.    Discussion    Genetic Test Results    Mr. Bunn had a sample submitted on 4/25/23 to [facility] for [facility] +RNAinsight panel testing. This panel includes sequencing and/or deletion/duplication analysis of the following 36 genes:    APC, JANE, AXIN2, BARD1, BMPR1A, BRCA1, BRCA2, BRIP1, CDH1, CDK4, CDKN2A, CHEK2, DICER1, EPCAM, GREM1, HOXB13, MLH1, MSH2, MSH3, MSH6, MUTYH, NBN, NF1, NTHL1, PALB2, PMS2, POLD1, POLE, PTEN, RAD51C, RAD51D, RECQL, SMAD4, SMARCA4, STK11, TP53    The results were negative for actionable mutations in any of these genes. This is considered a negative result, but it does not completely rule out a hereditary form of cancer in his family. Some individuals/families with cancer may have a mutation in a gene that is not included in this panel, and some may have mutations in one of these genes that is not detectable with our current technology. It could also be that Mr. Bunn's personal and family history of cancer is not due to a hereditary cause.     Given his negative results, the likelihood of a hereditary form of cancer has been drastically reduced for Mr. Bunn and his family. He has undergone comprehensive hereditary cancer genetic testing, and no further genetic testing is recommended at this time.    Cancer Risks    Mr. Bunn should continue to be treated per his physicians for his diagnosis of prostate cancer. He should also pursue other cancer screenings, including repeat colonoscopy per his gastroenterologist (based on polyp history).    Family Members    Mr. Bunn's close male relatives may still be at increased risk of prostate cancer given the number of relatives with prostate cancer. However,he does not have  any close living relatives at this time (no living siblings, children, parents, aunts, uncles).    Mr. Bunn received comprehensive genetic counseling regarding his negative genetic test results. Benefits and limitations were discussed, and he was provided with an electronic copy of his results report. Mr. Bunn is encouraged to contact cancer genetics if there are any updates to his personal or family history, or if he has any questions or concerns.    This assessment is based on the history and reports provided, as well as the current scientific knowledge regarding cancer genetics.

## 2023-05-16 ENCOUNTER — PATIENT MESSAGE (OUTPATIENT)
Dept: HEMATOLOGY/ONCOLOGY | Facility: CLINIC | Age: 68
End: 2023-05-16
Payer: MEDICARE

## 2023-05-16 DIAGNOSIS — Z71.3 NUTRITIONAL COUNSELING: Primary | ICD-10-CM

## 2023-05-17 DIAGNOSIS — E55.9 VITAMIN D DEFICIENCY, UNSPECIFIED: ICD-10-CM

## 2023-05-17 DIAGNOSIS — Z80.42 FAMILY HISTORY OF MALIGNANT NEOPLASM OF PROSTATE: Primary | ICD-10-CM

## 2023-05-23 ENCOUNTER — LAB VISIT (OUTPATIENT)
Dept: LAB | Facility: HOSPITAL | Age: 68
End: 2023-05-23
Attending: INTERNAL MEDICINE
Payer: MEDICARE

## 2023-05-23 ENCOUNTER — OFFICE VISIT (OUTPATIENT)
Dept: HEMATOLOGY/ONCOLOGY | Facility: CLINIC | Age: 68
End: 2023-05-23
Payer: MEDICARE

## 2023-05-23 VITALS
WEIGHT: 170.19 LBS | RESPIRATION RATE: 17 BRPM | DIASTOLIC BLOOD PRESSURE: 79 MMHG | SYSTOLIC BLOOD PRESSURE: 154 MMHG | OXYGEN SATURATION: 100 % | BODY MASS INDEX: 25.79 KG/M2 | TEMPERATURE: 98 F | HEART RATE: 48 BPM | HEIGHT: 68 IN

## 2023-05-23 DIAGNOSIS — C61 PROSTATE CANCER: ICD-10-CM

## 2023-05-23 DIAGNOSIS — C61 PROSTATE CANCER: Primary | ICD-10-CM

## 2023-05-23 DIAGNOSIS — G47.9 SLEEP DISORDER: ICD-10-CM

## 2023-05-23 LAB
ALBUMIN SERPL BCP-MCNC: 4 G/DL (ref 3.5–5.2)
ALP SERPL-CCNC: 67 U/L (ref 55–135)
ALT SERPL W/O P-5'-P-CCNC: 22 U/L (ref 10–44)
ANION GAP SERPL CALC-SCNC: 8 MMOL/L (ref 8–16)
AST SERPL-CCNC: 21 U/L (ref 10–40)
BASOPHILS # BLD AUTO: 0.05 K/UL (ref 0–0.2)
BASOPHILS NFR BLD: 1 % (ref 0–1.9)
BILIRUB SERPL-MCNC: 0.5 MG/DL (ref 0.1–1)
BUN SERPL-MCNC: 16 MG/DL (ref 8–23)
CALCIUM SERPL-MCNC: 9.4 MG/DL (ref 8.7–10.5)
CHLORIDE SERPL-SCNC: 105 MMOL/L (ref 95–110)
CO2 SERPL-SCNC: 25 MMOL/L (ref 23–29)
COMPLEXED PSA SERPL-MCNC: 0.15 NG/ML (ref 0–4)
CREAT SERPL-MCNC: 1.1 MG/DL (ref 0.5–1.4)
DIFFERENTIAL METHOD: ABNORMAL
EOSINOPHIL # BLD AUTO: 0.3 K/UL (ref 0–0.5)
EOSINOPHIL NFR BLD: 5.8 % (ref 0–8)
ERYTHROCYTE [DISTWIDTH] IN BLOOD BY AUTOMATED COUNT: 12.7 % (ref 11.5–14.5)
EST. GFR  (NO RACE VARIABLE): >60 ML/MIN/1.73 M^2
GLUCOSE SERPL-MCNC: 93 MG/DL (ref 70–110)
HCT VFR BLD AUTO: 38.1 % (ref 40–54)
HGB BLD-MCNC: 12.4 G/DL (ref 14–18)
IMM GRANULOCYTES # BLD AUTO: 0.01 K/UL (ref 0–0.04)
IMM GRANULOCYTES NFR BLD AUTO: 0.2 % (ref 0–0.5)
LYMPHOCYTES # BLD AUTO: 0.9 K/UL (ref 1–4.8)
LYMPHOCYTES NFR BLD: 19 % (ref 18–48)
MCH RBC QN AUTO: 32.5 PG (ref 27–31)
MCHC RBC AUTO-ENTMCNC: 32.5 G/DL (ref 32–36)
MCV RBC AUTO: 100 FL (ref 82–98)
MONOCYTES # BLD AUTO: 0.5 K/UL (ref 0.3–1)
MONOCYTES NFR BLD: 10.9 % (ref 4–15)
NEUTROPHILS # BLD AUTO: 3.1 K/UL (ref 1.8–7.7)
NEUTROPHILS NFR BLD: 63.1 % (ref 38–73)
NRBC BLD-RTO: 0 /100 WBC
PLATELET # BLD AUTO: 200 K/UL (ref 150–450)
PMV BLD AUTO: 9.8 FL (ref 9.2–12.9)
POTASSIUM SERPL-SCNC: 4.8 MMOL/L (ref 3.5–5.1)
PROT SERPL-MCNC: 7 G/DL (ref 6–8.4)
RBC # BLD AUTO: 3.81 M/UL (ref 4.6–6.2)
SODIUM SERPL-SCNC: 138 MMOL/L (ref 136–145)
WBC # BLD AUTO: 4.96 K/UL (ref 3.9–12.7)

## 2023-05-23 PROCEDURE — 99214 OFFICE O/P EST MOD 30 MIN: CPT | Mod: S$PBB,,, | Performed by: INTERNAL MEDICINE

## 2023-05-23 PROCEDURE — 99999 PR PBB SHADOW E&M-EST. PATIENT-LVL V: CPT | Mod: PBBFAC,,, | Performed by: INTERNAL MEDICINE

## 2023-05-23 PROCEDURE — 99999 PR PBB SHADOW E&M-EST. PATIENT-LVL V: ICD-10-PCS | Mod: PBBFAC,,, | Performed by: INTERNAL MEDICINE

## 2023-05-23 PROCEDURE — 80053 COMPREHEN METABOLIC PANEL: CPT | Performed by: INTERNAL MEDICINE

## 2023-05-23 PROCEDURE — 84153 ASSAY OF PSA TOTAL: CPT | Performed by: INTERNAL MEDICINE

## 2023-05-23 PROCEDURE — 85025 COMPLETE CBC W/AUTO DIFF WBC: CPT | Performed by: INTERNAL MEDICINE

## 2023-05-23 PROCEDURE — 99214 PR OFFICE/OUTPT VISIT, EST, LEVL IV, 30-39 MIN: ICD-10-PCS | Mod: S$PBB,,, | Performed by: INTERNAL MEDICINE

## 2023-05-23 PROCEDURE — 99215 OFFICE O/P EST HI 40 MIN: CPT | Mod: PBBFAC | Performed by: INTERNAL MEDICINE

## 2023-05-23 PROCEDURE — 36415 COLL VENOUS BLD VENIPUNCTURE: CPT | Performed by: INTERNAL MEDICINE

## 2023-05-23 NOTE — PROGRESS NOTES
Subjective     Patient ID: Denis Bunn is a 67 y.o. male.    Chief Complaint: Prostate Cancer    HPI    Diagnosis: Metastatic Prostate Cancer  Previously seen by Rad Onc, Dr. Frias and prior consult at [facility]     Returns for follow up of PSA which again shows a doubling time= 0.15 ng/ml    Most recent imaging:   - 4/18/2023 PSMA PET:  FINDINGS:  In the head and neck, there is physiologic uptake in the lacrimal and salivary glands, and there are no tracer avid lesions suspicious for malignancy.  In the chest, there are no tracer avid lesions suspicious for malignancy.  In the abdomen and pelvis, there is physiologic distribution in the liver, spleen, bowel, and genitourinary tract, and there are no anatomic or tracer avid lesions suspicious for malignancy.  Specifically, there are no pathologically enlarged or tracer avid pelvic or retroperitoneal lymph nodes.  In the bones, there is physiologic uptake in the bone marrow, and there are no tracer avid lesions suspicious for malignancy.  Additional CT findings:  Probable right maxillary sinus retention cyst.  Bilateral subsegmental dependent atelectasis the lungs.  Small splenule.  Subcentimeter hyperattenuating left renal focus, possibly represents hemorrhagic cyst.  Postsurgical changes of prostatectomy.  Posterior instrumented fusion of lumbosacral spine.  Impression:  Postsurgical changes of prostatectomy in patient with prostate cancer.  No focal abnormal radiotracer uptake to suggest local recurrence or metastatic disease.     Oncology History:  - July 2020 presented for evaluation of an elevated PSA of 5.3 ng/ml.      - 9/2/2020 underwent RALP with bilateral lymph node dissection   Pathology revealed Jose 7 (4+3) adenocarcinoma involving 20% of the prostate.  The Minto pattern 4 accounted for 85% of the tumor.  There was extraprostatic extension with multifocal perineural and lymphovascular invasion.  There was no bladder neck invasion or seminal  vesicle invasion.  The margins of resection and 1  Rt. and 1 Lt. pelvic nodes were negative for tumor involvement.      - Postoperative PSA on 10/1/20 returned at 0.14 ng/ml.  Repeat PSA  2/9/21 and returned at 0.41 ng/ml.      - 2/26/2021 PET fluciclovine showed no evidence of recurrent or metastatic disease    - 3/2/2021 MRI of the prostate showed 7 mm bone lesion in the left symphysis pubis    - he was started on Casodex    - 3/3/2021 went to [facility] and casodex stopped to pursue biopsy    - 3/15/2021 IR-directed biopsy on the left symphysis pubis revealed prostate adenocarcinoma    - 3/24/21 restarted casodex and received Eligard on 3/31/2021     - 4/21/2021 started apalutamide per [facility]-- 1 week later developed a rash that responded to medrol dose pack but recurred     - He was recommended for radiotherapy to the prostate bed, pubic symphysis and pelvic nodes which he completed on 10/27/21.  The metastatic focus in the pubic symphysis also received radiotherapy.      - patient subsequently elected to discontinue his hormonal therapy.His last Lupron injection was in October of 2021.      - 7/13/2021 PET showed no evidence of fluciclovine avid recurrent or metastatic prostate carcinoma.     - 7/12/2021 MRI of the pelvis showed stable postsurgical findings without evidence of local recurrence or pelvic adenopathy. There was a sclerotic focus in the left pubic symphysis unchanged since the prior examination.     - PSA < .01 ng/ml    - He continued on ADT + apalutamide andradiation to the primary tumor and pelvic bone from 08/19/2021-10/27/2021 with Dr. Frias.     - 4/18/2023 PET PSMA:  FINDINGS:  In the head and neck, there is physiologic uptake in the lacrimal and salivary glands, and there are no tracer avid lesions suspicious for malignancy.  In the chest, there are no tracer avid lesions suspicious for malignancy.  In the abdomen and pelvis, there is physiologic distribution in the liver, spleen, bowel, and  genitourinary tract, and there are no anatomic or tracer avid lesions suspicious for malignancy.  Specifically, there are no pathologically enlarged or tracer avid pelvic or retroperitoneal lymph nodes.  In the bones, there is physiologic uptake in the bone marrow, and there are no tracer avid lesions suspicious for malignancy.  Additional CT findings:  Probable right maxillary sinus retention cyst.  Bilateral subsegmental dependent atelectasis the lungs.  Small splenule.  Subcentimeter hyperattenuating left renal focus, possibly represents hemorrhagic cyst.  Postsurgical changes of prostatectomy.  Posterior instrumented fusion of lumbosacral spine.  Impression:  Postsurgical changes of prostatectomy in patient with prostate cancer.  No focal abnormal radiotracer uptake to suggest local recurrence or metastatic disease.     FH:  Father prostate cancer at 79  Paternal grandfather- lung cancer (smoker)     SH:    Social EtOH    Review of Systems   Constitutional:  Positive for fatigue. Negative for activity change, appetite change, fever and unexpected weight change.   HENT:  Negative for sore throat and trouble swallowing.    Eyes:  Negative for photophobia and visual disturbance.   Respiratory:  Negative for cough, chest tightness, shortness of breath and wheezing.    Cardiovascular:  Negative for chest pain, palpitations and leg swelling.   Gastrointestinal:  Negative for abdominal pain, constipation, diarrhea, nausea and vomiting.   Genitourinary:  Negative for dysuria and flank pain.   Musculoskeletal:  Negative for arthralgias, back pain and myalgias.   Integumentary:  Negative for color change and pallor.   Neurological:  Negative for dizziness, weakness and headaches.   Psychiatric/Behavioral:  Negative for confusion and decreased concentration.         Objective     Physical Exam  Vitals and nursing note reviewed.   Constitutional:       Comments: Presents with wife  Very pleasant  ECOG=0           Assessment and Plan     Problem List Items Addressed This Visit       Sleep disorder    Prostate cancer - Primary     Send Sonata for sleep    This is a 67 year old male initially diagnosed withwith high risk prostate cancer who was initially treated with prostatectomy (post op PSA .14) who was found to have metastatic disease on MRI imaging in 2021 that was later confirmed with biopsy. Patient treated with ADT and apalutamide and had side effects of fatigue, rash, muscle weakness, and weight loss. Has been off apalutamide since 2021, last lupron shot on Oct 2021.  PSMA PET without areas of recurrence; he is aware that microscopic disease is the possibilityand he has had prior metastatic disease  His testosterone has normalized  Reviewed low dose Zytiga  Reviewed intermittent option as concern for side effects    30 minutes total    Route Chart for Scheduling    Med Onc Chart Routing      Follow up with physician . RTC approx 6 weeks- cbc, cmp, psa   Follow up with [facility]    Infusion scheduling note    Injection scheduling note    Labs    Imaging    Pharmacy appointment    Other referrals

## 2023-05-24 ENCOUNTER — PATIENT MESSAGE (OUTPATIENT)
Dept: HEMATOLOGY/ONCOLOGY | Facility: CLINIC | Age: 68
End: 2023-05-24
Payer: MEDICARE

## 2023-05-24 DIAGNOSIS — E55.9 VITAMIN D DEFICIENCY, UNSPECIFIED: Primary | ICD-10-CM

## 2023-05-24 DIAGNOSIS — G62.9 NEUROPATHY: ICD-10-CM

## 2023-05-24 RX ORDER — ZALEPLON 5 MG/1
5 CAPSULE ORAL NIGHTLY PRN
Qty: 30 CAPSULE | Refills: 0 | Status: SHIPPED | OUTPATIENT
Start: 2023-05-24 | End: 2023-06-23

## 2023-05-24 RX ORDER — ABIRATERONE ACETATE 250 MG/1
250 TABLET ORAL DAILY
Qty: 30 TABLET | Refills: 4 | Status: ACTIVE | OUTPATIENT
Start: 2023-05-24 | End: 2023-10-10 | Stop reason: SDUPTHER

## 2023-05-25 RX ORDER — GABAPENTIN 300 MG/1
CAPSULE ORAL
Qty: 90 CAPSULE | Refills: 1 | Status: SHIPPED | OUTPATIENT
Start: 2023-05-25 | End: 2024-01-16 | Stop reason: SDUPTHER

## 2023-05-25 NOTE — TELEPHONE ENCOUNTER
No care due was identified.  [facility] Embedded Care Due Messages. Reference number: [id number].   5/24/2023 8:29:07 PM CDT

## 2023-05-26 ENCOUNTER — TELEPHONE (OUTPATIENT)
Dept: PHARMACY | Facility: CLINIC | Age: 68
End: 2023-05-26
Payer: MEDICARE

## 2023-05-26 DIAGNOSIS — C61 PROSTATE CANCER: ICD-10-CM

## 2023-05-26 DIAGNOSIS — E55.9 VITAMIN D DEFICIENCY, UNSPECIFIED: Primary | ICD-10-CM

## 2023-05-26 RX ORDER — PREDNISONE 5 MG/1
5 TABLET ORAL DAILY
Qty: 30 TABLET | Refills: 3 | Status: SHIPPED | OUTPATIENT
Start: 2023-05-26 | End: 2023-09-25

## 2023-05-26 NOTE — TELEPHONE ENCOUNTER
Deng, this is ADRIAN GEORGE, clinical pharmacist with [facility] that is part of your care team.  We have begun working on your prescription that your doctor has sent us. Our next steps include:     Working with your insurance company to obtain approval for your medication  Working with you to ensure your medication is affordable     We will be calling you along the way with updates on your medication but if you have any concerns or receive information that you would like to discuss please reach us at [phone].    Welcome call outcome: Left voicemail

## 2023-05-30 ENCOUNTER — SPECIALTY PHARMACY (OUTPATIENT)
Dept: PHARMACY | Facility: CLINIC | Age: 68
End: 2023-05-30
Payer: MEDICARE

## 2023-05-30 LAB — 1,25(OH)2D SERPL-MCNC: 66.5 PG/ML (ref 24.8–81.5)

## 2023-05-30 RX ORDER — ACETAMINOPHEN 500 MG
5000 TABLET ORAL DAILY
COMMUNITY

## 2023-05-30 NOTE — TELEPHONE ENCOUNTER
Specialty Pharmacy - Medication/Referral Authorization  Specialty Pharmacy - Initial Clinical Assessment    Specialty Medication Orders Linked to Encounter      Flowsheet Row Most Recent Value   Medication #1 abiraterone (ZYTIGA) 250 mg Tab (Order#[id number], Rx#)          Patient Diagnosis   C61 - Prostate cancer    Subjective    Denis Bunn is a 67 y.o. male, who is followed by the specialty pharmacy service for management and education.    Recent Encounters       Date Type Provider Description    05/30/2023 Specialty Pharmacy ADRIAN GEORGE PharmD Referral Authorization; Initial Clinical Assessment            Current Outpatient Medications   Medication Sig Note    allopurinoL (ZYLOPRIM) 100 MG tablet TAKE 1 TABLET BY MOUTH TWICE A DAY     cholecalciferol, vitamin D3, 125 mcg (5,000 unit) Tab Take 5,000 Units by mouth once daily.     coenzyme Q10 100 mg capsule Take 100 mg by mouth once daily. Hold for 1 week prior to surgery     FA/niacinamide/cupric ox/Zn ox (NICOTINAMIDE ORAL) Take 500 mg by mouth once daily.     ferrous sulfate (SLOW RELEASE IRON) 142 mg (45 mg iron) TbSR Take by mouth. 5/30/2023: Pt takes Iron bisglycinate 25 mg qd    gabapentin (NEURONTIN) 300 MG capsule TAKE 1 CAPSULE BY MOUTH EVERY EVENING     garlic 1,000 mg Cap Take by mouth. Hold for 1 week prior to surgery     losartan (COZAAR) 100 MG tablet TAKE 1 TABLET BY MOUTH EVERY DAY     omega-3 fatty acids 1,000 mg Cap Take 1,000 mg by mouth.     pravastatin (PRAVACHOL) 20 MG tablet TAKE 1 TABLET(20 MG) BY MOUTH EVERY DAY     verapamiL (CALAN-SR) 120 MG CR tablet Take 1 tablet (120 mg total) by mouth nightly. in addition to 240mg in the morning     verapamiL (VERELAN) 240 MG C24P Take 1 capsule (240 mg total) by mouth every morning. in addition to 120 mg at night     zaleplon (SONATA) 5 MG Cap Take 1 capsule (5 mg total) by mouth nightly as needed (as needed).     zinc 50 mg Tab Take by mouth. Hold for 1 week prior to surgery 5/30/2023:  Patient takes 1 per day     ZIOPTAN, PF, 0.0015 % Dpet Place 1 drop into both eyes nightly.     abiraterone (ZYTIGA) 250 mg Tab Take 1 tablet (250 mg total) by mouth once daily.     ALPRAZolam (XANAX) 0.5 MG tablet Take 1 tablet (0.5 mg total) by mouth nightly as needed for Anxiety. 5/30/2023: As needed    ascorbic acid, vitamin C, (VITAMIN C) 500 MG tablet Take 500 mg by mouth once daily. Hold for 1 week prior to surgery 5/30/2023: Liposomal Vitamin C 1000 mg per serving; patient takes 3-5 capsules per day.     `    azelastine (ASTELIN) 137 mcg (0.1 %) nasal spray 1 spray (137 mcg total) by Nasal route 2 (two) times daily. 5/30/2023: No longer taking    ergocalciferol, vitamin D2, (VITAMIN D ORAL) Take by mouth. Hold for 1 week prior to surgery 5/30/2023: No longer taking    fluticasone propionate (FLONASE) 50 mcg/actuation nasal spray 1 spray (50 mcg total) by Each Nostril route 2 (two) times daily. 5/30/2023: No longer taking    fluticasone propionate (FLONASE) 50 mcg/actuation nasal spray 2 sprays (100 mcg total) by Each Nostril route 2 (two) times daily. 5/30/2023: No longer taking    hydrocortisone 2.5 % cream APPLY TO AFFECTED AREA TWICE A DAY 5/30/2023: No longer taking    magnesium 250 mg Tab Take 250 mg by mouth once. Takes two 250 mg tablets daily   Hold for 1 week prior to surgery 5/30/2023: Pt takes magnesium SRT- time release 500 mg    papaverine 30 mg/mL injection Add: Phentolamine 1 mg  Add: PGE1 10 mcg    Sig:  Inject 20 units as directed; titrate up by 5 units until desired results 5/30/2023: As needed     predniSONE (DELTASONE) 5 MG tablet Take 1 tablet (5 mg total) by mouth once daily. To be taken with Zytiga    Last reviewed on 5/30/2023  3:57 PM by Gale Celis, PharmD    Review of patient's allergies indicates:   Allergen Reactions    Amoxicillin-pot clavulanate Nausea And Vomiting     Other reaction(s): Stomach upset  Other reaction(s): Stomach upset    Celecoxib Other (See Comments) and  Nausea And Vomiting   Last reviewed on  5/30/2023 3:41 PM by Gale Celis    Drug Interactions    Drug interactions evaluated: yes  Clinically relevant drug interactions identified: no  Provided the patient with educational material regarding drug interactions: not applicable         Adverse Effects    *All other systems reviewed and are negative       Assessment Questions - Documented Responses      Flowsheet Row Most Recent Value   Assessment    Medication Reconciliation completed for patient Yes   During the past 4 weeks, has patient missed any activities due to condition or medication? No   During the past 4 weeks, did patient have any of the following urgent care visits? None   Goals of Therapy Status Discussed (new start)   Status of the patients ability to self-administer: Is Able   All education points have been covered with patient? No, patient declined- printed education provided   Welcome packet contents reviewed and discussed with patient? Yes   Assesment completed? Yes   Plan Therapy being initiated   Do you need to open a clinical intervention (i-vent)? No   Do you want to schedule first shipment? Yes   Medication #1 Assessment Info    Patient status New medication, New to [facility]   Is this medication appropriate for the patient? Yes   Is this medication effective? Not yet started          Refill Questions - Documented Responses      Flowsheet Row Most Recent Value   Refill Screening Questions    When does the patient need to receive the medication? 06/01/23   Refill Delivery Questions    How will the patient receive the medication? MEDRx   When does the patient need to receive the medication? 06/01/23   Shipping Address Home   Address in [facility] confirmed and updated if neccessary? Yes   Expected Copay ($) 352.31   Is the patient able to afford the medication copay? Yes   Payment Method CC on file   Days supply of Refill 30   Supplies needed? No supplies needed   Refill activity completed?  "Yes   Refill activity plan Refill scheduled   Shipment/Pickup Date: 05/31/23            Objective    He has a past medical history of Anxiety, Back pain, Cataract, Constipation - functional, Dyslipidemia, ED (erectile dysfunction), FH: CAD (coronary artery disease) (4/24/2013), History of gout, HTN (hypertension), benign (04/24/2013), Personal history of colonic polyps, Prostate cancer, and Vision changes.    Tried/failed medications: casodex, erleada    BP Readings from Last 4 Encounters:   05/23/23 (!) 154/79   04/25/23 138/67   04/14/23 135/80   03/28/23 (!) 148/73     Ht Readings from Last 4 Encounters:   05/23/23 5' 8" (1.727 m)   04/25/23 5' 8" (1.727 m)   04/14/23 5' 8" (1.727 m)   04/06/23 5' 8" (1.727 m)     Wt Readings from Last 4 Encounters:   05/23/23 77.2 kg (170 lb 3.2 oz)   04/25/23 76.4 kg (168 lb 6.4 oz)   04/14/23 77.7 kg (171 lb 4.8 oz)   04/06/23 77.7 kg (171 lb 3 oz)     Recent Labs   Lab Result Units 05/23/23  1514 03/28/23  1103   RBC M/uL 3.81 L 3.89 L   Hemoglobin g/dL 12.4 L 12.8 L   Hematocrit % 38.1 L 38.6 L   WBC K/uL 4.96 4.67   Gran # (ANC) K/uL 3.1 3.0   Gran % % 63.1 63.4   Platelets K/uL 200 205   Sodium mmol/L 138 140   Potassium mmol/L 4.8 5.4 H   Chloride mmol/L 105 104   Glucose mg/dL 93 96   BUN mg/dL 16 17   Creatinine mg/dL 1.1 1.0   Calcium mg/dL 9.4 10.1   Total Protein g/dL 7.0 7.1   Albumin g/dL 4.0 4.1   Total Bilirubin mg/dL 0.5 0.6   Alkaline Phosphatase U/L 67 69   AST U/L 21 28   ALT U/L 22 39     The goals of cancer treatment include:  Achieving remission of cancer, if possible  Reducing tumor size and spread of cancer, if remission is not possible  Minimizing pain and symptoms of the cancer  Preventing infection and other complications of treatment  Promoting adequate nutrition  Encouraging proper hydration  Improving or maintaining quality of life  Maintaining optimal therapy adherence  Minimizing and managing side effects    Goals of Therapy Status: Discussed " (new start)    Assessment/Plan  Patient plans to start therapy on 06/01/23      Indication, dosage, appropriateness, effectiveness, safety and convenience of his specialty medication(s) were reviewed today.     Patient Education   Pharmacist offer to  patient was declined. Printed educational materials will be provided with medication.  Patient did accept verbal education on the following topics:  · Expectations and possible outcomes of therapy  · Proper use, timely administration, and missed dose management  · Duration of therapy  · Side effects, including prevention, minimization, and management  · Contraindications and safety precautions  · New or changed medications, including prescribe and over the counter medications and supplements  · Storage, safe handling, and disposal    Patient plans to start abiraterone as soon as he receives it. He is filling prednisone 5 mg qd at [facility]    Tasks added this encounter   No tasks added.   Tasks due within next 3 months   6/1/2023 - Benefits Investigation  5/30/2023 - Initial Clinical Assessment/Patient Education (180 Day Reassessment)  5/30/2023 - Set up Initial Fill     ADRIAN GEORGE, PharmD  [facility]  [facility] [address]  [facility] [address]  Phone: [phone]  Fax: [phone]

## 2023-05-31 ENCOUNTER — PATIENT MESSAGE (OUTPATIENT)
Dept: HEMATOLOGY/ONCOLOGY | Facility: CLINIC | Age: 68
End: 2023-05-31
Payer: MEDICARE

## 2023-06-06 ENCOUNTER — PATIENT MESSAGE (OUTPATIENT)
Dept: HEMATOLOGY/ONCOLOGY | Facility: CLINIC | Age: 68
End: 2023-06-06
Payer: MEDICARE

## 2023-06-06 ENCOUNTER — SPECIALTY PHARMACY (OUTPATIENT)
Dept: PHARMACY | Facility: CLINIC | Age: 68
End: 2023-06-06
Payer: MEDICARE

## 2023-06-06 DIAGNOSIS — E55.9 VITAMIN D DEFICIENCY, UNSPECIFIED: Primary | ICD-10-CM

## 2023-06-06 NOTE — TELEPHONE ENCOUNTER
Outgoing call to pt to  him on abiraterone administration.   Patient instructed to take abiraterone 250 mg daily with a low fat breakfast ( ie oatmeal or low fat yogurt). He can administer metamucil after abiraterone, he must wait 2 hours before taking metamucil as the metamucil package insert recommends  metamucil dose by at least 2 hours from other drug therapies. Patient expressed understanding.

## 2023-06-16 ENCOUNTER — TELEPHONE (OUTPATIENT)
Dept: PHARMACY | Facility: CLINIC | Age: 68
End: 2023-06-16
Payer: MEDICARE

## 2023-06-16 NOTE — TELEPHONE ENCOUNTER
Pt has been eating fat free yogurt for breakfast before taking Zytiga. Pt accidentally purchased yogurt with 5% milk fat, and he wanted to know if this would be acceptable. Pt advised that he should not take Zytiga with 5% milk fat yogurt. Pt agreed to purchase more fat free yogurt to eat before taking Zytiga.    Chonodrocutaneous Helical Advancement Flap Text: The defect edges were debeveled with a #15 scalpel blade.  Given the location of the defect and the proximity to free margins a chondrocutaneous helical advancement flap was deemed most appropriate.  Using a sterile surgical marker, the appropriate advancement flap was drawn incorporating the defect and placing the expected incisions within the relaxed skin tension lines where possible.    The area thus outlined was incised deep to adipose tissue with a #15 scalpel blade.  The skin margins were undermined to an appropriate distance in all directions utilizing iris scissors.

## 2023-06-20 ENCOUNTER — SPECIALTY PHARMACY (OUTPATIENT)
Dept: PHARMACY | Facility: CLINIC | Age: 68
End: 2023-06-20
Payer: MEDICARE

## 2023-06-20 NOTE — TELEPHONE ENCOUNTER
Patient inquire about Zytiga refill. Refill too soon until 6/22. Will be going out of town this weekend.Plan to callback 6/22 to setup- estimated about 13-14 days onhand.

## 2023-06-21 ENCOUNTER — PATIENT MESSAGE (OUTPATIENT)
Dept: HEMATOLOGY/ONCOLOGY | Facility: CLINIC | Age: 68
End: 2023-06-21
Payer: MEDICARE

## 2023-06-22 NOTE — TELEPHONE ENCOUNTER
Specialty Pharmacy - Refill Coordination    Specialty Medication Orders Linked to Encounter      Flowsheet Row Most Recent Value   Medication #1 abiraterone (ZYTIGA) 250 mg Tab (Order#[id number], Rx#[id number])          Pt requests early refill since he will be leaving out of town this weekend.    Refill Questions - Documented Responses      Flowsheet Row Most Recent Value   Patient Availability and HIPAA Verification    Does patient want to proceed with activity? Yes   HIPAA/medical authority confirmed? Yes   Relationship to patient of person spoken to? Self   Refill Screening Questions    Changes to allergies? No   Changes to medications? No   New conditions since last clinic visit? No   Unplanned office visit, urgent care, ED, or hospital admission in the last 4 weeks? No   How does patient/caregiver feel medication is working? Good   Financial problems or insurance changes? No   How many doses of your specialty medications were missed in the last 4 weeks? 0   Would patient like to speak to a pharmacist? No   When does the patient need to receive the medication? 07/04/23   Refill Delivery Questions    How will the patient receive the medication? Pickup   When does the patient need to receive the medication? 07/04/23   Expected Copay ($) 140.62   Is the patient able to afford the medication copay? Yes   Payment Method at pickup   Days supply of Refill 30   Supplies needed? No supplies needed   Refill activity completed? Yes   Refill activity plan Refill scheduled   Shipment/Pickup Date: 06/23/23            Current Outpatient Medications   Medication Sig    abiraterone (ZYTIGA) 250 mg Tab Take 1 tablet (250 mg total) by mouth once daily.    allopurinoL (ZYLOPRIM) 100 MG tablet TAKE 1 TABLET BY MOUTH TWICE A DAY    ALPRAZolam (XANAX) 0.5 MG tablet Take 1 tablet (0.5 mg total) by mouth nightly as needed for Anxiety.    ascorbic acid, vitamin C, (VITAMIN C) 500 MG tablet Take 500 mg by mouth once daily. Hold for 1  week prior to surgery    azelastine (ASTELIN) 137 mcg (0.1 %) nasal spray 1 spray (137 mcg total) by Nasal route 2 (two) times daily.    cholecalciferol, vitamin D3, 125 mcg (5,000 unit) Tab Take 5,000 Units by mouth once daily.    coenzyme Q10 100 mg capsule Take 100 mg by mouth once daily. Hold for 1 week prior to surgery    ergocalciferol, vitamin D2, (VITAMIN D ORAL) Take by mouth. Hold for 1 week prior to surgery    FA/niacinamide/cupric ox/Zn ox (NICOTINAMIDE ORAL) Take 500 mg by mouth once daily.    ferrous sulfate (SLOW RELEASE IRON) 142 mg (45 mg iron) TbSR Take by mouth.    fluticasone propionate (FLONASE) 50 mcg/actuation nasal spray 1 spray (50 mcg total) by Each Nostril route 2 (two) times daily.    fluticasone propionate (FLONASE) 50 mcg/actuation nasal spray 2 sprays (100 mcg total) by Each Nostril route 2 (two) times daily.    gabapentin (NEURONTIN) 300 MG capsule TAKE 1 CAPSULE BY MOUTH EVERY EVENING    garlic 1,000 mg Cap Take by mouth. Hold for 1 week prior to surgery    hydrocortisone 2.5 % cream APPLY TO AFFECTED AREA TWICE A DAY    losartan (COZAAR) 100 MG tablet TAKE 1 TABLET BY MOUTH EVERY DAY    magnesium 250 mg Tab Take 250 mg by mouth once. Takes two 250 mg tablets daily   Hold for 1 week prior to surgery    omega-3 fatty acids 1,000 mg Cap Take 1,000 mg by mouth.    papaverine 30 mg/mL injection Add: Phentolamine 1 mg  Add: PGE1 10 mcg    Sig:  Inject 20 units as directed; titrate up by 5 units until desired results    pravastatin (PRAVACHOL) 20 MG tablet TAKE 1 TABLET(20 MG) BY MOUTH EVERY DAY    predniSONE (DELTASONE) 5 MG tablet Take 1 tablet (5 mg total) by mouth once daily. To be taken with Zytiga    verapamiL (CALAN-SR) 120 MG CR tablet Take 1 tablet (120 mg total) by mouth nightly. in addition to 240mg in the morning    verapamiL (VERELAN) 240 MG C24P Take 1 capsule (240 mg total) by mouth every morning. in addition to 120 mg at night    zaleplon (SONATA) 5 MG Cap Take 1 capsule (5  mg total) by mouth nightly as needed (as needed).    zinc 50 mg Tab Take by mouth. Hold for 1 week prior to surgery    ZIOPTAN, PF, 0.0015 % Dpet Place 1 drop into both eyes nightly.   Last reviewed on 5/30/2023  3:57 PM by Gale Celis PharmD    Review of patient's allergies indicates:   Allergen Reactions    Amoxicillin-pot clavulanate Nausea And Vomiting     Other reaction(s): Stomach upset  Other reaction(s): Stomach upset    Celecoxib Other (See Comments) and Nausea And Vomiting    Last reviewed on  5/30/2023 3:41 PM by Gale Celis      Tasks added this encounter   No tasks added.   Tasks due within next 3 months   6/24/2023 - Refill Coordination Outreach (1 time occurrence)     Kathy Van, PharmD  [facility]  [address] [place] [id number]  Phone: [phone]  Fax: [phone]

## 2023-07-05 ENCOUNTER — PATIENT MESSAGE (OUTPATIENT)
Dept: FAMILY MEDICINE | Facility: CLINIC | Age: 68
End: 2023-07-05
Payer: MEDICARE

## 2023-07-07 ENCOUNTER — PATIENT MESSAGE (OUTPATIENT)
Dept: HEMATOLOGY/ONCOLOGY | Facility: CLINIC | Age: 68
End: 2023-07-07
Payer: MEDICARE

## 2023-07-10 ENCOUNTER — OFFICE VISIT (OUTPATIENT)
Dept: OTOLARYNGOLOGY | Facility: CLINIC | Age: 68
End: 2023-07-10
Payer: MEDICARE

## 2023-07-10 VITALS — WEIGHT: 168 LBS | HEIGHT: 68 IN | BODY MASS INDEX: 25.46 KG/M2

## 2023-07-10 DIAGNOSIS — H90.3 SENSORINEURAL HEARING LOSS (SNHL) OF BOTH EARS: ICD-10-CM

## 2023-07-10 DIAGNOSIS — G47.33 OSA (OBSTRUCTIVE SLEEP APNEA): Primary | ICD-10-CM

## 2023-07-10 DIAGNOSIS — J30.89 CHRONIC NONSEASONAL ALLERGIC RHINITIS DUE TO POLLEN: ICD-10-CM

## 2023-07-10 DIAGNOSIS — J34.3 HYPERTROPHY OF INFERIOR NASAL TURBINATE: ICD-10-CM

## 2023-07-10 PROCEDURE — 99213 OFFICE O/P EST LOW 20 MIN: CPT | Mod: S$GLB,,, | Performed by: OTOLARYNGOLOGY

## 2023-07-10 PROCEDURE — 99213 PR OFFICE/OUTPT VISIT, EST, LEVL III, 20-29 MIN: ICD-10-PCS | Mod: S$GLB,,, | Performed by: OTOLARYNGOLOGY

## 2023-07-10 NOTE — PROGRESS NOTES
OTOLARYNGOLOGY CLINIC NOTE  Date:  07/10/2023     Chief complaint:  Chief Complaint   Patient presents with    Follow-up     3 month follow of snoring. Still snoring.       History of Present Illness  Denis Bunn is a 67 y.o. male  presenting today for a followup.  Has apptmt with sleep medicine   Swallowing stuff has improved   Sometimes when gets  viral type symptoms he loses hearing and comes back the next day     I originally saw the patient in 2022 for sudden hearing loss. I last saw him 4-6-23 for new issue of snoring. Started on saline, flonase and astelin . I had recommended a sleep study at last visit but he declined and wanted to try nasal sprays first. Still snoring      Past Medical History  Past Medical History:   Diagnosis Date    Anxiety     Back pain     Cataract     Constipation - functional     Dyslipidemia     ED (erectile dysfunction)     FH: CAD (coronary artery disease) 4/24/2013    But he had 0 CAC    History of gout     HTN (hypertension), benign 04/24/2013    Personal history of colonic polyps     Prostate cancer     Vision changes         Past Surgical History  Past Surgical History:   Procedure Laterality Date    BACK SURGERY      CATARACT EXTRACTION      left eye    COLONOSCOPY N/A 7/29/2019    Procedure: COLONOSCOPY;  Surgeon: Mingo Freeman MD;  Location: [facility];  Service: Endoscopy;  Laterality: N/A;  due July 2019    ELBOW SURGERY      scope/right    EYE SURGERY      Dr. Hope.retina x2 left    LAMINECTOMY      ROBOT-ASSISTED LAPAROSCOPIC PROSTATECTOMY N/A 9/2/2020    Procedure: ROBOTIC PROSTATECTOMY;  Surgeon: Edson White MD;  Location: [address];  Service: Urology;  Laterality: N/A;  3hrs gen with regional    WRIST SURGERY      right        Medications  Current Outpatient Medications on File Prior to Visit   Medication Sig Dispense Refill    abiraterone (ZYTIGA) 250 mg Tab Take 1 tablet (250 mg total) by mouth once daily. 30 tablet 4    allopurinoL (ZYLOPRIM)  100 MG tablet TAKE 1 TABLET BY MOUTH TWICE A  tablet 3    ALPRAZolam (XANAX) 0.5 MG tablet Take 1 tablet (0.5 mg total) by mouth nightly as needed for Anxiety. 30 tablet 1    ascorbic acid, vitamin C, (VITAMIN C) 500 MG tablet Take 500 mg by mouth once daily. Hold for 1 week prior to surgery      azelastine (ASTELIN) 137 mcg (0.1 %) nasal spray 1 spray (137 mcg total) by Nasal route 2 (two) times daily. 30 mL 3    cholecalciferol, vitamin D3, 125 mcg (5,000 unit) Tab Take 5,000 Units by mouth once daily.      coenzyme Q10 100 mg capsule Take 100 mg by mouth once daily. Hold for 1 week prior to surgery      ergocalciferol, vitamin D2, (VITAMIN D ORAL) Take by mouth. Hold for 1 week prior to surgery      FA/niacinamide/cupric ox/Zn ox (NICOTINAMIDE ORAL) Take 500 mg by mouth once daily.      ferrous sulfate (SLOW RELEASE IRON) 142 mg (45 mg iron) TbSR Take by mouth.      fluticasone propionate (FLONASE) 50 mcg/actuation nasal spray 1 spray (50 mcg total) by Each Nostril route 2 (two) times daily. 9.9 mL 0    fluticasone propionate (FLONASE) 50 mcg/actuation nasal spray 2 sprays (100 mcg total) by Each Nostril route 2 (two) times daily. 18.2 mL 3    gabapentin (NEURONTIN) 300 MG capsule TAKE 1 CAPSULE BY MOUTH EVERY EVENING 90 capsule 1    garlic 1,000 mg Cap Take by mouth. Hold for 1 week prior to surgery      hydrocortisone 2.5 % cream APPLY TO AFFECTED AREA TWICE A DAY 28.35 g 1    losartan (COZAAR) 100 MG tablet TAKE 1 TABLET BY MOUTH EVERY DAY 90 tablet 3    magnesium 250 mg Tab Take 250 mg by mouth once. Takes two 250 mg tablets daily   Hold for 1 week prior to surgery      omega-3 fatty acids 1,000 mg Cap Take 1,000 mg by mouth.      papaverine 30 mg/mL injection Add: Phentolamine 1 mg  Add: PGE1 10 mcg    Sig:  Inject 20 units as directed; titrate up by 5 units until desired results 10 mL 12    pravastatin (PRAVACHOL) 20 MG tablet TAKE 1 TABLET(20 MG) BY MOUTH EVERY DAY 90 tablet 3    predniSONE  (DELTASONE) 5 MG tablet Take 1 tablet (5 mg total) by mouth once daily. To be taken with Zytiga 30 tablet 3    verapamiL (CALAN-SR) 120 MG CR tablet Take 1 tablet (120 mg total) by mouth nightly. in addition to 240mg in the morning 90 tablet 3    verapamiL (VERELAN) 240 MG C24P Take 1 capsule (240 mg total) by mouth every morning. in addition to 120 mg at night 90 capsule 3    zinc 50 mg Tab Take by mouth. Hold for 1 week prior to surgery      ZIOPTSHERWIN GARCIA, 0.0015 % Dpet Place 1 drop into both eyes nightly.       No current facility-administered medications on file prior to visit.       Review of Systems  Review of Systems   Constitutional: Negative.    Eyes: Negative.    Gastrointestinal: Negative.    Musculoskeletal: Negative.    Skin: Negative.    Neurological: Negative.    Psychiatric/Behavioral: Negative.      Answers submitted by the patient for this visit:  Review of Symptoms Questionnaire  (Submitted on 7/5/2023)  Snoring?: Yes  Irregular heartbeat?: Yes    Social History   reports that he has never smoked. He has never used smokeless tobacco. He reports current alcohol use of about 3.0 standard drinks per week. He reports that he does not use drugs.     Family History  Family History   Problem Relation Age of Onset    Blindness Mother         from cva    Cataracts Mother     Prostate cancer Father     Cataracts Father     No Known Problems Sister     No Known Problems Brother     No Known Problems Maternal Aunt     No Known Problems Maternal Uncle     No Known Problems Paternal Aunt     No Known Problems Paternal Uncle     No Known Problems Maternal Grandmother     No Known Problems Maternal Grandfather     No Known Problems Paternal Grandmother     No Known Problems Paternal Grandfather     Amblyopia Neg Hx     Diabetes Neg Hx     Glaucoma Neg Hx     Hypertension Neg Hx     Macular degeneration Neg Hx     Retinal detachment Neg Hx     Strabismus Neg Hx     Stroke Neg Hx     Thyroid disease Neg Hx          Physical Exam   There were no vitals filed for this visit. Body mass index is 25.54 kg/m².            GENERAL: no acute distress.  HEAD: normocephalic.   EYES: No scleral icterus  EARS: external ear without lesion, normal pinna shape and position.     NOSE: external nose without significant bony abnormality; mild turbinate hypertrophy on anterior rhinoscopy  ORAL CAVITY/OROPHARYNX: tongue mobile.   NECK: trachea midline.   LYMPH NODES:No cervical lymphadenopathy.  RESPIRATORY: no stridor, no stertor. Voice normal. Respirations nonlabored.  NEURO: alert, responds to questions appropriately.    PSYCH:mood appropriate      Imaging:  The patient does not have any new imaging of the head and neck since last visit.     Labs:  CBC  Recent Labs   Lab 02/27/23  0722 03/28/23  1103 05/23/23  1514   WBC 3.68 L 4.67 4.96   Hemoglobin 13.0 L 12.8 L 12.4 L   Hematocrit 38.6 L 38.6 L 38.1 L   MCV 97 99 H 100 H   Platelets 194 205 200     BMP  Recent Labs   Lab 09/05/20  0440 09/06/20  0340 05/24/21  0737 02/27/23  0722 03/28/23  1103 05/23/23  1514   Glucose 105 98   < > 105 96 93   Sodium 134 L 135 L   < > 140 140 138   Potassium 4.3 4.0   < > 4.8 5.4 H 4.8   Chloride 103 104   < > 103 104 105   CO2 24 23   < > 27 29 25   BUN 14 12   < > 14 17 16   Creatinine 1.6 H 1.2   < > 1.1 1.0 1.1   Calcium 8.8 8.5 L   < > 9.7 10.1 9.4   Phosphorus 2.8 2.6 L  --   --   --   --    Magnesium 2.0 2.0  --   --   --   --     < > = values in this interval not displayed.     COAGS  Recent Labs   Lab 09/05/20  0857   INR 1.0       Assessment  1. DAYLIN (obstructive sleep apnea)  - Home Sleep Study; Future    2. Sensorineural hearing loss (SNHL) of both ears    3. Hypertrophy of inferior nasal turbinate    4. Chronic nonseasonal allergic rhinitis due to pollen       Plan:  Discussed plan of care with patient in detail and all questions answered. Patient reported understanding of plan of care. I gave the patient the opportunity to ask questions and  patient confirmed all questions answered to satisfaction.   Sleep study ordered, already has apptmt with sleep medicine  Discussed with him that some people need hospital study for diagnosis but will start with home study. He can message me in [email or web address] for results if does not come to portal. He is ok with that plan    F/u 4-6 months with hearing test , sooner if issue    I spent a total of 20 minutes on the day of the visit.  This includes face to face time and non-face to face time preparing to see the patient (eg, review of tests), obtaining and/or reviewing separately obtained history, documenting clinical information in the electronic or other health record, independently interpreting results and communicating results to the patient/family/caregiver, or care coordinator.   Please be aware that this note has been generated with the assistance of MModal voice-to-text.  Please excuse any spelling or grammatical errors.

## 2023-07-13 ENCOUNTER — TELEPHONE (OUTPATIENT)
Dept: PULMONOLOGY | Facility: HOSPITAL | Age: 68
End: 2023-07-13
Payer: MEDICARE

## 2023-07-17 ENCOUNTER — SPECIALTY PHARMACY (OUTPATIENT)
Dept: PHARMACY | Facility: CLINIC | Age: 68
End: 2023-07-17
Payer: MEDICARE

## 2023-07-17 NOTE — TELEPHONE ENCOUNTER
Specialty Pharmacy - Refill Coordination    Specialty Medication Orders Linked to Encounter      Flowsheet Row Most Recent Value   Medication #1 abiraterone (ZYTIGA) 250 mg Tab (Order#[id number], Rx#[id number])            Refill Questions - Documented Responses      Flowsheet Row Most Recent Value   Patient Availability and HIPAA Verification    Does patient want to proceed with activity? Yes   HIPAA/medical authority confirmed? Yes   Relationship to patient of person spoken to? Self   Refill Screening Questions    Changes to allergies? No   Changes to medications? No   New conditions since last clinic visit? No   Unplanned office visit, urgent care, ED, or hospital admission in the last 4 weeks? No   How does patient/caregiver feel medication is working? Good   Financial problems or insurance changes? No   How many doses of your specialty medications were missed in the last 4 weeks? 0   Would patient like to speak to a pharmacist? Yes  [Fatigue with medication]   When does the patient need to receive the medication? 07/27/23   Refill Delivery Questions    How will the patient receive the medication? MEDRx   When does the patient need to receive the medication? 07/27/23   Shipping Address Home   Address in [facility] confirmed and updated if neccessary? Yes   Expected Copay ($) 140.62   Is the patient able to afford the medication copay? Yes   Payment Method CC on file   Days supply of Refill 30   Supplies needed? No supplies needed   Refill activity completed? Yes   Refill activity plan Refill scheduled   Shipment/Pickup Date: 07/24/23            Current Outpatient Medications   Medication Sig    abiraterone (ZYTIGA) 250 mg Tab Take 1 tablet (250 mg total) by mouth once daily.    allopurinoL (ZYLOPRIM) 100 MG tablet TAKE 1 TABLET BY MOUTH TWICE A DAY    ALPRAZolam (XANAX) 0.5 MG tablet Take 1 tablet (0.5 mg total) by mouth nightly as needed for Anxiety.    ascorbic acid, vitamin C, (VITAMIN C) 500 MG tablet  Take 500 mg by mouth once daily. Hold for 1 week prior to surgery    azelastine (ASTELIN) 137 mcg (0.1 %) nasal spray 1 spray (137 mcg total) by Nasal route 2 (two) times daily. (Patient not taking: Reported on 7/10/2023)    cholecalciferol, vitamin D3, 125 mcg (5,000 unit) Tab Take 5,000 Units by mouth once daily.    coenzyme Q10 100 mg capsule Take 100 mg by mouth once daily. Hold for 1 week prior to surgery    ergocalciferol, vitamin D2, (VITAMIN D ORAL) Take by mouth. Hold for 1 week prior to surgery    FA/niacinamide/cupric ox/Zn ox (NICOTINAMIDE ORAL) Take 500 mg by mouth once daily.    ferrous sulfate (SLOW RELEASE IRON) 142 mg (45 mg iron) TbSR Take by mouth.    fluticasone propionate (FLONASE) 50 mcg/actuation nasal spray 1 spray (50 mcg total) by Each Nostril route 2 (two) times daily.    fluticasone propionate (FLONASE) 50 mcg/actuation nasal spray 2 sprays (100 mcg total) by Each Nostril route 2 (two) times daily.    gabapentin (NEURONTIN) 300 MG capsule TAKE 1 CAPSULE BY MOUTH EVERY EVENING    garlic 1,000 mg Cap Take by mouth. Hold for 1 week prior to surgery    hydrocortisone 2.5 % cream APPLY TO AFFECTED AREA TWICE A DAY    losartan (COZAAR) 100 MG tablet TAKE 1 TABLET BY MOUTH EVERY DAY    magnesium 250 mg Tab Take 250 mg by mouth once. Takes two 250 mg tablets daily   Hold for 1 week prior to surgery    omega-3 fatty acids 1,000 mg Cap Take 1,000 mg by mouth.    papaverine 30 mg/mL injection Add: Phentolamine 1 mg  Add: PGE1 10 mcg    Sig:  Inject 20 units as directed; titrate up by 5 units until desired results    pravastatin (PRAVACHOL) 20 MG tablet TAKE 1 TABLET(20 MG) BY MOUTH EVERY DAY    predniSONE (DELTASONE) 5 MG tablet Take 1 tablet (5 mg total) by mouth once daily. To be taken with Zytiga    verapamiL (CALAN-SR) 120 MG CR tablet Take 1 tablet (120 mg total) by mouth nightly. in addition to 240mg in the morning    verapamiL (VERELAN) 240 MG C24P Take 1 capsule (240 mg total) by mouth every  morning. in addition to 120 mg at night    zinc 50 mg Tab Take by mouth. Hold for 1 week prior to surgery    ZIOPTAN, PF, 0.0015 % Dpet Place 1 drop into both eyes nightly.   Last reviewed on 5/30/2023  3:57 PM by Gale Celis PharmD    Review of patient's allergies indicates:   Allergen Reactions    Amoxicillin-pot clavulanate Nausea And Vomiting     Other reaction(s): Stomach upset  Other reaction(s): Stomach upset    Celecoxib Other (See Comments) and Nausea And Vomiting    Last reviewed on  7/10/2023 10:41 AM by Mery Wheatley      Tasks added this encounter   No tasks added.   Tasks due within next 3 months   No tasks due.     Isiah Mg, PharmD  [facility]  [address] [place] [id number]  Phone: [phone]  Fax: [phone]

## 2023-07-17 NOTE — TELEPHONE ENCOUNTER
Outgoing call regarding Zytiga refill, pt states the side affects are different from when he was on Erleada. now he has no energy and sometimes can move would like to speak with a rph. Transferred to Maliha JIANG

## 2023-07-19 ENCOUNTER — PATIENT MESSAGE (OUTPATIENT)
Dept: CARDIOLOGY | Facility: CLINIC | Age: 68
End: 2023-07-19
Payer: MEDICARE

## 2023-07-31 ENCOUNTER — SPECIALTY PHARMACY (OUTPATIENT)
Dept: PHARMACY | Facility: CLINIC | Age: 68
End: 2023-07-31
Payer: MEDICARE

## 2023-07-31 ENCOUNTER — PATIENT MESSAGE (OUTPATIENT)
Dept: CARDIOLOGY | Facility: CLINIC | Age: 68
End: 2023-07-31
Payer: MEDICARE

## 2023-07-31 NOTE — TELEPHONE ENCOUNTER
Informed pt that there are no noted interactions between turmeric, abiraterone/prednisone and current meds. Pt expressed understanding.

## 2023-08-16 ENCOUNTER — HOSPITAL ENCOUNTER (OUTPATIENT)
Dept: SLEEP MEDICINE | Facility: HOSPITAL | Age: 68
Discharge: HOME | End: 2023-08-16
Attending: OTOLARYNGOLOGY
Payer: MEDICARE

## 2023-08-16 ENCOUNTER — PATIENT MESSAGE (OUTPATIENT)
Dept: HEMATOLOGY/ONCOLOGY | Facility: CLINIC | Age: 68
End: 2023-08-16
Payer: MEDICARE

## 2023-08-16 DIAGNOSIS — G47.33 OSA (OBSTRUCTIVE SLEEP APNEA): ICD-10-CM

## 2023-08-16 PROCEDURE — 95800 SLP STDY UNATTENDED: CPT

## 2023-08-16 NOTE — PROGRESS NOTES
The patient ID was verified. He  was instructed on how to turn the Home Sleep Testing device on and off, how to apply the sensors. He  was encouraged to sleep on supine position and must have 6 hours of sleep. The patient was instructed not to get the device wet and return it back to us. All questions were answered prior to patient leaving. He  was provided the after visit summary.

## 2023-08-17 PROBLEM — G47.33 OSA (OBSTRUCTIVE SLEEP APNEA): Status: ACTIVE | Noted: 2023-08-17

## 2023-08-18 ENCOUNTER — TELEPHONE (OUTPATIENT)
Dept: HEMATOLOGY/ONCOLOGY | Facility: CLINIC | Age: 68
End: 2023-08-18
Payer: MEDICARE

## 2023-08-18 NOTE — TELEPHONE ENCOUNTER
----- Message from Rosalie Melendez sent at 8/18/2023  9:12 AM CDT -----  Regarding: Reschedule Upcoming Appts  Contact: Pt  Pt is requesting  callback regarding upcoming appts. Pt stated he will be traveling and need to reschedule and also have some questions and concerns. Please adv pt               Confirmed contact below:   Contact Name:Denismello Bunn  Phone Number: [phone]

## 2023-08-21 ENCOUNTER — PATIENT MESSAGE (OUTPATIENT)
Dept: HEMATOLOGY/ONCOLOGY | Facility: CLINIC | Age: 68
End: 2023-08-21
Payer: MEDICARE

## 2023-08-22 ENCOUNTER — LAB VISIT (OUTPATIENT)
Dept: LAB | Facility: HOSPITAL | Age: 68
End: 2023-08-22
Attending: INTERNAL MEDICINE
Payer: MEDICARE

## 2023-08-22 ENCOUNTER — OFFICE VISIT (OUTPATIENT)
Dept: HEMATOLOGY/ONCOLOGY | Facility: CLINIC | Age: 68
End: 2023-08-22
Payer: MEDICARE

## 2023-08-22 VITALS
TEMPERATURE: 98 F | HEIGHT: 68 IN | RESPIRATION RATE: 17 BRPM | OXYGEN SATURATION: 98 % | DIASTOLIC BLOOD PRESSURE: 60 MMHG | SYSTOLIC BLOOD PRESSURE: 130 MMHG | HEART RATE: 58 BPM | BODY MASS INDEX: 25.54 KG/M2

## 2023-08-22 DIAGNOSIS — C61 PROSTATE CANCER: Primary | ICD-10-CM

## 2023-08-22 DIAGNOSIS — C61 PROSTATE CANCER: ICD-10-CM

## 2023-08-22 LAB
ALBUMIN SERPL BCP-MCNC: 4.1 G/DL (ref 3.5–5.2)
ALP SERPL-CCNC: 58 U/L (ref 55–135)
ALT SERPL W/O P-5'-P-CCNC: 36 U/L (ref 10–44)
ANION GAP SERPL CALC-SCNC: 7 MMOL/L (ref 8–16)
AST SERPL-CCNC: 33 U/L (ref 10–40)
BASOPHILS # BLD AUTO: 0.07 K/UL (ref 0–0.2)
BASOPHILS NFR BLD: 1.3 % (ref 0–1.9)
BILIRUB SERPL-MCNC: 0.8 MG/DL (ref 0.1–1)
BUN SERPL-MCNC: 15 MG/DL (ref 8–23)
CALCIUM SERPL-MCNC: 9.5 MG/DL (ref 8.7–10.5)
CHLORIDE SERPL-SCNC: 103 MMOL/L (ref 95–110)
CO2 SERPL-SCNC: 27 MMOL/L (ref 23–29)
COMPLEXED PSA SERPL-MCNC: <0.01 NG/ML (ref 0–4)
CREAT SERPL-MCNC: 1.1 MG/DL (ref 0.5–1.4)
DIFFERENTIAL METHOD: ABNORMAL
EOSINOPHIL # BLD AUTO: 0.1 K/UL (ref 0–0.5)
EOSINOPHIL NFR BLD: 1.8 % (ref 0–8)
ERYTHROCYTE [DISTWIDTH] IN BLOOD BY AUTOMATED COUNT: 12.5 % (ref 11.5–14.5)
EST. GFR  (NO RACE VARIABLE): >60 ML/MIN/1.73 M^2
GLUCOSE SERPL-MCNC: 122 MG/DL (ref 70–110)
HCT VFR BLD AUTO: 38.8 % (ref 40–54)
HGB BLD-MCNC: 13.1 G/DL (ref 14–18)
IMM GRANULOCYTES # BLD AUTO: 0.01 K/UL (ref 0–0.04)
IMM GRANULOCYTES NFR BLD AUTO: 0.2 % (ref 0–0.5)
LYMPHOCYTES # BLD AUTO: 0.6 K/UL (ref 1–4.8)
LYMPHOCYTES NFR BLD: 11.8 % (ref 18–48)
MCH RBC QN AUTO: 32.7 PG (ref 27–31)
MCHC RBC AUTO-ENTMCNC: 33.8 G/DL (ref 32–36)
MCV RBC AUTO: 97 FL (ref 82–98)
MONOCYTES # BLD AUTO: 0.4 K/UL (ref 0.3–1)
MONOCYTES NFR BLD: 7 % (ref 4–15)
NEUTROPHILS # BLD AUTO: 4.2 K/UL (ref 1.8–7.7)
NEUTROPHILS NFR BLD: 77.9 % (ref 38–73)
NRBC BLD-RTO: 0 /100 WBC
PLATELET # BLD AUTO: 200 K/UL (ref 150–450)
PMV BLD AUTO: 9.7 FL (ref 9.2–12.9)
POTASSIUM SERPL-SCNC: 4.7 MMOL/L (ref 3.5–5.1)
PROT SERPL-MCNC: 7.1 G/DL (ref 6–8.4)
RBC # BLD AUTO: 4.01 M/UL (ref 4.6–6.2)
SODIUM SERPL-SCNC: 137 MMOL/L (ref 136–145)
WBC # BLD AUTO: 5.42 K/UL (ref 3.9–12.7)

## 2023-08-22 PROCEDURE — 80053 COMPREHEN METABOLIC PANEL: CPT | Performed by: INTERNAL MEDICINE

## 2023-08-22 PROCEDURE — 84153 ASSAY OF PSA TOTAL: CPT | Performed by: INTERNAL MEDICINE

## 2023-08-22 PROCEDURE — 99214 OFFICE O/P EST MOD 30 MIN: CPT | Mod: S$PBB,,, | Performed by: INTERNAL MEDICINE

## 2023-08-22 PROCEDURE — 99999 PR PBB SHADOW E&M-EST. PATIENT-LVL V: ICD-10-PCS | Mod: PBBFAC,,, | Performed by: INTERNAL MEDICINE

## 2023-08-22 PROCEDURE — 99214 PR OFFICE/OUTPT VISIT, EST, LEVL IV, 30-39 MIN: ICD-10-PCS | Mod: S$PBB,,, | Performed by: INTERNAL MEDICINE

## 2023-08-22 PROCEDURE — 99999 PR PBB SHADOW E&M-EST. PATIENT-LVL V: CPT | Mod: PBBFAC,,, | Performed by: INTERNAL MEDICINE

## 2023-08-22 PROCEDURE — 36415 COLL VENOUS BLD VENIPUNCTURE: CPT | Performed by: INTERNAL MEDICINE

## 2023-08-22 PROCEDURE — 99215 OFFICE O/P EST HI 40 MIN: CPT | Mod: PBBFAC | Performed by: INTERNAL MEDICINE

## 2023-08-22 PROCEDURE — 85025 COMPLETE CBC W/AUTO DIFF WBC: CPT | Performed by: INTERNAL MEDICINE

## 2023-08-22 NOTE — PROGRESS NOTES
Subjective     Patient ID: Denis Bunn is a 68 y.o. male.    Chief Complaint: Prostate Cancer    HPI    Diagnosis: Metastatic Prostate Cancer  Previously seen by Rad Onc, Dr. Frias and prior consult at [facility]     Returns for follow up of PSA which is < 0.01 ng/ml  Good energy level  Busy with home renovation projects    Does note it took time to get adjusted to the medication  + fatigue  x 4 weeks  No weight gain  No breast tenderness or changes  + loss of muscle mass noted  No hot flashes    Lesion neck removed-non melanoma- Moh's     Most recent imaging:   - 4/18/2023 PSMA PET:  FINDINGS:  In the head and neck, there is physiologic uptake in the lacrimal and salivary glands, and there are no tracer avid lesions suspicious for malignancy.  In the chest, there are no tracer avid lesions suspicious for malignancy.  In the abdomen and pelvis, there is physiologic distribution in the liver, spleen, bowel, and genitourinary tract, and there are no anatomic or tracer avid lesions suspicious for malignancy.  Specifically, there are no pathologically enlarged or tracer avid pelvic or retroperitoneal lymph nodes.  In the bones, there is physiologic uptake in the bone marrow, and there are no tracer avid lesions suspicious for malignancy.  Additional CT findings:  Probable right maxillary sinus retention cyst.  Bilateral subsegmental dependent atelectasis the lungs.  Small splenule.  Subcentimeter hyperattenuating left renal focus, possibly represents hemorrhagic cyst.  Postsurgical changes of prostatectomy.  Posterior instrumented fusion of lumbosacral spine.  Impression:  Postsurgical changes of prostatectomy in patient with prostate cancer.  No focal abnormal radiotracer uptake to suggest local recurrence or metastatic disease.     Oncology History:  - July 2020 presented for evaluation of an elevated PSA of 5.3 ng/ml.       - 9/2/2020 underwent RALP with bilateral lymph node dissection   Pathology revealed  Jose 7 (4+3) adenocarcinoma involving 20% of the prostate.  The [place] pattern 4 accounted for 85% of the tumor.  There was extraprostatic extension with multifocal perineural and lymphovascular invasion.  There was no bladder neck invasion or seminal vesicle invasion.  The margins of resection and 1  Rt. and 1 Lt. pelvic nodes were negative for tumor involvement.       - Postoperative PSA on 10/1/20 returned at 0.14 ng/ml.  Repeat PSA  2/9/21 and returned at 0.41 ng/ml.       - 2/26/2021 PET fluciclovine showed no evidence of recurrent or metastatic disease     - 3/2/2021 MRI of the prostate showed 7 mm bone lesion in the left symphysis pubis     - he was started on Casodex     - 3/3/2021 went to [facility] and casodex stopped to pursue biopsy     - 3/15/2021 IR-directed biopsy on the left symphysis pubis revealed prostate adenocarcinoma     - 3/24/21 restarted casodex and received Eligard on 3/31/2021      - 4/21/2021 started apalutamide per [facility]-- 1 week later developed a rash that responded to medrol dose pack but recurred      - He was recommended for radiotherapy to the prostate bed, pubic symphysis and pelvic nodes which he completed on 10/27/21.  The metastatic focus in the pubic symphysis also received radiotherapy.       - patient subsequently elected to discontinue his hormonal therapy.His last Lupron injection was in October of 2021.       - 7/13/2021 PET showed no evidence of fluciclovine avid recurrent or metastatic prostate carcinoma.      - 7/12/2021 MRI of the pelvis showed stable postsurgical findings without evidence of local recurrence or pelvic adenopathy. There was a sclerotic focus in the left pubic symphysis unchanged since the prior examination.      - PSA < .01 ng/ml    - He continued on ADT + apalutamide andradiation to the primary tumor and pelvic bone from 08/19/2021-10/27/2021 with Dr. Frias.     - 4/18/2023 PET PSMA:  FINDINGS:  In the head and neck, there is physiologic uptake in  the lacrimal and salivary glands, and there are no tracer avid lesions suspicious for malignancy.  In the chest, there are no tracer avid lesions suspicious for malignancy.  In the abdomen and pelvis, there is physiologic distribution in the liver, spleen, bowel, and genitourinary tract, and there are no anatomic or tracer avid lesions suspicious for malignancy.  Specifically, there are no pathologically enlarged or tracer avid pelvic or retroperitoneal lymph nodes.  In the bones, there is physiologic uptake in the bone marrow, and there are no tracer avid lesions suspicious for malignancy.  Additional CT findings:  Probable right maxillary sinus retention cyst.  Bilateral subsegmental dependent atelectasis the lungs.  Small splenule.  Subcentimeter hyperattenuating left renal focus, possibly represents hemorrhagic cyst.  Postsurgical changes of prostatectomy.  Posterior instrumented fusion of lumbosacral spine.  Impression:  Postsurgical changes of prostatectomy in patient with prostate cancer.  No focal abnormal radiotracer uptake to suggest local recurrence or metastatic disease.     FH:  Father prostate cancer at 79  Paternal grandfather- lung cancer (smoker)     SH:    Social EtOH      Review of Systems   Constitutional:  Positive for fatigue. Negative for activity change, appetite change, fever and unexpected weight change.   HENT:  Negative for sore throat and trouble swallowing.    Eyes:  Negative for photophobia and visual disturbance.   Respiratory:  Negative for cough, chest tightness, shortness of breath and wheezing.    Cardiovascular:  Negative for chest pain, palpitations and leg swelling.   Gastrointestinal:  Negative for abdominal pain, constipation, diarrhea, nausea and vomiting.   Genitourinary:  Negative for dysuria and flank pain.   Musculoskeletal:  Negative for arthralgias, back pain and myalgias.   Integumentary:  Negative for color change and pallor.   Neurological:  Negative for  dizziness, weakness and headaches.   Psychiatric/Behavioral:  Negative for confusion and decreased concentration.           Objective     Physical Exam  Vitals and nursing note reviewed.   Constitutional:       Appearance: Normal appearance. He is normal weight.      Comments: Very pleasant; wife present  ECOG= 0   Neurological:      General: No focal deficit present.      Mental Status: He is alert.   Psychiatric:         Mood and Affect: Mood normal.         Behavior: Behavior normal.         Thought Content: Thought content normal.         Judgment: Judgment normal.          Assessment and Plan     1. Prostate cancer      This is a 68 year old male initially diagnosed withwith high risk prostate cancer who was initially treated with prostatectomy (post op PSA .14) who was found to have metastatic disease on MRI imaging in 2021 that was later confirmed with biopsy. Patient treated with ADT and apalutamide and had side effects of fatigue, rash, muscle weakness, and weight loss. Has been off apalutamide since 2021, last lupron shot on Oct 2021.  Then recent PSA rise and PSMA PET without areas of recurrence; he is aware that microscopic disease is the possibility and he has had prior metastatic disease  His testosterone has normalized  Reviewed low dose Zytiga and opted for  Reviewed intermittent option as concern for side effects-- with rian we discussed holding ane repeating PSA in 8 weeks    [facility] appointment planned for Thursday (he was understandably frustrated by scheduling issues in our clinic)    Needs BMD- will schedule    Route Chart for Scheduling    Med Onc Chart Routing  Urgent    Follow up with physician . 8 weeks cbc, cmp, psa prior   Follow up with [facility]    Infusion scheduling note    Injection scheduling note    Labs    Imaging    Pharmacy appointment    Other referrals

## 2023-08-23 ENCOUNTER — PATIENT MESSAGE (OUTPATIENT)
Dept: HEMATOLOGY/ONCOLOGY | Facility: CLINIC | Age: 68
End: 2023-08-23
Payer: MEDICARE

## 2023-08-25 ENCOUNTER — PATIENT MESSAGE (OUTPATIENT)
Dept: HEMATOLOGY/ONCOLOGY | Facility: CLINIC | Age: 68
End: 2023-08-25
Payer: MEDICARE

## 2023-08-26 PROCEDURE — 95806 SLEEP STUDY UNATT&RESP EFFT: CPT | Mod: 26,,, | Performed by: INTERNAL MEDICINE

## 2023-08-26 PROCEDURE — 95806 PR SLEEP STUDY, UNATTENDED, SIMUL RECORD HR/O2 SAT/RESP FLOW/RESP EFFT: ICD-10-PCS | Mod: 26,,, | Performed by: INTERNAL MEDICINE

## 2023-08-27 NOTE — PROCEDURES
"Dear Provider,     You have ordered sleep LAB services to perform the sleep study for Denis Bunn.  The sleep study that you ordered is complete.      Please find Sleep Study result in "Chart Review" under the "Media tab."      As the ordering provider, you are responsible for reviewing the results and implementing a treatment plan with your patient.    If you need a Sleep Medicine provider to explain the sleep study findings and arrange treatment for the patient, please refer patient for consultation to our Sleep Clinic via [facility] with Ambulatory Consult Sleep.    To do that please place an order for an  "Ambulatory Consult Sleep" - it will go to our clinic work queue for our Medical Assistant to contact the patient for an appointment.     For any questions, please contact our clinic staff at [phone] to talk to clinical staff.   "

## 2023-08-28 ENCOUNTER — PATIENT MESSAGE (OUTPATIENT)
Dept: HEMATOLOGY/ONCOLOGY | Facility: CLINIC | Age: 68
End: 2023-08-28
Payer: MEDICARE

## 2023-08-28 ENCOUNTER — PATIENT MESSAGE (OUTPATIENT)
Dept: OTOLARYNGOLOGY | Facility: CLINIC | Age: 68
End: 2023-08-28
Payer: MEDICARE

## 2023-08-28 ENCOUNTER — TELEPHONE (OUTPATIENT)
Dept: HEMATOLOGY/ONCOLOGY | Facility: CLINIC | Age: 68
End: 2023-08-28
Payer: MEDICARE

## 2023-08-28 DIAGNOSIS — M85.89 OTHER SPECIFIED DISORDERS OF BONE DENSITY AND STRUCTURE, MULTIPLE SITES: ICD-10-CM

## 2023-08-28 DIAGNOSIS — C61 PROSTATE CANCER: Primary | ICD-10-CM

## 2023-08-28 NOTE — TELEPHONE ENCOUNTER
----- Message from Fior Bui NP sent at 8/28/2023  9:48 AM CDT -----  Hi there! He needs a BMD scheduled. Please call as he will be out of town on certain dates. thanks

## 2023-08-29 ENCOUNTER — PATIENT MESSAGE (OUTPATIENT)
Dept: HEMATOLOGY/ONCOLOGY | Facility: CLINIC | Age: 68
End: 2023-08-29
Payer: MEDICARE

## 2023-08-29 NOTE — TELEPHONE ENCOUNTER
Patient and MDO has agreed patient will take a break from abiraterone at this time. Patient has about 8-10 tablets remaining. He was counseled on proper disposal. Will disenroll pt at this time.

## 2023-09-01 ENCOUNTER — HOSPITAL ENCOUNTER (OUTPATIENT)
Dept: RADIOLOGY | Facility: CLINIC | Age: 68
Discharge: HOME | End: 2023-09-01
Attending: NURSE PRACTITIONER
Payer: MEDICARE

## 2023-09-01 DIAGNOSIS — C61 PROSTATE CANCER: ICD-10-CM

## 2023-09-01 DIAGNOSIS — M85.89 OTHER SPECIFIED DISORDERS OF BONE DENSITY AND STRUCTURE, MULTIPLE SITES: ICD-10-CM

## 2023-09-01 PROCEDURE — 77080 DXA BONE DENSITY AXIAL SKELETON 1 OR MORE SITES: ICD-10-PCS | Mod: 26,,, | Performed by: INTERNAL MEDICINE

## 2023-09-01 PROCEDURE — 77080 DXA BONE DENSITY AXIAL: CPT | Mod: TC,PO

## 2023-09-01 PROCEDURE — 77080 DXA BONE DENSITY AXIAL: CPT | Mod: 26,,, | Performed by: INTERNAL MEDICINE

## 2023-09-05 ENCOUNTER — PATIENT MESSAGE (OUTPATIENT)
Dept: HEMATOLOGY/ONCOLOGY | Facility: CLINIC | Age: 68
End: 2023-09-05
Payer: MEDICARE

## 2023-09-05 DIAGNOSIS — C61 PROSTATE CANCER: Primary | ICD-10-CM

## 2023-09-05 RX ORDER — FERROUS SULFATE, DRIED 160(50) MG
1 TABLET, EXTENDED RELEASE ORAL 2 TIMES DAILY
Qty: 180 TABLET | Refills: 3 | Status: SHIPPED | OUTPATIENT
Start: 2023-09-05 | End: 2024-09-04

## 2023-09-06 ENCOUNTER — PATIENT MESSAGE (OUTPATIENT)
Dept: CARDIOLOGY | Facility: CLINIC | Age: 68
End: 2023-09-06
Payer: MEDICARE

## 2023-09-07 DIAGNOSIS — M81.8 OTHER OSTEOPOROSIS, UNSPECIFIED PATHOLOGICAL FRACTURE PRESENCE: ICD-10-CM

## 2023-09-07 PROBLEM — M81.0 OSTEOPOROSIS: Status: ACTIVE | Noted: 2023-09-07

## 2023-09-11 ENCOUNTER — PATIENT MESSAGE (OUTPATIENT)
Dept: HEMATOLOGY/ONCOLOGY | Facility: CLINIC | Age: 68
End: 2023-09-11
Payer: MEDICARE

## 2023-09-13 RX ORDER — PRAVASTATIN SODIUM 20 MG/1
TABLET ORAL
Qty: 90 TABLET | Refills: 3 | Status: SHIPPED | OUTPATIENT
Start: 2023-09-13

## 2023-09-14 ENCOUNTER — PATIENT MESSAGE (OUTPATIENT)
Dept: HEMATOLOGY/ONCOLOGY | Facility: CLINIC | Age: 68
End: 2023-09-14
Payer: MEDICARE

## 2023-09-22 ENCOUNTER — OFFICE VISIT (OUTPATIENT)
Dept: PULMONOLOGY | Facility: CLINIC | Age: 68
End: 2023-09-22
Payer: MEDICARE

## 2023-09-22 VITALS
HEART RATE: 53 BPM | HEIGHT: 68 IN | DIASTOLIC BLOOD PRESSURE: 87 MMHG | SYSTOLIC BLOOD PRESSURE: 145 MMHG | OXYGEN SATURATION: 97 % | BODY MASS INDEX: 24.98 KG/M2 | WEIGHT: 164.81 LBS

## 2023-09-22 DIAGNOSIS — R06.83 SNORING: ICD-10-CM

## 2023-09-22 PROCEDURE — 99499 UNLISTED E&M SERVICE: CPT | Mod: S$PBB,,, | Performed by: INTERNAL MEDICINE

## 2023-09-22 PROCEDURE — 99499 NO LOS: ICD-10-PCS | Mod: S$PBB,,, | Performed by: INTERNAL MEDICINE

## 2023-09-25 ENCOUNTER — OFFICE VISIT (OUTPATIENT)
Dept: UROLOGY | Facility: CLINIC | Age: 68
End: 2023-09-25
Payer: MEDICARE

## 2023-09-25 VITALS
DIASTOLIC BLOOD PRESSURE: 83 MMHG | BODY MASS INDEX: 25.07 KG/M2 | HEIGHT: 68 IN | WEIGHT: 165.38 LBS | SYSTOLIC BLOOD PRESSURE: 160 MMHG | HEART RATE: 49 BPM

## 2023-09-25 DIAGNOSIS — Z92.3 HISTORY OF EXTERNAL BEAM RADIATION THERAPY: ICD-10-CM

## 2023-09-25 DIAGNOSIS — C61 PROSTATE CANCER: ICD-10-CM

## 2023-09-25 DIAGNOSIS — N52.31 ERECTILE DYSFUNCTION FOLLOWING RADICAL PROSTATECTOMY: ICD-10-CM

## 2023-09-25 DIAGNOSIS — Z90.79 HISTORY OF ROBOT-ASSISTED LAPAROSCOPIC RADICAL PROSTATECTOMY: ICD-10-CM

## 2023-09-25 PROCEDURE — 99999 PR PBB SHADOW E&M-EST. PATIENT-LVL IV: ICD-10-PCS | Mod: PBBFAC,,, | Performed by: NURSE PRACTITIONER

## 2023-09-25 PROCEDURE — 99214 OFFICE O/P EST MOD 30 MIN: CPT | Mod: PBBFAC | Performed by: NURSE PRACTITIONER

## 2023-09-25 PROCEDURE — 99215 OFFICE O/P EST HI 40 MIN: CPT | Mod: S$PBB,,, | Performed by: NURSE PRACTITIONER

## 2023-09-25 PROCEDURE — 99215 PR OFFICE/OUTPT VISIT, EST, LEVL V, 40-54 MIN: ICD-10-PCS | Mod: S$PBB,,, | Performed by: NURSE PRACTITIONER

## 2023-09-25 PROCEDURE — 99999 PR PBB SHADOW E&M-EST. PATIENT-LVL IV: CPT | Mod: PBBFAC,,, | Performed by: NURSE PRACTITIONER

## 2023-09-25 RX ORDER — PAPAVERINE HYDROCHLORIDE 30 MG/ML
INJECTION INTRAMUSCULAR; INTRAVENOUS
Qty: 10 ML | Refills: 12 | Status: SHIPPED | OUTPATIENT
Start: 2023-09-25 | End: 2024-09-23

## 2023-09-25 RX ORDER — SYRINGE-NEEDLE,INSULIN,0.5 ML 30 GX5/16"
SYRINGE, EMPTY DISPOSABLE MISCELLANEOUS
Qty: 10 EACH | Refills: 10 | Status: SHIPPED | OUTPATIENT
Start: 2023-09-25 | End: 2024-09-18

## 2023-09-25 RX ORDER — SYRINGE-NEEDLE,INSULIN,0.5 ML 30 GX5/16"
SYRINGE, EMPTY DISPOSABLE MISCELLANEOUS
Qty: 10 EACH | Refills: 10 | Status: SHIPPED | OUTPATIENT
Start: 2023-09-25 | End: 2023-09-25 | Stop reason: SDUPTHER

## 2023-09-25 NOTE — PROGRESS NOTES
CHIEF COMPLAINT:    Denis Bunn is a 68 y.o. male presents today for Post RALP ED.     HISTORY OF PRESENTING ILLINESS:    Denis Bunn is a 68 y.o. male with Prostate Cancer; s/p RALP on 09/02/2020 with Dr. White.   S/p XRT completed 10/27/2021.   Last PSA was <0.01 07/2022  He has good bladder control.  He c/o ED since surgery. No issues before.   Tried and failed the oral agents.      Last clinic visit was 10/18/2022 for ICI education and 1st injection.     He is here today to discuss ICI.   Not really getting the results he desires.   Up to 30-40 units.   He brought in his Rx with [facility] 4/25/2023.          REVIEW OF SYSTEMS:  Review of Systems   Constitutional: Negative.  Negative for chills and fever.   Eyes:  Negative for double vision.   Respiratory:  Negative for cough and shortness of breath.    Cardiovascular:  Negative for chest pain.   Gastrointestinal:  Negative for abdominal pain, constipation, diarrhea, nausea and vomiting.   Genitourinary: Negative.  Negative for dysuria, flank pain and hematuria.        Ok with urination     Neurological:  Negative for dizziness and seizures.   Endo/Heme/Allergies:  Negative for polydipsia.         PATIENT HISTORY:    Past Medical History:   Diagnosis Date    Anxiety     Back pain     Cataract     Constipation - functional     Dyslipidemia     ED (erectile dysfunction)     FH: CAD (coronary artery disease) 4/24/2013    But he had 0 CAC    History of gout     HTN (hypertension), benign 04/24/2013    Personal history of colonic polyps     Prostate cancer     Vision changes        Past Surgical History:   Procedure Laterality Date    BACK SURGERY      CATARACT EXTRACTION      left eye    COLONOSCOPY N/A 7/29/2019    Procedure: COLONOSCOPY;  Surgeon: Mingo Freeman MD;  Location: [facility];  Service: Endoscopy;  Laterality: N/A;  due July 2019    ELBOW SURGERY      scope/right    EYE SURGERY      Dr. Hope.retina x2 left    LAMINECTOMY      ROBOT-ASSISTED  LAPAROSCOPIC PROSTATECTOMY N/A 9/2/2020    Procedure: ROBOTIC PROSTATECTOMY;  Surgeon: Edson White MD;  Location: [facility] OR [address] [place] [address];  Service: Urology;  Laterality: N/A;  3hrs gen with regional    WRIST SURGERY      right       Family History   Problem Relation Age of Onset    Blindness Mother         from cva    Cataracts Mother     Prostate cancer Father     Cataracts Father     No Known Problems Sister     No Known Problems Brother     No Known Problems Maternal Aunt     No Known Problems Maternal Uncle     No Known Problems Paternal Aunt     No Known Problems Paternal Uncle     No Known Problems Maternal Grandmother     No Known Problems Maternal Grandfather     No Known Problems Paternal Grandmother     No Known Problems Paternal Grandfather     Amblyopia Neg Hx     Diabetes Neg Hx     Glaucoma Neg Hx     Hypertension Neg Hx     Macular degeneration Neg Hx     Retinal detachment Neg Hx     Strabismus Neg Hx     Stroke Neg Hx     Thyroid disease Neg Hx        Social History     Socioeconomic History    Marital status:    Tobacco Use    Smoking status: Never    Smokeless tobacco: Never    Tobacco comments:     , 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   Substance and Sexual Activity    Alcohol use: Yes     Alcohol/week: 3.0 standard drinks of alcohol     Types: 3 Glasses of wine per week     Comment: wine daily    Drug use: No    Sexual activity: Yes     Partners: Female     Social Determinants of Health     Financial Resource Strain: Low Risk  (8/22/2023)    Overall Financial Resource Strain (CARDIA)     Difficulty of Paying Living Expenses: Not hard at all   Food Insecurity: No Food Insecurity (8/22/2023)    Hunger Vital Sign     Worried About Running Out of Food in the Last Year: Never true     Ran Out of Food in the Last Year: Never true   Transportation Needs: No Transportation Needs (8/22/2023)    PRAPARE - Transportation     Lack of Transportation (Medical): No     Lack of Transportation (Non-Medical):  No   Physical Activity: Insufficiently Active (9/21/2023)    Exercise Vital Sign     Days of Exercise per Week: 3 days     Minutes of Exercise per Session: 30 min   Stress: Stress Concern Present (9/21/2023)    [employer] [place] [employer] - Occupational Stress Questionnaire     Feeling of Stress : To some extent   Social Connections: Unknown (9/21/2023)    Social Connection and Isolation Panel [NHANES]     Frequency of Communication with Friends and Family: More than three times a week     Frequency of Social Gatherings with Friends and Family: Twice a week     Active Member of Clubs or Organizations: No     Attends Club or Organization Meetings: Patient refused     Marital Status:    Housing Stability: Low Risk  (8/22/2023)    Housing Stability Vital Sign     Unable to Pay for Housing in the Last Year: No     Number of Places Lived in the Last Year: 1     Unstable Housing in the Last Year: No       Allergies:  Amoxicillin-pot clavulanate and Celecoxib    Medications:    Current Outpatient Medications:     abiraterone (ZYTIGA) 250 mg Tab, Take 1 tablet (250 mg total) by mouth once daily., Disp: 30 tablet, Rfl: 4    allopurinoL (ZYLOPRIM) 100 MG tablet, TAKE 1 TABLET BY MOUTH TWICE A DAY, Disp: 180 tablet, Rfl: 3    ascorbic acid, vitamin C, (VITAMIN C) 500 MG tablet, Take 500 mg by mouth once daily. Hold for 1 week prior to surgery, Disp: , Rfl:     calcium-vitamin D3 ([place] CALCIUM-VIT D3) 500 mg-5 mcg (200 unit) per tablet, Take 1 tablet by mouth 2 (two) times daily., Disp: 180 tablet, Rfl: 3    cholecalciferol, vitamin D3, 125 mcg (5,000 unit) Tab, Take 5,000 Units by mouth once daily., Disp: , Rfl:     coenzyme Q10 100 mg capsule, Take 100 mg by mouth once daily. Hold for 1 week prior to surgery, Disp: , Rfl:     FA/niacinamide/cupric ox/Zn ox (NICOTINAMIDE ORAL), Take 500 mg by mouth once daily., Disp: , Rfl:     ferrous sulfate (SLOW RELEASE IRON) 142 mg (45 mg iron) TbSR, Take by  mouth., Disp: , Rfl:     gabapentin (NEURONTIN) 300 MG capsule, TAKE 1 CAPSULE BY MOUTH EVERY EVENING, Disp: 90 capsule, Rfl: 1    garlic 1,000 mg Cap, Take by mouth. Hold for 1 week prior to surgery, Disp: , Rfl:     hydrocortisone 2.5 % cream, APPLY TO AFFECTED AREA TWICE A DAY, Disp: 28.35 g, Rfl: 1    losartan (COZAAR) 100 MG tablet, TAKE 1 TABLET BY MOUTH EVERY DAY, Disp: 90 tablet, Rfl: 3    magnesium 250 mg Tab, Take 250 mg by mouth once. Takes two 250 mg tablets daily  Hold for 1 week prior to surgery, Disp: , Rfl:     omega-3 fatty acids 1,000 mg Cap, Take 1,000 mg by mouth., Disp: , Rfl:     pravastatin (PRAVACHOL) 20 MG tablet, TAKE 1 TABLET(20 MG) BY MOUTH EVERY DAY, Disp: 90 tablet, Rfl: 3    verapamiL (CALAN-SR) 120 MG CR tablet, Take 1 tablet (120 mg total) by mouth nightly. in addition to 240mg in the morning, Disp: 90 tablet, Rfl: 3    verapamiL (VERELAN) 240 MG C24P, Take 1 capsule (240 mg total) by mouth every morning. in addition to 120 mg at night, Disp: 90 capsule, Rfl: 3    zinc 50 mg Tab, Take by mouth. Hold for 1 week prior to surgery, Disp: , Rfl:     ZIOPTAN, PF, 0.0015 % Dpet, Place 1 drop into both eyes nightly., Disp: , Rfl:     ALPRAZolam (XANAX) 0.5 MG tablet, Take 1 tablet (0.5 mg total) by mouth nightly as needed for Anxiety., Disp: 30 tablet, Rfl: 1    insulin syringe-needle U-100 1 mL 30 gauge x 5/16 Syrg, Use with ICI Therapy, Disp: 10 each, Rfl: 10    papaverine 30 mg/mL injection, Add: Phentolamine 1 mg Add: PGE1 20 mcg  Sig:  Inject 20 units as directed; titrate up by 5 units until desired results, Disp: 10 mL, Rfl: 12    predniSONE (DELTASONE) 5 MG tablet, TAKE 1 TABLET (5 MG TOTAL) BY MOUTH ONCE DAILY. TO BE TAKEN WITH ZYTIGA, Disp: 30 tablet, Rfl: 3    PHYSICAL EXAMINATION:  Physical Exam  Vitals and nursing note reviewed.   Constitutional:       General: He is awake.      Appearance: Normal appearance.   HENT:      Head: Normocephalic.      Right Ear: External ear normal.       Left Ear: External ear normal.      Nose: Nose normal.   Cardiovascular:      Rate and Rhythm: Normal rate.   Pulmonary:      Effort: Pulmonary effort is normal. No respiratory distress.   Abdominal:      Tenderness: There is no abdominal tenderness. There is no right CVA tenderness or left CVA tenderness.   Genitourinary:     Penis: Normal.       Testes: Normal.   Musculoskeletal:         General: Normal range of motion.      Cervical back: Normal range of motion.   Skin:     General: Skin is warm and dry.   Neurological:      General: No focal deficit present.      Mental Status: He is alert and oriented to person, place, and time.   Psychiatric:         Mood and Affect: Mood normal.         Behavior: Behavior is cooperative.           LABS:        Lab Results   Component Value Date    PSA 3.8 05/29/2019    PSA 3.1 11/09/2018    PSA 3.6 06/11/2018    PSADIAG <0.01 08/22/2023    PSADIAG 0.15 05/23/2023    PSADIAG 0.06 03/28/2023       Lab Results   Component Value Date    CREATININE 0.99 08/24/2023    EGFRNORACEVR >60.0 08/22/2023             IMPRESSION:    Encounter Diagnoses   Name Primary?    Prostate cancer     Erectile dysfunction following radical prostatectomy     History of external beam radiation therapy     History of robot-assisted laparoscopic radical prostatectomy          Assessment:       1. Prostate cancer    2. Erectile dysfunction following radical prostatectomy    3. History of external beam radiation therapy    4. History of robot-assisted laparoscopic radical prostatectomy        Plan:         I spent 40 minutes with the patient of which more than half was spent in direct consultation with the patient in regards to our treatment and plan.  We addressed his ED and the contributory factors.   Reviewed ICI therapy & expectations & dosing.   We reviewed his drawing up the medication. Discussed tips to prevent needle bending.   He then have himself an injection. 30 minutes after injection only  "achieved semi erection with 30 units.   We discussed max titration 100units.   Recommend a new Rx due to age of medication.   Risks with increasing the dose.  RTC for redosing; new Rx given for 30 gauge 5/16" needles. Less bending          "

## 2023-10-04 ENCOUNTER — PATIENT MESSAGE (OUTPATIENT)
Dept: HEMATOLOGY/ONCOLOGY | Facility: CLINIC | Age: 68
End: 2023-10-04
Payer: MEDICARE

## 2023-10-04 DIAGNOSIS — C61 PROSTATE CANCER: Primary | ICD-10-CM

## 2023-10-09 NOTE — PROGRESS NOTES
Subjective     Patient ID: Denis Bunn is a 68 y.o. male.    Chief Complaint: Prostate Cancer    HPI    Diagnosis: Metastatic Prostate Cancer  Previously seen by Rad Onc, Dr. 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  In the chest, there are no tracer avid lesions suspicious for malignancy.  In the abdomen and pelvis, there is physiologic distribution in the liver, spleen, bowel, and genitourinary tract, and there are no anatomic or tracer avid lesions suspicious for malignancy.  Specifically, there are no pathologically enlarged or tracer avid pelvic or retroperitoneal lymph nodes.  In the bones, there is physiologic uptake in the bone marrow, and there are no tracer avid lesions suspicious for malignancy.  Additional CT findings:  Probable right maxillary sinus retention cyst.  Bilateral subsegmental dependent atelectasis the lungs.  Small splenule.  Subcentimeter hyperattenuating left renal focus, possibly represents hemorrhagic cyst.  Postsurgical changes of prostatectomy.  Posterior instrumented fusion of lumbosacral spine.  Impression:  Postsurgical changes of prostatectomy in patient with prostate cancer.  No focal abnormal radiotracer uptake to suggest local recurrence or metastatic disease.     Oncology History:  - July 2020 presented for evaluation of an elevated PSA of  5.3 ng/ml.       - 9/2/2020 underwent RALP with bilateral lymph node dissection   Pathology revealed [place] 7 (4+3) adenocarcinoma involving 20% of the prostate.  The Jose pattern 4 accounted for 85% of the tumor.  There was extraprostatic extension with multifocal perineural and lymphovascular invasion.  There was no bladder neck invasion or seminal vesicle invasion.  The margins of resection and 1  Rt. and 1 Lt. pelvic nodes were negative for tumor involvement.       - Postoperative PSA on 10/1/20 returned at 0.14 ng/ml.  Repeat PSA  2/9/21 and returned at 0.41 ng/ml.       - 2/26/2021 PET fluciclovine showed no evidence of recurrent or metastatic disease     - 3/2/2021 MRI of the prostate showed 7 mm bone lesion in the left symphysis pubis     - he was started on Casodex     - 3/3/2021 went to [facility] and casodex stopped to pursue biopsy     - 3/15/2021 IR-directed biopsy on the left symphysis pubis revealed prostate adenocarcinoma     - 3/24/21 restarted casodex and received Eligard on 3/31/2021      - 4/21/2021 started apalutamide per [facility]-- 1 week later developed a rash that responded to medrol dose pack but recurred      - He was recommended for radiotherapy to the prostate bed, pubic symphysis and pelvic nodes which he completed on 10/27/21.  The metastatic focus in the pubic symphysis also received radiotherapy.       - patient subsequently elected to discontinue his hormonal therapy.His last Lupron injection was in October of 2021.       - 7/13/2021 PET showed no evidence of fluciclovine avid recurrent or metastatic prostate carcinoma.      - 7/12/2021 MRI of the pelvis showed stable postsurgical findings without evidence of local recurrence or pelvic adenopathy. There was a sclerotic focus in the left pubic symphysis unchanged since the prior examination.      - PSA < .01 ng/ml    - He continued on ADT + apalutamide andradiation to the primary tumor and pelvic bone from 08/19/2021-10/27/2021 with   Altagracia.     - 4/18/2023 PET PSMA:  FINDINGS:  In the head and neck, there is physiologic uptake in the lacrimal and salivary glands, and there are no tracer avid lesions suspicious for malignancy.  In the chest, there are no tracer avid lesions suspicious for malignancy.  In the abdomen and pelvis, there is physiologic distribution in the liver, spleen, bowel, and genitourinary tract, and there are no anatomic or tracer avid lesions suspicious for malignancy.  Specifically, there are no pathologically enlarged or tracer avid pelvic or retroperitoneal lymph nodes.  In the bones, there is physiologic uptake in the bone marrow, and there are no tracer avid lesions suspicious for malignancy.  Additional CT findings:  Probable right maxillary sinus retention cyst.  Bilateral subsegmental dependent atelectasis the lungs.  Small splenule.  Subcentimeter hyperattenuating left renal focus, possibly represents hemorrhagic cyst.  Postsurgical changes of prostatectomy.  Posterior instrumented fusion of lumbosacral spine.  Impression:  Postsurgical changes of prostatectomy in patient with prostate cancer.  No focal abnormal radiotracer uptake to suggest local recurrence or metastatic disease.     FH:  Father prostate cancer at 79  Paternal grandfather- lung cancer (smoker)     SH:    Social EtOH    Review of Systems   Constitutional:  Positive for fatigue. Negative for activity change, appetite change, fever and unexpected weight change.   HENT:  Negative for sore throat and trouble swallowing.    Eyes:  Negative for photophobia and visual disturbance.   Respiratory:  Negative for cough, chest tightness, shortness of breath and wheezing.    Cardiovascular:  Negative for chest pain, palpitations and leg swelling.   Gastrointestinal:  Negative for abdominal pain, constipation, diarrhea, nausea and vomiting.   Genitourinary:  Negative for dysuria and flank pain.   Musculoskeletal:  Negative for arthralgias, back pain and  myalgias.   Integumentary:  Negative for color change and pallor.   Neurological:  Negative for dizziness, weakness and headaches.   Psychiatric/Behavioral:  Negative for confusion and decreased concentration.           Objective     Physical Exam  Vitals and nursing note reviewed.   Constitutional:       General: He is not in acute distress.     Appearance: Normal appearance. He is normal weight. He is not ill-appearing.   Neurological:      General: No focal deficit present.      Mental Status: He is alert and oriented to person, place, and time.   Psychiatric:         Mood and Affect: Mood normal.         Behavior: Behavior normal.         Thought Content: Thought content normal.         Judgment: Judgment normal.            Assessment and Plan     1. Insomnia, unspecified type  -     temazepam (RESTORIL) 15 mg Cap; Take 1 capsule (15 mg total) by mouth nightly as needed (insomnia).  Dispense: 30 capsule; Refill: 3    2. Prostate cancer    3. Other osteoporosis, unspecified pathological fracture presence      Insomnia- Restoril sent    Prostate cancer  Wanted a treatment break but PSA rising - will restart low dose Zytiga, Prednisone  RTC 8 weeks    Osteoporosis  Prolia q 6 months-- starts today  BMD 1 year

## 2023-10-10 ENCOUNTER — OFFICE VISIT (OUTPATIENT)
Dept: HEMATOLOGY/ONCOLOGY | Facility: CLINIC | Age: 68
End: 2023-10-10
Payer: MEDICARE

## 2023-10-10 ENCOUNTER — INFUSION (OUTPATIENT)
Dept: INFUSION THERAPY | Facility: HOSPITAL | Age: 68
End: 2023-10-10
Payer: MEDICARE

## 2023-10-10 VITALS
OXYGEN SATURATION: 100 % | TEMPERATURE: 97 F | WEIGHT: 168.63 LBS | SYSTOLIC BLOOD PRESSURE: 164 MMHG | BODY MASS INDEX: 25.56 KG/M2 | HEART RATE: 50 BPM | DIASTOLIC BLOOD PRESSURE: 77 MMHG | HEIGHT: 68 IN | RESPIRATION RATE: 17 BRPM

## 2023-10-10 DIAGNOSIS — M81.8 OTHER OSTEOPOROSIS, UNSPECIFIED PATHOLOGICAL FRACTURE PRESENCE: ICD-10-CM

## 2023-10-10 DIAGNOSIS — C61 PROSTATE CANCER: ICD-10-CM

## 2023-10-10 DIAGNOSIS — G47.00 INSOMNIA, UNSPECIFIED TYPE: Primary | ICD-10-CM

## 2023-10-10 DIAGNOSIS — M81.8 OTHER OSTEOPOROSIS, UNSPECIFIED PATHOLOGICAL FRACTURE PRESENCE: Primary | ICD-10-CM

## 2023-10-10 PROCEDURE — 99999 PR PBB SHADOW E&M-EST. PATIENT-LVL V: ICD-10-PCS | Mod: PBBFAC,,, | Performed by: INTERNAL MEDICINE

## 2023-10-10 PROCEDURE — 99214 OFFICE O/P EST MOD 30 MIN: CPT | Mod: S$PBB,,, | Performed by: INTERNAL MEDICINE

## 2023-10-10 PROCEDURE — 99215 OFFICE O/P EST HI 40 MIN: CPT | Mod: 25,PBBFAC | Performed by: INTERNAL MEDICINE

## 2023-10-10 PROCEDURE — 96372 THER/PROPH/DIAG INJ SC/IM: CPT

## 2023-10-10 PROCEDURE — 99214 PR OFFICE/OUTPT VISIT, EST, LEVL IV, 30-39 MIN: ICD-10-PCS | Mod: S$PBB,,, | Performed by: INTERNAL MEDICINE

## 2023-10-10 PROCEDURE — 63600175 PHARM REV CODE 636 W HCPCS: Mod: JZ,JG | Performed by: INTERNAL MEDICINE

## 2023-10-10 PROCEDURE — 99999 PR PBB SHADOW E&M-EST. PATIENT-LVL V: CPT | Mod: PBBFAC,,, | Performed by: INTERNAL MEDICINE

## 2023-10-10 RX ORDER — TEMAZEPAM 15 MG/1
15 CAPSULE ORAL NIGHTLY PRN
Qty: 30 CAPSULE | Refills: 3 | Status: SHIPPED | OUTPATIENT
Start: 2023-10-10 | End: 2023-10-11 | Stop reason: SDUPTHER

## 2023-10-10 RX ORDER — PREDNISONE 5 MG/1
5 TABLET ORAL DAILY
Qty: 30 TABLET | Refills: 3 | Status: ACTIVE | OUTPATIENT
Start: 2023-10-10 | End: 2024-01-16

## 2023-10-10 RX ORDER — PREDNISONE 5 MG/1
5 TABLET ORAL DAILY
Qty: 30 TABLET | Refills: 3 | Status: SHIPPED | OUTPATIENT
Start: 2023-10-10 | End: 2023-10-10 | Stop reason: SDUPTHER

## 2023-10-10 RX ORDER — ABIRATERONE ACETATE 250 MG/1
250 TABLET ORAL DAILY
Qty: 30 TABLET | Refills: 4 | Status: ACTIVE | OUTPATIENT
Start: 2023-10-10 | End: 2024-03-04 | Stop reason: SDUPTHER

## 2023-10-10 RX ORDER — ABIRATERONE ACETATE 250 MG/1
250 TABLET ORAL DAILY
Qty: 30 TABLET | Refills: 4 | Status: SHIPPED | OUTPATIENT
Start: 2023-10-10 | End: 2023-10-10 | Stop reason: SDUPTHER

## 2023-10-10 RX ADMIN — DENOSUMAB 60 MG: 60 INJECTION SUBCUTANEOUS at 10:10

## 2023-10-11 ENCOUNTER — OFFICE VISIT (OUTPATIENT)
Dept: PULMONOLOGY | Facility: CLINIC | Age: 68
End: 2023-10-11
Payer: MEDICARE

## 2023-10-11 ENCOUNTER — PATIENT MESSAGE (OUTPATIENT)
Dept: HEMATOLOGY/ONCOLOGY | Facility: CLINIC | Age: 68
End: 2023-10-11
Payer: MEDICARE

## 2023-10-11 VITALS
BODY MASS INDEX: 25.86 KG/M2 | WEIGHT: 170.63 LBS | OXYGEN SATURATION: 98 % | HEIGHT: 68 IN | SYSTOLIC BLOOD PRESSURE: 143 MMHG | HEART RATE: 51 BPM | DIASTOLIC BLOOD PRESSURE: 80 MMHG

## 2023-10-11 DIAGNOSIS — G47.33 OSA (OBSTRUCTIVE SLEEP APNEA): Primary | ICD-10-CM

## 2023-10-11 DIAGNOSIS — I10 HTN (HYPERTENSION), BENIGN: ICD-10-CM

## 2023-10-11 DIAGNOSIS — G47.01 INSOMNIA DUE TO MEDICAL CONDITION: ICD-10-CM

## 2023-10-11 DIAGNOSIS — I49.3 PVC (PREMATURE VENTRICULAR CONTRACTION): ICD-10-CM

## 2023-10-11 DIAGNOSIS — G47.00 INSOMNIA, UNSPECIFIED TYPE: ICD-10-CM

## 2023-10-11 PROCEDURE — 99204 OFFICE O/P NEW MOD 45 MIN: CPT | Mod: S$PBB,,, | Performed by: INTERNAL MEDICINE

## 2023-10-11 PROCEDURE — 99204 PR OFFICE/OUTPT VISIT, NEW, LEVL IV, 45-59 MIN: ICD-10-PCS | Mod: S$PBB,,, | Performed by: INTERNAL MEDICINE

## 2023-10-11 PROCEDURE — 99215 OFFICE O/P EST HI 40 MIN: CPT | Mod: PBBFAC | Performed by: INTERNAL MEDICINE

## 2023-10-11 PROCEDURE — 99999 PR PBB SHADOW E&M-EST. PATIENT-LVL V: CPT | Mod: PBBFAC,,, | Performed by: INTERNAL MEDICINE

## 2023-10-11 PROCEDURE — 99999 PR PBB SHADOW E&M-EST. PATIENT-LVL V: ICD-10-PCS | Mod: PBBFAC,,, | Performed by: INTERNAL MEDICINE

## 2023-10-11 RX ORDER — VERAPAMIL HYDROCHLORIDE 120 MG/1
120 TABLET, FILM COATED, EXTENDED RELEASE ORAL NIGHTLY
Qty: 90 TABLET | Refills: 3 | Status: SHIPPED | OUTPATIENT
Start: 2023-10-11

## 2023-10-11 RX ORDER — TEMAZEPAM 15 MG/1
15 CAPSULE ORAL NIGHTLY PRN
Qty: 30 CAPSULE | Refills: 3 | Status: SHIPPED | OUTPATIENT
Start: 2023-10-11 | End: 2023-11-10

## 2023-10-11 RX ORDER — LOSARTAN POTASSIUM 100 MG/1
TABLET ORAL
Qty: 90 TABLET | Refills: 3 | Status: SHIPPED | OUTPATIENT
Start: 2023-10-11

## 2023-10-11 RX ORDER — VERAPAMIL HYDROCHLORIDE 240 MG/1
240 CAPSULE, EXTENDED RELEASE ORAL EVERY MORNING
Qty: 90 CAPSULE | Refills: 3 | Status: SHIPPED | OUTPATIENT
Start: 2023-10-11

## 2023-10-11 NOTE — PROGRESS NOTES
Denis Bunn  was seen as a new patient for the evaluation of  raven.    CHIEF COMPLAINT:    Chief Complaint   Patient presents with    Apnea       HISTORY OF PRESENT ILLNESS: Denis Bunn is a 68 y.o. male is here for sleep evaluation.   Patient underwent hsat by Dr. Griggs on 8/16/23 with ahi of 7.  Patient is here for further inputs.    STOPBANG during initial visit:  Snore:  loud snoring worse in supine position  Tire:  daily   Observed apnea:  no  Pressure (HTN):  yes  BMI:  Body mass index is 25.95 kg/m².   Age:  68 y.o.  Neck (inch):  15  Gender:  male    Other sleep ROS:  no parsomnia.  No cataplexy.  No rls symptoms.      [place] Sleepiness Scale score during initial sleep evaluation was 4.    SLEEP ROUTINE:  Activity the hour prior to sleep: watch tv or read in bed    Bed partner:  alone due to snoring  Time to bed:  9-9:30 pm  Lights off:  off  Sleep onset latency:  15 minutes        Disruptions or awakenings:    3 times (can have difficulty going back to sleep)    Wakeup time:      5 am   Perceived sleep quality:  tire       Daytime naps:      none  Weekend sleep routine:      same  Caffeine use: 1-2 cups of coffee in am   exercise habit:   swim 3 days per week, active at home      PAST MEDICAL HISTORY:    Active Ambulatory Problems     Diagnosis Date Noted    Nuclear cataract 07/05/2012    Macular edema 07/05/2012    CME (cystoid macular edema) - Left Eye 08/08/2012    Epiretinal membrane - Left Eye 08/08/2012    Macular puckering - Left Eye 08/08/2012    History of gout     Back pain     HTN (hypertension), benign 04/24/2013    Dyslipidemia 04/24/2013    Palpitations 05/14/2015    Impaired fasting glucose 07/15/2015    Anxiety 06/26/2018    PVC (premature ventricular contraction) 07/10/2018    Muscle spasm of back 12/07/2018    Decreased range of motion of lumbar spine 12/07/2018    Atypical chest pain 06/11/2020    Prostate cancer 08/05/2020    Erectile dysfunction following radical  prostatectomy 10/15/2020    Low testosterone 11/17/2021    Aortic atherosclerosis 03/01/2023    Sleep disorder 04/25/2023    DAYLIN (obstructive sleep apnea) 08/17/2023    Osteoporosis 09/07/2023    Insomnia due to medical condition 10/12/2023     Resolved Ambulatory Problems     Diagnosis Date Noted    FH: CAD (coronary artery disease) 04/24/2013    Overweight (BMI 25.0-29.9) 07/11/2018    Colon cancer screening 07/29/2019    Anastomotic urine leak after radical prostatectomy 09/04/2020     Past Medical History:   Diagnosis Date    Cataract     Constipation - functional     ED (erectile dysfunction)     Personal history of colonic polyps     Vision changes                 PAST SURGICAL HISTORY:    Past Surgical History:   Procedure Laterality Date    BACK SURGERY      CATARACT EXTRACTION      left eye    COLONOSCOPY N/A 7/29/2019    Procedure: COLONOSCOPY;  Surgeon: Mingo Freeman MD;  Location: [facility];  Service: Endoscopy;  Laterality: N/A;  due July 2019    ELBOW SURGERY      scope/right    EYE SURGERY      Dr. Hope.retina x2 left    LAMINECTOMY      ROBOT-ASSISTED LAPAROSCOPIC PROSTATECTOMY N/A 9/2/2020    Procedure: ROBOTIC PROSTATECTOMY;  Surgeon: Edson White MD;  Location: [address];  Service: Urology;  Laterality: N/A;  3hrs gen with regional    WRIST SURGERY      right         FAMILY HISTORY:                Family History   Problem Relation Age of Onset    Blindness Mother         from cva    Cataracts Mother     Prostate cancer Father     Cataracts Father     No Known Problems Sister     No Known Problems Brother     No Known Problems Maternal Aunt     No Known Problems Maternal Uncle     No Known Problems Paternal Aunt     No Known Problems Paternal Uncle     No Known Problems Maternal Grandmother     No Known Problems Maternal Grandfather     No Known Problems Paternal Grandmother     No Known Problems Paternal Grandfather     Amblyopia Neg Hx     Diabetes Neg Hx     Glaucoma Neg Hx      Hypertension Neg Hx     Macular degeneration Neg Hx     Retinal detachment Neg Hx     Strabismus Neg Hx     Stroke Neg Hx     Thyroid disease Neg Hx        SOCIAL HISTORY:          Tobacco:   Social History     Tobacco Use   Smoking Status Never   Smokeless Tobacco Never   Tobacco Comments    , 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       alcohol use:    Social History     Substance and Sexual Activity   Alcohol Use Yes    Alcohol/week: 3.0 standard drinks of alcohol    Types: 3 Glasses of wine per week    Comment: wine daily                 Occupation:  retire     ALLERGIES:    Review of patient's allergies indicates:   Allergen Reactions    Amoxicillin-pot clavulanate Nausea And Vomiting     Other reaction(s): Stomach upset  Other reaction(s): Stomach upset    Celecoxib Other (See Comments) and Nausea And Vomiting       CURRENT MEDICATIONS:    Current Outpatient Medications   Medication Sig Dispense Refill    abiraterone (ZYTIGA) 250 mg Tab Take 1 tablet (250 mg total) by mouth once daily. 30 tablet 4    allopurinoL (ZYLOPRIM) 100 MG tablet TAKE 1 TABLET BY MOUTH TWICE A  tablet 3    ascorbic acid, vitamin C, (VITAMIN C) 500 MG tablet Take 500 mg by mouth once daily. Hold for 1 week prior to surgery      calcium-vitamin D3 (OYSTER [place]LL CALCIUM-VIT D3) 500 mg-5 mcg (200 unit) per tablet Take 1 tablet by mouth 2 (two) times daily. 180 tablet 3    cholecalciferol, vitamin D3, 125 mcg (5,000 unit) Tab Take 5,000 Units by mouth once daily.      coenzyme Q10 100 mg capsule Take 100 mg by mouth once daily. Hold for 1 week prior to surgery      FA/niacinamide/cupric ox/Zn ox (NICOTINAMIDE ORAL) Take 500 mg by mouth once daily.      ferrous sulfate (SLOW RELEASE IRON) 142 mg (45 mg iron) TbSR Take by mouth.      gabapentin (NEURONTIN) 300 MG capsule TAKE 1 CAPSULE BY MOUTH EVERY EVENING 90 capsule 1    garlic 1,000 mg Cap Take by mouth. Hold for 1 week prior to surgery      hydrocortisone 2.5 % cream APPLY TO  AFFECTED AREA TWICE A DAY 28.35 g 1    insulin syringe-needle U-100 1 mL 30 gauge x 5/16 Syrg Use with ICI Therapy 10 each 10    magnesium 250 mg Tab Take 250 mg by mouth once. Takes two 250 mg tablets daily   Hold for 1 week prior to surgery      omega-3 fatty acids 1,000 mg Cap Take 1,000 mg by mouth.      papaverine 30 mg/mL injection Add: Phentolamine 1 mg  Add: PGE1 20 mcg    Sig:  Inject 20 units as directed; titrate up by 5 units until desired results 10 mL 12    pravastatin (PRAVACHOL) 20 MG tablet TAKE 1 TABLET(20 MG) BY MOUTH EVERY DAY 90 tablet 3    predniSONE (DELTASONE) 5 MG tablet Take 1 tablet (5 mg total) by mouth once daily. To be taken with Zytiga 30 tablet 3    temazepam (RESTORIL) 15 mg Cap Take 1 capsule (15 mg total) by mouth nightly as needed (insomnia). 30 capsule 3    zinc 50 mg Tab Take by mouth. Hold for 1 week prior to surgery      ZIOPTAN, PF, 0.0015 % Dpet Place 1 drop into both eyes nightly.      ALPRAZolam (XANAX) 0.5 MG tablet Take 1 tablet (0.5 mg total) by mouth nightly as needed for Anxiety. 30 tablet 1    losartan (COZAAR) 100 MG tablet TAKE 1 TABLET BY MOUTH EVERY DAY 90 tablet 3    verapamiL (CALAN-SR) 120 MG CR tablet TAKE 1 TABLET (120 MG TOTAL) BY MOUTH NIGHTLY. IN ADDITION TO 240MG IN THE MORNING 90 tablet 3    verapamiL (VERELAN) 240 MG C24P TAKE 1 CAPSULE (240 MG TOTAL) BY MOUTH EVERY MORNING. IN ADDITION  MG AT NIGHT 90 capsule 3     No current facility-administered medications for this visit.                  REVIEW OF SYSTEMS:     Sleep related symptoms as per HPI.  CONST:Denies weight gain    HEENT: Denies sinus congestion  PULM: Denies dyspnea  CARD:  Denies palpitations   GI:  Denies acid reflux  : Denies polyuria; no urgency or frequency  NEURO: Denies headaches  PSYCH: anxious a/w prostate cancer  HEME: Denies anemia   Otherwise, a balance of systems reviewed is negative.          PHYSICAL EXAM:  Vitals:    10/11/23 1259   BP: (!) 143/80   Pulse: (!) 51  "  SpO2: 98%   Weight: 77.4 kg (170 lb 10.2 oz)   Height: 5' 8" (1.727 m)   PainSc:   4   PainLoc: Back     Body mass index is 25.95 kg/m².     GENERAL: Normal development, well groomed  HEENT:  Conjunctivae are non-erythematous; Pupils equal, round, and reactive to light; Nose is symmetrical; Nasal mucosa is pink and moist; Septum is midline; Inferior turbinates are normal; Nasal airflow is normal; Posterior pharynx is pink; Modified Mallampati: 2; Posterior palate is normal; Tonsils +1; Uvula is normal and pink;Tongue is normal; Dentition is fair; No TMJ tenderness; Jaw opening and protrusion without click and without discomfort.  NECK: Supple. Neck circumference is 15 inches. No thyromegaly. No palpable nodes.     SKIN: On face and neck: No abrasions, no rashes, no lesions.  No subcutaneous nodules are palpable.  RESPIRATORY: Chest is clear to auscultation.  Normal chest expansion and non-labored breathing at rest.  CARDIOVASCULAR: Normal S1, S2.  No murmurs, gallops or rubs. No carotid bruits bilaterally.  EXTREMITIES: No edema. No clubbing. No cyanosis. Station normal. Gait normal.        NEURO/PSYCH: Oriented to time, place and person. Normal attention span and concentration. Affect is full. Mood is normal.                                              DATA   Hsat 8/16/23 Ahi of 7; rdi of 12    Lab Results   Component Value Date    TSH 1.258 02/27/2023       ASSESSMENT/PLAN    Problem List Items Addressed This Visit       Insomnia due to medical condition    Overview     maintenance is the issue.    Untreated raven + psychophysiologic.    Stimulus control d/w patient.  Advise patient to download insomnia .  May be candidate for cbt after raven is optimize.          RAVEN (obstructive sleep apnea) - Primary    Overview     ahi 7.    Result d/w patient.  Recommend treatment due to restless and htn.  Agree to oral appliance.           Relevant Orders    Ambulatory referral/consult to Dentistry         Education: " During our discussion today, we talked about the etiology of obstructive sleep apnea as well as the potential ramifications of untreated sleep apnea, which could include daytime sleepiness, hypertension, heart disease and/or stroke.     Precautions: The patient was advised to abstain from driving should they feel sleepy or drowsy.       Patient will No follow-ups on file.      45 minutes of total time spent on the encounter, which includes face to face time and non-face to face time preparing to see the patient (eg, review of tests), Obtaining and/or reviewing separately obtained history, documenting clinical information in the electronic or other health record, independently interpreting results (not separately reported) and communicating results to the patient/family/caregiver, or Care coordination (not separately reported).

## 2023-10-11 NOTE — PATIENT INSTRUCTIONS
Dentists for dental appliance    - [facility] [phone]    [facility]  -Jeff Mcgovern   [phone]  [address] [place] [address]    [place]    - Dr. Carlin Bowser     [phone], [address]  [place], [place] [address]      [facility]    - Regino Enriquez Jr      [phone]   [place] [address]    -Dr. Cristhian Noe   [phone]  [facility]    -Dr. Moises Lakhani   [phone]    -Sudeep Rodriguez DDS ([facility]) [phone]       [facility]    -Darrin Brown  [facility]   [address]  [place] [address]          [facility]

## 2023-10-12 PROBLEM — G47.01 INSOMNIA DUE TO MEDICAL CONDITION: Status: ACTIVE | Noted: 2023-10-12

## 2023-10-18 ENCOUNTER — PATIENT MESSAGE (OUTPATIENT)
Dept: PULMONOLOGY | Facility: CLINIC | Age: 68
End: 2023-10-18
Payer: MEDICARE

## 2023-11-28 ENCOUNTER — OFFICE VISIT (OUTPATIENT)
Dept: FAMILY MEDICINE | Facility: CLINIC | Age: 68
End: 2023-11-28
Payer: MEDICARE

## 2023-11-28 VITALS
HEIGHT: 68 IN | WEIGHT: 170.63 LBS | OXYGEN SATURATION: 97 % | BODY MASS INDEX: 25.86 KG/M2 | TEMPERATURE: 98 F | HEART RATE: 52 BPM | SYSTOLIC BLOOD PRESSURE: 134 MMHG | DIASTOLIC BLOOD PRESSURE: 78 MMHG

## 2023-11-28 DIAGNOSIS — I10 HYPERTENSION, ESSENTIAL: ICD-10-CM

## 2023-11-28 DIAGNOSIS — C61 PROSTATE CANCER: ICD-10-CM

## 2023-11-28 DIAGNOSIS — E78.2 MIXED HYPERLIPIDEMIA: ICD-10-CM

## 2023-11-28 DIAGNOSIS — F33.1 MODERATE EPISODE OF RECURRENT MAJOR DEPRESSIVE DISORDER: Primary | ICD-10-CM

## 2023-11-28 PROCEDURE — 99214 PR OFFICE/OUTPT VISIT, EST, LEVL IV, 30-39 MIN: ICD-10-PCS | Mod: S$PBB,,, | Performed by: INTERNAL MEDICINE

## 2023-11-28 PROCEDURE — 99215 OFFICE O/P EST HI 40 MIN: CPT | Mod: PBBFAC,PN | Performed by: INTERNAL MEDICINE

## 2023-11-28 PROCEDURE — 99999 PR PBB SHADOW E&M-EST. PATIENT-LVL V: ICD-10-PCS | Mod: PBBFAC,,, | Performed by: INTERNAL MEDICINE

## 2023-11-28 PROCEDURE — 99999 PR PBB SHADOW E&M-EST. PATIENT-LVL V: CPT | Mod: PBBFAC,,, | Performed by: INTERNAL MEDICINE

## 2023-11-28 PROCEDURE — 99214 OFFICE O/P EST MOD 30 MIN: CPT | Mod: S$PBB,,, | Performed by: INTERNAL MEDICINE

## 2023-11-28 RX ORDER — CITALOPRAM 10 MG/1
10 TABLET ORAL DAILY
Qty: 30 TABLET | Refills: 2 | Status: SHIPPED | OUTPATIENT
Start: 2023-11-28 | End: 2024-01-16 | Stop reason: SDUPTHER

## 2023-11-29 ENCOUNTER — PATIENT MESSAGE (OUTPATIENT)
Dept: CARDIOLOGY | Facility: CLINIC | Age: 68
End: 2023-11-29
Payer: MEDICARE

## 2023-12-19 ENCOUNTER — PATIENT MESSAGE (OUTPATIENT)
Dept: FAMILY MEDICINE | Facility: CLINIC | Age: 68
End: 2023-12-19
Payer: MEDICARE

## 2023-12-22 DIAGNOSIS — Z82.49 FH: CAD (CORONARY ARTERY DISEASE): ICD-10-CM

## 2023-12-22 DIAGNOSIS — I10 HTN (HYPERTENSION), BENIGN: ICD-10-CM

## 2023-12-22 RX ORDER — ALLOPURINOL 100 MG/1
TABLET ORAL
Qty: 180 TABLET | Refills: 0 | Status: SHIPPED | OUTPATIENT
Start: 2023-12-22 | End: 2024-01-30

## 2023-12-22 NOTE — TELEPHONE ENCOUNTER
Refill Routing Note   Medication(s) are not appropriate for processing by [facility] for the following reason(s):        Required labs outdated    [facility] action(s):  Defer     Requires labs : Yes             Appointments  past 12m or future 3m with PCP    Date Provider   Last Visit   11/28/2023 Taz Lilly MD   Next Visit   1/23/2024 Taz Lilly MD   ED visits in past 90 days: 0        Note composed:12:48 PM 12/22/2023

## 2023-12-22 NOTE — TELEPHONE ENCOUNTER
Care Due:                  Date            Visit Type   Department     Provider  --------------------------------------------------------------------------------                                YAMEL      [facility]                              FOLLOWUP/OF  MEDICINE/  Last Visit: 11-      [facility] VISIT   INTERNAL MED   Taz Lilly  Next Visit: None Scheduled  None         None Found                                                            Last  Test          Frequency    Reason                     Performed    Due Date  --------------------------------------------------------------------------------    Uric Acid...  12 months..  allopurinoL..............  10-   10-    [facility] Embedded Care Due Messages. Reference number: [id number].   12/22/2023 12:07:55 AM CST

## 2024-01-03 ENCOUNTER — PATIENT MESSAGE (OUTPATIENT)
Dept: HEMATOLOGY/ONCOLOGY | Facility: CLINIC | Age: 69
End: 2024-01-03
Payer: MEDICARE

## 2024-01-08 ENCOUNTER — PATIENT MESSAGE (OUTPATIENT)
Dept: HEMATOLOGY/ONCOLOGY | Facility: CLINIC | Age: 69
End: 2024-01-08
Payer: MEDICARE

## 2024-01-08 DIAGNOSIS — E55.9 VITAMIN D DEFICIENCY, UNSPECIFIED: ICD-10-CM

## 2024-01-08 DIAGNOSIS — M81.8 OTHER OSTEOPOROSIS, UNSPECIFIED PATHOLOGICAL FRACTURE PRESENCE: Primary | ICD-10-CM

## 2024-01-11 ENCOUNTER — LAB VISIT (OUTPATIENT)
Dept: LAB | Facility: HOSPITAL | Age: 69
End: 2024-01-11
Attending: INTERNAL MEDICINE
Payer: MEDICARE

## 2024-01-11 ENCOUNTER — OFFICE VISIT (OUTPATIENT)
Dept: HEMATOLOGY/ONCOLOGY | Facility: CLINIC | Age: 69
End: 2024-01-11
Payer: MEDICARE

## 2024-01-11 VITALS
DIASTOLIC BLOOD PRESSURE: 86 MMHG | HEIGHT: 68 IN | BODY MASS INDEX: 25.38 KG/M2 | HEART RATE: 55 BPM | TEMPERATURE: 99 F | SYSTOLIC BLOOD PRESSURE: 184 MMHG | WEIGHT: 167.44 LBS

## 2024-01-11 DIAGNOSIS — D49.9 IMMUNODEFICIENCY SECONDARY TO NEOPLASM: ICD-10-CM

## 2024-01-11 DIAGNOSIS — C61 PROSTATE CANCER: ICD-10-CM

## 2024-01-11 DIAGNOSIS — D84.81 IMMUNODEFICIENCY SECONDARY TO NEOPLASM: ICD-10-CM

## 2024-01-11 DIAGNOSIS — M81.8 OTHER OSTEOPOROSIS, UNSPECIFIED PATHOLOGICAL FRACTURE PRESENCE: ICD-10-CM

## 2024-01-11 DIAGNOSIS — Z79.818 ENCOUNTER FOR MONITORING ANDROGEN DEPRIVATION THERAPY: ICD-10-CM

## 2024-01-11 DIAGNOSIS — C61 PROSTATE CANCER: Primary | ICD-10-CM

## 2024-01-11 DIAGNOSIS — E55.9 VITAMIN D DEFICIENCY, UNSPECIFIED: ICD-10-CM

## 2024-01-11 DIAGNOSIS — M54.9 BACK PAIN, UNSPECIFIED BACK LOCATION, UNSPECIFIED BACK PAIN LATERALITY, UNSPECIFIED CHRONICITY: ICD-10-CM

## 2024-01-11 DIAGNOSIS — G47.00 INSOMNIA, UNSPECIFIED TYPE: ICD-10-CM

## 2024-01-11 LAB
ALBUMIN SERPL BCP-MCNC: 4.3 G/DL (ref 3.5–5.2)
ALP SERPL-CCNC: 49 U/L (ref 55–135)
ALT SERPL W/O P-5'-P-CCNC: 27 U/L (ref 10–44)
ANION GAP SERPL CALC-SCNC: 9 MMOL/L (ref 8–16)
AST SERPL-CCNC: 25 U/L (ref 10–40)
BASOPHILS # BLD AUTO: 0.03 K/UL (ref 0–0.2)
BASOPHILS NFR BLD: 0.5 % (ref 0–1.9)
BILIRUB SERPL-MCNC: 1 MG/DL (ref 0.1–1)
BUN SERPL-MCNC: 15 MG/DL (ref 8–23)
CALCIUM SERPL-MCNC: 10 MG/DL (ref 8.7–10.5)
CHLORIDE SERPL-SCNC: 105 MMOL/L (ref 95–110)
CO2 SERPL-SCNC: 28 MMOL/L (ref 23–29)
COMPLEXED PSA SERPL-MCNC: <0.01 NG/ML (ref 0–4)
CREAT SERPL-MCNC: 1 MG/DL (ref 0.5–1.4)
DIFFERENTIAL METHOD BLD: ABNORMAL
EOSINOPHIL # BLD AUTO: 0.1 K/UL (ref 0–0.5)
EOSINOPHIL NFR BLD: 2.1 % (ref 0–8)
ERYTHROCYTE [DISTWIDTH] IN BLOOD BY AUTOMATED COUNT: 11.9 % (ref 11.5–14.5)
EST. GFR  (NO RACE VARIABLE): >60 ML/MIN/1.73 M^2
GLUCOSE SERPL-MCNC: 107 MG/DL (ref 70–110)
HCT VFR BLD AUTO: 40.9 % (ref 40–54)
HGB BLD-MCNC: 13.7 G/DL (ref 14–18)
IMM GRANULOCYTES # BLD AUTO: 0.02 K/UL (ref 0–0.04)
IMM GRANULOCYTES NFR BLD AUTO: 0.4 % (ref 0–0.5)
LYMPHOCYTES # BLD AUTO: 0.9 K/UL (ref 1–4.8)
LYMPHOCYTES NFR BLD: 16.4 % (ref 18–48)
MCH RBC QN AUTO: 33.2 PG (ref 27–31)
MCHC RBC AUTO-ENTMCNC: 33.5 G/DL (ref 32–36)
MCV RBC AUTO: 99 FL (ref 82–98)
MONOCYTES # BLD AUTO: 0.5 K/UL (ref 0.3–1)
MONOCYTES NFR BLD: 8.7 % (ref 4–15)
NEUTROPHILS # BLD AUTO: 4 K/UL (ref 1.8–7.7)
NEUTROPHILS NFR BLD: 71.9 % (ref 38–73)
NRBC BLD-RTO: 0 /100 WBC
PLATELET # BLD AUTO: 220 K/UL (ref 150–450)
PMV BLD AUTO: 9.8 FL (ref 9.2–12.9)
POTASSIUM SERPL-SCNC: 4.5 MMOL/L (ref 3.5–5.1)
PROT SERPL-MCNC: 7.5 G/DL (ref 6–8.4)
RBC # BLD AUTO: 4.13 M/UL (ref 4.6–6.2)
SODIUM SERPL-SCNC: 142 MMOL/L (ref 136–145)
URATE SERPL-MCNC: 4 MG/DL (ref 3.4–7)
WBC # BLD AUTO: 5.62 K/UL (ref 3.9–12.7)

## 2024-01-11 PROCEDURE — 99999 PR PBB SHADOW E&M-EST. PATIENT-LVL IV: CPT | Mod: PBBFAC,,, | Performed by: REGISTERED NURSE

## 2024-01-11 PROCEDURE — 84153 ASSAY OF PSA TOTAL: CPT | Performed by: INTERNAL MEDICINE

## 2024-01-11 PROCEDURE — 99214 OFFICE O/P EST MOD 30 MIN: CPT | Mod: PBBFAC | Performed by: REGISTERED NURSE

## 2024-01-11 PROCEDURE — 85025 COMPLETE CBC W/AUTO DIFF WBC: CPT | Performed by: INTERNAL MEDICINE

## 2024-01-11 PROCEDURE — 80053 COMPREHEN METABOLIC PANEL: CPT | Performed by: INTERNAL MEDICINE

## 2024-01-11 PROCEDURE — 99214 OFFICE O/P EST MOD 30 MIN: CPT | Mod: S$PBB,,, | Performed by: REGISTERED NURSE

## 2024-01-11 PROCEDURE — 82652 VIT D 1 25-DIHYDROXY: CPT | Performed by: INTERNAL MEDICINE

## 2024-01-11 PROCEDURE — 84550 ASSAY OF BLOOD/URIC ACID: CPT | Performed by: INTERNAL MEDICINE

## 2024-01-11 RX ORDER — MELOXICAM 7.5 MG/1
TABLET ORAL
COMMUNITY
Start: 2024-01-09

## 2024-01-11 RX ORDER — DENOSUMAB 60 MG/ML
60 INJECTION SUBCUTANEOUS
COMMUNITY

## 2024-01-11 NOTE — PROGRESS NOTES
Subjective     Patient ID: Denis Bunn is a 68 y.o. male.    Chief Complaint: No chief complaint on file.    HPI    Diagnosis: Metastatic Prostate Cancer  Previously seen by Rad Onc, Dr. Frias and prior consult at [facility]     Presents to clinic for follow up  Reports compliance with Zytiga + prednisone  Restarted since last visit   Tolerating treatment well overall  Eating well, weight stable.   Energy level stable, continues to stay busy with home improvement projects.   No fevers, chills, or infectious concerns  No SOB, CP, or palpitations  No nausea, diarrhea or constipation  No blood in urine or stool  No significant hot flashes  No new urinary concerns/complaints  Does report increased lower back pain  Follows with outside back/spine MD, recently with imaging done.   BP elevated in clinic - notes increased anxiety around test results/visits       Most recent imaging:   - 4/18/2023 PSMA PET:  FINDINGS:  In the head and neck, there is physiologic uptake in the lacrimal and salivary glands, and there are no tracer avid lesions suspicious for malignancy.  In the chest, there are no tracer avid lesions suspicious for malignancy.  In the abdomen and pelvis, there is physiologic distribution in the liver, spleen, bowel, and genitourinary tract, and there are no anatomic or tracer avid lesions suspicious for malignancy.  Specifically, there are no pathologically enlarged or tracer avid pelvic or retroperitoneal lymph nodes.  In the bones, there is physiologic uptake in the bone marrow, and there are no tracer avid lesions suspicious for malignancy.  Additional CT findings:  Probable right maxillary sinus retention cyst.  Bilateral subsegmental dependent atelectasis the lungs.  Small splenule.  Subcentimeter hyperattenuating left renal focus, possibly represents hemorrhagic cyst.  Postsurgical changes of prostatectomy.  Posterior instrumented fusion of lumbosacral spine.  Impression:  Postsurgical changes of  prostatectomy in patient with prostate cancer.  No focal abnormal radiotracer uptake to suggest local recurrence or metastatic disease.     Oncology History:  - July 2020 presented for evaluation of an elevated PSA of 5.3 ng/ml.       - 9/2/2020 underwent RALP with bilateral lymph node dissection   Pathology revealed [place] 7 (4+3) adenocarcinoma involving 20% of the prostate.  The [place] pattern 4 accounted for 85% of the tumor.  There was extraprostatic extension with multifocal perineural and lymphovascular invasion.  There was no bladder neck invasion or seminal vesicle invasion.  The margins of resection and 1  Rt. and 1 Lt. pelvic nodes were negative for tumor involvement.       - Postoperative PSA on 10/1/20 returned at 0.14 ng/ml.  Repeat PSA  2/9/21 and returned at 0.41 ng/ml.       - 2/26/2021 PET fluciclovine showed no evidence of recurrent or metastatic disease     - 3/2/2021 MRI of the prostate showed 7 mm bone lesion in the left symphysis pubis     - he was started on Casodex     - 3/3/2021 went to [facility] and casodex stopped to pursue biopsy     - 3/15/2021 IR-directed biopsy on the left symphysis pubis revealed prostate adenocarcinoma     - 3/24/21 restarted casodex and received Eligard on 3/31/2021      - 4/21/2021 started apalutamide per [facility]-- 1 week later developed a rash that responded to medrol dose pack but recurred      - He was recommended for radiotherapy to the prostate bed, pubic symphysis and pelvic nodes which he completed on 10/27/21.  The metastatic focus in the pubic symphysis also received radiotherapy.       - patient subsequently elected to discontinue his hormonal therapy.His last Lupron injection was in October of 2021.       - 7/13/2021 PET showed no evidence of fluciclovine avid recurrent or metastatic prostate carcinoma.      - 7/12/2021 MRI of the pelvis showed stable postsurgical findings without evidence of local recurrence or pelvic adenopathy. There was a sclerotic  focus in the left pubic symphysis unchanged since the prior examination.      - PSA < .01 ng/ml    - He continued on ADT + apalutamide andradiation to the primary tumor and pelvic bone from 08/19/2021-10/27/2021 with Dr. Frias.     - 4/18/2023 PET PSMA:  FINDINGS:  In the head and neck, there is physiologic uptake in the lacrimal and salivary glands, and there are no tracer avid lesions suspicious for malignancy.  In the chest, there are no tracer avid lesions suspicious for malignancy.  In the abdomen and pelvis, there is physiologic distribution in the liver, spleen, bowel, and genitourinary tract, and there are no anatomic or tracer avid lesions suspicious for malignancy.  Specifically, there are no pathologically enlarged or tracer avid pelvic or retroperitoneal lymph nodes.  In the bones, there is physiologic uptake in the bone marrow, and there are no tracer avid lesions suspicious for malignancy.  Additional CT findings:  Probable right maxillary sinus retention cyst.  Bilateral subsegmental dependent atelectasis the lungs.  Small splenule.  Subcentimeter hyperattenuating left renal focus, possibly represents hemorrhagic cyst.  Postsurgical changes of prostatectomy.  Posterior instrumented fusion of lumbosacral spine.  Impression:  Postsurgical changes of prostatectomy in patient with prostate cancer.  No focal abnormal radiotracer uptake to suggest local recurrence or metastatic disease.     FH:  Father prostate cancer at 79  Paternal grandfather- lung cancer (smoker)     SH:    Social EtOH    Review of Systems   Constitutional:  Positive for fatigue (stable). Negative for activity change, appetite change, fever and unexpected weight change.   HENT:  Negative for sore throat and trouble swallowing.    Eyes:  Negative for photophobia and visual disturbance.   Respiratory:  Negative for cough, chest tightness, shortness of breath and wheezing.    Cardiovascular:  Negative for chest pain,  palpitations and leg swelling.   Gastrointestinal:  Negative for abdominal pain, constipation, diarrhea, nausea and vomiting.   Genitourinary:  Negative for bladder incontinence, dysuria, flank pain and hematuria.   Musculoskeletal:  Positive for back pain. Negative for arthralgias and myalgias.   Integumentary:  Negative for color change and pallor.   Neurological:  Negative for dizziness, weakness and headaches.   Psychiatric/Behavioral:  Negative for confusion and decreased concentration. The patient is nervous/anxious.         Objective     Physical Exam  Vitals and nursing note reviewed.   Constitutional:       General: He is not in acute distress.     Appearance: Normal appearance. He is normal weight. He is not ill-appearing, toxic-appearing or diaphoretic.   HENT:      Head: Normocephalic and atraumatic.      Right Ear: External ear normal.      Left Ear: External ear normal.      Nose: Nose normal.      Mouth/Throat:      Pharynx: Oropharynx is clear.   Eyes:      General: No scleral icterus.     Conjunctiva/sclera: Conjunctivae normal.   Cardiovascular:      Rate and Rhythm: Bradycardia present.   Pulmonary:      Effort: Pulmonary effort is normal. No respiratory distress.   Abdominal:      General: There is no distension.   Musculoskeletal:      Cervical back: Normal range of motion.   Skin:     General: Skin is warm and dry.   Neurological:      General: No focal deficit present.      Mental Status: He is alert and oriented to person, place, and time.      Motor: No weakness.      Gait: Gait normal.   Psychiatric:         Mood and Affect: Mood normal.         Behavior: Behavior normal.         Thought Content: Thought content normal.         Judgment: Judgment normal.        Assessment and Plan     1. Prostate cancer    2. Encounter for monitoring androgen deprivation therapy    3. Immunodeficiency secondary to neoplasm    4. Insomnia, unspecified type    5. Other osteoporosis, unspecified pathological  fracture presence    6. Vitamin D deficiency, unspecified    7. Back pain, unspecified back location, unspecified back pain laterality, unspecified chronicity        Insomnia-   Restoril sent previously. Continue.     Prostate cancer  Restarted low dose zytiga + prednisone  Tolerating well overall  PSA undetectable today, <0.01 ng/ml  Continue current regimen   Recheck labs in 8 weeks    Osteoporosis  Prolia q 6 months-- started 10/10/23  Due in 4/2024 for next dose   BMD at 1 year (09/2024)    Vitamin D deficiency-  Labs drawn today, results pending.  Continue to monitor.     Back pain-   Following with outside back/spine team  Has updated imaging - would like uploaded to his chart  Imaging disc given to team today for upload.     Patient is in agreement with the proposed treatment plan. All questions were answered to the patient's satisfaction. Pt knows to call clinic if anything is needed before the next clinic visit.    Patient discussed with collaborating physician, Dr. Orourke.    At least 30 minutes were spent today on this encounter including face to face time with the patient, data gathering/interpretation and documentation.       Jeannette Faria, MSN, APRN, ACCNS-  Hematology and Medical Oncology  Clinical Nurse Specialist to Dr. Tubbs, Dr. Orourke & Dr. Ruiz Chart for Scheduling    Med Onc Chart Routing      Follow up with physician 2 months. with labs to see Dr. Orourke in clinic   Follow up with [facility]    Infusion scheduling note    Injection scheduling note prolia due 4/9/24   Labs CBC, CMP and PSA   Scheduling:  Preferred lab:  Lab interval:     Imaging    Pharmacy appointment    Other referrals                Therapy Plan Information  Medications  denosumab (PROLIA) injection 60 mg  60 mg, Subcutaneous, Every 26 weeks

## 2024-01-12 LAB — 1,25(OH)2D3 SERPL-MCNC: 58 PG/ML (ref 20–79)

## 2024-01-14 DIAGNOSIS — C61 PROSTATE CANCER: ICD-10-CM

## 2024-01-14 DIAGNOSIS — G62.9 NEUROPATHY: ICD-10-CM

## 2024-01-14 DIAGNOSIS — F33.1 MODERATE EPISODE OF RECURRENT MAJOR DEPRESSIVE DISORDER: ICD-10-CM

## 2024-01-15 NOTE — TELEPHONE ENCOUNTER
No care due was identified.  [facility] Embedded Care Due Messages. Reference number: [id number].   1/14/2024 8:03:33 PM CST

## 2024-01-16 RX ORDER — PREDNISONE 5 MG/1
5 TABLET ORAL DAILY
Qty: 30 TABLET | Refills: 3 | Status: SHIPPED | OUTPATIENT
Start: 2024-01-16

## 2024-01-16 RX ORDER — CITALOPRAM 10 MG/1
10 TABLET ORAL
Qty: 90 TABLET | Refills: 1 | Status: SHIPPED | OUTPATIENT
Start: 2024-01-16

## 2024-01-16 RX ORDER — GABAPENTIN 300 MG/1
300 CAPSULE ORAL
Qty: 90 CAPSULE | Refills: 1 | Status: SHIPPED | OUTPATIENT
Start: 2024-01-16

## 2024-01-16 NOTE — TELEPHONE ENCOUNTER
Refill Routing Note   Medication(s) are not appropriate for processing by [facility] for the following reason(s):        New or recently adjusted medication  Outside of protocol    [facility] action(s):  Route             Appointments  past 12m or future 3m with PCP    Date Provider   Last Visit   11/28/2023 Taz Lilly MD   Next Visit   Visit date not found Taz Lilly MD   ED visits in past 90 days: 0        Note composed:10:24 AM 01/16/2024

## 2024-01-29 ENCOUNTER — TELEPHONE (OUTPATIENT)
Dept: CARDIOLOGY | Facility: CLINIC | Age: 69
End: 2024-01-29
Payer: MEDICARE

## 2024-01-29 NOTE — TELEPHONE ENCOUNTER
Returned pt's call - They are moving possibly in May and he and his wife would like to see Dr Pereyra before they leave. Will call them back with appts.     Eli Mcclure Staff  Caller: Unspecified (Today,  9:44 AM)  Pt is wanting for he and his wife to get in to see Dr. Pereyra before they move and there are no appt's available.  He says that you will get them an appt and would like to speak with you.  He can be reached at [phone]    Thank basilia

## 2024-01-29 NOTE — TELEPHONE ENCOUNTER
----- Message from Eli Mcclure sent at 1/29/2024  9:44 AM CST -----  Regarding: appt access  Pt is wanting for he and his wife to get in to see Dr. Pereyra before they move and there are no appt's available.  He says that you will get them an appt and would like to speak with you.  He can be reached at [phone]    Thank you

## 2024-01-30 DIAGNOSIS — I10 HTN (HYPERTENSION), BENIGN: ICD-10-CM

## 2024-01-30 DIAGNOSIS — Z82.49 FH: CAD (CORONARY ARTERY DISEASE): ICD-10-CM

## 2024-01-30 RX ORDER — ALLOPURINOL 100 MG/1
TABLET ORAL
Qty: 180 TABLET | Refills: 3 | Status: SHIPPED | OUTPATIENT
Start: 2024-01-30

## 2024-01-30 NOTE — TELEPHONE ENCOUNTER
No care due was identified.  [facility] Embedded Care Due Messages. Reference number: [id number].   1/30/2024 5:56:29 PM CST

## 2024-01-31 NOTE — TELEPHONE ENCOUNTER
Refill Decision Note   Denis Bunn  is requesting a refill authorization.    Brief Assessment and Rationale for Refill:   Approve       Medication Therapy Plan:         Comments:     Note composed:6:19 PM 01/30/2024

## 2024-02-16 ENCOUNTER — PATIENT MESSAGE (OUTPATIENT)
Dept: FAMILY MEDICINE | Facility: CLINIC | Age: 69
End: 2024-02-16
Payer: MEDICARE

## 2024-02-16 DIAGNOSIS — Z12.11 SCREENING FOR COLON CANCER: Primary | ICD-10-CM

## 2024-02-19 ENCOUNTER — TELEPHONE (OUTPATIENT)
Dept: ENDOSCOPY | Facility: HOSPITAL | Age: 69
End: 2024-02-19
Payer: MEDICARE

## 2024-02-19 DIAGNOSIS — Z12.11 COLON CANCER SCREENING: Primary | ICD-10-CM

## 2024-02-19 RX ORDER — POLYETHYLENE GLYCOL 3350, SODIUM SULFATE ANHYDROUS, SODIUM BICARBONATE, SODIUM CHLORIDE, POTASSIUM CHLORIDE 236; 22.74; 6.74; 5.86; 2.97 G/4L; G/4L; G/4L; G/4L; G/4L
4 POWDER, FOR SOLUTION ORAL ONCE
Qty: 4000 ML | Refills: 0 | Status: SHIPPED | OUTPATIENT
Start: 2024-02-19 | End: 2024-02-19

## 2024-02-19 NOTE — TELEPHONE ENCOUNTER
Spoke to patient to schedule procedure(s) Colonoscopy       Physician to perform procedure(s) Dr. APOLINAR Henry  Date of Procedure (s) 4/23/24  Arrival Time 7:00 AM  Time of Procedure(s) 8:00 AM   Location of Procedure(s) [facility] 4th Floor  Type of Rx Prep sent to patient: PEG  Instructions provided to patient via [facility]    Patient was informed on the following information and verbalized understanding. Screening questionnaire reviewed with patient and complete. If procedure requires anesthesia, a responsible adult needs to be present to accompany the patient home, patient cannot drive after receiving anesthesia. Appointment details are tentative, especially check-in time. Patient will receive a prep-op call 7 days prior to confirm check-in time for procedure. If applicable the patient should contact their pharmacy to verify Rx for procedure prep is ready for pick-up. Patient was advised to call the scheduling department at [phone] if pharmacy states no Rx is available. Patient was advised to call the endoscopy scheduling department if any questions or concerns arise.      [facility] Scheduling Department

## 2024-02-19 NOTE — TELEPHONE ENCOUNTER
Received patient's call to schedule for a colonoscopy. Informed patient last colonoscopy 7/2019- 5 year surveillance. He is not due until July 2024.  Patient states [place] is [place] out of [place] on [place] 1. Informed patient the first availability won't be until May. Patient verbalized an understanding.

## 2024-02-21 ENCOUNTER — PATIENT MESSAGE (OUTPATIENT)
Dept: FAMILY MEDICINE | Facility: CLINIC | Age: 69
End: 2024-02-21
Payer: MEDICARE

## 2024-03-04 DIAGNOSIS — C61 PROSTATE CANCER: ICD-10-CM

## 2024-03-04 RX ORDER — ABIRATERONE ACETATE 250 MG/1
250 TABLET ORAL DAILY
Qty: 30 TABLET | Refills: 4 | Status: ACTIVE | OUTPATIENT
Start: 2024-03-04 | End: 2025-03-04

## 2024-03-12 ENCOUNTER — LAB VISIT (OUTPATIENT)
Dept: LAB | Facility: HOSPITAL | Age: 69
End: 2024-03-12
Attending: INTERNAL MEDICINE
Payer: MEDICARE

## 2024-03-12 ENCOUNTER — OFFICE VISIT (OUTPATIENT)
Dept: HEMATOLOGY/ONCOLOGY | Facility: CLINIC | Age: 69
End: 2024-03-12
Payer: MEDICARE

## 2024-03-12 VITALS
TEMPERATURE: 97 F | SYSTOLIC BLOOD PRESSURE: 132 MMHG | OXYGEN SATURATION: 99 % | RESPIRATION RATE: 17 BRPM | BODY MASS INDEX: 25.01 KG/M2 | HEIGHT: 68 IN | WEIGHT: 165 LBS | HEART RATE: 59 BPM | DIASTOLIC BLOOD PRESSURE: 63 MMHG

## 2024-03-12 DIAGNOSIS — M81.8 OTHER OSTEOPOROSIS, UNSPECIFIED PATHOLOGICAL FRACTURE PRESENCE: ICD-10-CM

## 2024-03-12 DIAGNOSIS — C61 PROSTATE CANCER: ICD-10-CM

## 2024-03-12 DIAGNOSIS — C61 PROSTATE CANCER: Primary | ICD-10-CM

## 2024-03-12 LAB
ALBUMIN SERPL BCP-MCNC: 4.2 G/DL (ref 3.5–5.2)
ALP SERPL-CCNC: 50 U/L (ref 55–135)
ALT SERPL W/O P-5'-P-CCNC: 21 U/L (ref 10–44)
ANION GAP SERPL CALC-SCNC: 7 MMOL/L (ref 8–16)
AST SERPL-CCNC: 24 U/L (ref 10–40)
BASOPHILS # BLD AUTO: 0.04 K/UL (ref 0–0.2)
BASOPHILS NFR BLD: 0.9 % (ref 0–1.9)
BILIRUB SERPL-MCNC: 0.7 MG/DL (ref 0.1–1)
BUN SERPL-MCNC: 15 MG/DL (ref 8–23)
CALCIUM SERPL-MCNC: 9.9 MG/DL (ref 8.7–10.5)
CHLORIDE SERPL-SCNC: 106 MMOL/L (ref 95–110)
CO2 SERPL-SCNC: 27 MMOL/L (ref 23–29)
COMPLEXED PSA SERPL-MCNC: <0.01 NG/ML (ref 0–4)
CREAT SERPL-MCNC: 1 MG/DL (ref 0.5–1.4)
DIFFERENTIAL METHOD BLD: ABNORMAL
EOSINOPHIL # BLD AUTO: 0 K/UL (ref 0–0.5)
EOSINOPHIL NFR BLD: 0.9 % (ref 0–8)
ERYTHROCYTE [DISTWIDTH] IN BLOOD BY AUTOMATED COUNT: 12.3 % (ref 11.5–14.5)
EST. GFR  (NO RACE VARIABLE): >60 ML/MIN/1.73 M^2
GLUCOSE SERPL-MCNC: 121 MG/DL (ref 70–110)
HCT VFR BLD AUTO: 39.3 % (ref 40–54)
HGB BLD-MCNC: 13 G/DL (ref 14–18)
IMM GRANULOCYTES # BLD AUTO: 0.01 K/UL (ref 0–0.04)
IMM GRANULOCYTES NFR BLD AUTO: 0.2 % (ref 0–0.5)
LYMPHOCYTES # BLD AUTO: 0.5 K/UL (ref 1–4.8)
LYMPHOCYTES NFR BLD: 11.1 % (ref 18–48)
MCH RBC QN AUTO: 33.2 PG (ref 27–31)
MCHC RBC AUTO-ENTMCNC: 33.1 G/DL (ref 32–36)
MCV RBC AUTO: 100 FL (ref 82–98)
MONOCYTES # BLD AUTO: 0.3 K/UL (ref 0.3–1)
MONOCYTES NFR BLD: 6.7 % (ref 4–15)
NEUTROPHILS # BLD AUTO: 3.5 K/UL (ref 1.8–7.7)
NEUTROPHILS NFR BLD: 80.2 % (ref 38–73)
NRBC BLD-RTO: 0 /100 WBC
PLATELET # BLD AUTO: 197 K/UL (ref 150–450)
PMV BLD AUTO: 9.8 FL (ref 9.2–12.9)
POTASSIUM SERPL-SCNC: 4.4 MMOL/L (ref 3.5–5.1)
PROT SERPL-MCNC: 7.2 G/DL (ref 6–8.4)
RBC # BLD AUTO: 3.92 M/UL (ref 4.6–6.2)
SODIUM SERPL-SCNC: 140 MMOL/L (ref 136–145)
WBC # BLD AUTO: 4.34 K/UL (ref 3.9–12.7)

## 2024-03-12 PROCEDURE — 84153 ASSAY OF PSA TOTAL: CPT | Performed by: INTERNAL MEDICINE

## 2024-03-12 PROCEDURE — 99214 OFFICE O/P EST MOD 30 MIN: CPT | Mod: S$PBB,,, | Performed by: INTERNAL MEDICINE

## 2024-03-12 PROCEDURE — 80053 COMPREHEN METABOLIC PANEL: CPT | Performed by: INTERNAL MEDICINE

## 2024-03-12 PROCEDURE — 36415 COLL VENOUS BLD VENIPUNCTURE: CPT | Performed by: INTERNAL MEDICINE

## 2024-03-12 PROCEDURE — 99999 PR PBB SHADOW E&M-EST. PATIENT-LVL III: CPT | Mod: PBBFAC,,, | Performed by: INTERNAL MEDICINE

## 2024-03-12 PROCEDURE — 85025 COMPLETE CBC W/AUTO DIFF WBC: CPT | Performed by: INTERNAL MEDICINE

## 2024-03-12 PROCEDURE — 99213 OFFICE O/P EST LOW 20 MIN: CPT | Mod: PBBFAC | Performed by: INTERNAL MEDICINE

## 2024-03-12 NOTE — PROGRESS NOTES
Subjective     Patient ID: Denis Bunn is a 68 y.o. male.    Chief Complaint: Prostate Cancer    HPI    Diagnosis: Metastatic Prostate Cancer  Previously seen by Rad Onc, Dr. Frias and prior consult at [facility]     Presents to clinic for follow up  PSA < 0.01 ng/ml    Moving in May 2024  Selling his house here    Reports compliance with Zytiga + prednisone  Reports overall doing well  Some fatigue noted on some days but remains very active  Swims in the AM and doing yard work  No pain issues of concern- known historic back pain off and on  Weight stable      Most recent imaging:   - 4/18/2023 PSMA PET:  FINDINGS:  In the head and neck, there is physiologic uptake in the lacrimal and salivary glands, and there are no tracer avid lesions suspicious for malignancy.  In the chest, there are no tracer avid lesions suspicious for malignancy.  In the abdomen and pelvis, there is physiologic distribution in the liver, spleen, bowel, and genitourinary tract, and there are no anatomic or tracer avid lesions suspicious for malignancy.  Specifically, there are no pathologically enlarged or tracer avid pelvic or retroperitoneal lymph nodes.  In the bones, there is physiologic uptake in the bone marrow, and there are no tracer avid lesions suspicious for malignancy.  Additional CT findings:  Probable right maxillary sinus retention cyst.  Bilateral subsegmental dependent atelectasis the lungs.  Small splenule.  Subcentimeter hyperattenuating left renal focus, possibly represents hemorrhagic cyst.  Postsurgical changes of prostatectomy.  Posterior instrumented fusion of lumbosacral spine.  Impression:  Postsurgical changes of prostatectomy in patient with prostate cancer.  No focal abnormal radiotracer uptake to suggest local recurrence or metastatic disease.     Oncology History:  - July 2020 presented for evaluation of an elevated PSA of 5.3 ng/ml.       - 9/2/2020 underwent RALP with bilateral lymph node dissection    Pathology revealed [place] 7 (4+3) adenocarcinoma involving 20% of the prostate.  The [place] pattern 4 accounted for 85% of the tumor.  There was extraprostatic extension with multifocal perineural and lymphovascular invasion.  There was no bladder neck invasion or seminal vesicle invasion.  The margins of resection and 1  Rt. and 1 Lt. pelvic nodes were negative for tumor involvement.       - Postoperative PSA on 10/1/20 returned at 0.14 ng/ml.  Repeat PSA  2/9/21 and returned at 0.41 ng/ml.       - 2/26/2021 PET fluciclovine showed no evidence of recurrent or metastatic disease     - 3/2/2021 MRI of the prostate showed 7 mm bone lesion in the left symphysis pubis     - he was started on Casodex     - 3/3/2021 went to [facility] and casodex stopped to pursue biopsy     - 3/15/2021 IR-directed biopsy on the left symphysis pubis revealed prostate adenocarcinoma     - 3/24/21 restarted casodex and received Eligard on 3/31/2021      - 4/21/2021 started apalutamide per [facility]-- 1 week later developed a rash that responded to medrol dose pack but recurred      - He was recommended for radiotherapy to the prostate bed, pubic symphysis and pelvic nodes which he completed on 10/27/21.  The metastatic focus in the pubic symphysis also received radiotherapy.       - patient subsequently elected to discontinue his hormonal therapy.His last Lupron injection was in October of 2021.       - 7/13/2021 PET showed no evidence of fluciclovine avid recurrent or metastatic prostate carcinoma.      - 7/12/2021 MRI of the pelvis showed stable postsurgical findings without evidence of local recurrence or pelvic adenopathy. There was a sclerotic focus in the left pubic symphysis unchanged since the prior examination.      - PSA < .01 ng/ml    - He continued on ADT + apalutamide andradiation to the primary tumor and pelvic bone from 08/19/2021-10/27/2021 with Dr. Frias.     - 4/18/2023 PET PSMA:  FINDINGS:  In the head and neck, there is  physiologic uptake in the lacrimal and salivary glands, and there are no tracer avid lesions suspicious for malignancy.  In the chest, there are no tracer avid lesions suspicious for malignancy.  In the abdomen and pelvis, there is physiologic distribution in the liver, spleen, bowel, and genitourinary tract, and there are no anatomic or tracer avid lesions suspicious for malignancy.  Specifically, there are no pathologically enlarged or tracer avid pelvic or retroperitoneal lymph nodes.  In the bones, there is physiologic uptake in the bone marrow, and there are no tracer avid lesions suspicious for malignancy.  Additional CT findings:  Probable right maxillary sinus retention cyst.  Bilateral subsegmental dependent atelectasis the lungs.  Small splenule.  Subcentimeter hyperattenuating left renal focus, possibly represents hemorrhagic cyst.  Postsurgical changes of prostatectomy.  Posterior instrumented fusion of lumbosacral spine.  Impression:  Postsurgical changes of prostatectomy in patient with prostate cancer.  No focal abnormal radiotracer uptake to suggest local recurrence or metastatic disease.     FH:  Father prostate cancer at 79  Paternal grandfather- lung cancer (smoker)     SH:    Social EtOH    Review of Systems   Constitutional:  Positive for fatigue (mild). Negative for activity change, appetite change, fever and unexpected weight change.   HENT:  Negative for sore throat and trouble swallowing.    Eyes:  Negative for photophobia and visual disturbance.   Respiratory:  Negative for cough, chest tightness, shortness of breath and wheezing.    Cardiovascular:  Negative for chest pain, palpitations and leg swelling.   Gastrointestinal:  Negative for abdominal pain, constipation, diarrhea, nausea and vomiting.   Genitourinary:  Negative for dysuria and flank pain.   Musculoskeletal:  Negative for arthralgias, back pain and myalgias.   Integumentary:  Negative for color change and pallor.    Neurological:  Negative for dizziness, weakness and headaches.   Psychiatric/Behavioral:  Negative for confusion and decreased concentration.           Objective     Physical Exam  Vitals and nursing note reviewed.   Constitutional:       General: He is not in acute distress.     Appearance: Normal appearance. He is well-developed and normal weight. He is not ill-appearing.      Comments: Very pleasant; wife present  ECOG= 0   HENT:      Head: Normocephalic and atraumatic.      Mouth/Throat:      Mouth: Mucous membranes are moist.   Eyes:      General: No scleral icterus.     Extraocular Movements: Extraocular movements intact.      Conjunctiva/sclera: Conjunctivae normal.      Pupils: Pupils are equal, round, and reactive to light.   Cardiovascular:      Rate and Rhythm: Normal rate and regular rhythm.      Heart sounds: Normal heart sounds. No murmur heard.     No friction rub. No gallop.   Pulmonary:      Effort: Pulmonary effort is normal. No respiratory distress.      Breath sounds: Normal breath sounds. No wheezing, rhonchi or rales.   Abdominal:      General: Abdomen is flat. Bowel sounds are normal. There is no distension.      Palpations: Abdomen is soft. There is no mass.      Tenderness: There is no abdominal tenderness. There is no guarding or rebound.   Musculoskeletal:         General: No swelling or deformity. Normal range of motion.      Cervical back: Normal range of motion and neck supple.      Right lower leg: No edema.      Left lower leg: No edema.   Skin:     General: Skin is warm and dry.   Neurological:      General: No focal deficit present.      Mental Status: He is alert and oriented to person, place, and time.      Motor: No weakness.      Coordination: Coordination normal.      Gait: Gait normal.   Psychiatric:         Mood and Affect: Mood normal.         Behavior: Behavior normal.         Thought Content: Thought content normal.         Judgment: Judgment normal.     Labs- reviewed      Assessment and Plan     1. Prostate cancer    2. Other osteoporosis, unspecified pathological fracture presence      Prostate cancer  On low dose zytiga + prednisone in AM  Tolerating well overall  PSA undetectable today, <0.01 ng/ml  With 2 controlled levels- he would like to try intermittent current regimen   Plans to stop 3/13/2024  Recheck labs in 8 weeks     Osteoporosis  Prolia q 6 months-- started 10/10/23  Due in 4/2024 for next dose   BMD at 1 year (09/2024)         Plans to move to [facility]- will look for local resource  He would like to be able to alternate care here with labs locally as well    Route Chart for Scheduling    Med Onc Chart Routing      Follow up with physician . Mid April Prolia; RTC 8 weeks cbc, cmp and psa day prior   Follow up with [facility]    Infusion scheduling note    Injection scheduling note    Labs    Imaging    Pharmacy appointment    Other referrals              Therapy Plan Information  Medications  denosumab (PROLIA) injection 60 mg  60 mg, Subcutaneous, Every 26 weeks

## 2024-03-14 ENCOUNTER — PATIENT MESSAGE (OUTPATIENT)
Dept: CARDIOLOGY | Facility: CLINIC | Age: 69
End: 2024-03-14
Payer: MEDICARE

## 2024-03-20 ENCOUNTER — PATIENT MESSAGE (OUTPATIENT)
Dept: CARDIOLOGY | Facility: CLINIC | Age: 69
End: 2024-03-20
Payer: MEDICARE

## 2024-03-21 ENCOUNTER — PATIENT MESSAGE (OUTPATIENT)
Dept: HEMATOLOGY/ONCOLOGY | Facility: CLINIC | Age: 69
End: 2024-03-21
Payer: MEDICARE

## 2024-03-22 ENCOUNTER — TELEPHONE (OUTPATIENT)
Dept: CARDIOLOGY | Facility: CLINIC | Age: 69
End: 2024-03-22
Payer: MEDICARE

## 2024-03-22 ENCOUNTER — LAB VISIT (OUTPATIENT)
Dept: LAB | Facility: HOSPITAL | Age: 69
End: 2024-03-22
Payer: MEDICARE

## 2024-03-22 DIAGNOSIS — I49.3 PVC (PREMATURE VENTRICULAR CONTRACTION): ICD-10-CM

## 2024-03-22 DIAGNOSIS — C61 PROSTATE CANCER: ICD-10-CM

## 2024-03-22 DIAGNOSIS — I10 HTN (HYPERTENSION), BENIGN: ICD-10-CM

## 2024-03-22 DIAGNOSIS — I70.0 AORTIC ATHEROSCLEROSIS: ICD-10-CM

## 2024-03-22 DIAGNOSIS — R00.2 PALPITATIONS: ICD-10-CM

## 2024-03-22 DIAGNOSIS — Z12.5 SPECIAL SCREENING, PROSTATE CANCER: ICD-10-CM

## 2024-03-22 DIAGNOSIS — R73.01 IMPAIRED FASTING GLUCOSE: ICD-10-CM

## 2024-03-22 LAB
ALBUMIN SERPL BCP-MCNC: 4.1 G/DL (ref 3.5–5.2)
ALP SERPL-CCNC: 44 U/L (ref 55–135)
ALT SERPL W/O P-5'-P-CCNC: 21 U/L (ref 10–44)
ANION GAP SERPL CALC-SCNC: 9 MMOL/L (ref 8–16)
AST SERPL-CCNC: 26 U/L (ref 10–40)
BASOPHILS # BLD AUTO: 0.04 K/UL (ref 0–0.2)
BASOPHILS NFR BLD: 1 % (ref 0–1.9)
BILIRUB SERPL-MCNC: 0.6 MG/DL (ref 0.1–1)
BUN SERPL-MCNC: 13 MG/DL (ref 8–23)
CALCIUM SERPL-MCNC: 9.1 MG/DL (ref 8.7–10.5)
CHLORIDE SERPL-SCNC: 110 MMOL/L (ref 95–110)
CHOLEST SERPL-MCNC: 168 MG/DL (ref 120–199)
CHOLEST/HDLC SERPL: 2.2 {RATIO} (ref 2–5)
CO2 SERPL-SCNC: 23 MMOL/L (ref 23–29)
COMPLEXED PSA SERPL-MCNC: <0.01 NG/ML (ref 0–4)
CREAT SERPL-MCNC: 1 MG/DL (ref 0.5–1.4)
DIFFERENTIAL METHOD BLD: ABNORMAL
EOSINOPHIL # BLD AUTO: 0.1 K/UL (ref 0–0.5)
EOSINOPHIL NFR BLD: 2.9 % (ref 0–8)
ERYTHROCYTE [DISTWIDTH] IN BLOOD BY AUTOMATED COUNT: 12.1 % (ref 11.5–14.5)
EST. GFR  (NO RACE VARIABLE): >60 ML/MIN/1.73 M^2
ESTIMATED AVG GLUCOSE: 97 MG/DL (ref 68–131)
GLUCOSE SERPL-MCNC: 100 MG/DL (ref 70–110)
HBA1C MFR BLD: 5 % (ref 4–5.6)
HCT VFR BLD AUTO: 40.5 % (ref 40–54)
HDLC SERPL-MCNC: 76 MG/DL (ref 40–75)
HDLC SERPL: 45.2 % (ref 20–50)
HGB BLD-MCNC: 13.3 G/DL (ref 14–18)
IMM GRANULOCYTES # BLD AUTO: 0.01 K/UL (ref 0–0.04)
IMM GRANULOCYTES NFR BLD AUTO: 0.2 % (ref 0–0.5)
LDLC SERPL CALC-MCNC: 80.6 MG/DL (ref 63–159)
LYMPHOCYTES # BLD AUTO: 0.7 K/UL (ref 1–4.8)
LYMPHOCYTES NFR BLD: 17.9 % (ref 18–48)
MCH RBC QN AUTO: 33.1 PG (ref 27–31)
MCHC RBC AUTO-ENTMCNC: 32.8 G/DL (ref 32–36)
MCV RBC AUTO: 101 FL (ref 82–98)
MONOCYTES # BLD AUTO: 0.4 K/UL (ref 0.3–1)
MONOCYTES NFR BLD: 8.7 % (ref 4–15)
NEUTROPHILS # BLD AUTO: 2.9 K/UL (ref 1.8–7.7)
NEUTROPHILS NFR BLD: 69.3 % (ref 38–73)
NONHDLC SERPL-MCNC: 92 MG/DL
NRBC BLD-RTO: 0 /100 WBC
PLATELET # BLD AUTO: 225 K/UL (ref 150–450)
PMV BLD AUTO: 9.9 FL (ref 9.2–12.9)
POTASSIUM SERPL-SCNC: 4.6 MMOL/L (ref 3.5–5.1)
PROT SERPL-MCNC: 6.6 G/DL (ref 6–8.4)
RBC # BLD AUTO: 4.02 M/UL (ref 4.6–6.2)
SODIUM SERPL-SCNC: 142 MMOL/L (ref 136–145)
TRIGL SERPL-MCNC: 57 MG/DL (ref 30–150)
TSH SERPL DL<=0.005 MIU/L-ACNC: 1.8 UIU/ML (ref 0.4–4)
WBC # BLD AUTO: 4.13 K/UL (ref 3.9–12.7)

## 2024-03-22 PROCEDURE — 83036 HEMOGLOBIN GLYCOSYLATED A1C: CPT | Performed by: INTERNAL MEDICINE

## 2024-03-22 PROCEDURE — 80061 LIPID PANEL: CPT | Performed by: INTERNAL MEDICINE

## 2024-03-22 PROCEDURE — 36415 COLL VENOUS BLD VENIPUNCTURE: CPT | Mod: PN | Performed by: INTERNAL MEDICINE

## 2024-03-22 PROCEDURE — 84153 ASSAY OF PSA TOTAL: CPT | Performed by: INTERNAL MEDICINE

## 2024-03-22 PROCEDURE — 85025 COMPLETE CBC W/AUTO DIFF WBC: CPT | Performed by: INTERNAL MEDICINE

## 2024-03-22 PROCEDURE — 84443 ASSAY THYROID STIM HORMONE: CPT | Performed by: INTERNAL MEDICINE

## 2024-03-22 PROCEDURE — 80053 COMPREHEN METABOLIC PANEL: CPT | Performed by: INTERNAL MEDICINE

## 2024-03-22 NOTE — PROGRESS NOTES
Please contact the patient and let them know that their results were fine and do not require any change in treatment.All good PSA pending.

## 2024-03-23 NOTE — PROGRESS NOTES
Subjective:   Patient ID:  Denis Bunn is a 68 y.o. male who presents for follow-up of CAD but CAC 0    HPI:  The patient is here for CAD risk factors/CAC 0 but has aortic athero.  The patient has no chest pain, SOB, TIA, palpitations, syncope or pre-syncope.Patient currently exercises a few times per week.About to move he and wife.He thinks BPs 130s/80s.        Review of Systems   Constitutional: Negative for chills, decreased appetite, diaphoresis, fever, malaise/fatigue, night sweats, weight gain and weight loss.   HENT:  Negative for congestion, hoarse voice, nosebleeds, sore throat and tinnitus.    Eyes:  Negative for blurred vision, double vision, vision loss in left eye, vision loss in right eye, visual disturbance and visual halos.   Cardiovascular:  Negative for chest pain, claudication, cyanosis, dyspnea on exertion, irregular heartbeat, leg swelling, near-syncope, orthopnea, palpitations, paroxysmal nocturnal dyspnea and syncope.   Respiratory:  Negative for cough, hemoptysis, shortness of breath, sleep disturbances due to breathing, snoring, sputum production and wheezing.    Endocrine: Negative for cold intolerance, heat intolerance, polydipsia, polyphagia and polyuria.   Hematologic/Lymphatic: Negative for adenopathy and bleeding problem. Does not bruise/bleed easily.   Skin:  Negative for color change, dry skin, flushing, itching, nail changes, poor wound healing, rash, skin cancer, suspicious lesions and unusual hair distribution.   Musculoskeletal:  Negative for arthritis, back pain, falls, gout, joint pain, joint swelling, muscle cramps, muscle weakness, myalgias and stiffness.   Gastrointestinal:  Negative for abdominal pain, anorexia, change in bowel habit, constipation, diarrhea, dysphagia, heartburn, hematemesis, hematochezia, melena and vomiting.   Genitourinary:  Negative for decreased libido, dysuria, hematuria, hesitancy and urgency.   Neurological:  Negative for excessive daytime  "sleepiness, dizziness, focal weakness, headaches, light-headedness, loss of balance, numbness, paresthesias, seizures, sensory change, tremors, vertigo and weakness.   Psychiatric/Behavioral:  Negative for altered mental status, depression, hallucinations, memory loss, substance abuse and suicidal ideas. The patient does not have insomnia and is not nervous/anxious.    Allergic/Immunologic: Negative for environmental allergies and hives.       Objective: BP (!) 168/85   Pulse (!) 53   Ht 5' 8" (1.727 m)   Wt 75.1 kg (165 lb 9.1 oz)   BMI 25.17 kg/m²      Physical Exam  Constitutional:       General: He is not in acute distress.     Appearance: He is well-developed. He is not diaphoretic.   HENT:      Head: Normocephalic.   Eyes:      Pupils: Pupils are equal, round, and reactive to light.   Neck:      Thyroid: No thyromegaly.   Cardiovascular:      Rate and Rhythm: Normal rate and regular rhythm.      Pulses: Intact distal pulses.           Carotid pulses are 3+ on the right side and 3+ on the left side.       Radial pulses are 3+ on the right side and 3+ on the left side.        Femoral pulses are 3+ on the right side and 3+ on the left side.       Popliteal pulses are 3+ on the right side and 3+ on the left side.        Dorsalis pedis pulses are 3+ on the right side and 3+ on the left side.        Posterior tibial pulses are 3+ on the right side and 3+ on the left side.      Heart sounds: Normal heart sounds. No murmur heard.     No friction rub. No gallop.   Pulmonary:      Effort: Pulmonary effort is normal. No respiratory distress.      Breath sounds: Normal breath sounds. No wheezing or rales.   Chest:      Chest wall: No tenderness.   Abdominal:      General: There is no distension.      Palpations: Abdomen is soft. There is no mass.      Tenderness: There is no abdominal tenderness.   Musculoskeletal:         General: Normal range of motion.      Cervical back: Normal range of motion.   Lymphadenopathy: "      Cervical: No cervical adenopathy.   Skin:     General: Skin is warm.      Nails: There is no clubbing.   Neurological:      Mental Status: He is alert and oriented to person, place, and time.   Psychiatric:         Speech: Speech normal.         Behavior: Behavior normal.         Thought Content: Thought content normal.         Judgment: Judgment normal.         Assessment:     1. HTN (hypertension), benign    2. PVC (premature ventricular contraction)    3. Aortic atherosclerosis    4. Erectile dysfunction following radical prostatectomy    5. Impaired fasting glucose    6. Dyslipidemia    7. Anxiety    8. Atypical chest pain    9. Back pain, unspecified back location, unspecified back pain laterality, unspecified chronicity    10. Muscle spasm of back    11. Palpitations    12. Prostate cancer    13. Screening for prostate cancer        Plan:   Discussed diet , achieving and maintaining ideal body weight, and exercise.   We reviewed meds in detail.  Reassured-Discussed goals, options, plan.  Omega-3 > 800 mg/d combined EPA/DHA.   Goal BP< 130/80.  Goal LDL < 100 ( or even 130 if CAC 0).  If Home BP high add half HCTZ every other      Denis was seen today for hypertension and dyslipidemia.    Diagnoses and all orders for this visit:    HTN (hypertension), benign  -     Lipid Panel; Future; Expected date: 05/25/2025  -     Comprehensive Metabolic Panel; Future; Expected date: 05/25/2025  -     TSH; Future; Expected date: 05/25/2025  -     EKG 12-lead; Future; Expected date: 05/25/2025  -     BNP; Future; Expected date: 05/25/2025    PVC (premature ventricular contraction)  -     Lipid Panel; Future; Expected date: 05/25/2025  -     Comprehensive Metabolic Panel; Future; Expected date: 05/25/2025  -     TSH; Future; Expected date: 05/25/2025    Aortic atherosclerosis  -     Lipid Panel; Future; Expected date: 05/25/2025  -     CBC Auto Differential; Future; Expected date: 05/25/2025    Erectile dysfunction  following radical prostatectomy  -     Lipid Panel; Future; Expected date: 05/25/2025    Impaired fasting glucose  -     Lipid Panel; Future; Expected date: 05/25/2025  -     Hemoglobin A1C; Future; Expected date: 05/25/2025    Dyslipidemia  -     Lipid Panel; Future; Expected date: 05/25/2025  -     TSH; Future; Expected date: 05/25/2025    Anxiety    Atypical chest pain    Back pain, unspecified back location, unspecified back pain laterality, unspecified chronicity    Muscle spasm of back    Palpitations    Prostate cancer  -     PSA, Screening; Future; Expected date: 05/25/2025  -     CBC Auto Differential; Future; Expected date: 05/25/2025    Screening for prostate cancer  -     PSA, Screening; Future; Expected date: 05/25/2025            Follow up in about 15 months (around 6/25/2025) for with ECG and lab.

## 2024-03-24 NOTE — PROGRESS NOTES
Your results look fine and do not require any change in treatment. PSA excellent Congrats     Please contact me if you have any additional concerns.    Sincerely,    Domenico Pereyra

## 2024-03-25 ENCOUNTER — HOSPITAL ENCOUNTER (OUTPATIENT)
Dept: CARDIOLOGY | Facility: CLINIC | Age: 69
Discharge: HOME | End: 2024-03-25
Payer: MEDICARE

## 2024-03-25 ENCOUNTER — OFFICE VISIT (OUTPATIENT)
Dept: CARDIOLOGY | Facility: CLINIC | Age: 69
End: 2024-03-25
Payer: MEDICARE

## 2024-03-25 VITALS
SYSTOLIC BLOOD PRESSURE: 168 MMHG | HEART RATE: 53 BPM | BODY MASS INDEX: 25.09 KG/M2 | HEIGHT: 68 IN | WEIGHT: 165.56 LBS | DIASTOLIC BLOOD PRESSURE: 85 MMHG

## 2024-03-25 DIAGNOSIS — I10 HTN (HYPERTENSION), BENIGN: ICD-10-CM

## 2024-03-25 DIAGNOSIS — N52.31 ERECTILE DYSFUNCTION FOLLOWING RADICAL PROSTATECTOMY: ICD-10-CM

## 2024-03-25 DIAGNOSIS — R00.2 PALPITATIONS: ICD-10-CM

## 2024-03-25 DIAGNOSIS — I49.3 PVC (PREMATURE VENTRICULAR CONTRACTION): ICD-10-CM

## 2024-03-25 DIAGNOSIS — I70.0 AORTIC ATHEROSCLEROSIS: ICD-10-CM

## 2024-03-25 DIAGNOSIS — M54.9 BACK PAIN, UNSPECIFIED BACK LOCATION, UNSPECIFIED BACK PAIN LATERALITY, UNSPECIFIED CHRONICITY: ICD-10-CM

## 2024-03-25 DIAGNOSIS — C61 PROSTATE CANCER: ICD-10-CM

## 2024-03-25 DIAGNOSIS — F41.9 ANXIETY: ICD-10-CM

## 2024-03-25 DIAGNOSIS — I10 HTN (HYPERTENSION), BENIGN: Primary | ICD-10-CM

## 2024-03-25 DIAGNOSIS — R73.01 IMPAIRED FASTING GLUCOSE: ICD-10-CM

## 2024-03-25 DIAGNOSIS — M62.830 MUSCLE SPASM OF BACK: ICD-10-CM

## 2024-03-25 DIAGNOSIS — Z12.5 SCREENING FOR PROSTATE CANCER: ICD-10-CM

## 2024-03-25 DIAGNOSIS — R07.89 ATYPICAL CHEST PAIN: ICD-10-CM

## 2024-03-25 DIAGNOSIS — E78.5 DYSLIPIDEMIA: ICD-10-CM

## 2024-03-25 LAB
OHS QRS DURATION: 102 MS
OHS QTC CALCULATION: 383 MS

## 2024-03-25 PROCEDURE — 99215 OFFICE O/P EST HI 40 MIN: CPT | Mod: PBBFAC,25 | Performed by: INTERNAL MEDICINE

## 2024-03-25 PROCEDURE — 93005 ELECTROCARDIOGRAM TRACING: CPT | Mod: PBBFAC | Performed by: INTERNAL MEDICINE

## 2024-03-25 PROCEDURE — 99214 OFFICE O/P EST MOD 30 MIN: CPT | Mod: S$PBB,,, | Performed by: INTERNAL MEDICINE

## 2024-03-25 PROCEDURE — 99999 PR PBB SHADOW E&M-EST. PATIENT-LVL V: CPT | Mod: PBBFAC,,, | Performed by: INTERNAL MEDICINE

## 2024-03-25 PROCEDURE — 93010 ELECTROCARDIOGRAM REPORT: CPT | Mod: S$PBB,,, | Performed by: INTERNAL MEDICINE

## 2024-03-25 NOTE — PATIENT INSTRUCTIONS
Discussed diet , achieving and maintaining ideal body weight, and exercise.   We reviewed meds in detail.  Reassured-Discussed goals, options, plan.  Omega-3 > 800 mg/d combined EPA/DHA.   Goal BP< 130/80.  Goal LDL < 100 ( or even 130 if CAC 0).  If Home BP high add half HCTZ every other

## 2024-03-28 DIAGNOSIS — I49.3 PVC (PREMATURE VENTRICULAR CONTRACTION): Primary | ICD-10-CM

## 2024-04-03 NOTE — ANESTHESIA PREPROCEDURE EVALUATION
07/29/2019  Denis Bunn is a 63 y.o., male.    Anesthesia Evaluation    I have reviewed the Patient Summary Reports.    I have reviewed the Nursing Notes.   I have reviewed the Medications.     Review of Systems  Anesthesia Hx:  No problems with previous Anesthesia Denies Hx of Anesthetic complications  Neg history of prior surgery. Denies Family Hx of Anesthesia complications.   Denies Personal Hx of Anesthesia complications.   Social:  Non-Smoker, No Alcohol Use    Hematology/Oncology:  Hematology Normal   Oncology Normal     EENT/Dental:EENT/Dental Normal   Cardiovascular:   Exercise tolerance: good Hypertension    Pulmonary:  Pulmonary Normal    Renal/:  Renal/ Normal     Hepatic/GI:  Hepatic/GI Normal    Musculoskeletal:  Musculoskeletal Normal    Neurological:  Neurology Normal    Endocrine:  Endocrine Normal    Dermatological:  Skin Normal    Psych:  Psychiatric Normal           Physical Exam  General:  Well nourished    Airway/Jaw/Neck:  Airway Findings: Mouth Opening: Normal General Airway Assessment: Adult  Mallampati: II  TM Distance: Normal, at least 6 cm         Dental:  DENTAL FINDINGS: Normal   Chest/Lungs:  Chest/Lungs Clear    Heart/Vascular:  Heart Findings: Normal Heart murmur: negative       Mental Status:  Mental Status Findings:  Cooperative, Alert and Oriented         Anesthesia Plan  Type of Anesthesia, risks & benefits discussed:  Anesthesia Type:  general  Patient's Preference:   Intra-op Monitoring Plan: standard ASA monitors  Intra-op Monitoring Plan Comments:   Post Op Pain Control Plan:   Post Op Pain Control Plan Comments:   Induction:   IV  Beta Blocker:  Patient is not currently on a Beta-Blocker (No further documentation required).       Informed Consent: Patient understands risks and agrees with Anesthesia plan.  Questions answered. Anesthesia consent signed with  patient.  ASA Score: 2     Day of Surgery Review of History & Physical:            Ready For Surgery From Anesthesia Perspective.        b/l cataract lenses, hearing aid

## 2024-04-09 ENCOUNTER — INFUSION (OUTPATIENT)
Dept: INFUSION THERAPY | Facility: HOSPITAL | Age: 69
End: 2024-04-09
Payer: MEDICARE

## 2024-04-09 DIAGNOSIS — M81.8 OTHER OSTEOPOROSIS, UNSPECIFIED PATHOLOGICAL FRACTURE PRESENCE: Primary | ICD-10-CM

## 2024-04-09 PROCEDURE — 96372 THER/PROPH/DIAG INJ SC/IM: CPT

## 2024-04-09 PROCEDURE — 63600175 PHARM REV CODE 636 W HCPCS: Mod: JZ,JG | Performed by: INTERNAL MEDICINE

## 2024-04-09 RX ADMIN — DENOSUMAB 60 MG: 60 INJECTION SUBCUTANEOUS at 12:04

## 2024-04-20 ENCOUNTER — NURSE TRIAGE (OUTPATIENT)
Dept: ADMINISTRATIVE | Facility: CLINIC | Age: 69
End: 2024-04-20
Payer: MEDICARE

## 2024-04-20 NOTE — TELEPHONE ENCOUNTER
Pt calling to to get colonoscopy prep instructions. Chart reviewed and instructions reviewed with pt. No further questions at this time. Encounter routed to provider.     \Reason for Disposition   Question about upcoming scheduled test, no triage required and triager able to answer question    Protocols used: Information Only Call - No Triage-A-

## 2024-04-22 ENCOUNTER — TELEPHONE (OUTPATIENT)
Dept: ENDOSCOPY | Facility: HOSPITAL | Age: 69
End: 2024-04-22
Payer: MEDICARE

## 2024-04-23 ENCOUNTER — ANESTHESIA EVENT (OUTPATIENT)
Dept: ENDOSCOPY | Facility: HOSPITAL | Age: 69
End: 2024-04-23
Payer: MEDICARE

## 2024-04-23 ENCOUNTER — ANESTHESIA (OUTPATIENT)
Dept: ENDOSCOPY | Facility: HOSPITAL | Age: 69
End: 2024-04-23
Payer: MEDICARE

## 2024-04-23 ENCOUNTER — HOSPITAL ENCOUNTER (OUTPATIENT)
Facility: HOSPITAL | Age: 69
Discharge: HOME | End: 2024-04-23
Attending: UROLOGY | Admitting: STUDENT IN AN ORGANIZED HEALTH CARE EDUCATION/TRAINING PROGRAM
Payer: MEDICARE

## 2024-04-23 VITALS
BODY MASS INDEX: 24.55 KG/M2 | DIASTOLIC BLOOD PRESSURE: 70 MMHG | HEIGHT: 68 IN | SYSTOLIC BLOOD PRESSURE: 165 MMHG | HEART RATE: 52 BPM | WEIGHT: 162 LBS | TEMPERATURE: 98 F | RESPIRATION RATE: 16 BRPM | OXYGEN SATURATION: 98 %

## 2024-04-23 DIAGNOSIS — Z12.11 ENCOUNTER FOR SCREENING COLONOSCOPY: ICD-10-CM

## 2024-04-23 PROCEDURE — 25000003 PHARM REV CODE 250: Performed by: STUDENT IN AN ORGANIZED HEALTH CARE EDUCATION/TRAINING PROGRAM

## 2024-04-23 PROCEDURE — 25000003 PHARM REV CODE 250: Performed by: NURSE ANESTHETIST, CERTIFIED REGISTERED

## 2024-04-23 PROCEDURE — E9220 PRA ENDO ANESTHESIA: HCPCS | Mod: ,,, | Performed by: NURSE ANESTHETIST, CERTIFIED REGISTERED

## 2024-04-23 PROCEDURE — 37000009 HC ANESTHESIA EA ADD 15 MINS: Performed by: STUDENT IN AN ORGANIZED HEALTH CARE EDUCATION/TRAINING PROGRAM

## 2024-04-23 PROCEDURE — 37000008 HC ANESTHESIA 1ST 15 MINUTES: Performed by: STUDENT IN AN ORGANIZED HEALTH CARE EDUCATION/TRAINING PROGRAM

## 2024-04-23 PROCEDURE — 63600175 PHARM REV CODE 636 W HCPCS: Performed by: NURSE ANESTHETIST, CERTIFIED REGISTERED

## 2024-04-23 PROCEDURE — G0105 COLORECTAL SCRN; HI RISK IND: HCPCS | Mod: ,,, | Performed by: STUDENT IN AN ORGANIZED HEALTH CARE EDUCATION/TRAINING PROGRAM

## 2024-04-23 PROCEDURE — G0105 COLORECTAL SCRN; HI RISK IND: HCPCS | Performed by: STUDENT IN AN ORGANIZED HEALTH CARE EDUCATION/TRAINING PROGRAM

## 2024-04-23 RX ORDER — PROPOFOL 10 MG/ML
VIAL (ML) INTRAVENOUS
Status: DISCONTINUED | OUTPATIENT
Start: 2024-04-23 | End: 2024-04-23

## 2024-04-23 RX ORDER — SODIUM CHLORIDE 9 MG/ML
INJECTION, SOLUTION INTRAVENOUS CONTINUOUS
Status: DISCONTINUED | OUTPATIENT
Start: 2024-04-23 | End: 2024-04-23 | Stop reason: HOSPADM

## 2024-04-23 RX ORDER — LIDOCAINE HYDROCHLORIDE 20 MG/ML
INJECTION INTRAVENOUS
Status: DISCONTINUED | OUTPATIENT
Start: 2024-04-23 | End: 2024-04-23

## 2024-04-23 RX ORDER — PROPOFOL 10 MG/ML
VIAL (ML) INTRAVENOUS CONTINUOUS PRN
Status: DISCONTINUED | OUTPATIENT
Start: 2024-04-23 | End: 2024-04-23

## 2024-04-23 RX ADMIN — SODIUM CHLORIDE: 0.9 INJECTION, SOLUTION INTRAVENOUS at 07:04

## 2024-04-23 RX ADMIN — LIDOCAINE HYDROCHLORIDE 50 MG: 20 INJECTION INTRAVENOUS at 07:04

## 2024-04-23 RX ADMIN — PROPOFOL 70 MG: 10 INJECTION, EMULSION INTRAVENOUS at 07:04

## 2024-04-23 RX ADMIN — GLYCOPYRROLATE 0.2 MG: 0.2 INJECTION, SOLUTION INTRAMUSCULAR; INTRAVENOUS at 07:04

## 2024-04-23 RX ADMIN — PROPOFOL 150 MCG/KG/MIN: 10 INJECTION, EMULSION INTRAVENOUS at 07:04

## 2024-04-23 NOTE — ANESTHESIA PREPROCEDURE EVALUATION
[facility]  Anesthesia Pre-Operative Evaluation     Patient Name: Denis Bunn  YOB: 1955  MRN: [id number]  [facility]: [id number]       Admit Date: 4/23/2024   Admit Team: Networked reference to record PCT   Hospital Day: 1  Date of Procedure: 4/23/2024  Anesthesia: Choice Procedure: Procedure(s) (LRB):  COLONOSCOPY (N/A)  Pre-Operative Diagnosis: Colon cancer screening [Z12.11]  Encounter for colonoscopy due to history of colonic polyp [Z12.11, Z86.010]  Proceduralist:Surgeons and Role:     * Shmuel Henry MD - Primary  Code Status: Prior   Advanced Directive: <no information>  Isolation Precautions: No active isolations  Capacity: Full capacity     SUBJECTIVE:   Denis Bunn is a 68 y.o. male who  has a past medical history of Anxiety, Back pain, Cataract, Constipation - functional, Dyslipidemia, ED (erectile dysfunction), FH: CAD (coronary artery disease) (4/24/2013), History of gout, HTN (hypertension), benign (04/24/2013), Personal history of colonic polyps, Prostate cancer, and Vision changes.  No notes on file    Current Facility-Administered Medications   Medication Dose Route Frequency Last Rate Last Admin    sodium chloride 0.9%   Intravenous Continuous         Hospital LOS: 0 days  ICU LOS: Patient does not have an ICU stay during this admission.    he has a current medication list which includes the following long-term medication(s): calcium-vitamin d3, citalopram, coenzyme q10, gabapentin, hydrocortisone, losartan, omega-3 fatty acids, pravastatin, verapamil, verapamil, and abiraterone.     ALLERGIES:     Review of patient's allergies indicates:   Allergen Reactions    Amoxicillin-pot clavulanate Nausea And Vomiting     Other reaction(s): Stomach upset  Other reaction(s): Stomach upset    Celecoxib Other (See Comments) and Nausea And Vomiting     LDA:   AIRWAY:         [unfilled]     Lines/Drains/Airways       Drain  Duration                  Closed/Suction  Drain 09/05/20 1358 Left Perineal Bulb 8 Fr. 1325 days              Peripheral Intravenous Line  Duration                  Peripheral IV - Single Lumen 04/23/24 0715 20 G Right Antecubital <1 day                   Anesthesia Evaluation      Airway   Mallampati: II  TM distance: Normal  Neck ROM: Normal ROM  Dental    (+) Intact    Pulmonary    (+) sleep apnea  Cardiovascular   Exercise tolerance: good  (+) hypertension well controlled    Neuro/Psych      GI/Hepatic/Renal      Endo/Other    Abdominal                MEDICATIONS:     Current Outpatient Medications on File Prior to Encounter   Medication Sig Dispense Refill Last Dose    allopurinoL (ZYLOPRIM) 100 MG tablet TAKE 1 TABLET BY MOUTH TWICE A  tablet 3 Past Week    ascorbic acid, vitamin C, (VITAMIN C) 500 MG tablet Take 500 mg by mouth once daily. Hold for 1 week prior to surgery   Past Week    calcium-vitamin D3 (OYSTER SHELL CALCIUM-VIT D3) 500 mg-5 mcg (200 unit) per tablet Take 1 tablet by mouth 2 (two) times daily. 180 tablet 3 Past Week    cholecalciferol, vitamin D3, 125 mcg (5,000 unit) Tab Take 5,000 Units by mouth once daily.   Past Week    citalopram (CELEXA) 10 MG tablet TAKE 1 TABLET BY MOUTH EVERY DAY 90 tablet 1 Past Week    coenzyme Q10 100 mg capsule Take 100 mg by mouth once daily. Hold for 1 week prior to surgery   Past Week    denosumab (PROLIA) 60 mg/mL Syrg Inject 60 mg into the skin.   Past Week    FA/niacinamide/cupric ox/Zn ox (NICOTINAMIDE ORAL) Take 500 mg by mouth once daily.   Past Week    ferrous sulfate (SLOW RELEASE IRON) 142 mg (45 mg iron) TbSR Take by mouth.   Past Week    gabapentin (NEURONTIN) 300 MG capsule TAKE 1 CAPSULE BY MOUTH EVERY DAY IN THE EVENING 90 capsule 1 Past Week    garlic 1,000 mg Cap Take by mouth. Hold for 1 week prior to surgery   Past Week    hydrocortisone 2.5 % cream APPLY TO AFFECTED AREA TWICE A DAY 28.35 g 1 Past Week    losartan (COZAAR) 100 MG tablet TAKE 1 TABLET BY MOUTH  EVERY DAY 90 tablet 3 4/23/2024    magnesium 250 mg Tab Take 250 mg by mouth once. Takes two 250 mg tablets daily   Hold for 1 week prior to surgery   Past Week    meloxicam (MOBIC) 7.5 MG tablet    Past Week    omega-3 fatty acids 1,000 mg Cap Take 1,000 mg by mouth.   Past Week    papaverine 30 mg/mL injection Add: Phentolamine 1 mg  Add: PGE1 20 mcg    Sig:  Inject 20 units as directed; titrate up by 5 units until desired results 10 mL 12 Past Week    pravastatin (PRAVACHOL) 20 MG tablet TAKE 1 TABLET(20 MG) BY MOUTH EVERY DAY 90 tablet 3 Past Week    verapamiL (CALAN-SR) 120 MG CR tablet TAKE 1 TABLET (120 MG TOTAL) BY MOUTH NIGHTLY. IN ADDITION TO 240MG IN THE MORNING 90 tablet 3 4/23/2024    verapamiL (VERELAN) 240 MG C24P TAKE 1 CAPSULE (240 MG TOTAL) BY MOUTH EVERY MORNING. IN ADDITION  MG AT NIGHT 90 capsule 3 4/23/2024    zinc 50 mg Tab Take by mouth. Hold for 1 week prior to surgery   Past Week    ZIOPTJOSE PF, 0.0015 % Dpet Place 1 drop into both eyes nightly.   Past Week    insulin syringe-needle U-100 1 mL 30 gauge x 5/16 Syrg Use with ICI Therapy 10 each 10 More than a month    predniSONE (DELTASONE) 5 MG tablet TAKE 1 TABLET (5 MG TOTAL) BY MOUTH ONCE DAILY. TO BE TAKEN WITH ZYTIGA (Patient not taking: Reported on 3/25/2024) 30 tablet 3 More than a month      Inpatient Medications:  Antibiotics (From admission, onward)      None          VTE Risk Mitigation (From admission, onward)      None          Current Facility-Administered Medications   Medication Dose Route Frequency       Current Facility-Administered Medications   Medication Dose Route Frequency Provider Last Rate Last Admin    0.9%  NaCl infusion   Intravenous Continuous Shmuel Henry MD              History:   There are no hospital problems to display for this patient.    Surgical History:    has a past surgical history that includes Elbow surgery; Laminectomy; Wrist surgery; Eye surgery; Cataract extraction; Back  "surgery; Colonoscopy (N/A, 7/29/2019); and Robot-assisted laparoscopic prostatectomy (N/A, 9/2/2020).   Social History:    reports being sexually active and has had partner(s) who are female.  reports that he has never smoked. He has never used smokeless tobacco. He reports current alcohol use of about 3.0 standard drinks of alcohol per week. He reports that he does not use drugs.    Vitals:    04/23/24 0711   BP: (!) 181/84   Pulse: 60   Resp: 15   Temp: 36.4 °C (97.5 °F)   SpO2: 100%   Weight: 73.5 kg (162 lb)   Height: 5' 8" (1.727 m)     Vital Signs Range (Last 24H):  Temp:  [36.4 °C (97.5 °F)]   Pulse:  [60]   Resp:  [15]   BP: (181)/(84)   SpO2:  [100 %]     Body mass index is 24.63 kg/m².  Wt Readings from Last 4 Encounters:   04/23/24 73.5 kg (162 lb)   03/25/24 75.1 kg (165 lb 9.1 oz)   03/12/24 74.9 kg (165 lb 0.2 oz)   01/11/24 76 kg (167 lb 7 oz)        Intake/Output - Last 3 Shifts       None          Lab Results   Component Value Date    WBC 4.13 03/22/2024    HGB 13.3 (L) 03/22/2024    HCT 40.5 03/22/2024     03/22/2024     03/22/2024    K 4.6 03/22/2024     03/22/2024    CREATININE 1.0 03/22/2024    BUN 13 03/22/2024    CO2 23 03/22/2024     03/22/2024    CALCIUM 9.1 03/22/2024    MG 2.0 09/06/2020    PHOS 2.6 (L) 09/06/2020    ALKPHOS 44 (L) 03/22/2024    ALT 21 03/22/2024    AST 26 03/22/2024    ALBUMIN 4.1 03/22/2024    INR 1.0 09/05/2020    HGBA1C 5.0 03/22/2024    TROPONINI <0.006 07/01/2018     (H) 07/01/2018     No results found for this or any previous visit (from the past 12 hour(s)).  No results for input(s): "WBC", "HGB", "HCT", "PLT", "NA", "K", "CREATININE", "GLU", "INR" in the last 168 hours.  No LMP for male patient.    EKG:   Results for orders placed or performed during the hospital encounter of 03/25/24   EKG 12-lead    Collection Time: 03/25/24  2:50 PM   Result Value Ref Range    QRS Duration 102 ms    [facility] QTC Calculation 383 ms    Narrative    " "Test Reason : I70.0,I10,R00.2,I49.3,    Vent. Rate : 046 BPM     Atrial Rate : 046 BPM     P-R Int : 148 ms          QRS Dur : 102 ms      QT Int : 438 ms       P-R-T Axes : 032 055 -34 degrees     QTc Int : 383 ms    Marked sinus bradycardia  Nonspecific ST and T wave abnormality  Abnormal ECG  When compared with ECG of 14-APR-2023 07:34,  Inverted T waves have replaced nonspecific T wave abnormality in Lateral  leads  Confirmed by Ravin Rao MD ([id number]) on 3/25/2024 10:48:34 PM    Referred By: RAMY SCHAEFER           Confirmed By:Ravin Rao MD     TTE:  Results for orders placed or performed during the hospital encounter of 03/06/23   Echo Saline Bubble? No   Result Value Ref Range    Ascending aorta 3.47 cm    STJ 3.11 cm    AV mean gradient 3 mmHg    Ao peak [place]ston 1.26 m/s    Ao VTI 27.13 cm    IVRT 60.89 msec    IVS 0.97 0.6 - 1.1 cm    LA size 4.37 cm    Left Atrium Major Axis 5.59 cm    Left Atrium Minor Axis 5.03 cm    LVIDd 5.47 3.5 - 6.0 cm    LVIDs 3.53 2.1 - 4.0 cm    LVOT diameter 2.08 cm    LVOT peak VTI 23.21 cm    Posterior Wall 0.79 0.6 - 1.1 cm    MV Peak A [place] 0.41 m/s    E wave deceleration time 186.86 msec    MV Peak E [place] 0.62 m/s    PV Peak D [place] 0.47 m/s    PV Peak S [place] 0.52 m/s    RA Major Axis 4.91 cm    RA Width 3.80 cm    RVDD 3.60 cm    Sinus 3.45 cm    TAPSE 2.37 cm    TR Max [place] 2.63 m/s    TDI LATERAL 0.11 m/s    TDI SEPTAL 0.07 m/s    LA WIDTH 4.81 cm    MV stenosis pressure 1/2 time 54.19 ms    LV Diastolic Volume 145.88 mL    LV Systolic Volume 51.86 mL    RV S' 12.74 cm/s    LVOT peak kingston 0.98 m/s    LA volume (mod) 95.00 cm3    MV "A" wave duration 23.98 msec    LV LATERAL E/E' RATIO 5.64 m/s    LV SEPTAL E/E' RATIO 8.86 m/s    FS 35 %    LA volume 94.61 cm3    LV mass 178.87 g    Left Ventricle Relative Wall Thickness 0.29 cm    AV valve area 2.91 cm2    AV Velocity Ratio 0.78     AV index (prosthetic) 0.86     MV valve area p 1/2 method 4.06 cm2    E/A ratio 1.51     Mean e' 0.09 " "m/s    Pulm vein S/D ratio 1.11     LVOT area 3.4 cm2    LVOT stroke volume 78.83 cm3    AV peak gradient 6 mmHg    E/E' ratio 6.89 m/s    Triscuspid Valve Regurgitation Peak Gradient 28 mmHg    BSA 1.92 m2    LV Systolic Volume Index 27.2 mL/m2    LV Diastolic Volume Index 76.38 mL/m2    LA Volume Index 49.5 mL/m2    LV Mass Index 94 g/m2    LA Volume Index (Mod) 49.7 mL/m2    Right Atrial Pressure (from IVC) 3 mmHg    EF 60 %    RA area 16.70 cm2    TV resting pulmonary artery pressure 31 mmHg    Narrative    · The left ventricle is normal in size with normal systolic function.  · The estimated ejection fraction is 60%.  · Normal left ventricular diastolic function.  · Normal right ventricular size with normal right ventricular systolic   function.  · The estimated PA systolic pressure is 31 mmHg.  · Normal central venous pressure (3 mmHg).  · Moderate left atrial enlargement.        Results for orders placed or performed during the hospital encounter of 07/01/18   2D echo with color flow doppler   Result Value Ref Range    EF + QEF 55 55 - 65    Mitral Valve Regurgitation MILD     Diastolic Dysfunction No     Est. PA Systolic Pressure 20.31      RANDOLPH:  No results found. However, due to the size of the patient record, not all encounters were searched. Please check Results Review for a complete set of results.  Stress Test:  No results found for this or any previous visit.     LHC:  No results found for this or any previous visit.     PFT:  No results found for: "FEV1", "FVC", "MSS1UDA", "TLC", "DLCO"                                                                                                                  04/23/2024  Denis Bunn is a 68 y.o., male.      Pre-op Assessment    I have reviewed the Patient Summary Reports.       I have reviewed the Medications.     Review of Systems  Anesthesia Hx:  No problems with previous Anesthesia   History of prior surgery of interest to airway management or planning:  " Previous anesthesia: General           Social:  Non-Smoker, Social Alcohol Use       Hematology/Oncology:  Hematology Normal   Oncology Normal                                   EENT/Dental:  EENT/Dental Normal           Cardiovascular:  Exercise tolerance: good   Hypertension, well controlled               PVCs                         Pulmonary:        Sleep Apnea                Renal/:     Prostate CA             Neurological:  Neurology Normal                                      Endocrine:  Endocrine Normal            Dermatological:  Skin Normal    Psych:   anxiety               Physical Exam  General: Well nourished, Cooperative, Alert and Oriented    Airway:  Mallampati: II   Mouth Opening: Normal  TM Distance: Normal  Tongue: Normal  Neck ROM: Normal ROM    Dental:  Intact    Anesthesia Plan  Type of Anesthesia, risks & benefits discussed:    Anesthesia Type: Gen Natural Airway  Intra-op Monitoring Plan: Standard ASA Monitors  Post Op Pain Control Plan: multimodal analgesia and IV/PO Opioids PRN  Induction:  IV  Airway Plan: , Post-Induction  Informed Consent: Informed consent signed with the Patient and all parties understand the risks and agree with anesthesia plan.  All questions answered.   ASA Score: 3    Ready For Surgery From Anesthesia Perspective.   .

## 2024-04-23 NOTE — TRANSFER OF CARE
"Anesthesia Transfer of Care Note    Patient: Denis Bunn    Procedure(s) Performed: Procedure(s) (LRB):  COLONOSCOPY (N/A)    Patient location: GI    Anesthesia Type: general    Transport from OR: Transported from OR on room air with adequate spontaneous ventilation    Post pain: adequate analgesia    Post assessment: no apparent anesthetic complications and tolerated procedure well    Post vital signs: stable    Level of consciousness: awake, alert and oriented    Nausea/Vomiting: no nausea/vomiting    Complications: none    Transfer of care protocol was followed    Last vitals: Visit Vitals  BP (!) 140/66   Pulse (!) 54   Temp 36.4 °C (97.5 °F)   Resp 16   Ht 5' 8" (1.727 m)   Wt 73.5 kg (162 lb)   SpO2 100%   BMI 24.63 kg/m²     "

## 2024-04-23 NOTE — H&P
COLONOSCOPY HISTORY AND PE    Procedure : Colonoscopy    INDICATIONS: asymptomatic screening exam, personal history of colon polyps, family history of colon cancer (father, age 70s), and most recent endoscopic exam 5 years ago    Colonoscopy (7/29/19, complete):       The digital rectal exam was normal.        Non-bleeding internal hemorrhoids were found during retroflexion.        The hemorrhoids were mild.        A single small-mouthed diverticulum was found in the sigmoid colon.        There was no evidence of diverticular bleeding.        A 5 mm polyp was found in the ascending colon. (HP) The polyp was        sessile. The polyp was removed with a cold snare. Resection and        retrieval were complete.        A 3 mm polyp was found in the transverse colon. (TA) The polyp was        sessile. The polyp was removed with a cold snare. Resection and        retrieval were complete.        A diffuse area of mild melanosis was found in the entire colon.     Past Medical History:   Diagnosis Date    Anxiety     Back pain     Cataract     Constipation - functional     Dyslipidemia     ED (erectile dysfunction)     FH: CAD (coronary artery disease) 4/24/2013    But he had 0 CAC    History of gout     HTN (hypertension), benign 04/24/2013    Personal history of colonic polyps     Prostate cancer     Vision changes        Past Surgical History:   Procedure Laterality Date    BACK SURGERY      CATARACT EXTRACTION      left eye    COLONOSCOPY N/A 7/29/2019    Procedure: COLONOSCOPY;  Surgeon: Mingo Freeman MD;  Location: [facility];  Service: Endoscopy;  Laterality: N/A;  due July 2019    ELBOW SURGERY      scope/right    EYE SURGERY      Dr. Hope.retina x2 left    LAMINECTOMY      ROBOT-ASSISTED LAPAROSCOPIC PROSTATECTOMY N/A 9/2/2020    Procedure: ROBOTIC PROSTATECTOMY;  Surgeon: Edson White MD;  Location: [address];  Service: Urology;  Laterality: N/A;  3hrs gen with regional    WRIST SURGERY      right        Review of patient's allergies indicates:   Allergen Reactions    Amoxicillin-pot clavulanate Nausea And Vomiting     Other reaction(s): Stomach upset  Other reaction(s): Stomach upset    Celecoxib Other (See Comments) and Nausea And Vomiting       No current facility-administered medications on file prior to encounter.     Current Outpatient Medications on File Prior to Encounter   Medication Sig Dispense Refill    allopurinoL (ZYLOPRIM) 100 MG tablet TAKE 1 TABLET BY MOUTH TWICE A  tablet 3    ascorbic acid, vitamin C, (VITAMIN C) 500 MG tablet Take 500 mg by mouth once daily. Hold for 1 week prior to surgery      calcium-vitamin D3 (OYSTER SHELL CALCIUM-VIT D3) 500 mg-5 mcg (200 unit) per tablet Take 1 tablet by mouth 2 (two) times daily. 180 tablet 3    cholecalciferol, vitamin D3, 125 mcg (5,000 unit) Tab Take 5,000 Units by mouth once daily.      citalopram (CELEXA) 10 MG tablet TAKE 1 TABLET BY MOUTH EVERY DAY 90 tablet 1    coenzyme Q10 100 mg capsule Take 100 mg by mouth once daily. Hold for 1 week prior to surgery      denosumab (PROLIA) 60 mg/mL Syrg Inject 60 mg into the skin.      FA/niacinamide/cupric ox/Zn ox (NICOTINAMIDE ORAL) Take 500 mg by mouth once daily.      ferrous sulfate (SLOW RELEASE IRON) 142 mg (45 mg iron) TbSR Take by mouth.      gabapentin (NEURONTIN) 300 MG capsule TAKE 1 CAPSULE BY MOUTH EVERY DAY IN THE EVENING 90 capsule 1    garlic 1,000 mg Cap Take by mouth. Hold for 1 week prior to surgery      hydrocortisone 2.5 % cream APPLY TO AFFECTED AREA TWICE A DAY 28.35 g 1    losartan (COZAAR) 100 MG tablet TAKE 1 TABLET BY MOUTH EVERY DAY 90 tablet 3    magnesium 250 mg Tab Take 250 mg by mouth once. Takes two 250 mg tablets daily   Hold for 1 week prior to surgery      meloxicam (MOBIC) 7.5 MG tablet       omega-3 fatty acids 1,000 mg Cap Take 1,000 mg by mouth.      papaverine 30 mg/mL injection Add: Phentolamine 1 mg  Add: PGE1 20 mcg    Sig:  Inject 20 units as  directed; titrate up by 5 units until desired results 10 mL 12    pravastatin (PRAVACHOL) 20 MG tablet TAKE 1 TABLET(20 MG) BY MOUTH EVERY DAY 90 tablet 3    verapamiL (CALAN-SR) 120 MG CR tablet TAKE 1 TABLET (120 MG TOTAL) BY MOUTH NIGHTLY. IN ADDITION TO 240MG IN THE MORNING 90 tablet 3    verapamiL (VERELAN) 240 MG C24P TAKE 1 CAPSULE (240 MG TOTAL) BY MOUTH EVERY MORNING. IN ADDITION  MG AT NIGHT 90 capsule 3    zinc 50 mg Tab Take by mouth. Hold for 1 week prior to surgery      ZIOPTJOSE PF, 0.0015 % Dpet Place 1 drop into both eyes nightly.      insulin syringe-needle U-100 1 mL 30 gauge x 5/16 Syrg Use with ICI Therapy 10 each 10    predniSONE (DELTASONE) 5 MG tablet TAKE 1 TABLET (5 MG TOTAL) BY MOUTH ONCE DAILY. TO BE TAKEN WITH ZYTIGA (Patient not taking: Reported on 3/25/2024) 30 tablet 3       Family History   Problem Relation Name Age of Onset    Blindness Mother Eli         from cva    Cataracts Mother Eli     Prostate cancer Father Denis     Cataracts Father Denis     No Known Problems Sister      No Known Problems Brother      No Known Problems Maternal Aunt      No Known Problems Maternal Uncle      No Known Problems Paternal Aunt      No Known Problems Paternal Uncle      No Known Problems Maternal Grandmother      No Known Problems Maternal Grandfather      No Known Problems Paternal Grandmother      No Known Problems Paternal Grandfather      Amblyopia Neg Hx      Diabetes Neg Hx      Glaucoma Neg Hx      Hypertension Neg Hx      Macular degeneration Neg Hx      Retinal detachment Neg Hx      Strabismus Neg Hx      Stroke Neg Hx      Thyroid disease Neg Hx         Social History     Socioeconomic History    Marital status:    Tobacco Use    Smoking status: Never    Smokeless tobacco: Never    Tobacco comments:     , 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   Substance and Sexual Activity    Alcohol use: Yes     Alcohol/week: 3.0 standard drinks of alcohol     Types: 3 Glasses of wine  per week     Comment: wine 2-4 times a week    Drug use: No    Sexual activity: Yes     Partners: Female     Social Determinants of Health     Financial Resource Strain: Low Risk  (11/27/2023)    Overall Financial Resource Strain (CARDIA)     Difficulty of Paying Living Expenses: Not hard at all   Food Insecurity: No Food Insecurity (11/27/2023)    Hunger Vital Sign     Worried About Running Out of Food in the Last Year: Never true     Ran Out of Food in the Last Year: Never true   Transportation Needs: No Transportation Needs (11/27/2023)    PRAPARE - Transportation     Lack of Transportation (Medical): No     Lack of Transportation (Non-Medical): No   Physical Activity: Insufficiently Active (11/27/2023)    Exercise Vital Sign     Days of Exercise per Week: 4 days     Minutes of Exercise per Session: 30 min   Stress: Stress Concern Present (11/27/2023)    [employer] [place] [employer] [place] [employer] - Occupational Stress Questionnaire     Feeling of Stress : To some extent   Social Connections: Unknown (11/27/2023)    Social Connection and Isolation Panel [NHANES]     Frequency of Communication with Friends and Family: More than three times a week     Frequency of Social Gatherings with Friends and Family: Twice a week     Active Member of Clubs or Organizations: No     Attends Club or Organization Meetings: Patient declined     Marital Status:    Housing Stability: Low Risk  (11/27/2023)    Housing Stability Vital Sign     Unable to Pay for Housing in the Last Year: No     Number of Places Lived in the Last Year: 1     Unstable Housing in the Last Year: No     Review of Systems -    Respiratory : no cough, shortness of breath, or wheezing  Cardiovascular  no chest pain or dyspnea on exertion  Gastrointestinal no abdominal pain, change in bowel habits, or black or bloody stools  Musculoskeletal no deformities, swelling  Neurological no TIA or stroke symptoms    Physical Exam:  General: NAD  AT NC  EOMI  Mallampati Score   Neck supple, trachea midline  Lungs: nl excursions, no retractions.  Breathing comfortably  Abdomen ND soft NT.  No masses  Extremities: No CCE.      ASA:  II    PLAN  COLONOSCOPY.  The details of the procedure, the possible need for biopsy or polypectomy and the potential risks including bleeding, perforation, missed polyps were discussed in detail.    Shmuel Henry MD  Staff Surgeon

## 2024-04-23 NOTE — PROVATION PATIENT INSTRUCTIONS
Discharge Summary/Instructions after an Endoscopic Procedure  Patient Name: Denis Bunn  Patient MRN: [id number]  Patient YOB: 1955 Tuesday, April 23, 2024  Shmuel Henry MD  Dear patient,  As a result of recent federal legislation (The Federal Cures Act), you may   receive lab or pathology results from your procedure in your [facility]   account before your physician is able to contact you. Your physician or   their representative will relay the results to you with their   recommendations at their soonest availability.  Thank you,  RESTRICTIONS:  During your procedure today, you received medications for sedation.  These   medications may affect your judgment, balance and coordination.  Therefore,   for 24 hours, you have the following restrictions:   - DO NOT drive a car, operate machinery, make legal/financial decisions,   sign important papers or drink alcohol.    ACTIVITY:  Today: no heavy lifting, straining or running due to procedural   sedation/anesthesia.  The following day: return to full activity including work.  DIET:  Eat and drink normally unless instructed otherwise.     TREATMENT FOR COMMON SIDE EFFECTS:  - Mild abdominal pain, nausea, belching, bloating or excessive gas:  rest,   eat lightly and use a heating pad.  - Sore Throat: treat with throat lozenges and/or gargle with warm salt   water.  - Because air was used during the procedure, expelling large amounts of air   from your rectum or belching is normal.  - If a bowel prep was taken, you may not have a bowel movement for 1-3 days.    This is normal.  SYMPTOMS TO WATCH FOR AND REPORT TO YOUR PHYSICIAN:  1. Abdominal pain or bloating, other than gas cramps.  2. Chest pain.  3. Back pain.  4. Signs of infection such as: chills or fever occurring within 24 hours   after the procedure.  5. Rectal bleeding, which would show as bright red, maroon, or black stools.   (A tablespoon of blood from the rectum is not serious, especially  if   hemorrhoids are present.)  6. Vomiting.  7. Weakness or dizziness.  GO DIRECTLY TO THE NEAREST EMERGENCY ROOM IF YOU HAVE ANY OF THE FOLLOWING:      Difficulty breathing              Chills and/or fever over 101 F   Persistent vomiting and/or vomiting blood   Severe abdominal pain   Severe chest pain   Black, tarry stools   Bleeding- more than one tablespoon   Any other symptom or condition that you feel may need urgent attention  Your doctor recommends these additional instructions:  If any biopsies were taken, your doctors clinic will contact you in 1 to 2   weeks with any results.  - Discharge patient to home.   - Resume previous diet.   - Continue present medications.   - Repeat colonoscopy for surveillance.   - Repeat colonoscopy in 5 years for screening purposes and Family history of   colon cancer in first degree relative.  For questions, problems or results please call your physician - Shmuel Henry MD at Work:  [phone].  [facility] EM[facility] RO[facility] PHONE NUMBER: [phone]  IF A COMPLICATION OR EMERGENCY SITUATION ARISES AND YOU ARE UNABLE TO REACH   YOUR PHYSICIAN - GO DIRECTLY TO THE EMERGENCY ROOM.  MD Shmuel Brito MD  4/23/2024 8:21:21 AM  This report has been verified and signed electronically.  Dear patient,  As a result of recent federal legislation (The Federal Cures Act), you may   receive lab or pathology results from your procedure in your [facility]   account before your physician is able to contact you. Your physician or   their representative will relay the results to you with their   recommendations at their soonest availability.  Thank you,  PROVATION

## 2024-04-23 NOTE — ANESTHESIA POSTPROCEDURE EVALUATION
Anesthesia Post Evaluation    Patient: Denis Bunn    Procedure(s) Performed: Procedure(s) (LRB):  COLONOSCOPY (N/A)    Final Anesthesia Type: general      Patient location during evaluation: PACU  Patient participation: Yes- Able to Participate  Level of consciousness: awake and alert  Post-procedure vital signs: reviewed and stable  Pain management: adequate  Airway patency: patent    PONV status at discharge: No PONV  Anesthetic complications: no      Cardiovascular status: blood pressure returned to baseline  Respiratory status: unassisted  Hydration status: euvolemic  Follow-up not needed.              Vitals Value Taken Time   /70 04/23/24 0855   Temp 36.7 °C (98 °F) 04/23/24 0822   Pulse 52 04/23/24 0855   Resp 16 04/23/24 0855   SpO2 98 % 04/23/24 0855         Event Time   Out of Recovery 08:55:52         Pain/Bianca Score: Bianca Score: 10 (4/23/2024  8:22 AM)

## 2024-04-27 ENCOUNTER — PATIENT MESSAGE (OUTPATIENT)
Dept: HEMATOLOGY/ONCOLOGY | Facility: CLINIC | Age: 69
End: 2024-04-27
Payer: MEDICARE

## 2024-04-29 DIAGNOSIS — C61 PROSTATE CANCER: Primary | ICD-10-CM

## 2024-04-29 DIAGNOSIS — M81.8 OTHER OSTEOPOROSIS, UNSPECIFIED PATHOLOGICAL FRACTURE PRESENCE: ICD-10-CM

## 2024-04-29 DIAGNOSIS — Z79.818 ENCOUNTER FOR MONITORING ANDROGEN DEPRIVATION THERAPY: ICD-10-CM

## 2024-04-30 ENCOUNTER — OFFICE VISIT (OUTPATIENT)
Dept: HEMATOLOGY/ONCOLOGY | Facility: CLINIC | Age: 69
End: 2024-04-30
Payer: MEDICARE

## 2024-04-30 ENCOUNTER — LAB VISIT (OUTPATIENT)
Dept: LAB | Facility: HOSPITAL | Age: 69
End: 2024-04-30
Attending: INTERNAL MEDICINE
Payer: MEDICARE

## 2024-04-30 ENCOUNTER — PATIENT MESSAGE (OUTPATIENT)
Dept: CARDIOLOGY | Facility: CLINIC | Age: 69
End: 2024-04-30
Payer: MEDICARE

## 2024-04-30 VITALS
HEART RATE: 59 BPM | OXYGEN SATURATION: 99 % | DIASTOLIC BLOOD PRESSURE: 72 MMHG | RESPIRATION RATE: 17 BRPM | HEIGHT: 68 IN | WEIGHT: 166 LBS | SYSTOLIC BLOOD PRESSURE: 183 MMHG | BODY MASS INDEX: 25.16 KG/M2 | TEMPERATURE: 97 F

## 2024-04-30 DIAGNOSIS — C61 PROSTATE CANCER: Primary | ICD-10-CM

## 2024-04-30 DIAGNOSIS — C79.51 SECONDARY MALIGNANT NEOPLASM OF BONE: ICD-10-CM

## 2024-04-30 DIAGNOSIS — C61 PROSTATE CANCER: ICD-10-CM

## 2024-04-30 DIAGNOSIS — I10 HTN (HYPERTENSION), BENIGN: ICD-10-CM

## 2024-04-30 LAB
ALBUMIN SERPL BCP-MCNC: 3.9 G/DL (ref 3.5–5.2)
ALP SERPL-CCNC: 46 U/L (ref 55–135)
ALT SERPL W/O P-5'-P-CCNC: 27 U/L (ref 10–44)
ANION GAP SERPL CALC-SCNC: 7 MMOL/L (ref 8–16)
AST SERPL-CCNC: 23 U/L (ref 10–40)
BASOPHILS # BLD AUTO: 0.06 K/UL (ref 0–0.2)
BASOPHILS NFR BLD: 1.4 % (ref 0–1.9)
BILIRUB SERPL-MCNC: 0.4 MG/DL (ref 0.1–1)
BUN SERPL-MCNC: 14 MG/DL (ref 8–23)
CALCIUM SERPL-MCNC: 9.6 MG/DL (ref 8.7–10.5)
CHLORIDE SERPL-SCNC: 106 MMOL/L (ref 95–110)
CO2 SERPL-SCNC: 28 MMOL/L (ref 23–29)
COMPLEXED PSA SERPL-MCNC: 0.03 NG/ML (ref 0–4)
CREAT SERPL-MCNC: 0.9 MG/DL (ref 0.5–1.4)
DIFFERENTIAL METHOD BLD: ABNORMAL
EOSINOPHIL # BLD AUTO: 0.2 K/UL (ref 0–0.5)
EOSINOPHIL NFR BLD: 3.8 % (ref 0–8)
ERYTHROCYTE [DISTWIDTH] IN BLOOD BY AUTOMATED COUNT: 12.6 % (ref 11.5–14.5)
EST. GFR  (NO RACE VARIABLE): >60 ML/MIN/1.73 M^2
GLUCOSE SERPL-MCNC: 95 MG/DL (ref 70–110)
HCT VFR BLD AUTO: 38.6 % (ref 40–54)
HGB BLD-MCNC: 12.8 G/DL (ref 14–18)
IMM GRANULOCYTES # BLD AUTO: 0 K/UL (ref 0–0.04)
IMM GRANULOCYTES NFR BLD AUTO: 0 % (ref 0–0.5)
LYMPHOCYTES # BLD AUTO: 0.8 K/UL (ref 1–4.8)
LYMPHOCYTES NFR BLD: 19.9 % (ref 18–48)
MCH RBC QN AUTO: 33.4 PG (ref 27–31)
MCHC RBC AUTO-ENTMCNC: 33.2 G/DL (ref 32–36)
MCV RBC AUTO: 101 FL (ref 82–98)
MONOCYTES # BLD AUTO: 0.4 K/UL (ref 0.3–1)
MONOCYTES NFR BLD: 10.2 % (ref 4–15)
NEUTROPHILS # BLD AUTO: 2.7 K/UL (ref 1.8–7.7)
NEUTROPHILS NFR BLD: 64.7 % (ref 38–73)
NRBC BLD-RTO: 0 /100 WBC
PLATELET # BLD AUTO: 193 K/UL (ref 150–450)
PMV BLD AUTO: 9.9 FL (ref 9.2–12.9)
POTASSIUM SERPL-SCNC: 5.1 MMOL/L (ref 3.5–5.1)
PROT SERPL-MCNC: 6.8 G/DL (ref 6–8.4)
RBC # BLD AUTO: 3.83 M/UL (ref 4.6–6.2)
SODIUM SERPL-SCNC: 141 MMOL/L (ref 136–145)
WBC # BLD AUTO: 4.22 K/UL (ref 3.9–12.7)

## 2024-04-30 PROCEDURE — 84153 ASSAY OF PSA TOTAL: CPT | Performed by: INTERNAL MEDICINE

## 2024-04-30 PROCEDURE — 99215 OFFICE O/P EST HI 40 MIN: CPT | Mod: PBBFAC | Performed by: INTERNAL MEDICINE

## 2024-04-30 PROCEDURE — 99214 OFFICE O/P EST MOD 30 MIN: CPT | Mod: S$PBB,,, | Performed by: INTERNAL MEDICINE

## 2024-04-30 PROCEDURE — 99999 PR PBB SHADOW E&M-EST. PATIENT-LVL V: CPT | Mod: PBBFAC,,, | Performed by: INTERNAL MEDICINE

## 2024-04-30 PROCEDURE — 36415 COLL VENOUS BLD VENIPUNCTURE: CPT | Performed by: INTERNAL MEDICINE

## 2024-04-30 PROCEDURE — 85025 COMPLETE CBC W/AUTO DIFF WBC: CPT | Performed by: INTERNAL MEDICINE

## 2024-04-30 PROCEDURE — 80053 COMPREHEN METABOLIC PANEL: CPT | Performed by: INTERNAL MEDICINE

## 2024-04-30 RX ORDER — TEMAZEPAM 15 MG/1
15 CAPSULE ORAL NIGHTLY PRN
Qty: 30 CAPSULE | Refills: 3 | Status: SHIPPED | OUTPATIENT
Start: 2024-04-30 | End: 2024-05-30

## 2024-04-30 NOTE — PROGRESS NOTES
Subjective     Patient ID: Denis Bunn is a 68 y.o. male.    Chief Complaint: Prostate Cancer    HPI    Diagnosis: Metastatic Prostate Cancer  Previously seen by Rad Onc, Dr. Frias and prior consult at [facility]     Presents to clinic for follow up  On intermittent therapy, now off  Notes energy has improved somewhat off therapy    PSA = 0.03 ng/ml  We reviewed up but not to level where we agreen intermittent therapy to restart    Moving in May 2024 to [place]  Selling his house here      Most recent imaging: continued to be improved  - 4/18/2023 PSMA PET:  FINDINGS:  In the head and neck, there is physiologic uptake in the lacrimal and salivary glands, and there are no tracer avid lesions suspicious for malignancy.  In the chest, there are no tracer avid lesions suspicious for malignancy.  In the abdomen and pelvis, there is physiologic distribution in the liver, spleen, bowel, and genitourinary tract, and there are no anatomic or tracer avid lesions suspicious for malignancy.  Specifically, there are no pathologically enlarged or tracer avid pelvic or retroperitoneal lymph nodes.  In the bones, there is physiologic uptake in the bone marrow, and there are no tracer avid lesions suspicious for malignancy.  Additional CT findings:  Probable right maxillary sinus retention cyst.  Bilateral subsegmental dependent atelectasis the lungs.  Small splenule.  Subcentimeter hyperattenuating left renal focus, possibly represents hemorrhagic cyst.  Postsurgical changes of prostatectomy.  Posterior instrumented fusion of lumbosacral spine.  Impression:  Postsurgical changes of prostatectomy in patient with prostate cancer.  No focal abnormal radiotracer uptake to suggest local recurrence or metastatic disease.     Health maintenance  Colonoscopy last week- negative   Oncology History:  - July 2020 presented for evaluation of an elevated PSA of 5.3 ng/ml.       - 9/2/2020 underwent RALP with bilateral lymph node  dissection   Pathology revealed Jose 7 (4+3) adenocarcinoma involving 20% of the prostate.  The Jose pattern 4 accounted for 85% of the tumor.  There was extraprostatic extension with multifocal perineural and lymphovascular invasion.  There was no bladder neck invasion or seminal vesicle invasion.  The margins of resection and 1  Rt. and 1 Lt. pelvic nodes were negative for tumor involvement.       - Postoperative PSA on 10/1/20 returned at 0.14 ng/ml.  Repeat PSA  2/9/21 and returned at 0.41 ng/ml.       - 2/26/2021 PET fluciclovine showed no evidence of recurrent or metastatic disease     - 3/2/2021 MRI of the prostate showed 7 mm bone lesion in the left symphysis pubis     - he was started on Casodex     - 3/3/2021 went to [facility] and casodex stopped to pursue biopsy     - 3/15/2021 IR-directed biopsy on the left symphysis pubis revealed prostate adenocarcinoma     - 3/24/21 restarted casodex and received Eligard on 3/31/2021      - 4/21/2021 started apalutamide per [facility]-- 1 week later developed a rash that responded to medrol dose pack but recurred      - He was recommended for radiotherapy to the prostate bed, pubic symphysis and pelvic nodes which he completed on 10/27/21.  The metastatic focus in the pubic symphysis also received radiotherapy.       - patient subsequently elected to discontinue his hormonal therapy.His last Lupron injection was in October of 2021.       - 7/13/2021 PET showed no evidence of fluciclovine avid recurrent or metastatic prostate carcinoma.      - 7/12/2021 MRI of the pelvis showed stable postsurgical findings without evidence of local recurrence or pelvic adenopathy. There was a sclerotic focus in the left pubic symphysis unchanged since the prior examination.      - PSA < .01 ng/ml    - He continued on ADT + apalutamide andradiation to the primary tumor and pelvic bone from 08/19/2021-10/27/2021 with Dr. Frias.     - 4/18/2023 PET PSMA:  FINDINGS:  In the head and neck,  there is physiologic uptake in the lacrimal and salivary glands, and there are no tracer avid lesions suspicious for malignancy.  In the chest, there are no tracer avid lesions suspicious for malignancy.  In the abdomen and pelvis, there is physiologic distribution in the liver, spleen, bowel, and genitourinary tract, and there are no anatomic or tracer avid lesions suspicious for malignancy.  Specifically, there are no pathologically enlarged or tracer avid pelvic or retroperitoneal lymph nodes.  In the bones, there is physiologic uptake in the bone marrow, and there are no tracer avid lesions suspicious for malignancy.  Additional CT findings:  Probable right maxillary sinus retention cyst.  Bilateral subsegmental dependent atelectasis the lungs.  Small splenule.  Subcentimeter hyperattenuating left renal focus, possibly represents hemorrhagic cyst.  Postsurgical changes of prostatectomy.  Posterior instrumented fusion of lumbosacral spine.  Impression:  Postsurgical changes of prostatectomy in patient with prostate cancer.  No focal abnormal radiotracer uptake to suggest local recurrence or metastatic disease.     FH:  Father prostate cancer at 79  Paternal grandfather- lung cancer (smoker)     SH:    Social EtOH    Review of Systems   Constitutional:  Positive for fatigue. Negative for activity change, appetite change, fever and unexpected weight change.   HENT:  Negative for sore throat and trouble swallowing.    Eyes:  Negative for photophobia and visual disturbance.   Respiratory:  Negative for cough, chest tightness, shortness of breath and wheezing.    Cardiovascular:  Negative for chest pain, palpitations and leg swelling.   Gastrointestinal:  Negative for abdominal pain, constipation, diarrhea, nausea and vomiting.   Genitourinary:  Negative for dysuria and flank pain.   Musculoskeletal:  Negative for arthralgias, back pain and myalgias.   Integumentary:  Negative for color change and pallor.    Neurological:  Negative for dizziness, weakness and headaches.   Psychiatric/Behavioral:  Negative for confusion and decreased concentration.           Objective     Physical Exam  Vitals and nursing note reviewed.   Constitutional:       General: He is not in acute distress.     Appearance: Normal appearance. He is normal weight. He is not ill-appearing.   Neurological:      General: No focal deficit present.      Mental Status: He is alert and oriented to person, place, and time. Mental status is at baseline.   Psychiatric:         Mood and Affect: Mood normal.         Behavior: Behavior normal.         Thought Content: Thought content normal.         Judgment: Judgment normal.   Labs- reviewed       Assessment and Plan     1. Prostate cancer  -     temazepam (RESTORIL) 15 mg Cap; Take 1 capsule (15 mg total) by mouth nightly as needed (insomnia).  Dispense: 30 capsule; Refill: 3    2. HTN (hypertension), benign  Overview:  Borderline      3. Secondary malignant neoplasm of bone        Prostate cancer  On intermittent low dose Zytiga + Prednisone in AM- currently off  Side effects improved  Recheck PSA in 8 weeks- restart if > 0.8-1.0 ng/ml (will do locally and call)     Osteoporosis  Prolia q 6 months-- received in 4/2024  BMD at 1 year (09/2024)        Plans to move to [facility]- will look for local resource    25 minutes direct with patient  He would like to be able to alternate care here with labs locally as well    Route Chart for Scheduling    Med Onc Chart Routing      Follow up with physician 2 months. psa and testosterone at [facility] in [place] as closest, audio appt a couple days later   Follow up with [facility]    Infusion scheduling note    Injection scheduling note    Labs    Imaging    Pharmacy appointment    Other referrals              Therapy Plan Information  Medications  denosumab (PROLIA) injection 60 mg  60 mg, Subcutaneous, Every 26 weeks

## 2024-05-13 ENCOUNTER — PATIENT MESSAGE (OUTPATIENT)
Dept: HEMATOLOGY/ONCOLOGY | Facility: CLINIC | Age: 69
End: 2024-05-13
Payer: MEDICARE

## 2024-05-21 ENCOUNTER — TELEPHONE (OUTPATIENT)
Dept: OTOLARYNGOLOGY | Facility: CLINIC | Age: 69
End: 2024-05-21
Payer: MEDICARE

## 2024-05-21 NOTE — TELEPHONE ENCOUNTER
----- Message from Shannon Ruvalcaba sent at 5/21/2024  3:56 PM CDT -----  Type:  Sooner Appointment Request    Caller is requesting a sooner appointment.  Caller declined first available appointment listed below.  Caller will not accept being placed on the waitlist and is requesting a message be sent to doctor.    Name of Caller:pt  When is the first available appointment?na  Symptoms:hearing loss in left ear  Would the patient rather a call back or a response via [facility]? call  Best Call Back Number: [phone]  Additional Information:

## 2024-05-21 NOTE — TELEPHONE ENCOUNTER
Patient has sudden hearing loss that started a few day ago. He's always had hearing loss but it became dead the other day. Did book that appointment with  for Thursday 05/23 with a hearing test on 05/21.

## 2024-05-22 ENCOUNTER — CLINICAL SUPPORT (OUTPATIENT)
Dept: AUDIOLOGY | Facility: CLINIC | Age: 69
End: 2024-05-22
Payer: MEDICARE

## 2024-05-22 DIAGNOSIS — H90.A22 SENSORINEURAL HEARING LOSS (SNHL) OF LEFT EAR WITH RESTRICTED HEARING OF RIGHT EAR: Primary | ICD-10-CM

## 2024-05-22 DIAGNOSIS — H90.3 SENSORINEURAL HEARING LOSS (SNHL) OF BOTH EARS: ICD-10-CM

## 2024-05-22 PROCEDURE — 92567 TYMPANOMETRY: CPT | Mod: S$GLB,,,

## 2024-05-22 PROCEDURE — 92557 COMPREHENSIVE HEARING TEST: CPT | Mod: S$GLB,,,

## 2024-05-22 NOTE — PROGRESS NOTES
Please click on link to view Audiogram:  Document on 5/22/2024 9:16 AM by Elsi Yin AU.D: Audiogram    Mr. Denis Bunn, a 68 y.o. male, was seen in the clinic today for an audiological evaluation. Mr. Bunn's main complaint was sudden hearing loss in his left ear. Patient reported that hearing loss started 10 days ago. Previous audiogram from 4/13/22 indicated a mild to moderate mid-high frequency sensorineural hearing loss (SNHL) bilaterally.    Otoscopy clear bilaterally. Tympanometry testing revealed a Type A tympanogram for the right ear and a Type A tympanogram for the left ear.     Audiological testing revealed a mild to moderate mid-high frequency SNHL for the right ear and a moderate to severe SNHL for the left ear. A speech reception threshold was obtained at 35 dBHL for the right ear and at 65 dBHL for the left ear. Speech discrimination was 92% for the right ear and 72% for the left ear.      Recommendations:  1. Otologic evaluation  2. Repeat audiological evaluation in conjunction with medical treatment  3. Hearing protection when in noise   4. Utilize ReSound Relief blanco and other tinnitus management strategies discussed during appointment (lower salt/caffeine intake, monitor stress/anxiety levels, soft background noise in quiet)

## 2024-05-23 ENCOUNTER — OFFICE VISIT (OUTPATIENT)
Dept: OTOLARYNGOLOGY | Facility: CLINIC | Age: 69
End: 2024-05-23
Payer: MEDICARE

## 2024-05-23 ENCOUNTER — TELEPHONE (OUTPATIENT)
Dept: OTOLARYNGOLOGY | Facility: CLINIC | Age: 69
End: 2024-05-23

## 2024-05-23 VITALS — HEIGHT: 68 IN | WEIGHT: 166 LBS | BODY MASS INDEX: 25.16 KG/M2

## 2024-05-23 DIAGNOSIS — H90.42 SENSORINEURAL HEARING LOSS (SNHL) OF LEFT EAR WITH UNRESTRICTED HEARING OF RIGHT EAR: ICD-10-CM

## 2024-05-23 DIAGNOSIS — H91.22 SUDDEN HEARING LOSS, LEFT: Primary | ICD-10-CM

## 2024-05-23 PROCEDURE — 99214 OFFICE O/P EST MOD 30 MIN: CPT | Mod: S$GLB,,, | Performed by: OTOLARYNGOLOGY

## 2024-05-23 RX ORDER — PREDNISONE 10 MG/1
TABLET ORAL
Qty: 70 TABLET | Refills: 0 | Status: SHIPPED | OUTPATIENT
Start: 2024-05-23 | End: 2024-05-23

## 2024-05-23 RX ORDER — PREDNISONE 10 MG/1
TABLET ORAL
Qty: 70 TABLET | Refills: 0 | Status: SHIPPED | OUTPATIENT
Start: 2024-05-23 | End: 2024-06-06

## 2024-05-23 NOTE — TELEPHONE ENCOUNTER
----- Message from Jackeline Nascimento MA sent at 5/23/2024  2:34 PM CDT -----  Done 06/28 1:45  ----- Message -----  From: Allan Arevalo MA  Sent: 5/23/2024   2:29 PM CDT  To: YEFRI Kumar states this pt would like to schedule at the main campus after his MRI.  Thanks   SAIRA Lemus

## 2024-05-23 NOTE — PROGRESS NOTES
Subjective     Patient ID: Denis Bunn is a 68 y.o. male.    Chief Complaint: Hearing Loss (Possible sudden hearing loss )    HPI     Denis Bunn is a 68 y.o. male presents for ISSNHL AS x 10 days. No treatment so far. Has prior hx of this occurring 20 yrs ago per patient. Associated ear fullness, mild tinnitus, no vertigo. Has hx of metastatic prostate ca.    Review of Systems   Constitutional: Negative.    HENT:  Positive for hearing loss.    Eyes: Negative.    Cardiovascular: Negative.    Gastrointestinal: Negative.    Endocrine: Negative.    Musculoskeletal:  Positive for back pain.   Integumentary:  Negative.   Allergic/Immunologic: Negative.    Neurological: Negative.    Hematological: Negative.    Psychiatric/Behavioral:  Positive for sleep disturbance.           Objective     Physical Exam  Vitals and nursing note reviewed.   Constitutional:       Appearance: Normal appearance.   HENT:      Head: Normocephalic and atraumatic.      Right Ear: Tympanic membrane, ear canal and external ear normal. There is no impacted cerumen.      Left Ear: Tympanic membrane, ear canal and external ear normal. There is no impacted cerumen.   Neurological:      Mental Status: He is alert.       Data Reviewed:      Audiogram tracings independently reviewed and discussed with patient shows left-sided moderate hearing loss right-sided mild sensorineural hearing loss     Assessment and Plan     1. Sudden hearing loss, left  -     Discontinue: predniSONE (DELTASONE) 10 MG tablet; Take 6 tablets (60 mg total) by mouth once daily for 10 days, THEN 4 tablets (40 mg total) once daily for 1 day, THEN 3 tablets (30 mg total) once daily for 1 day, THEN 2 tablets (20 mg total) once daily for 1 day, THEN 1 tablet (10 mg total) once daily for 1 day.  Dispense: 70 tablet; Refill: 0  -     predniSONE (DELTASONE) 10 MG tablet; Take 6 tablets (60 mg total) by mouth once daily for 10 days, THEN 4 tablets (40 mg total) once daily for 1  day, THEN 3 tablets (30 mg total) once daily for 1 day, THEN 2 tablets (20 mg total) once daily for 1 day, THEN 1 tablet (10 mg total) once daily for 1 day.  Dispense: 70 tablet; Refill: 0    2. Sensorineural hearing loss (SNHL) of left ear with unrestricted hearing of right ear  -     MRI IAC/Temporal Bones W W/O Contrast; Future; Expected date: 05/23/2024        Prednisone taper   MRI IAC to rule out acoustic neuroma   Follow up after steroids to recheck hearing.  Discussed transtympanic steroids which patient is opposed to.         No follow-ups on file.

## 2024-06-03 ENCOUNTER — PATIENT MESSAGE (OUTPATIENT)
Dept: OTOLARYNGOLOGY | Facility: CLINIC | Age: 69
End: 2024-06-03
Payer: MEDICARE

## 2024-06-03 ENCOUNTER — PATIENT MESSAGE (OUTPATIENT)
Dept: HEMATOLOGY/ONCOLOGY | Facility: CLINIC | Age: 69
End: 2024-06-03
Payer: MEDICARE

## 2024-06-04 ENCOUNTER — PATIENT MESSAGE (OUTPATIENT)
Dept: ADMINISTRATIVE | Facility: HOSPITAL | Age: 69
End: 2024-06-04
Payer: MEDICARE

## 2024-06-21 RX ORDER — PRAVASTATIN SODIUM 20 MG/1
20 TABLET ORAL
Qty: 90 TABLET | Refills: 3 | Status: SHIPPED | OUTPATIENT
Start: 2024-06-21

## 2024-06-24 PROBLEM — R00.1 BRADYCARDIA: Status: ACTIVE | Noted: 2024-06-24

## 2024-06-24 NOTE — PROGRESS NOTES
Subjective   Patient ID:  Denis Bunn is a 68 y.o. male who presents for follow-up of PVC      HPI 69 yo male with PVC's, bradycardia, Htn, Prostate cancer, Sleep apnea.     Background:     Primary cardiologist is Dr. Pereyra.  Moved to [place] recently.  He presented early July 2018 with frequent palpitations. He was found to have frequent PVCs with RBBB morphology that were relatively narrow. He was admitted for observation. There his EF was noted to be normal. I advised that he stop his metoprolol and start verapamil.  48h holter. revealed PVC burden was 23%.    2/20: Verapamil up-titrated to 240 qAM, 120 q PM. Symptoms stable.      3/2021: Prostate CA with prostatectomy 9/2020. He has had some suggestion of bone lesions. Now awaiting bone biopsy results. No cardiac issues. Remains on verapamil 240 qAM and 120 qPM. He is awaiting final plan for his CA.   65 yoM PVCs here for routine follow up. PVCs under good control on verapamil. RTC 1y     4/7/2022: He is stable from a rhythm standpoint, with no documented or symptomatic PVCs on verapamil regimen. He has chronic bradycardia but is asymptomatic. Some fatigue related to his CA regimen and was recently taken off 2 meds to gain more energy. He is looking to start exercise regimen.    Echo 3/6/23  The left ventricle is normal in size with normal systolic function.  The estimated ejection fraction is 60%.  Normal left ventricular diastolic function.  Normal right ventricular size with normal right ventricular systolic function.  The estimated PA systolic pressure is 31 mmHg.  Normal central venous pressure (3 mmHg).  Moderate left atrial enlargement.     Update:    ECG today reviewed by me, reveals sinus bradycardia at 46 bpm.  Feels well overall, denies dizziness, syncope. Happy with his energy level. However, anticipates need for hormone therapy for recurrent Prostate cancer shortly, and this has caused challenges in the past.  Noted palpitations when  on Prednisone >> stopped upon discontinuation.        Review of Systems   Constitutional: Negative. Negative for fever and malaise/fatigue.   HENT:  Negative for congestion and sore throat.    Cardiovascular:  Negative for chest pain, dyspnea on exertion, irregular heartbeat, leg swelling, near-syncope, orthopnea, palpitations, paroxysmal nocturnal dyspnea and syncope.   Respiratory:  Negative for cough and shortness of breath.    Gastrointestinal:  Negative for abdominal pain, constipation and diarrhea.   Neurological:  Negative for dizziness, light-headedness and weakness.   Psychiatric/Behavioral:  Negative for depression. The patient is not nervous/anxious.    All other systems reviewed and are negative.         Objective     Physical Exam  Constitutional:       Appearance: He is well-developed.   Eyes:      General: No scleral icterus.     Conjunctiva/sclera: Conjunctivae normal.   Neck:      Vascular: No JVD.      Trachea: No tracheal deviation.   Cardiovascular:      Rate and Rhythm: Regular rhythm. Bradycardia present.      Chest Wall: PMI is not displaced.      Heart sounds: Normal heart sounds.   Pulmonary:      Effort: Pulmonary effort is normal. No respiratory distress.      Breath sounds: Normal breath sounds.   Abdominal:      Palpations: Abdomen is soft.      Tenderness: There is no abdominal tenderness.   Musculoskeletal:         General: No tenderness.   Skin:     General: Skin is warm and dry.      Findings: No rash.   Neurological:      Mental Status: He is alert and oriented to person, place, and time.   Psychiatric:         Behavior: Behavior normal.            Assessment and Plan     1. PVC (premature ventricular contraction)    2. HTN (hypertension), benign    3. Prostate cancer    4. DAYLIN (obstructive sleep apnea)    5. Bradycardia        Plan:       Doing well overall. Has asymptomatic bradycardia. Continue current medications.  F/u in one year.

## 2024-06-25 ENCOUNTER — PATIENT MESSAGE (OUTPATIENT)
Dept: HEMATOLOGY/ONCOLOGY | Facility: CLINIC | Age: 69
End: 2024-06-25
Payer: MEDICARE

## 2024-06-26 ENCOUNTER — HOSPITAL ENCOUNTER (OUTPATIENT)
Dept: CARDIOLOGY | Facility: CLINIC | Age: 69
Discharge: HOME | End: 2024-06-26
Payer: MEDICARE

## 2024-06-26 ENCOUNTER — HOSPITAL ENCOUNTER (OUTPATIENT)
Dept: RADIOLOGY | Facility: HOSPITAL | Age: 69
Discharge: HOME | End: 2024-06-26
Attending: OTOLARYNGOLOGY
Payer: MEDICARE

## 2024-06-26 ENCOUNTER — PATIENT MESSAGE (OUTPATIENT)
Dept: HEMATOLOGY/ONCOLOGY | Facility: CLINIC | Age: 69
End: 2024-06-26
Payer: MEDICARE

## 2024-06-26 ENCOUNTER — CLINICAL SUPPORT (OUTPATIENT)
Dept: AUDIOLOGY | Facility: CLINIC | Age: 69
End: 2024-06-26
Payer: MEDICARE

## 2024-06-26 ENCOUNTER — OFFICE VISIT (OUTPATIENT)
Dept: ELECTROPHYSIOLOGY | Facility: CLINIC | Age: 69
End: 2024-06-26
Payer: MEDICARE

## 2024-06-26 VITALS
SYSTOLIC BLOOD PRESSURE: 164 MMHG | HEIGHT: 68 IN | DIASTOLIC BLOOD PRESSURE: 74 MMHG | WEIGHT: 167.75 LBS | BODY MASS INDEX: 25.42 KG/M2 | HEART RATE: 46 BPM

## 2024-06-26 DIAGNOSIS — H90.42 SENSORINEURAL HEARING LOSS (SNHL) OF LEFT EAR WITH UNRESTRICTED HEARING OF RIGHT EAR: ICD-10-CM

## 2024-06-26 DIAGNOSIS — G47.33 OSA (OBSTRUCTIVE SLEEP APNEA): ICD-10-CM

## 2024-06-26 DIAGNOSIS — I49.3 PVC (PREMATURE VENTRICULAR CONTRACTION): ICD-10-CM

## 2024-06-26 DIAGNOSIS — I49.3 PVC (PREMATURE VENTRICULAR CONTRACTION): Primary | ICD-10-CM

## 2024-06-26 DIAGNOSIS — C61 PROSTATE CANCER: ICD-10-CM

## 2024-06-26 DIAGNOSIS — R00.1 BRADYCARDIA: ICD-10-CM

## 2024-06-26 DIAGNOSIS — H90.A22 SENSORINEURAL HEARING LOSS (SNHL) OF LEFT EAR WITH RESTRICTED HEARING OF RIGHT EAR: Primary | ICD-10-CM

## 2024-06-26 DIAGNOSIS — I10 HTN (HYPERTENSION), BENIGN: ICD-10-CM

## 2024-06-26 LAB
OHS QRS DURATION: 100 MS
OHS QTC CALCULATION: 385 MS

## 2024-06-26 PROCEDURE — 93005 ELECTROCARDIOGRAM TRACING: CPT | Mod: PBBFAC | Performed by: INTERNAL MEDICINE

## 2024-06-26 PROCEDURE — A9585 GADOBUTROL INJECTION: HCPCS | Performed by: OTOLARYNGOLOGY

## 2024-06-26 PROCEDURE — 99214 OFFICE O/P EST MOD 30 MIN: CPT | Mod: PBBFAC,25 | Performed by: INTERNAL MEDICINE

## 2024-06-26 PROCEDURE — 99214 OFFICE O/P EST MOD 30 MIN: CPT | Mod: S$PBB,,, | Performed by: INTERNAL MEDICINE

## 2024-06-26 PROCEDURE — 99999 PR PBB SHADOW E&M-EST. PATIENT-LVL IV: CPT | Mod: PBBFAC,,, | Performed by: INTERNAL MEDICINE

## 2024-06-26 PROCEDURE — 99999 PR PBB SHADOW E&M-EST. PATIENT-LVL I: CPT | Mod: PBBFAC,,, | Performed by: AUDIOLOGIST

## 2024-06-26 PROCEDURE — 92567 TYMPANOMETRY: CPT | Mod: PBBFAC | Performed by: AUDIOLOGIST

## 2024-06-26 PROCEDURE — 92557 COMPREHENSIVE HEARING TEST: CPT | Mod: PBBFAC | Performed by: AUDIOLOGIST

## 2024-06-26 PROCEDURE — 25500020 PHARM REV CODE 255: Performed by: OTOLARYNGOLOGY

## 2024-06-26 PROCEDURE — 93010 ELECTROCARDIOGRAM REPORT: CPT | Mod: S$PBB,,, | Performed by: INTERNAL MEDICINE

## 2024-06-26 PROCEDURE — 70553 MRI BRAIN STEM W/O & W/DYE: CPT | Mod: 26,,, | Performed by: RADIOLOGY

## 2024-06-26 PROCEDURE — 99211 OFF/OP EST MAY X REQ PHY/QHP: CPT | Mod: PBBFAC,25,27 | Performed by: AUDIOLOGIST

## 2024-06-26 PROCEDURE — 70553 MRI BRAIN STEM W/O & W/DYE: CPT | Mod: TC

## 2024-06-26 RX ORDER — GADOBUTROL 604.72 MG/ML
8 INJECTION INTRAVENOUS
Status: COMPLETED | OUTPATIENT
Start: 2024-06-26 | End: 2024-06-26

## 2024-06-26 RX ADMIN — GADOBUTROL 8 ML: 604.72 INJECTION INTRAVENOUS at 02:06

## 2024-06-26 NOTE — PROGRESS NOTES
Denis Bunn, a 68 y.o. male, was seen today in the clinic for an audiologic evaluation.  The patient's main complaint was left ear sudden sensorineural hearing loss and constant tinnitus in both ears, especially in quiet.  Mr. Bunn reported improvement in hearing for the left ear since onset one month ago. Pt denied dizziness and balance problems, as well as otalgia. Pt reported aural fullness in his left ear.     Tympanometry revealed Type A in the right ear and Type A in the left ear. Audiogram results revealed mild sloping to moderate SNHL from 5940-7623 Hz in the right ear and normal sloping to moderate SNHL in the left ear from 250-8000 Hz.  Speech reception thresholds were noted at 25 dB in the right ear and 30 dB in the left ear.  Speech discrimination scores were 92% in the right ear and 80% in the left ear.    Recommendations:  Otologic evaluation  Repeat audiogram per ENT treatment plan.  Hearing protection when in noise  Hearing aid consultation if difficulty is perceived.

## 2024-06-27 ENCOUNTER — PATIENT MESSAGE (OUTPATIENT)
Dept: HEMATOLOGY/ONCOLOGY | Facility: CLINIC | Age: 69
End: 2024-06-27
Payer: MEDICARE

## 2024-06-27 ENCOUNTER — OFFICE VISIT (OUTPATIENT)
Dept: OTOLARYNGOLOGY | Facility: CLINIC | Age: 69
End: 2024-06-27
Payer: MEDICARE

## 2024-06-27 ENCOUNTER — OFFICE VISIT (OUTPATIENT)
Dept: HEMATOLOGY/ONCOLOGY | Facility: CLINIC | Age: 69
End: 2024-06-27
Payer: MEDICARE

## 2024-06-27 DIAGNOSIS — C79.51 SECONDARY MALIGNANT NEOPLASM OF BONE: ICD-10-CM

## 2024-06-27 DIAGNOSIS — H90.42 SENSORINEURAL HEARING LOSS (SNHL) OF LEFT EAR WITH UNRESTRICTED HEARING OF RIGHT EAR: ICD-10-CM

## 2024-06-27 DIAGNOSIS — C61 PROSTATE CANCER: Primary | ICD-10-CM

## 2024-06-27 DIAGNOSIS — H91.22 SUDDEN HEARING LOSS, LEFT: Primary | ICD-10-CM

## 2024-06-27 PROCEDURE — 99213 OFFICE O/P EST LOW 20 MIN: CPT | Mod: 95,,, | Performed by: OTOLARYNGOLOGY

## 2024-06-27 NOTE — PROGRESS NOTES
Subjective     Patient ID: Denis Bunn is a 68 y.o. male.    Chief Complaint: Prostate Cancer    HPI    The patient location is: home  The chief complaint leading to consultation is: follow up  Visit type: audiovisual  Face to Face time with patient: 12 minutes  15 minutes of total time spent on the encounter, which includes face to face time and non-face to face time preparing to see the patient (eg, review of tests), Obtaining and/or reviewing separately obtained history, Documenting clinical information in the electronic or other health record, Independently interpreting results (not separately reported) and communicating results to the patient/family/caregiver, or Care coordination (not separately reported).   Each patient to whom he or she provides medical services by telemedicine is:  (1) informed of the relationship between the physician and patient and the respective role of any other health care provider with respect to management of the patient; and (2) notified that he or she may decline to receive medical services by telemedicine and may withdraw from such care at any time.    Notes:     Diagnosis: Metastatic Prostate Cancer  Previously seen by Rad Onc, Dr. Frias and prior consult at [facility]     Presents to clinic for follow up  On intermittent therapy, now off  Notes energy has improved somewhat off therapy  No new issues  Note -- looking for local oncologist    PSA = 0.11 ng/ml (up)  We reviewed up again      Most recent imaging: continued to be improved  - 4/18/2023 PSMA PET:  FINDINGS:  In the head and neck, there is physiologic uptake in the lacrimal and salivary glands, and there are no tracer avid lesions suspicious for malignancy.  In the chest, there are no tracer avid lesions suspicious for malignancy.  In the abdomen and pelvis, there is physiologic distribution in the liver, spleen, bowel, and genitourinary tract, and there are no anatomic or tracer avid lesions suspicious for  malignancy.  Specifically, there are no pathologically enlarged or tracer avid pelvic or retroperitoneal lymph nodes.  In the bones, there is physiologic uptake in the bone marrow, and there are no tracer avid lesions suspicious for malignancy.  Additional CT findings:  Probable right maxillary sinus retention cyst.  Bilateral subsegmental dependent atelectasis the lungs.  Small splenule.  Subcentimeter hyperattenuating left renal focus, possibly represents hemorrhagic cyst.  Postsurgical changes of prostatectomy.  Posterior instrumented fusion of lumbosacral spine.  Impression:  Postsurgical changes of prostatectomy in patient with prostate cancer.  No focal abnormal radiotracer uptake to suggest local recurrence or metastatic disease.     Health maintenance  Colonoscopy - negative     Oncology History:  - July 2020 presented for evaluation of an elevated PSA of 5.3 ng/ml.       - 9/2/2020 underwent RALP with bilateral lymph node dissection   Pathology revealed Joes 7 (4+3) adenocarcinoma involving 20% of the prostate.  The Jose pattern 4 accounted for 85% of the tumor.  There was extraprostatic extension with multifocal perineural and lymphovascular invasion.  There was no bladder neck invasion or seminal vesicle invasion.  The margins of resection and 1  Rt. and 1 Lt. pelvic nodes were negative for tumor involvement.       - Postoperative PSA on 10/1/20 returned at 0.14 ng/ml.  Repeat PSA  2/9/21 and returned at 0.41 ng/ml.       - 2/26/2021 PET fluciclovine showed no evidence of recurrent or metastatic disease     - 3/2/2021 MRI of the prostate showed 7 mm bone lesion in the left symphysis pubis     - he was started on Casodex     - 3/3/2021 went to [facility] and casodex stopped to pursue biopsy     - 3/15/2021 IR-directed biopsy on the left symphysis pubis revealed prostate adenocarcinoma     - 3/24/21 restarted casodex and received Eligard on 3/31/2021      - 4/21/2021 started apalutamide per [facility]-- 1 week  later developed a rash that responded to medrol dose pack but recurred      - He was recommended for radiotherapy to the prostate bed, pubic symphysis and pelvic nodes which he completed on 10/27/21.  The metastatic focus in the pubic symphysis also received radiotherapy.       - patient subsequently elected to discontinue his hormonal therapy.His last Lupron injection was in October of 2021.       - 7/13/2021 PET showed no evidence of fluciclovine avid recurrent or metastatic prostate carcinoma.      - 7/12/2021 MRI of the pelvis showed stable postsurgical findings without evidence of local recurrence or pelvic adenopathy. There was a sclerotic focus in the left pubic symphysis unchanged since the prior examination.      - PSA < .01 ng/ml    - He continued on ADT + apalutamide andradiation to the primary tumor and pelvic bone from 08/19/2021-10/27/2021 with Dr. Frias.     - 4/18/2023 PET PSMA:  FINDINGS:  In the head and neck, there is physiologic uptake in the lacrimal and salivary glands, and there are no tracer avid lesions suspicious for malignancy.  In the chest, there are no tracer avid lesions suspicious for malignancy.  In the abdomen and pelvis, there is physiologic distribution in the liver, spleen, bowel, and genitourinary tract, and there are no anatomic or tracer avid lesions suspicious for malignancy.  Specifically, there are no pathologically enlarged or tracer avid pelvic or retroperitoneal lymph nodes.  In the bones, there is physiologic uptake in the bone marrow, and there are no tracer avid lesions suspicious for malignancy.  Additional CT findings:  Probable right maxillary sinus retention cyst.  Bilateral subsegmental dependent atelectasis the lungs.  Small splenule.  Subcentimeter hyperattenuating left renal focus, possibly represents hemorrhagic cyst.  Postsurgical changes of prostatectomy.  Posterior instrumented fusion of lumbosacral spine.  Impression:  Postsurgical changes of  prostatectomy in patient with prostate cancer.  No focal abnormal radiotracer uptake to suggest local recurrence or metastatic disease.     FH:  Father prostate cancer at 79  Paternal grandfather- lung cancer (smoker)     SH:    Social EtOH    Review of Systems   Constitutional:  Positive for fatigue. Negative for activity change, appetite change, fever and unexpected weight change.   HENT:  Negative for sore throat and trouble swallowing.    Eyes:  Negative for photophobia and visual disturbance.   Respiratory:  Negative for cough, chest tightness, shortness of breath and wheezing.    Cardiovascular:  Negative for chest pain, palpitations and leg swelling.   Gastrointestinal:  Negative for abdominal pain, constipation, diarrhea, nausea and vomiting.   Genitourinary:  Negative for dysuria and flank pain.   Musculoskeletal:  Negative for arthralgias, back pain and myalgias.   Integumentary:  Negative for color change and pallor.   Neurological:  Negative for dizziness, weakness and headaches.   Psychiatric/Behavioral:  Negative for confusion and decreased concentration.           Objective     Physical Exam    Virtual visit    Labs- reviewed     Assessment and Plan     1. Prostate cancer    2. Secondary malignant neoplasm of bone      Prostate cancer  Previously on intermittent low dose Zytiga + Prednisone in AM- currently off  Side effects improved  Discussed Orgovyx (relugolix) - he would like to misael      Osteoporosis  Prolia q 6 months-- received in 4/2024 (due 10/24- not Xgeva as bone lesions resolved)  BMD at 1 year (09/2024)        Plans to move to [facility]- will look for local resource    Route Chart for Scheduling    Med Onc Chart Routing      Follow up with physician 2 months. cbc, cmp, psa and EP   Follow up with [facility]    Infusion scheduling note    Injection scheduling note    Labs    Imaging    Pharmacy appointment    Other referrals                    Therapy Plan  Information  Medications  denosumab (PROLIA) injection 60 mg  60 mg, Subcutaneous, Every 26 weeks

## 2024-06-27 NOTE — PROGRESS NOTES
Subjective     Patient ID: Denis Bunn is a 68 y.o. male.    Chief Complaint: f/u MRI       HPI     Denis Bunn is a 68 y.o. male history of sudden hearing loss left ear about 6 weeks ago.  He is here to follow up after MRI and steroid therapy.  He did not tolerate prednisone well and had to stop early.  Reports mild improvement in his hearing.  Otherwise doing fine.    Review of Systems   Constitutional: Negative.    HENT:  Positive for hearing loss.    Eyes: Negative.    Gastrointestinal: Negative.    Endocrine: Negative.    Musculoskeletal:  Positive for back pain.   Integumentary:  Negative.   Allergic/Immunologic: Negative.    Neurological: Negative.    Hematological: Negative.    Psychiatric/Behavioral: Negative.            Objective     Physical Exam  Vitals and nursing note reviewed.   Constitutional:       Appearance: Normal appearance.   HENT:      Head: Normocephalic and atraumatic.      Right Ear: Tympanic membrane, ear canal and external ear normal. There is no impacted cerumen.      Left Ear: Tympanic membrane, ear canal and external ear normal. There is no impacted cerumen.   Neurological:      Mental Status: He is alert.       Data reviewed:        Audiogram independently reviewed by me shows left greater than right sensorineural hearing loss in the mild-to-moderate range in the left ear and mild range right side    MRI IAC independently reviewed by me no pathology of the temporal bones noted.  No sign of vestibular schwannoma.    Assessment and Plan     1. Sudden hearing loss, left    2. Sensorineural hearing loss (SNHL) of left ear with unrestricted hearing of right ear        Discussed hearing aids   Check hearing on an annual basis  MRI negative         No follow-ups on file.

## 2024-07-01 ENCOUNTER — PATIENT MESSAGE (OUTPATIENT)
Dept: HEMATOLOGY/ONCOLOGY | Facility: CLINIC | Age: 69
End: 2024-07-01
Payer: MEDICARE

## 2024-07-01 DIAGNOSIS — C61 PROSTATE CANCER: Primary | ICD-10-CM

## 2024-07-03 ENCOUNTER — PATIENT MESSAGE (OUTPATIENT)
Dept: ELECTROPHYSIOLOGY | Facility: CLINIC | Age: 69
End: 2024-07-03
Payer: MEDICARE

## 2024-07-03 ENCOUNTER — PATIENT MESSAGE (OUTPATIENT)
Dept: CARDIOLOGY | Facility: CLINIC | Age: 69
End: 2024-07-03
Payer: MEDICARE

## 2024-07-03 DIAGNOSIS — C61 PROSTATE CANCER: ICD-10-CM

## 2024-07-03 NOTE — TELEPHONE ENCOUNTER
----- Message from Cinda Tam sent at 7/3/2024  1:49 PM CDT -----  Regarding: rx refill  Contact: [email or web address]  RX Name:  relugolix 120 mg Tab   [[id number]]        How is it taken:     Quantity:       Preferred Pharmacy with phone number:[email or web address] [place] [place] - [address]  [address] [facility] [facility] [address]  Phone: [phone] Fax: [phone]                Ordering Provider:Sharad       Contact Preference:       Phone: [phone] Fax: [phone]          Addl info:Nithin is calling to verify that doctor knows that relugolix 120 mg Tab  and verapamiL (CALAN-SR) 120 MG CR tablet cause major side affects and if wants to continue please call and advise

## 2024-07-19 ENCOUNTER — PATIENT OUTREACH (OUTPATIENT)
Dept: ADMINISTRATIVE | Facility: HOSPITAL | Age: 69
End: 2024-07-19
Payer: MEDICARE

## 2024-07-22 ENCOUNTER — PATIENT OUTREACH (OUTPATIENT)
Dept: ADMINISTRATIVE | Facility: HOSPITAL | Age: 69
End: 2024-07-22
Payer: MEDICARE

## 2024-07-22 NOTE — PROGRESS NOTES
Pt declines annual exam at this time and will follow up at a later date. Immunization's updated/triggered.

## 2024-08-19 DIAGNOSIS — F33.1 MODERATE EPISODE OF RECURRENT MAJOR DEPRESSIVE DISORDER: ICD-10-CM

## 2024-08-19 RX ORDER — CITALOPRAM 10 MG/1
10 TABLET ORAL
Qty: 90 TABLET | Refills: 1 | Status: SHIPPED | OUTPATIENT
Start: 2024-08-19

## 2024-08-19 NOTE — TELEPHONE ENCOUNTER
Refill Decision Note   Denis Bunn  is requesting a refill authorization.  Brief Assessment and Rationale for Refill:  Approve     Medication Therapy Plan:         Comments:     Note composed:12:17 AM 08/19/2024

## 2024-08-19 NOTE — TELEPHONE ENCOUNTER
No care due was identified.  [facility] Embedded Care Due Messages. Reference number: [id number].   8/19/2024 12:07:40 AM CDT

## 2024-08-22 ENCOUNTER — PATIENT MESSAGE (OUTPATIENT)
Dept: FAMILY MEDICINE | Facility: CLINIC | Age: 69
End: 2024-08-22
Payer: MEDICARE

## 2024-08-22 DIAGNOSIS — G62.9 NEUROPATHY: ICD-10-CM

## 2024-08-22 RX ORDER — GABAPENTIN 300 MG/1
300 CAPSULE ORAL NIGHTLY
Qty: 90 CAPSULE | Refills: 1 | Status: SHIPPED | OUTPATIENT
Start: 2024-08-22

## 2024-08-22 NOTE — TELEPHONE ENCOUNTER
No care due was identified.  [facility] Embedded Care Due Messages. Reference number: [id number].   8/22/2024 2:35:30 PM CDT

## 2024-08-26 ENCOUNTER — PATIENT MESSAGE (OUTPATIENT)
Dept: HEMATOLOGY/ONCOLOGY | Facility: CLINIC | Age: 69
End: 2024-08-26
Payer: MEDICARE

## 2024-08-26 DIAGNOSIS — C61 PROSTATE CANCER: ICD-10-CM

## 2024-09-13 ENCOUNTER — TELEPHONE (OUTPATIENT)
Dept: FAMILY MEDICINE | Facility: CLINIC | Age: 69
End: 2024-09-13
Payer: MEDICARE

## 2024-09-13 NOTE — TELEPHONE ENCOUNTER
----- Message from Adriana Meza sent at 9/13/2024 12:26 PM CDT -----  Regarding: flu shot  Type:  Patient Returning Call      Name of who is calling:pt         What is request in detail:pt is requesting a call back in regards to high dose flu vaccine. Patient wants to know if available        Can clinic reply by [facility]:no        What number to call back if not in [facility]: [phone] a

## 2024-09-25 DIAGNOSIS — Z00.00 ENCOUNTER FOR MEDICARE ANNUAL WELLNESS EXAM: ICD-10-CM

## 2024-09-26 ENCOUNTER — CLINICAL SUPPORT (OUTPATIENT)
Dept: FAMILY MEDICINE | Facility: CLINIC | Age: 69
End: 2024-09-26
Payer: MEDICARE

## 2024-09-26 ENCOUNTER — TELEPHONE (OUTPATIENT)
Dept: FAMILY MEDICINE | Facility: CLINIC | Age: 69
End: 2024-09-26
Payer: MEDICARE

## 2024-09-26 DIAGNOSIS — Z23 NEED FOR INFLUENZA VACCINATION: Primary | ICD-10-CM

## 2024-09-26 PROCEDURE — G0008 ADMIN INFLUENZA VIRUS VAC: HCPCS | Mod: PBBFAC,PN

## 2024-09-26 PROCEDURE — 90472 IMMUNIZATION ADMIN EACH ADD: CPT | Mod: PBBFAC,PN

## 2024-09-26 PROCEDURE — 90653 IIV ADJUVANT VACCINE IM: CPT | Mod: PBBFAC,PN

## 2024-09-26 PROCEDURE — 99999PBSHW PR PBB SHADOW TECHNICAL ONLY FILED TO HB: Mod: PBBFAC,,,

## 2024-09-26 RX ADMIN — INFLUENZA A VIRUS A/VICTORIA/4897/2022 IVR-238 (H1N1) ANTIGEN (FORMALDEHYDE INACTIVATED), INFLUENZA A VIRUS A/THAILAND/8/2022 IVR-237 (H3N2) ANTIGEN (FORMALDEHYDE INACTIVATED), INFLUENZA B VIRUS B/AUSTRIA/1359417/2021 BVR-26 ANTIGEN (FORMALDEHYDE INACTIVATED) 0.5 ML: 15; 15; 15 INJECTION, SUSPENSION INTRAMUSCULAR at 01:09

## 2024-09-26 NOTE — PROGRESS NOTES
Patient given flu vaccine left deltoid, no complaints or reactions noted. Vis given 8/6/21 to patient. Instructed to wait in lobby for 15 minutes to monitor for reaction

## 2024-09-26 NOTE — TELEPHONE ENCOUNTER
----- Message from Vani Nascimento sent at 9/26/2024  7:50 AM CDT -----  .Type: Patient Call Back    Who called: Self     What is the request in detail: Would like to know  are there any flu shots for people over 65? Ask that that nurse give him a call     Can the clinic reply by [facility]? No     Would the patient rather a call back or a response via [facility]? Call Back     Best call back number: .[phone] (home)       Additional Information:

## 2024-09-27 ENCOUNTER — PATIENT MESSAGE (OUTPATIENT)
Dept: FAMILY MEDICINE | Facility: CLINIC | Age: 69
End: 2024-09-27
Payer: MEDICARE

## 2024-10-04 DIAGNOSIS — I10 HTN (HYPERTENSION), BENIGN: ICD-10-CM

## 2024-10-05 RX ORDER — LOSARTAN POTASSIUM 100 MG/1
TABLET ORAL
Qty: 90 TABLET | Refills: 3 | Status: SHIPPED | OUTPATIENT
Start: 2024-10-05

## 2024-11-03 DIAGNOSIS — I49.3 PVC (PREMATURE VENTRICULAR CONTRACTION): ICD-10-CM

## 2024-11-03 DIAGNOSIS — I10 HTN (HYPERTENSION), BENIGN: ICD-10-CM

## 2024-11-05 RX ORDER — VERAPAMIL HYDROCHLORIDE 240 MG/1
240 CAPSULE, EXTENDED RELEASE ORAL EVERY MORNING
Qty: 90 CAPSULE | Refills: 3 | Status: SHIPPED | OUTPATIENT
Start: 2024-11-05

## 2024-11-05 RX ORDER — HYDROCHLOROTHIAZIDE 25 MG/1
TABLET ORAL
Qty: 90 TABLET | Refills: 3 | Status: SHIPPED | OUTPATIENT
Start: 2024-11-05

## 2024-11-13 ENCOUNTER — PATIENT OUTREACH (OUTPATIENT)
Dept: ADMINISTRATIVE | Facility: HOSPITAL | Age: 69
End: 2024-11-13
Payer: MEDICARE

## 2024-11-13 ENCOUNTER — OFFICE VISIT (OUTPATIENT)
Dept: FAMILY MEDICINE | Facility: CLINIC | Age: 69
End: 2024-11-13
Payer: MEDICARE

## 2024-11-13 ENCOUNTER — PATIENT MESSAGE (OUTPATIENT)
Dept: ADMINISTRATIVE | Facility: HOSPITAL | Age: 69
End: 2024-11-13
Payer: MEDICARE

## 2024-11-13 VITALS
HEIGHT: 68 IN | OXYGEN SATURATION: 97 % | HEART RATE: 60 BPM | SYSTOLIC BLOOD PRESSURE: 138 MMHG | BODY MASS INDEX: 25.83 KG/M2 | WEIGHT: 170.44 LBS | RESPIRATION RATE: 19 BRPM | DIASTOLIC BLOOD PRESSURE: 78 MMHG

## 2024-11-13 VITALS — SYSTOLIC BLOOD PRESSURE: 129 MMHG | DIASTOLIC BLOOD PRESSURE: 76 MMHG

## 2024-11-13 DIAGNOSIS — K59.00 CONSTIPATION, UNSPECIFIED CONSTIPATION TYPE: ICD-10-CM

## 2024-11-13 DIAGNOSIS — C61 PROSTATE CANCER: Primary | ICD-10-CM

## 2024-11-13 DIAGNOSIS — F41.9 ANXIETY: ICD-10-CM

## 2024-11-13 DIAGNOSIS — E78.2 MIXED HYPERLIPIDEMIA: ICD-10-CM

## 2024-11-13 DIAGNOSIS — I10 HYPERTENSION, ESSENTIAL: ICD-10-CM

## 2024-11-13 PROCEDURE — 99999 PR PBB SHADOW E&M-EST. PATIENT-LVL IV: CPT | Mod: PBBFAC,,, | Performed by: INTERNAL MEDICINE

## 2024-11-13 PROCEDURE — 99214 OFFICE O/P EST MOD 30 MIN: CPT | Mod: S$PBB,,, | Performed by: INTERNAL MEDICINE

## 2024-11-13 PROCEDURE — 99214 OFFICE O/P EST MOD 30 MIN: CPT | Mod: PBBFAC,PN | Performed by: INTERNAL MEDICINE

## 2024-11-13 RX ORDER — ALPRAZOLAM 0.5 MG/1
0.5 TABLET ORAL NIGHTLY PRN
Qty: 15 TABLET | Refills: 0 | Status: SHIPPED | OUTPATIENT
Start: 2024-11-13 | End: 2024-12-13

## 2024-11-13 RX ORDER — LACTULOSE 10 G/15ML
15 SOLUTION ORAL 2 TIMES DAILY PRN
Qty: 300 ML | Refills: 1 | Status: SHIPPED | OUTPATIENT
Start: 2024-11-13

## 2024-11-13 NOTE — PROGRESS NOTES
"Subjective:       Patient ID: Denis Bunn is a 69 y.o. male.    Chief Complaint: Follow-up    Follow up chronic conditions    HPI: 70 y/o w/ HTN HLD prostate cancer presents with wife for follow up. Doing "okay" still with issues with early a.m. awakening did not feel restoril was of any benefit will stay in bed up to two or three hours once awaken (looking at phone/reading) no LE swelling no orthopnea or PND he also reports long history of sensation of incomplete evacuation of bowels. Using colace and fiber supplement rarely strains no melena or BRBPR      Review of Systems   Constitutional:  Negative for activity change, fever and unexpected weight change.   HENT:  Positive for hearing loss. Negative for congestion, rhinorrhea, sore throat and trouble swallowing.    Eyes:  Negative for photophobia, discharge, redness and visual disturbance.   Respiratory:  Negative for cough, chest tightness, shortness of breath and wheezing.    Cardiovascular:  Negative for chest pain, palpitations and leg swelling.   Gastrointestinal:  Positive for constipation. Negative for abdominal pain, blood in stool, diarrhea, nausea and vomiting.   Endocrine: Negative for cold intolerance, heat intolerance, polydipsia and polyuria.   Genitourinary:  Negative for decreased urine volume, difficulty urinating, dysuria, hematuria and urgency.   Musculoskeletal:  Negative for arthralgias, back pain, joint swelling and neck pain.   Skin:  Negative for rash.   Neurological:  Negative for dizziness, syncope, weakness and headaches.   Psychiatric/Behavioral:  Negative for confusion, dysphoric mood, sleep disturbance and suicidal ideas.        Objective:     Vitals:    11/13/24 1501   BP: 138/78   BP Location: Left arm   Patient Position: Sitting   Pulse: 60   Resp: 19   SpO2: 97%   Weight: 77.3 kg (170 lb 6.7 oz)   Height: 5' 8" (1.727 m)          Physical Exam  Constitutional:       Appearance: He is well-developed.   HENT:      Head: " Normocephalic and atraumatic.   Eyes:      General: No scleral icterus.     Conjunctiva/sclera: Conjunctivae normal.   Cardiovascular:      Rate and Rhythm: Normal rate and regular rhythm.      Heart sounds: No murmur heard.     No friction rub. No gallop.   Pulmonary:      Effort: Pulmonary effort is normal.      Breath sounds: Normal breath sounds. No wheezing or rales.   Abdominal:      Palpations: Abdomen is soft.      Tenderness: There is no abdominal tenderness. There is no guarding or rebound.   Musculoskeletal:         General: No tenderness. Normal range of motion.      Cervical back: Normal range of motion.      Right lower leg: No edema.      Left lower leg: No edema.   Skin:     General: Skin is warm and dry.   Neurological:      Mental Status: He is alert and oriented to person, place, and time.      Cranial Nerves: No cranial nerve deficit.         Assessment and Plan   1. Prostate cancer (Primary)  Management per oncologist on ADT continue    2. Hypertension, essential  Bp at goal continue current medicatiosn    3. Mixed hyperlipidemia  Continue statin therapy    4. Constipation, unspecified constipation type  Normal colonoscopy within last year on fiber supplement can use lactuosle prn for incomplete evacuation  - lactulose (CHRONULAC) 20 gram/30 mL Soln; Take 23 mLs (15 g total) by mouth 2 (two) times daily as needed (constipation).  Dispense: 300 mL; Refill: 1    5. Anxiety  Recommend getting out of bed if not able to fall asleep after 30 minutes avoid screens prn alprazolam to assist   - ALPRAZolam (XANAX) 0.5 MG tablet; Take 1 tablet (0.5 mg total) by mouth nightly as needed for Insomnia.  Dispense: 15 tablet; Refill: 0

## 2024-11-14 ENCOUNTER — PATIENT MESSAGE (OUTPATIENT)
Dept: FAMILY MEDICINE | Facility: CLINIC | Age: 69
End: 2024-11-14
Payer: MEDICARE

## 2024-11-14 DIAGNOSIS — K64.9 HEMORRHOIDS, UNSPECIFIED HEMORRHOID TYPE: Primary | ICD-10-CM

## 2024-11-15 RX ORDER — HYDROCORTISONE 25 MG/G
CREAM TOPICAL 2 TIMES DAILY
Qty: 28 G | Refills: 0 | Status: SHIPPED | OUTPATIENT
Start: 2024-11-15

## 2024-12-17 ENCOUNTER — OFFICE VISIT (OUTPATIENT)
Dept: FAMILY MEDICINE | Facility: CLINIC | Age: 69
End: 2024-12-17
Payer: MEDICARE

## 2024-12-17 VITALS
TEMPERATURE: 99 F | HEART RATE: 50 BPM | OXYGEN SATURATION: 99 % | RESPIRATION RATE: 15 BRPM | BODY MASS INDEX: 25.83 KG/M2 | WEIGHT: 170.44 LBS | DIASTOLIC BLOOD PRESSURE: 65 MMHG | HEIGHT: 68 IN | SYSTOLIC BLOOD PRESSURE: 132 MMHG

## 2024-12-17 DIAGNOSIS — G47.01 INSOMNIA DUE TO MEDICAL CONDITION: ICD-10-CM

## 2024-12-17 DIAGNOSIS — I10 HTN (HYPERTENSION), BENIGN: ICD-10-CM

## 2024-12-17 DIAGNOSIS — R53.83 FATIGUE, UNSPECIFIED TYPE: Primary | ICD-10-CM

## 2024-12-17 DIAGNOSIS — C61 PROSTATE CANCER: ICD-10-CM

## 2024-12-17 PROCEDURE — G2211 COMPLEX E/M VISIT ADD ON: HCPCS | Mod: S$PBB,,, | Performed by: NURSE PRACTITIONER

## 2024-12-17 PROCEDURE — 99215 OFFICE O/P EST HI 40 MIN: CPT | Mod: PBBFAC,PN | Performed by: NURSE PRACTITIONER

## 2024-12-17 PROCEDURE — 99214 OFFICE O/P EST MOD 30 MIN: CPT | Mod: S$PBB,,, | Performed by: NURSE PRACTITIONER

## 2024-12-17 PROCEDURE — 99999 PR PBB SHADOW E&M-EST. PATIENT-LVL V: CPT | Mod: PBBFAC,,, | Performed by: NURSE PRACTITIONER

## 2024-12-17 RX ORDER — MIRTAZAPINE 7.5 MG/1
7.5 TABLET, FILM COATED ORAL NIGHTLY PRN
Qty: 30 TABLET | Refills: 0 | Status: SHIPPED | OUTPATIENT
Start: 2024-12-17 | End: 2025-12-17

## 2024-12-17 NOTE — PROGRESS NOTES
Routine Office Visit    Patient Name: Denis Bunn    : 1955  MRN: [id number]    Chief Complaint:  Fatigue    Subjective:  Denis is a 69 y.o. male who presents today for:    Fatigue - patient who is new to me with prostate cancer on Orgovyx followed at [facility], other medical history as documented below reports today for evaluation.  He reports today to discuss some afternoon fatigue which he has been having for the last 8 days.  Eight days ago he developed a scratchy throat and was seen by a provider in [place] where he had a COVID test which was negative.  He notes that the throat symptoms have resolved but he is still having fatigue in the afternoons for about an hour where it feels like he hits a lull and he feels drained.  He denies any cough, shortness of breath, chest pain, or other physical symptoms that has been lingering with this.    He did have recent lab work done by his oncologist at [facility] with results as follows:                Note is made of mildly elevated potassium on lab work and mildly decreased hemoglobin as well as significant decrease in testosterone.  He states he has been having longstanding issues with insomnia as well.  He states he can fall asleep fine but wakes up multiple times a night due to nocturia and it is hard for him to fall back asleep.  He will have to take a Xanax to fall asleep but he finds himself up for hours in the middle of the night sometimes.  He denies any chronic urinary urgency or incontinence or other overactive bladder symptoms.  He states he will be on the Orgovyx until January.  Of note, he states that the fatigue has improved somewhat in the last couple of days.    Past Medical History  Past Medical History:   Diagnosis Date    Anxiety     Back pain     Cataract     Constipation - functional     Dyslipidemia     ED (erectile dysfunction)     FH: CAD (coronary artery disease) 2013    But he had 0 CAC    History of gout     HTN  (hypertension), benign 04/24/2013    Personal history of colonic polyps     Prostate cancer     Vision changes        Family History  Family History   Problem Relation Name Age of Onset    Blindness Mother Eli         from cva    Cataracts Mother Eli     Prostate cancer Father Denis     Cataracts Father Denis     No Known Problems Sister      No Known Problems Brother      No Known Problems Maternal Aunt      No Known Problems Maternal Uncle      No Known Problems Paternal Aunt      No Known Problems Paternal Uncle      No Known Problems Maternal Grandmother      No Known Problems Maternal Grandfather      No Known Problems Paternal Grandmother      No Known Problems Paternal Grandfather      Amblyopia Neg Hx      Diabetes Neg Hx      Glaucoma Neg Hx      Hypertension Neg Hx      Macular degeneration Neg Hx      Retinal detachment Neg Hx      Strabismus Neg Hx      Stroke Neg Hx      Thyroid disease Neg Hx         Current Medications  Current Outpatient Medications on File Prior to Visit   Medication Sig Dispense Refill    allopurinoL (ZYLOPRIM) 100 MG tablet TAKE 1 TABLET BY MOUTH TWICE A  tablet 3    ascorbic acid, vitamin C, (VITAMIN C) 500 MG tablet Take 500 mg by mouth once daily. Hold for 1 week prior to surgery      cholecalciferol, vitamin D3, 125 mcg (5,000 unit) Tab Take 5,000 Units by mouth once daily.      coenzyme Q10 100 mg capsule Take 100 mg by mouth once daily. Hold for 1 week prior to surgery      denosumab (PROLIA) 60 mg/mL Syrg Inject 60 mg into the skin.      FA/niacinamide/cupric ox/Zn ox (NICOTINAMIDE ORAL) Take 500 mg by mouth once daily.      ferrous sulfate (SLOW RELEASE IRON) 142 mg (45 mg iron) TbSR Take by mouth.      gabapentin (NEURONTIN) 300 MG capsule Take 1 capsule (300 mg total) by mouth every evening. 90 capsule 1    garlic 1,000 mg Cap Take by mouth. Hold for 1 week prior to surgery      hydrocortisone 2.5 % cream Apply topically 2 (two) times daily. 28 g 0     lactulose (CHRONULAC) 20 gram/30 mL Soln Take 23 mLs (15 g total) by mouth 2 (two) times daily as needed (constipation). 300 mL 1    losartan (COZAAR) 100 MG tablet TAKE 1 TABLET BY MOUTH EVERY DAY 90 tablet 3    magnesium 250 mg Tab Take 250 mg by mouth once. Takes two 250 mg tablets daily   Hold for 1 week prior to surgery      meloxicam (MOBIC) 7.5 MG tablet       omega-3 fatty acids 1,000 mg Cap Take 1,000 mg by mouth.      pravastatin (PRAVACHOL) 20 MG tablet TAKE 1 TABLET BY MOUTH EVERY DAY 90 tablet 3    relugolix 120 mg Tab Take 120 mg by mouth once daily. 30 tablet 1    verapamiL (CALAN-SR) 120 MG CR tablet TAKE 1 TABLET BY MOUTH NIGHTLY. IN ADDITION TO 240MG IN THE MORNING 90 tablet 3    verapamiL (VERELAN) 240 MG C24P Take 1 capsule (240 mg total) by mouth every morning. in addition to 120 mg at night 90 capsule 3    zinc 50 mg Tab Take by mouth. Hold for 1 week prior to surgery      ALPRAZolam (XANAX) 0.5 MG tablet Take 1 tablet (0.5 mg total) by mouth nightly as needed for Insomnia. 15 tablet 0    calcium-vitamin D3 (OYSTER SHELL CALCIUM-VIT D3) 500 mg-5 mcg (200 unit) per tablet Take 1 tablet by mouth 2 (two) times daily. 180 tablet 3    [DISCONTINUED] abiraterone (ZYTIGA) 250 mg Tab Take 1 tablet (250 mg total) by mouth once daily. (Patient not taking: Reported on 6/26/2024.) 30 tablet 4    [DISCONTINUED] citalopram (CELEXA) 10 MG tablet TAKE 1 TABLET BY MOUTH EVERY DAY (Patient not taking: Reported on 12/17/2024) 90 tablet 1     No current facility-administered medications on file prior to visit.       Allergies   Review of patient's allergies indicates:   Allergen Reactions    Amoxicillin-pot clavulanate Nausea And Vomiting     Other reaction(s): Stomach upset  Other reaction(s): Stomach upset    Celecoxib Other (See Comments) and Nausea And Vomiting       Review of Systems (Pertinent positives)  Review of Systems   Constitutional:  Positive for malaise/fatigue. Negative for chills and fever.  "  HENT: Negative.  Negative for ear discharge, ear pain, hearing loss and tinnitus.    Eyes:  Negative for discharge.   Respiratory:  Negative for cough, sputum production and wheezing.    Cardiovascular: Negative.  Negative for chest pain and palpitations.   Gastrointestinal:  Negative for blood in stool, diarrhea and vomiting.   Genitourinary:  Negative for hematuria and urgency.   Musculoskeletal:  Negative for neck pain.   Skin: Negative.    Neurological:  Negative for weakness and headaches.   Endo/Heme/Allergies:  Negative for polydipsia.       /65 (BP Location: Right arm, Patient Position: Sitting)   Pulse (!) 50   Temp 98.7 °F (37.1 °C) (Oral)   Resp 15   Ht 5' 8" (1.727 m)   Wt 77.3 kg (170 lb 6.7 oz)   SpO2 99%   BMI 25.91 kg/m²     Physical Exam  Vitals reviewed.   Constitutional:       General: He is not in acute distress.     Appearance: Normal appearance. He is not ill-appearing, toxic-appearing or diaphoretic.   HENT:      Head: Normocephalic and atraumatic.      Right Ear: Tympanic membrane, ear canal and external ear normal.      Left Ear: Tympanic membrane, ear canal and external ear normal.      Nose: Nose normal. No congestion or rhinorrhea.      Mouth/Throat:      Mouth: Mucous membranes are moist.      Pharynx: Oropharynx is clear. No oropharyngeal exudate or posterior oropharyngeal erythema.   Eyes:      General:         Right eye: No discharge.         Left eye: No discharge.      Extraocular Movements: Extraocular movements intact.      Conjunctiva/sclera: Conjunctivae normal.   Cardiovascular:      Rate and Rhythm: Normal rate and regular rhythm.      Pulses: Normal pulses.      Heart sounds: Normal heart sounds.   Pulmonary:      Effort: Pulmonary effort is normal. No respiratory distress.      Breath sounds: Normal breath sounds. No wheezing.   Abdominal:      General: Bowel sounds are normal. There is no distension.      Palpations: Abdomen is soft.      Tenderness: There is " "no abdominal tenderness.   Musculoskeletal:         General: No swelling or deformity.   Skin:     General: Skin is warm and dry.      Capillary Refill: Capillary refill takes less than 2 seconds.   Neurological:      General: No focal deficit present.      Mental Status: He is alert and oriented to person, place, and time.   Psychiatric:         Mood and Affect: Mood normal.         Behavior: Behavior normal.            Assessment/Plan:  Denis Bunn is a 69 y.o. male who presents today for :    Denis Norman" was seen today for fatigue.    Diagnoses and all orders for this visit:    Fatigue, unspecified type    HTN (hypertension), benign    Prostate cancer    Insomnia due to medical condition  -     mirtazapine (REMERON) 7.5 MG Tab; Take 1 tablet (7.5 mg total) by mouth nightly as needed (insomnia).    I had a detailed discussion with the patient regarding his symptoms and further workup/treatment going forward.  He has been having some afternoon fatigue which has been pretty draining for him for the past 8 days.  Started after a presumed viral URI.  No evidence of viral or bacterial nidus on examination today.  We discussed potential causes of his fatigue, including his insomnia and decreased testosterone.  Recommended patient to monitor for any worsening fatigue as of now.  We discussed potential medications for insomnia.  Will start mirtazapine at low dose.  Potential side effects discussed.  Recommended patient not to take this with Xanax.  His BP is controlled today and he should continue all of his other medications.  All questions answered.  Follow up as needed.    >30 minutes spent total on encounter.        This office note has been dictated.  This dictation has been generated using M-Modal Fluency Direct dictation; some phonetic errors may occur.     "

## 2024-12-19 ENCOUNTER — PATIENT MESSAGE (OUTPATIENT)
Dept: FAMILY MEDICINE | Facility: CLINIC | Age: 69
End: 2024-12-19
Payer: MEDICARE

## 2025-01-08 ENCOUNTER — PATIENT MESSAGE (OUTPATIENT)
Dept: CARDIOLOGY | Facility: CLINIC | Age: 70
End: 2025-01-08
Payer: MEDICARE

## 2025-01-09 DIAGNOSIS — G47.01 INSOMNIA DUE TO MEDICAL CONDITION: ICD-10-CM

## 2025-01-09 RX ORDER — MIRTAZAPINE 7.5 MG/1
7.5 TABLET, FILM COATED ORAL NIGHTLY PRN
Qty: 90 TABLET | Refills: 1 | Status: SHIPPED | OUTPATIENT
Start: 2025-01-09 | End: 2026-01-09

## 2025-01-15 ENCOUNTER — TELEPHONE (OUTPATIENT)
Dept: ELECTROPHYSIOLOGY | Facility: CLINIC | Age: 70
End: 2025-01-15
Payer: MEDICARE

## 2025-01-15 NOTE — TELEPHONE ENCOUNTER
Pt wanted to schedule 1 yr f/u but isnt due til June 2025. Letter is going to mail out in March. Pt will call back in March

## 2025-01-30 DIAGNOSIS — Z00.00 ENCOUNTER FOR MEDICARE ANNUAL WELLNESS EXAM: ICD-10-CM

## 2025-03-02 DIAGNOSIS — Z82.49 FH: CAD (CORONARY ARTERY DISEASE): ICD-10-CM

## 2025-03-02 DIAGNOSIS — G62.9 NEUROPATHY: ICD-10-CM

## 2025-03-02 DIAGNOSIS — I10 HTN (HYPERTENSION), BENIGN: ICD-10-CM

## 2025-03-02 NOTE — TELEPHONE ENCOUNTER
Care Due:                  Date            Visit Type   Department     Provider  --------------------------------------------------------------------------------                                YAMEL      [facility]                              FOLLOWUP/OF  MEDICINE/  Last Visit: 11-      FICE VISIT   INTERNAL MED   Taz Lilly  Next Visit: None Scheduled  None         None Found                                                            Last  Test          Frequency    Reason                     Performed    Due Date  --------------------------------------------------------------------------------    CBC.........  12 months..  allopurinoL..............  04- 04-    CMP.........  12 months..  allopurinoL..............  04- 04-    Uric Acid...  12 months..  allopurinoL..............  01- 01-    [facility] Embedded Care Due Messages. Reference number: [id number].   3/02/2025 11:48:24 AM CST

## 2025-03-03 RX ORDER — GABAPENTIN 300 MG/1
300 CAPSULE ORAL NIGHTLY
Qty: 90 CAPSULE | Refills: 1 | Status: SHIPPED | OUTPATIENT
Start: 2025-03-03

## 2025-03-03 RX ORDER — ALLOPURINOL 100 MG/1
100 TABLET ORAL 2 TIMES DAILY
Qty: 180 TABLET | Refills: 2 | Status: SHIPPED | OUTPATIENT
Start: 2025-03-03

## 2025-03-03 NOTE — TELEPHONE ENCOUNTER
Refill Routing Note   Medication(s) are not appropriate for processing by [facility] for the following reason(s):        Required labs outdated  Outside of protocol    [facility] action(s):  Defer  Route     Requires labs : Yes             Appointments  past 12m or future 3m with PCP    Date Provider   Last Visit   11/13/2024 Taz Lilly MD   Next Visit   Visit date not found Taz Lilly MD   ED visits in past 90 days: 0        Note composed:7:46 PM 03/02/2025

## 2025-03-11 ENCOUNTER — PATIENT MESSAGE (OUTPATIENT)
Dept: ELECTROPHYSIOLOGY | Facility: CLINIC | Age: 70
End: 2025-03-11
Payer: MEDICARE

## 2025-03-11 DIAGNOSIS — I49.3 PVC (PREMATURE VENTRICULAR CONTRACTION): Primary | ICD-10-CM

## 2025-04-01 ENCOUNTER — PATIENT MESSAGE (OUTPATIENT)
Dept: ELECTROPHYSIOLOGY | Facility: CLINIC | Age: 70
End: 2025-04-01
Payer: MEDICARE

## 2025-04-16 ENCOUNTER — PATIENT MESSAGE (OUTPATIENT)
Dept: CARDIOLOGY | Facility: CLINIC | Age: 70
End: 2025-04-16
Payer: MEDICARE

## 2025-05-14 ENCOUNTER — PATIENT MESSAGE (OUTPATIENT)
Dept: CARDIOLOGY | Facility: CLINIC | Age: 70
End: 2025-05-14
Payer: MEDICARE

## 2025-05-28 ENCOUNTER — TELEPHONE (OUTPATIENT)
Dept: CARDIOLOGY | Facility: CLINIC | Age: 70
End: 2025-05-28
Payer: MEDICARE

## 2025-05-28 NOTE — TELEPHONE ENCOUNTER
Spoke with pt regarding  bookout 8/28 Dr Pereyra- He wants to book appts for him and wife on the same day- They're coming from [place]. Available anytime in next 2-3 mos. Will call him back with dates.

## 2025-06-25 DIAGNOSIS — I10 HTN (HYPERTENSION), BENIGN: ICD-10-CM

## 2025-06-25 DIAGNOSIS — G62.9 NEUROPATHY: ICD-10-CM

## 2025-06-25 NOTE — TELEPHONE ENCOUNTER
Refill Routing Note   Medication(s) are not appropriate for processing by [facility] for the following reason(s):        Patient not seen by provider within 15 months  Required labs outdated    [facility] action(s):  Defer             Appointments  past 12m or future 3m with PCP    Date Provider   Last Visit   3/25/2024 Domenico Pereyra MD   Next Visit   Visit date not found Domenico Pereyra MD   ED visits in past 90 days: 0        Note composed:6:01 PM 06/25/2025

## 2025-06-25 NOTE — TELEPHONE ENCOUNTER
Care Due:                  Date            Visit Type   Department     Provider  --------------------------------------------------------------------------------                                YAMEL      [facility]                              [facility]/OF  MEDICINE /  Last Visit: 11-      FICE VISIT   INTERNAL MED   Taz Lilly  Next Visit: None Scheduled  None         None Found                                                            Last  Test          Frequency    Reason                     Performed    Due Date  --------------------------------------------------------------------------------    CBC.........  12 months..  allopurinoL..............  04- 04-    CMP.........  12 months..  allopurinoL, losartan,     04- 04-                             pravastatin..............    Lipid Panel.  12 months..  pravastatin..............  03- 03-    Uric Acid...  12 months..  allopurinoL..............  01- 01-    [employer] Embedded Care Due Messages. Reference number: [id number].   6/25/2025 6:00:21 PM CDT

## 2025-06-26 RX ORDER — GABAPENTIN 300 MG/1
300 CAPSULE ORAL NIGHTLY
Qty: 90 CAPSULE | Refills: 1 | Status: SHIPPED | OUTPATIENT
Start: 2025-06-26

## 2025-06-27 RX ORDER — PRAVASTATIN SODIUM 20 MG/1
20 TABLET ORAL
Qty: 90 TABLET | Refills: 3 | Status: SHIPPED | OUTPATIENT
Start: 2025-06-27

## 2025-06-27 RX ORDER — LOSARTAN POTASSIUM 100 MG/1
100 TABLET ORAL
Qty: 90 TABLET | Refills: 3 | Status: SHIPPED | OUTPATIENT
Start: 2025-06-27

## (undated) DEVICE — TRAY FOLEY 16FR INFECTION CONT

## (undated) DEVICE — TROCAR ENDOPATH XCEL 5MM 7.5CM

## (undated) DEVICE — SUT MCRYL PLUS 4-0 PS2 27IN

## (undated) DEVICE — SUT PDS II 0 CT-1 VIL MONO

## (undated) DEVICE — SUT ABS CLIP LAPRA-TY CTD

## (undated) DEVICE — SCISSOR 5MMX35CM DIRECT DRIVE

## (undated) DEVICE — SET TRI-LUMEN FILTERED TUBE

## (undated) DEVICE — SYR 50ML CATH TIP

## (undated) DEVICE — CLIP HEMO-LOK MLX LARGE LF

## (undated) DEVICE — SOL WATER STRL IRR 1000ML

## (undated) DEVICE — Device

## (undated) DEVICE — SOL NS 1000CC

## (undated) DEVICE — CORD BIPOLAR 12 FOOT

## (undated) DEVICE — LUBRICANT SURGILUBE 2 OZ

## (undated) DEVICE — ELECTRODE REM PLYHSV RETURN 9

## (undated) DEVICE — COVER LIGHT HANDLE

## (undated) DEVICE — KIT ROBOTIC 4 ARM DA VINCI SI

## (undated) DEVICE — KIT ANTIFOG

## (undated) DEVICE — BAG TISS RETRV MONARCH 10MM

## (undated) DEVICE — COVER TIP CURVED SCISSORS XI

## (undated) DEVICE — TRAY MINOR GEN SURG

## (undated) DEVICE — SUT MONOCRYL 3-0 UNDYED RB1

## (undated) DEVICE — IRRIGATOR ENDOSCOPY DISP.

## (undated) DEVICE — DRAPE SCOPE PILLOW WARMER

## (undated) DEVICE — LEGGINGS 48X31 INCH

## (undated) DEVICE — SUT 2/0 36IN COATED VICRYL

## (undated) DEVICE — TROCAR ENDOPATH XCEL 12X100MM

## (undated) DEVICE — SUT MONOCRYL 3-0 RB1

## (undated) DEVICE — SOL CLEARIFY VISUALIZATION LAP

## (undated) DEVICE — GOWN SURGICAL X-LARGE

## (undated) DEVICE — NDL INSUF ULTRA VERESS 120MM

## (undated) DEVICE — DRAPE ABDOMINAL TIBURON 14X11

## (undated) DEVICE — ADHESIVE DERMABOND ADVANCED

## (undated) DEVICE — PORT AIRSEAL 12/120MM LPI

## (undated) DEVICE — SOL ELECTROLUBE ANTI-STIC